# Patient Record
Sex: FEMALE | Race: WHITE | Employment: OTHER | URBAN - METROPOLITAN AREA
[De-identification: names, ages, dates, MRNs, and addresses within clinical notes are randomized per-mention and may not be internally consistent; named-entity substitution may affect disease eponyms.]

---

## 2017-01-10 ENCOUNTER — NURSING HOME VISIT (OUTPATIENT)
Dept: GERIATRICS | Facility: CLINIC | Age: 72
End: 2017-01-10
Payer: COMMERCIAL

## 2017-01-10 VITALS
OXYGEN SATURATION: 93 % | DIASTOLIC BLOOD PRESSURE: 70 MMHG | WEIGHT: 105 LBS | RESPIRATION RATE: 22 BRPM | SYSTOLIC BLOOD PRESSURE: 128 MMHG | HEART RATE: 102 BPM

## 2017-01-10 DIAGNOSIS — J44.1 CHRONIC OBSTRUCTIVE PULMONARY DISEASE WITH ACUTE EXACERBATION (H): Primary | ICD-10-CM

## 2017-01-10 DIAGNOSIS — T38.0X5A STEROID-INDUCED DIABETES MELLITUS (H): ICD-10-CM

## 2017-01-10 DIAGNOSIS — C18.7 MALIGNANT NEOPLASM OF SIGMOID COLON (H): ICD-10-CM

## 2017-01-10 DIAGNOSIS — Z71.89 ADVANCED DIRECTIVES, COUNSELING/DISCUSSION: ICD-10-CM

## 2017-01-10 DIAGNOSIS — I10 BENIGN ESSENTIAL HYPERTENSION: ICD-10-CM

## 2017-01-10 DIAGNOSIS — I50.9 CONGESTIVE HEART FAILURE, UNSPECIFIED CONGESTIVE HEART FAILURE CHRONICITY, UNSPECIFIED CONGESTIVE HEART FAILURE TYPE: ICD-10-CM

## 2017-01-10 DIAGNOSIS — Z71.89 ADVANCE DIRECTIVE DISCUSSED WITH PATIENT: Chronic | ICD-10-CM

## 2017-01-10 DIAGNOSIS — J18.9 PNEUMONIA DUE TO INFECTIOUS ORGANISM, UNSPECIFIED LATERALITY, UNSPECIFIED PART OF LUNG: ICD-10-CM

## 2017-01-10 DIAGNOSIS — E09.9 STEROID-INDUCED DIABETES MELLITUS (H): ICD-10-CM

## 2017-01-10 PROBLEM — R53.1 WEAKNESS: Status: ACTIVE | Noted: 2017-01-04

## 2017-01-10 PROBLEM — D72.829 LEUKOCYTOSIS: Status: ACTIVE | Noted: 2017-01-04

## 2017-01-10 PROBLEM — R06.02 SHORTNESS OF BREATH: Status: ACTIVE | Noted: 2017-01-04

## 2017-01-10 PROBLEM — R73.9 HYPERGLYCEMIA: Status: ACTIVE | Noted: 2017-01-04

## 2017-01-10 PROBLEM — E87.1 HYPONATREMIA: Status: ACTIVE | Noted: 2017-01-04

## 2017-01-10 PROBLEM — I21.4 ACUTE NON-ST ELEVATION MYOCARDIAL INFARCTION (NSTEMI) (H): Status: ACTIVE | Noted: 2017-01-04

## 2017-01-10 PROBLEM — E11.9 TYPE 2 DIABETES MELLITUS (H): Status: ACTIVE | Noted: 2017-01-05

## 2017-01-10 PROBLEM — R79.89 ELEVATED TROPONIN I LEVEL: Status: ACTIVE | Noted: 2017-01-04

## 2017-01-10 PROCEDURE — 99310 SBSQ NF CARE HIGH MDM 45: CPT | Performed by: NURSE PRACTITIONER

## 2017-01-10 PROCEDURE — 99207 ZZC CDG-CORRECTLY CODED, REVIEWED AND AGREE: CPT | Performed by: NURSE PRACTITIONER

## 2017-01-10 RX ORDER — ATORVASTATIN CALCIUM 20 MG/1
20 TABLET, FILM COATED ORAL DAILY
COMMUNITY
End: 2020-01-01

## 2017-01-10 RX ORDER — ALBUTEROL SULFATE 90 UG/1
2 AEROSOL, METERED RESPIRATORY (INHALATION) 4 TIMES DAILY PRN
COMMUNITY
End: 2017-03-07

## 2017-01-10 RX ORDER — MULTIVITAMIN WITH IRON
1 TABLET ORAL 2 TIMES DAILY
Status: ON HOLD | COMMUNITY
End: 2021-01-01

## 2017-01-10 RX ORDER — CITALOPRAM HYDROBROMIDE 40 MG/1
40 TABLET ORAL DAILY
COMMUNITY

## 2017-01-10 RX ORDER — IPRATROPIUM BROMIDE AND ALBUTEROL SULFATE 2.5; .5 MG/3ML; MG/3ML
1 SOLUTION RESPIRATORY (INHALATION) 4 TIMES DAILY
COMMUNITY
End: 2017-02-28

## 2017-01-10 NOTE — PROGRESS NOTES
[facility]  PRIMARY CARE PROVIDER AND CLINIC:  No primary care provider on file. No primary physician on file.  Chief Complaint   Patient presents with     Clinic Care Coordination - Initial     Hospital F/U       HPI:    Bella Saucedo is a 71 year old  (1945),admitted to the [facility]  from [facility] .  Hospital stay 1/4/17  through 1/9/17.  Admitted to this facility for  rehab, medical management and nursing care. Current issues are:      There are no diagnoses linked to this encounter.     CODE STATUS/ADVANCE DIRECTIVES DISCUSSION:   {CODE STATUS:472595}  Patient's living condition: {LIVES WITH (NURSING HOME):705073}    ALLERGIES:Nitroglycerin  PAST MEDICAL HISTORY:  has no past medical history on file.  PAST SURGICAL HISTORY:  has no past surgical history on file.  FAMILY HISTORY: family history is not on file.  SOCIAL HISTORY:      Post Discharge Medication Reconciliation Status: {ACO Med Rec (Provider):415028}.  Current Outpatient Prescriptions   Medication Sig Dispense Refill     ACETAMINOPHEN PO Take 80 mg by mouth daily       ASPIRIN PO Take 81 mg by mouth daily       Citalopram Hydrobromide (CELEXA PO) Take 20 mg by mouth daily       Atorvastatin Calcium (LIPITOR PO) Take 20 mg by mouth daily       [START ON 1/25/2017] DEXAMETHASONE PO Take 4 mg by mouth 2 times daily       DEXAMETHASONE PO Take 6 mg by mouth daily       [START ON 1/17/2017] DEXAMETHASONE PO Take 5 mg by mouth daily       fluticasone-salmeterol (ADVAIR) 250-50 MCG/DOSE diskus inhaler Inhale 1 puff into the lungs 2 times daily       magnesium 250 MG tablet Take 1 tablet by mouth 2 times daily       METOPROLOL TARTRATE PO Take 12.5 mg by mouth 2 times daily       ipratropium - albuterol 0.5 mg/2.5 mg/3 mL (DUONEB) 0.5-2.5 (3) MG/3ML neb solution Take 1 vial by nebulization 4 times daily       Insulin Aspart (NOVOLOG SC) Inject as per sliding scale:  if 0 - 149 = 0  Unit;  150 - 199 = 1 Unit;  200 - 249 = 2 Unit;  250 - 299 = 3 unit;  300 - 349 = 4 unit;  350 - 999 = 5 unit,  subcutaneously before meals and  at bedtime for hyperglycemia       albuterol (PROAIR HFA/PROVENTIL HFA/VENTOLIN HFA) 108 (90 BASE) MCG/ACT Inhaler Inhale 2 puffs into the lungs 4 times daily as needed for shortness of breath / dyspnea or wheezing       LevoFLOXacin (LEVAQUIN PO) Take 750 mg by mouth every other day         Medications reconciled to facility chart and changes were made to reflect current medications as identified as above med list. Below are the changes that were made:   Medications stopped since last EPIC medication reconciliation:   There are no discontinued medications.    Medications started since last [facility] medication reconciliation:  Orders Placed This Encounter   Medications     ACETAMINOPHEN PO     Sig: Take 80 mg by mouth daily     ASPIRIN PO     Sig: Take 81 mg by mouth daily     Citalopram Hydrobromide (CELEXA PO)     Sig: Take 20 mg by mouth daily     Atorvastatin Calcium (LIPITOR PO)     Sig: Take 20 mg by mouth daily     DEXAMETHASONE PO     Sig: Take 4 mg by mouth 2 times daily     DEXAMETHASONE PO     Sig: Take 6 mg by mouth daily     DEXAMETHASONE PO     Sig: Take 5 mg by mouth daily     fluticasone-salmeterol (ADVAIR) 250-50 MCG/DOSE diskus inhaler     Sig: Inhale 1 puff into the lungs 2 times daily     magnesium 250 MG tablet     Sig: Take 1 tablet by mouth 2 times daily     METOPROLOL TARTRATE PO     Sig: Take 12.5 mg by mouth 2 times daily     ipratropium - albuterol 0.5 mg/2.5 mg/3 mL (DUONEB) 0.5-2.5 (3) MG/3ML neb solution     Sig: Take 1 vial by nebulization 4 times daily     Insulin Aspart (NOVOLOG SC)     Sig: Inject as per sliding scale:  if 0 - 149 = 0 Unit;  150 - 199 = 1 Unit;  200 - 249 = 2 Unit;  250 - 299 = 3 unit;  300 - 349 = 4 unit;  350 - 999 = 5 unit,  subcutaneously before meals and  at bedtime for hyperglycemia     albuterol (PROAIR HFA/PROVENTIL  HFA/VENTOLIN HFA) 108 (90 BASE) MCG/ACT Inhaler     Sig: Inhale 2 puffs into the lungs 4 times daily as needed for shortness of breath / dyspnea or wheezing     LevoFLOXacin (LEVAQUIN PO)     Sig: Take 750 mg by mouth every other day     ***        ROS:  {wogmmt61:883603}    Exam:  /70 mmHg  Pulse 102  Resp 22  Wt 105 lb (47.628 kg)  SpO2 93%  {Nursing home physical exam :871788}    GENERAL APPEARANCE:  Alert, in *** distress  HEAD:  Normal, normocephalic, atraumatic  EYE EXAM: normal external eye, conjunctiva, lids, WILBER  NECK EXAM: supple, ***, no JVD  CHEST/RESP:  respiratory effort *** normal, lung sounds *** , *** respiratory distress  CV:  Rate ***, rhythm ***, ***murmur, *** peripheral edema ***  GI/ABDOMEN:  ***normal bowel sounds, soft, nontender, no palpable masses  M/S:   extremities *** normal, gait *** normal-***, normal muscle tone, and range of motion ***  SKIN EXAM: ***  NEUROLOGIC EXAM: Normal gross motor movement, tone and coordination. No tremor.  Cranial nerves 2-12 are normal tested and grossly at patient's baseline  PSYCH:  Alert and oriented to ***, affect ***, judgement ***    Lab/Diagnostic data:   ***  No results found for: WBC]  No results found for: HGB]  Last Basic Metabolic Panel:  No results found for this basename: na   No results found for this basename: potassium  No results found for this basename: ed  No results found for this basename: co2  No results found for this basename: BUN  No results found for this basename: CR  No results found for this basename: glc    ASSESSMENT/PLAN:  There are no diagnoses linked to this encounter.      Orders:  ***    Information reviewed:  Medications, vital signs, orders, nursing notes, problem list, hospital information. Facility MDS and care plan reviewed. ***  Total time spent with patient visit was 35 min including patient visit, review of past records and ***. Greater than 50% of total time spent with counseling and coordinating  care.    Electronically signed by:  Sarah Reynolds CNP   [facility]  [phone] cell

## 2017-01-10 NOTE — PROGRESS NOTES
"[place]  PRIMARY CARE PROVIDER AND CLINIC:  No primary care provider on file. No primary physician on file.  Chief Complaint   Patient presents with     Clinic Care Coordination - Initial     Hospital F/U       HPI:    Bella Saucedo is a 71 year old  (1945),admitted to the [facility]  from [facility] .  Hospital stay 1/4/17  through 1/9/17.  Admitted to this facility for  rehab, medical management and nursing care. Current issues are:       As copied from hospital DC Summary:   \"Bella Saucedo is a 71 y.o. female with a history of COPD on home oxygen 2L/min, colon cancer s/p hemicolectomy and ostomy (had chemotherapy in 2016 - treated with Xeloda for 6 months with completion date in 11/2016 and currently on Decadron 2 mg BID for poor appetite, started by Oncologist), HTN, prediabetes, subarachnoid hemorrhage/coil embolization of right MCA, and diastolic CHF who was transferred to [facility] from [facility] on 01/04/2017 for further evaluation of shortness of breath on minimal exertion and increased fatigue with no associated chest pain. Originally presented to [place] ED and was found to have leukocytosis (WBC 19), elevated troponin (initially 0.093), EKG without acute ST changes, chest XR negative for acute infiltrate, CT C/A/P negative for PE and pulmonary infiltrate. Second troponin elevated to 0.155. Was then transferred to [facility] for further evaluation and management.      On admission to [facility], ASA started and heparin gtt continued. Cardiology consulted; echo 1/5 showed decreased LV size (LVEF 65-70%), increased RV wall thickness, aortic valve sclerosis. CTA report with moderate stenosis and significant coronary plaque (Ca score 1416), however without severe obstruction. Cardiology recommended continued risk factor treatment and signed off 1/5.     CT chest angio 1/5 with new nodular opacities in RLL, likely infectious/inflammatory in nature along with " bilateral emphysematous changes. She was started on a 7 day levaquin regimen; a follow up CT is advised in 6 weeks. Patient discharged to SNF on 1/9 with duonebs, advair, aforementioned abx, statin, asa, and increased decadron.     COMPLICATIONS DURING HOSPITALIZATION   None    CONSULTATIONS REQUESTED DURING THIS ADMISSION     Cardiology: Malick Barrios MD    PERTINENT IMAGING RESULTS   CT Angio chest 1/5/17:  1. No evidence for pulmonary embolism. Bilateral emphysematous changes most pronounced at the lung apices.   2. Ill-defined nodular opacities in the subpleural right lower lobe, which are new compared to prior examination and are likely infectious/inflammatory in nature.     Echo complete w/o 1/05/17   1. Technically limited exam.   2. Decreased LV size, borderline wall thickness, hyperdynamic global systolic function with an estimated EF of 65 - 70%. There is evididence of intra-cavitary obstruction.   3. Mildly increased RV wall thickness.   4. Right ventricular cavity size is normal, global systolic RV function is normal.   5. The aortic valve is sclerotic, no stenosis and mild regurgitation.   6. The inferior vena cava is normal sized, respiratory size variation greater than 50%.   7. The mitral valve is not well visualized, mild to moderate mitral regurgitation.     CT coronary arteries 1/5/17   - Moderate stenosis only, no severe stenosis noted.   - Extensive coronary calcific plaque noted in small vessels. Calcium score 1416.5, placing her in the 98th percentile for women her age.  - Please refer to radiology report for pulmonary angiographic results.      CT C/A/P 1/03/17 - OSH  1. Interval decrease in size of a pulmonary nodule in the left lower lobe now measuring 0.4 cm compared to 0.9 cm on the previous study.   2. Marked emphysematous changes in the lungs.   3. Stable hemangioma in the dome of the liver.   4. Status post left hemicolectomy with a left lower quadrant colostomy.      XR Chest  "1/04/17 - OSH  Stable chest x-ray. Marked emphysematous changes in the lungs. No evidence of acute pulmonary disease.\"      Chronic obstructive pulmonary disease with acute exacerbation (H)  On chronic O2 at home, now using nebs, advair. She reports no new dyspnea and reports feeling well today.     Pneumonia due to infectious organism, unspecified laterality, unspecified part of lung  Completing course of levaquin, qod dosing suggestive of renal impairment. Will check creat.     Congestive heart failure, unspecified congestive heart failure chronicity, unspecified congestive heart failure type (H)  Chronic, no new dyspnea, feels well.     Type 2 diabetes mellitus (H)  On low dose sliding scale, apparently steroid induced as she is on a long course taper of decadron.    Benign essential hypertension  On metoprolol, no headaches, no dizziness.     Malignant neoplasm of sigmoid colon (H)  Completed chemo therapy in November. Has colostomy and generally changes the appliance 2x per week. Has been managing this herself.         CODE STATUS/ADVANCE DIRECTIVES DISCUSSION:   CPR/Full code   Patient's living condition: lives with spouse    ALLERGIES:Nitroglycerin  PAST MEDICAL HISTORY:  has no past medical history on file.  PAST SURGICAL HISTORY:  has no past surgical history on file.  FAMILY HISTORY: family history is not on file.  SOCIAL HISTORY:      Post Discharge Medication Reconciliation Status: discharge medications reconciled and changed, per note/orders (see AVS).  Current Outpatient Prescriptions   Medication Sig Dispense Refill     ACETAMINOPHEN PO Take 80 mg by mouth daily       ASPIRIN PO Take 81 mg by mouth daily       Citalopram Hydrobromide (CELEXA PO) Take 20 mg by mouth daily       Atorvastatin Calcium (LIPITOR PO) Take 20 mg by mouth daily       [START ON 1/25/2017] DEXAMETHASONE PO Take 4 mg by mouth 2 times daily       DEXAMETHASONE PO Take 6 mg by mouth daily       [START ON 1/17/2017] DEXAMETHASONE PO " Take 5 mg by mouth daily       fluticasone-salmeterol (ADVAIR) 250-50 MCG/DOSE diskus inhaler Inhale 1 puff into the lungs 2 times daily       magnesium 250 MG tablet Take 1 tablet by mouth 2 times daily       METOPROLOL TARTRATE PO Take 12.5 mg by mouth 2 times daily       ipratropium - albuterol 0.5 mg/2.5 mg/3 mL (DUONEB) 0.5-2.5 (3) MG/3ML neb solution Take 1 vial by nebulization 4 times daily       Insulin Aspart (NOVOLOG SC) Inject as per sliding scale:  if 0 - 149 = 0 Unit;  150 - 199 = 1 Unit;  200 - 249 = 2 Unit;  250 - 299 = 3 unit;  300 - 349 = 4 unit;  350 - 999 = 5 unit,  subcutaneously before meals and  at bedtime for hyperglycemia       albuterol (PROAIR HFA/PROVENTIL HFA/VENTOLIN HFA) 108 (90 BASE) MCG/ACT Inhaler Inhale 2 puffs into the lungs 4 times daily as needed for shortness of breath / dyspnea or wheezing       LevoFLOXacin (LEVAQUIN PO) Take 750 mg by mouth every other day         Medications reconciled to facility chart and changes were made to reflect current medications as identified as above med list. Below are the changes that were made:   Medications stopped since last EPIC medication reconciliation:   There are no discontinued medications.    Medications started since last [facility] medication reconciliation:  Orders Placed This Encounter   Medications     ACETAMINOPHEN PO     Sig: Take 80 mg by mouth daily     ASPIRIN PO     Sig: Take 81 mg by mouth daily     Citalopram Hydrobromide (CELEXA PO)     Sig: Take 20 mg by mouth daily     Atorvastatin Calcium (LIPITOR PO)     Sig: Take 20 mg by mouth daily     DEXAMETHASONE PO     Sig: Take 4 mg by mouth 2 times daily     DEXAMETHASONE PO     Sig: Take 6 mg by mouth daily     DEXAMETHASONE PO     Sig: Take 5 mg by mouth daily     fluticasone-salmeterol (ADVAIR) 250-50 MCG/DOSE diskus inhaler     Sig: Inhale 1 puff into the lungs 2 times daily     magnesium 250 MG tablet     Sig: Take 1 tablet by mouth 2 times daily     METOPROLOL TARTRATE PO      Sig: Take 12.5 mg by mouth 2 times daily     ipratropium - albuterol 0.5 mg/2.5 mg/3 mL (DUONEB) 0.5-2.5 (3) MG/3ML neb solution     Sig: Take 1 vial by nebulization 4 times daily     Insulin Aspart (NOVOLOG SC)     Sig: Inject as per sliding scale:  if 0 - 149 = 0 Unit;  150 - 199 = 1 Unit;  200 - 249 = 2 Unit;  250 - 299 = 3 unit;  300 - 349 = 4 unit;  350 - 999 = 5 unit,  subcutaneously before meals and  at bedtime for hyperglycemia     albuterol (PROAIR HFA/PROVENTIL HFA/VENTOLIN HFA) 108 (90 BASE) MCG/ACT Inhaler     Sig: Inhale 2 puffs into the lungs 4 times daily as needed for shortness of breath / dyspnea or wheezing     LevoFLOXacin (LEVAQUIN PO)     Sig: Take 750 mg by mouth every other day     ROS:  Bella Saucedo complains of no pain, no chest pain or pressure, no shortness of breath. Sleep is good, appetite good. Bella Saucedo does not complain of bowel changes, colostomy. S/he does not complain of bladder changes, continent.  All other systems reviewed and are negative unless otherwise noted in HPI.     Exam:  /70 mmHg  Pulse 102  Resp 22  Wt 105 lb (47.628 kg)  SpO2 93%  GENERAL APPEARANCE:  Alert, in no distress  HEAD:  Normal, normocephalic, atraumatic  EYE EXAM: normal external eye, conjunctiva, lids, WILBER  NECK EXAM: supple, no JVD  CHEST/RESP:  respiratory effort normal, lung sounds diminished but clear , no respiratory distress  CV:  Rate reg, rhythm reg, no murmur, no peripheral edema   M/S:   extremities normal, gait abnormal-nonambulatory at this time-being evaluated by PT today, normal muscle tone  NEUROLOGIC EXAM: Normal gross motor movement, tone and coordination. No tremor.  Cranial nerves 2-12 are normal tested and grossly at patient's baseline  PSYCH:  Alert and oriented to self and surroundings with forgetfulness , affect pleasant, judgement appropriate     Lab/Diagnostic data:   Recent Labs   01/04-09/17  SODIUM 137 134 L -- 129 L   POTASSIUM 4.6 -- -- 4.1   CHLORIDE 96 L  -- -- 92 L   [id number] 34 H -- -- 26   BUN 24 -- 24 23   CREATININE 0.58 -- 0.61 0.61   CALCIUM 9.4 -- -- 8.6   GLUCOSE 167 H -- -- 174 H     Recent Labs   01/07/17  0613 01/06/17  0515 01/05/17  0600 01/04/17  1010   WBC -- -- 12.6 H -- 19.1 H   RBC -- -- -- -- 5.00   HGB 14.9 16.0 16.6 H < > 16.5 H   HCT 43.5 46.6 47.9 < > 48.2   MCV 99 98 97 < > 96   MCH -- -- -- -- 33.0   MCHC -- -- -- -- 34.2    175 189 < > 217   MPV 9.4 9.7 9.3 < > 8.8   < > = values in this interval not displayed.     Recent Labs   01/07/17  0613 01/06/17  0515 01/05/17  2312 01/04/17  2125 01/04/17  1010   INR -- -- -- -- 1.0 0.9    175 -- < > -- 217   PTT -- 82 H 82 H < > 41 H --   < > = values in this interval not displayed.         ASSESSMENT/PLAN:  Chronic obstructive pulmonary disease with acute exacerbation (H)  Pneumonia due to infectious organism, unspecified laterality, unspecified part of lung  Congestive heart failure, unspecified congestive heart failure chronicity, unspecified congestive heart failure type (H)  On antibiotics and nebs, feeling better. Needs therapy for strengthening, goal to return home. The current medical regimen is effective;  continue present plan and medications.     Steroid induced diabetes mellitus (H)  Monitor blood sugars, sliding scale novolog for now.     Benign essential hypertension  On metoprolol. Monitor BP/P    Malignant neoplasm of sigmoid colon (H)  Stable, manages colostomy her self at home.     Advanced directives, counseling/discussion  POLST signed.   - Full Code         Orders:  1.  PT/OT to eval and treat for weakness, COPD exacerbation.  2.  Colostomy bag change Thurs and Sat and PRN if loose/leaking.  3.  Daily weights.  4.  Lab draw 1/12/17 to include:    Magnesium dx:  Hypomagnesemia,   BMP dx:  HTN,   CBC dx:  Pneumonia      Information reviewed:  Medications, vital signs, orders, nursing notes, problem list, hospital information. Facility MDS and care plan reviewed.    Total time spent with patient visit was 35 min including patient visit, review of past records. Greater than 50% of total time spent with counseling and coordinating care.    Electronically signed by:  Sarah Reynolds CNP   [facility]  [phone] cell

## 2017-01-11 PROBLEM — T38.0X5A STEROID-INDUCED DIABETES MELLITUS (H): Status: ACTIVE | Noted: 2017-01-05

## 2017-01-11 PROBLEM — I10 BENIGN ESSENTIAL HYPERTENSION: Status: ACTIVE | Noted: 2017-01-11

## 2017-01-11 PROBLEM — J18.9 PNEUMONIA DUE TO INFECTIOUS ORGANISM, UNSPECIFIED LATERALITY, UNSPECIFIED PART OF LUNG: Status: ACTIVE | Noted: 2017-01-11

## 2017-01-11 PROBLEM — Z71.89 ADVANCED DIRECTIVES, COUNSELING/DISCUSSION: Status: ACTIVE | Noted: 2017-01-11

## 2017-01-11 PROBLEM — R73.9 HYPERGLYCEMIA: Status: RESOLVED | Noted: 2017-01-04 | Resolved: 2017-01-11

## 2017-01-11 PROBLEM — D72.829 LEUKOCYTOSIS: Status: RESOLVED | Noted: 2017-01-04 | Resolved: 2017-01-11

## 2017-01-11 PROBLEM — E09.9 STEROID-INDUCED DIABETES MELLITUS (H): Status: ACTIVE | Noted: 2017-01-05

## 2017-01-12 ENCOUNTER — HOSPITAL LABORATORY (OUTPATIENT)
Facility: OTHER | Age: 72
End: 2017-01-12

## 2017-01-12 LAB
ANION GAP SERPL CALCULATED.3IONS-SCNC: 6 MMOL/L (ref 3–14)
BUN SERPL-MCNC: 20 MG/DL (ref 7–30)
CALCIUM SERPL-MCNC: 8.8 MG/DL (ref 8.5–10.1)
CHLORIDE SERPL-SCNC: 97 MMOL/L (ref 94–109)
CO2 SERPL-SCNC: 33 MMOL/L (ref 20–32)
CREAT SERPL-MCNC: 0.45 MG/DL (ref 0.52–1.04)
ERYTHROCYTE [DISTWIDTH] IN BLOOD BY AUTOMATED COUNT: 13.3 % (ref 10–15)
GFR SERPL CREATININE-BSD FRML MDRD: ABNORMAL ML/MIN/1.7M2
GLUCOSE SERPL-MCNC: 189 MG/DL (ref 70–99)
HCT VFR BLD AUTO: 45.4 % (ref 35–47)
HGB BLD-MCNC: 15.3 G/DL (ref 11.7–15.7)
MAGNESIUM SERPL-MCNC: 2.4 MG/DL (ref 1.6–2.3)
MCH RBC QN AUTO: 32.7 PG (ref 26.5–33)
MCHC RBC AUTO-ENTMCNC: 33.7 G/DL (ref 31.5–36.5)
MCV RBC AUTO: 97 FL (ref 78–100)
PLATELET # BLD AUTO: 224 10E9/L (ref 150–450)
POTASSIUM SERPL-SCNC: 4.4 MMOL/L (ref 3.4–5.3)
RBC # BLD AUTO: 4.68 10E12/L (ref 3.8–5.2)
SODIUM SERPL-SCNC: 136 MMOL/L (ref 133–144)
WBC # BLD AUTO: 9.6 10E9/L (ref 4–11)

## 2017-01-17 ENCOUNTER — NURSING HOME VISIT (OUTPATIENT)
Dept: GERIATRICS | Facility: CLINIC | Age: 72
End: 2017-01-17
Payer: COMMERCIAL

## 2017-01-17 VITALS
HEART RATE: 83 BPM | WEIGHT: 109.6 LBS | TEMPERATURE: 97.7 F | RESPIRATION RATE: 20 BRPM | DIASTOLIC BLOOD PRESSURE: 57 MMHG | OXYGEN SATURATION: 96 % | SYSTOLIC BLOOD PRESSURE: 112 MMHG

## 2017-01-17 DIAGNOSIS — E09.9 STEROID-INDUCED DIABETES MELLITUS (H): ICD-10-CM

## 2017-01-17 DIAGNOSIS — T38.0X5A STEROID-INDUCED DIABETES MELLITUS (H): ICD-10-CM

## 2017-01-17 DIAGNOSIS — I50.9 CONGESTIVE HEART FAILURE, UNSPECIFIED CONGESTIVE HEART FAILURE CHRONICITY, UNSPECIFIED CONGESTIVE HEART FAILURE TYPE: ICD-10-CM

## 2017-01-17 DIAGNOSIS — J44.1 CHRONIC OBSTRUCTIVE PULMONARY DISEASE WITH ACUTE EXACERBATION (H): Primary | ICD-10-CM

## 2017-01-17 PROCEDURE — 99309 SBSQ NF CARE MODERATE MDM 30: CPT | Performed by: NURSE PRACTITIONER

## 2017-01-17 PROCEDURE — 99207 ZZC CDG-CORRECTLY CODED, REVIEWED AND AGREE: CPT | Performed by: NURSE PRACTITIONER

## 2017-01-17 NOTE — PROGRESS NOTES
[facility]    Chief Complaint   Patient presents with     Nursing Home Acute       HPI:    Bella Saucedo is a 71 year old  (1945), who is being seen today for an episodic care visit at [facility] . Today's concern is:  Chronic obstructive pulmonary disease with acute exacerbation (H)  She reports she is feeling much better with minimal dyspnea, no cough. She reports she is feeling much stronger    Steroid-induced diabetes mellitus (H)  On dexamethasone at 4 mg BID for appetite enhancement. Blood sugars trending as noted below.   Blood Sugar Summary  01/17/2017 07:15 98 mg/dL  01/16/2017 20:54 196 mg/dL  01/16/2017 19:22 196 mg/dL  01/16/2017 16:47 196 mg/dL  01/16/2017 10:55 190 mg/dL    Congestive heart failure, unspecified congestive heart failure chronicity, unspecified congestive heart failure type (H)  No new swelling, no cough, no congestion. On no diuretics.        ALLERGIES: Nitroglycerin  Past Medical, Surgical, Family and Social History reviewed and updated in [facility].    Current Outpatient Prescriptions   Medication Sig Dispense Refill     lidocaine (XYLOCAINE) 2 % solution Take by mouth 4 times daily as needed for moderate pain swish and spit every 3-8 hours as needed; max 8 doses/24 hour period       ASPIRIN PO Take 81 mg by mouth daily       Citalopram Hydrobromide (CELEXA PO) Take 20 mg by mouth daily       Atorvastatin Calcium (LIPITOR PO) Take 20 mg by mouth daily       [START ON 1/25/2017] DEXAMETHASONE PO Take 4 mg by mouth 2 times daily       DEXAMETHASONE PO Take 5 mg by mouth daily       fluticasone-salmeterol (ADVAIR) 250-50 MCG/DOSE diskus inhaler Inhale 1 puff into the lungs 2 times daily       magnesium 250 MG tablet Take 1 tablet by mouth daily        METOPROLOL TARTRATE PO Take 12.5 mg by mouth 2 times daily       ipratropium - albuterol 0.5 mg/2.5 mg/3 mL (DUONEB) 0.5-2.5 (3) MG/3ML neb solution Take 1 vial by nebulization 4 times daily       Insulin  Aspart (NOVOLOG SC) Inject as per sliding scale:  if 0 - 149 = 0 Unit;  150 - 199 = 1 Unit;  200 - 249 = 2 Unit;  250 - 299 = 3 unit;  300 - 349 = 4 unit;  350 - 999 = 5 unit,  subcutaneously before meals and  at bedtime for hyperglycemia       albuterol (PROAIR HFA/PROVENTIL HFA/VENTOLIN HFA) 108 (90 BASE) MCG/ACT Inhaler Inhale 2 puffs into the lungs 4 times daily as needed for shortness of breath / dyspnea or wheezing       DEXAMETHASONE PO Take 6 mg by mouth daily         Medications reconciled to facility chart and changes were made to reflect current medications as identified as above med list. Below are the changes that were made:   Medications stopped since last EPIC medication reconciliation:   Medications Discontinued During This Encounter   Medication Reason     ACETAMINOPHEN PO        Medications started since last [facility] medication reconciliation:  Orders Placed This Encounter   Medications     lidocaine (XYLOCAINE) 2 % solution     Sig: Take by mouth 4 times daily as needed for moderate pain swish and spit every 3-8 hours as needed; max 8 doses/24 hour period       REVIEW OF SYSTEMS:  Bella Saucedo complains of sharp pain, located in her mouth-feels as if she has a canker sore, no chest pain or pressure, no shortness of breath. Sleep is good, appetite good. Bella Saucedo does not complain of bowel changes. S/he does not complain of bladder changes.  All other systems reviewed and are negative unless otherwise noted in HPI.     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   CHEST/RESP:  respiratory effort normal, lung sounds CTA , no respiratory distress  CV:  Rate reg, rhythm reg, no  murmur, no peripheral edema   M/S:   extremities normal, gait normal-with rolling walker about 200 ft, normal muscle tone, and range of motion normal   SKIN EXAM: CDI  NEUROLOGIC EXAM: Normal gross motor movement, tone and coordination. No tremor.  Cranial nerves 2-12 are normal tested and grossly at patient's baseline  PSYCH:  Alert and oriented to person-place-time, affect pleasant, judgement appropriate     RECENT LABS:    Hospital Laboratory on 01/12/2017   Component Date Value Ref Range Status     Sodium 01/12/2017 136  133 - 144 mmol/L Final     Potassium 01/12/2017 4.4  3.4 - 5.3 mmol/L Final     Chloride 01/12/2017 97  94 - 109 mmol/L Final     Carbon Dioxide 01/12/2017 33* 20 - 32 mmol/L Final     Anion Gap 01/12/2017 6  3 - 14 mmol/L Final     Glucose 01/12/2017 189* 70 - 99 mg/dL Final     Urea Nitrogen 01/12/2017 20  7 - 30 mg/dL Final     Creatinine 01/12/2017 0.45* 0.52 - 1.04 mg/dL Final     GFR Estimate 01/12/2017   >60 mL/min/1.7m2 Final                    Value:>90  Non  GFR Calc       GFR Estimate If Black 01/12/2017   >60 mL/min/1.7m2 Final                    Value:>90   GFR Calc       Calcium 01/12/2017 8.8  8.5 - 10.1 mg/dL Final     WBC 01/12/2017 9.6  4.0 - 11.0 10e9/L Final     RBC Count 01/12/2017 4.68  3.8 - 5.2 10e12/L Final     Hemoglobin 01/12/2017 15.3  11.7 - 15.7 g/dL Final     Hematocrit 01/12/2017 45.4  35.0 - 47.0 % Final     MCV 01/12/2017 97  78 - 100 fl Final     MCH 01/12/2017 32.7  26.5 - 33.0 pg Final     MCHC 01/12/2017 33.7  31.5 - 36.5 g/dL Final     RDW 01/12/2017 13.3  10.0 - 15.0 % Final     Platelet Count 01/12/2017 224  150 - 450 10e9/L Final     Magnesium 01/12/2017 2.4* 1.6 - 2.3 mg/dL Final         ASSESSMENT/PLAN:  Chronic obstructive pulmonary disease with acute exacerbation (H)  Chronic, compensated with recent exacerbation. no smoking right now,  QID nebulizer use. On advair, duoneb, prn albuterol inhaler.  The current medical  regimen is effective;  continue present plan and medications.      Steroid-induced diabetes mellitus (H)  Blood sugars still trending a bit high, using dexamethasone BID. Using 3-5 u insulin per day     Congestive heart failure, unspecified congestive heart failure chronicity, unspecified congestive heart failure type (H)  Compensated, minimal dyspnea, no edema. Monitor.        ORDERS:  1.  DC Tylenol 80mg-no need.  2.  Viscous Lidocain 2% QID and PRN-swish/paint on sore spot in mouth until resolved.  Dx:  Sore mouth.  3.  DC daily weights.  4.  Weekly weights per facility protocol.  5.  DC Magnesium 250mg BID.  6.  Magnesium 250mg PO daily.  Dx:  Low magnesium levels.  7.  Please start med teaching for discharge.    Goal to discharge to home next week       Total time spent with patient visit was 25 min including patient visit and review of past records   Greater than 50% of total time spent with counseling and coordinating care    Electronically signed by  Sarah Reynolds CNP   [facility]  [phone] cell

## 2017-01-24 ENCOUNTER — NURSING HOME VISIT (OUTPATIENT)
Dept: GERIATRICS | Facility: CLINIC | Age: 72
End: 2017-01-24
Payer: COMMERCIAL

## 2017-01-24 VITALS
SYSTOLIC BLOOD PRESSURE: 123 MMHG | HEART RATE: 78 BPM | WEIGHT: 110.6 LBS | DIASTOLIC BLOOD PRESSURE: 55 MMHG | TEMPERATURE: 98.5 F | RESPIRATION RATE: 20 BRPM | OXYGEN SATURATION: 98 %

## 2017-01-24 DIAGNOSIS — J44.1 CHRONIC OBSTRUCTIVE PULMONARY DISEASE WITH ACUTE EXACERBATION (H): ICD-10-CM

## 2017-01-24 DIAGNOSIS — T38.0X5A STEROID-INDUCED DIABETES MELLITUS (H): Primary | ICD-10-CM

## 2017-01-24 DIAGNOSIS — E09.9 STEROID-INDUCED DIABETES MELLITUS (H): Primary | ICD-10-CM

## 2017-01-24 PROCEDURE — 99309 SBSQ NF CARE MODERATE MDM 30: CPT | Performed by: NURSE PRACTITIONER

## 2017-01-24 PROCEDURE — 99207 ZZC CDG-CORRECTLY CODED, REVIEWED AND AGREE: CPT | Performed by: NURSE PRACTITIONER

## 2017-01-24 RX ORDER — FUROSEMIDE 40 MG
20 TABLET ORAL DAILY
COMMUNITY
End: 2021-01-01

## 2017-01-24 NOTE — PROGRESS NOTES
[facility]    Chief Complaint   Patient presents with     Nursing Home Acute       HPI:    Bella Saucedo is a 71 year old  (1945), who is being seen today for an episodic care visit at [facility] . Today's concern is:  Diabetes mellitus, type 2 (H)  Continues to have high blood sugar trending 179-379 fasting. On sliding scale novolog. Continues to be on dexamethasone, thought to be steroid induced hyperglycemia  Blood Sugar Summary  01/23/2017 19:17 520 mg/dL  01/23/2017 19:15 520 mg/dL  01/23/2017 17:34 314 mg/dL  01/23/2017 10:42 455 mg/dL  01/23/2017 10:35 455 mg/dL    Chronic obstructive pulmonary disease with acute exacerbation (H)  No new concerns. Remains O2 dep and on nebs qid. No cough, no congestion.        ALLERGIES: Nitroglycerin  Past Medical, Surgical, Family and Social History reviewed and updated in [facility].    Current Outpatient Prescriptions   Medication Sig Dispense Refill     GUAIFENESIN PO Take 2 tsp. by mouth every 6 hours as needed       [START ON 1/27/2017] DEXAMETHASONE PO Take 2 mg by mouth daily       Furosemide (LASIX PO) Take 20 mg by mouth daily       POTASSIUM CHLORIDE PO Take 10 mEq by mouth daily       lidocaine (XYLOCAINE) 2 % solution Take by mouth 4 times daily as needed for moderate pain swish and spit every 3-8 hours as needed; max 8 doses/24 hour period       ASPIRIN PO Take 81 mg by mouth daily       Citalopram Hydrobromide (CELEXA PO) Take 20 mg by mouth daily       Atorvastatin Calcium (LIPITOR PO) Take 20 mg by mouth daily       DEXAMETHASONE PO Take 4 mg by mouth daily        fluticasone-salmeterol (ADVAIR) 250-50 MCG/DOSE diskus inhaler Inhale 1 puff into the lungs 2 times daily       magnesium 250 MG tablet Take 1 tablet by mouth daily        METOPROLOL TARTRATE PO Take 12.5 mg by mouth 2 times daily       ipratropium - albuterol 0.5 mg/2.5 mg/3 mL (DUONEB) 0.5-2.5 (3) MG/3ML neb solution Take 1 vial by nebulization 4 times daily        Insulin Aspart (NOVOLOG SC) Inject as per sliding scale:  if 0 - 149 = 0 Unit;  150 - 199 = 1 Unit;  200 - 249 = 2 Unit;  250 - 299 = 3 unit;  300 - 349 = 4 unit;  350 - 999 = 5 unit,  subcutaneously before meals and  at bedtime for hyperglycemia       albuterol (PROAIR HFA/PROVENTIL HFA/VENTOLIN HFA) 108 (90 BASE) MCG/ACT Inhaler Inhale 2 puffs into the lungs 4 times daily as needed for shortness of breath / dyspnea or wheezing       [DISCONTINUED] DEXAMETHASONE PO Take 5 mg by mouth daily         Medications reconciled to facility chart and changes were made to reflect current medications as identified as above med list. Below are the changes that were made:   Medications stopped since last EPIC medication reconciliation:   Medications Discontinued During This Encounter   Medication Reason     DEXAMETHASONE PO        Medications started since last [facility] medication reconciliation:  Orders Placed This Encounter   Medications     GUAIFENESIN PO     Sig: Take 2 tsp. by mouth every 6 hours as needed     DEXAMETHASONE PO     Sig: Take 2 mg by mouth daily     Furosemide (LASIX PO)     Sig: Take 20 mg by mouth daily     POTASSIUM CHLORIDE PO     Sig: Take 10 mEq by mouth daily       REVIEW OF SYSTEMS:  10 point ROS of systems including Constitutional, Eyes, Respiratory, Cardiovascular, Gastroenterology, Genitourinary, Integumentary, Muscularskeletal, Psychiatric were all negative except for pertinent positives noted in my HPI.    PHYSICAL EXAM:  /55 mmHg  Pulse 78  Temp(Src) 98.5  F (36.9  C)  Resp 20  Wt 110 lb 9.6 oz (50.168 kg)  SpO2 98%  GENERAL APPEARANCE:  Alert, in no acute distress  HEAD:  Normal, normocephalic, atraumatic  EYE EXAM: normal external eye, conjunctiva, lids, WILBER  NECK EXAM: supple, no JVD  CHEST/RESP:  respiratory effort normal, lung sounds CTA , no respiratory distress  CV:  Rate reg, rhythm reg, no murmur, 2+ peripheral edema to bilateral feet  M/S:   extremities normal, gait  normal-with rolling walker , normal muscle tone, and range of motion normal   NEUROLOGIC EXAM: Normal gross motor movement, tone and coordination. No tremor.  Cranial nerves 2-12 are normal tested and grossly at patient's baseline  PSYCH:  Alert and oriented to person-place-time , affect pleasant, judgement appropriate     RECENT LABS:    Hospital Laboratory on 01/12/2017   Component Date Value Ref Range Status     Sodium 01/12/2017 136  133 - 144 mmol/L Final     Potassium 01/12/2017 4.4  3.4 - 5.3 mmol/L Final     Chloride 01/12/2017 97  94 - 109 mmol/L Final     Carbon Dioxide 01/12/2017 33* 20 - 32 mmol/L Final     Anion Gap 01/12/2017 6  3 - 14 mmol/L Final     Glucose 01/12/2017 189* 70 - 99 mg/dL Final     Urea Nitrogen 01/12/2017 20  7 - 30 mg/dL Final     Creatinine 01/12/2017 0.45* 0.52 - 1.04 mg/dL Final     GFR Estimate 01/12/2017   >60 mL/min/1.7m2 Final                    Value:>90  Non  GFR Calc       GFR Estimate If Black 01/12/2017   >60 mL/min/1.7m2 Final                    Value:>90   GFR Calc       Calcium 01/12/2017 8.8  8.5 - 10.1 mg/dL Final     WBC 01/12/2017 9.6  4.0 - 11.0 10e9/L Final     RBC Count 01/12/2017 4.68  3.8 - 5.2 10e12/L Final     Hemoglobin 01/12/2017 15.3  11.7 - 15.7 g/dL Final     Hematocrit 01/12/2017 45.4  35.0 - 47.0 % Final     MCV 01/12/2017 97  78 - 100 fl Final     MCH 01/12/2017 32.7  26.5 - 33.0 pg Final     MCHC 01/12/2017 33.7  31.5 - 36.5 g/dL Final     RDW 01/12/2017 13.3  10.0 - 15.0 % Final     Platelet Count 01/12/2017 224  150 - 450 10e9/L Final     Magnesium 01/12/2017 2.4* 1.6 - 2.3 mg/dL Final         ASSESSMENT/PLAN:  Chronic obstructive pulmonary disease with acute exacerbation (H)  Stable, no new concerns. The current medical regimen is effective;  continue present plan and medications.     Steroid-induced diabetes mellitus (H)  Continues to have elevated blood sugars, will do rapid decrease in dexamethasone to evaluate  baseline blood sugars. She will need ongoing teaching to check blood sugars and manage insulin before discharge to home.        ORDERS:  1.  DC current Dexamethasone orders.  2.  Start Dexamethasone 4mg PO daily x 3days, then 2mg PO daily x3 days then DC.  3.  Lasix 20mg PO daily-please give a dose today then every AM.  Dx:  Edema.  4.  Potassium 10meq PO daily.  Dx:  Supplement.  5.  Daily weights-notify NP of >3lbs in 1 day or 5lbs in 1 week change.  6.  Tensoshapes-toes to knees on in Am and off in PM.  Dx:  LE edema  7.  Lab draw 1/26/17:  BMP dx:  HTN, Mag dx:  Low mag levels, CBC dx:  Leukocytosis      Total time spent with patient visit was 35 min including patient visit and review of past records   Greater than 50% of total time spent with counseling and coordinating care    Electronically signed by  Sarah Reynolds CNP   [facility]  [phone] cell

## 2017-01-26 ENCOUNTER — DISCHARGE SUMMARY NURSING HOME (OUTPATIENT)
Dept: GERIATRICS | Facility: CLINIC | Age: 72
End: 2017-01-26
Payer: COMMERCIAL

## 2017-01-26 ENCOUNTER — HOSPITAL LABORATORY (OUTPATIENT)
Facility: OTHER | Age: 72
End: 2017-01-26

## 2017-01-26 DIAGNOSIS — T38.0X5A STEROID-INDUCED DIABETES MELLITUS (H): Primary | ICD-10-CM

## 2017-01-26 DIAGNOSIS — I50.9 CONGESTIVE HEART FAILURE, UNSPECIFIED CONGESTIVE HEART FAILURE CHRONICITY, UNSPECIFIED CONGESTIVE HEART FAILURE TYPE: ICD-10-CM

## 2017-01-26 DIAGNOSIS — E09.9 STEROID-INDUCED DIABETES MELLITUS (H): Primary | ICD-10-CM

## 2017-01-26 DIAGNOSIS — J18.9 PNEUMONIA DUE TO INFECTIOUS ORGANISM, UNSPECIFIED LATERALITY, UNSPECIFIED PART OF LUNG: ICD-10-CM

## 2017-01-26 DIAGNOSIS — J44.1 CHRONIC OBSTRUCTIVE PULMONARY DISEASE WITH ACUTE EXACERBATION (H): ICD-10-CM

## 2017-01-26 LAB
ANION GAP SERPL CALCULATED.3IONS-SCNC: 3 MMOL/L (ref 3–14)
BUN SERPL-MCNC: 26 MG/DL (ref 7–30)
CALCIUM SERPL-MCNC: 8.6 MG/DL (ref 8.5–10.1)
CHLORIDE SERPL-SCNC: 101 MMOL/L (ref 94–109)
CO2 SERPL-SCNC: 36 MMOL/L (ref 20–32)
CREAT SERPL-MCNC: 0.44 MG/DL (ref 0.52–1.04)
ERYTHROCYTE [DISTWIDTH] IN BLOOD BY AUTOMATED COUNT: 13.4 % (ref 10–15)
GFR SERPL CREATININE-BSD FRML MDRD: ABNORMAL ML/MIN/1.7M2
GLUCOSE SERPL-MCNC: 136 MG/DL (ref 70–99)
HCT VFR BLD AUTO: 43.1 % (ref 35–47)
HGB BLD-MCNC: 14.2 G/DL (ref 11.7–15.7)
MCH RBC QN AUTO: 32.5 PG (ref 26.5–33)
MCHC RBC AUTO-ENTMCNC: 32.9 G/DL (ref 31.5–36.5)
MCV RBC AUTO: 99 FL (ref 78–100)
PLATELET # BLD AUTO: 183 10E9/L (ref 150–450)
POTASSIUM SERPL-SCNC: 4.1 MMOL/L (ref 3.4–5.3)
RBC # BLD AUTO: 4.37 10E12/L (ref 3.8–5.2)
SODIUM SERPL-SCNC: 140 MMOL/L (ref 133–144)
WBC # BLD AUTO: 9.8 10E9/L (ref 4–11)

## 2017-01-26 PROCEDURE — 99316 NF DSCHRG MGMT 30 MIN+: CPT | Performed by: NURSE PRACTITIONER

## 2017-01-27 VITALS
HEART RATE: 90 BPM | OXYGEN SATURATION: 96 % | WEIGHT: 115.2 LBS | SYSTOLIC BLOOD PRESSURE: 112 MMHG | DIASTOLIC BLOOD PRESSURE: 52 MMHG

## 2017-01-27 NOTE — PROGRESS NOTES
Documentation of Face-to-Face and Certification for Home Health Services     Patient: Bella Saucedo   YOB: 1945  MR Number: [id number]  Today's Date: 1/27/2017    I certify that patient: Bella Saucedo is under my care and that I, or a nurse practitioner or physician's assistant working with me, had a face-to-face encounter that meets the physician face-to-face encounter requirements with this patient on: 1/26/2017.    This encounter with the patient was in whole, or in part, for the following medical condition, which is the primary reason for home health care:      Steroid-induced diabetes mellitus (H)  Chronic obstructive pulmonary disease with acute exacerbation (H)  Pneumonia due to infectious organism, unspecified laterality, unspecified part of lung  Congestive heart failure, unspecified congestive heart failure chronicity, unspecified congestive heart failure type (H) .    I certify that, based on my findings, the following services are medically necessary home health services: Nursing, Occupational Therapy and Physical Therapy.    My clinical findings support the need for the above services because: Nurse is needed: To assess blood sugars after changes in medications or other medical regimen. and To provide assessment and oversight required in the home to assure adherence to the medical plan due to: medication changes.., Occupational Therapy Services are needed to assess and treat cognitive ability and address ADL safety due to impairment in strength, mobility. and Physical Therapy Services are needed to assess and treat the following functional impairments: new rolling walker , weakness.    Further, I certify that my clinical findings support that this patient is homebound (i.e. absences from home require considerable and taxing effort and are for medical reasons or Druze services or infrequently or of short duration when for other reasons) because: Requires assistance of another  person or specialized equipment to access medical services because patient: Is unable to walk greater than 200 feet without rest..    Based on the above findings. I certify that this patient is confined to the home and needs intermittent skilled nursing care, physical therapy and/or speech therapy.  The patient is under my care, and I have initiated the establishment of the plan of care.  This patient will be followed by a physician who will periodically review the plan of care.  Physician/Provider to provide follow up care: Shannon Olivas    Responsible Medicare certified PECOS Physician: Sarah Reynolds CNP  Physician Signature: See electronic signature associated with these discharge orders.  Date: 1/26/2017

## 2017-01-27 NOTE — PROGRESS NOTES
[facility] DISCHARGE SUMMARY    PATIENT'S NAME: Bella Saucedo  YOB: 1945    PRIMARY CARE PROVIDER AND CLINIC RESPONSIBLE AFTER TRANSFER: Shannon Olivas [facility] [address] [facility]     CODE STATUS/ADVANCE DIRECTIVES DISCUSSION: CPR/Full code     ALLERGIES: Nitroglycerin    TRANSFERRING PROVIDERS:  Sarah Reynolds CNP, Uzma Avila MD   DATE OF SNF ADMISSION:  January / 09 / 2017  DATE OF SNF (anticipated) DISCHARGE: January / 28 / 2017  DISCHARGE DISPOSITION: Non-[facility] Provider  Nursing Facility: [facility]  stay 1/4/17 to 1/9/17.     Condition on Discharge:  Improving.    Function:  Ambulating with rolling walker independently  Cognitive Scores: CPT 5.1/6 and ACL 5.2/6    Equipment: walker and oxygen      [facility] Course:    Steroid-induced diabetes mellitus (H)  Trying taper of dexamethasone, as it appears that this was started by oncology to stimulate appetite. Her appetite is good, has gained about 10 pounds since admission. She continues to have VERY high blood sugars, is treating QID novolog insulin, and she has learned how to do this on her own. She will continue to manage her own blood sugars, keeping track of them, and will report this to her primary care provider and to home care RN for assistance. She is doing well with this. Supplies/glucometer ordered at [facility].   - insulin aspart (NOVOLOG PEN) 100 UNIT/ML injection; Inject 1-5 Units Subcutaneous 4 times daily  - insulin pen needle 31G X 6 MM; Use 4 times daily or as directed.  - blood glucose monitoring (NO BRAND SPECIFIED) meter device kit; Use to test blood sugar 4 times daily or as directed.  - blood glucose (NO BRAND SPECIFIED) lancets standard; Use to test blood sugar 4 times daily or as directed.  - blood glucose monitoring (NO BRAND SPECIFIED) test strip; Use to test blood sugars 4 times daily or as  directed    Chronic obstructive pulmonary disease with acute exacerbation (H)  Stable, chronic O2 dependence and on advair, duonebs regularly. compensated    Pneumonia due to infectious organism, unspecified laterality, unspecified part of lung  Resolved, completed antibiotics while hospitalized.     Congestive heart failure, unspecified congestive heart failure chronicity, unspecified congestive heart failure type (H)  New onset of LE edema this week, so resumed on PTA lasix 20 mg daily with some relief. No new dyspnea with this. The current medical regimen is effective;  continue present plan and medications.        PAST MEDICAL HISTORY:  has no past medical history on file.    DISCHARGE MEDICATIONS:  Current Outpatient Prescriptions   Medication Sig Dispense Refill     insulin aspart (NOVOLOG PEN) 100 UNIT/ML injection Inject 1-5 Units Subcutaneous 4 times daily 6 mL 1     insulin pen needle 31G X 6 MM Use 4 times daily or as directed. 100 each 1     blood glucose monitoring (NO BRAND SPECIFIED) meter device kit Use to test blood sugar 4 times daily or as directed. 1 kit 0     blood glucose (NO BRAND SPECIFIED) lancets standard Use to test blood sugar 4 times daily or as directed. 1 Box 3     blood glucose monitoring (NO BRAND SPECIFIED) test strip Use to test blood sugars 4 times daily or as directed 100 strip 3     GUAIFENESIN PO Take 2 tsp. by mouth every 6 hours as needed       DEXAMETHASONE PO Take 2 mg by mouth daily       Furosemide (LASIX PO) Take 20 mg by mouth daily       POTASSIUM CHLORIDE PO Take 10 mEq by mouth daily       lidocaine (XYLOCAINE) 2 % solution Take by mouth 4 times daily as needed for moderate pain swish and spit every 3-8 hours as needed; max 8 doses/24 hour period       ASPIRIN PO Take 81 mg by mouth daily       Citalopram Hydrobromide (CELEXA PO) Take 20 mg by mouth daily       Atorvastatin Calcium (LIPITOR PO) Take 20 mg by mouth daily       fluticasone-salmeterol (ADVAIR) 250-50  MCG/DOSE diskus inhaler Inhale 1 puff into the lungs 2 times daily       magnesium 250 MG tablet Take 1 tablet by mouth daily        METOPROLOL TARTRATE PO Take 12.5 mg by mouth 2 times daily       ipratropium - albuterol 0.5 mg/2.5 mg/3 mL (DUONEB) 0.5-2.5 (3) MG/3ML neb solution Take 1 vial by nebulization 4 times daily       Insulin Aspart (NOVOLOG SC) Inject as per sliding scale:  if 0 - 149 = 0 Unit;  150 - 199 = 1 Unit;  200 - 249 = 2 Unit;  250 - 299 = 3 unit;  300 - 349 = 4 unit;  350 - 999 = 5 unit,  subcutaneously before meals and  at bedtime for hyperglycemia       albuterol (PROAIR HFA/PROVENTIL HFA/VENTOLIN HFA) 108 (90 BASE) MCG/ACT Inhaler Inhale 2 puffs into the lungs 4 times daily as needed for shortness of breath / dyspnea or wheezing         Review of Systems:  No CP, SOB, Cough, dizziness, fevers, chills, HA, N/V, dysuria or Bowel Abnormalities. Appetite is good.  Pain none    /52 mmHg  Pulse 90  Wt 115 lb 3.2 oz (52.254 kg)  SpO2 96%    Physical Exam:  A & O x 3,   NAD. Lungs CTA, non labored. RRR, S1/S2 w/o murmur,gallop or rub.  No edema.  Abdomen soft, nontender.    DISCHARGE PLAN:  Occupational Therapy, Physical Therapy, Registered Nurse, Home Health Aide,  and From:  [facility] Follow-up with PCP in 7 days by calling [phone] to schedule an appointment.     Current [place] scheduled appointments:  None    Pending labs: none    [facility] labs   Results for orders placed or performed in visit on 01/26/17   Basic metabolic panel   Result Value Ref Range    Sodium 140 133 - 144 mmol/L    Potassium 4.1 3.4 - 5.3 mmol/L    Chloride 101 94 - 109 mmol/L    Carbon Dioxide 36 (H) 20 - 32 mmol/L    Anion Gap 3 3 - 14 mmol/L    Glucose 136 (H) 70 - 99 mg/dL    Urea Nitrogen 26 7 - 30 mg/dL    Creatinine 0.44 (L) 0.52 - 1.04 mg/dL    GFR Estimate >90  Non  GFR Calc   >60 mL/min/1.7m2    GFR Estimate If Black >90   GFR Calc   >60 mL/min/1.7m2     Calcium 8.6 8.5 - 10.1 mg/dL   CBC with platelets   Result Value Ref Range    WBC 9.8 4.0 - 11.0 10e9/L    RBC Count 4.37 3.8 - 5.2 10e12/L    Hemoglobin 14.2 11.7 - 15.7 g/dL    Hematocrit 43.1 35.0 - 47.0 %    MCV 99 78 - 100 fl    MCH 32.5 26.5 - 33.0 pg    MCHC 32.9 31.5 - 36.5 g/dL    RDW 13.4 10.0 - 15.0 %    Platelet Count 183 150 - 450 10e9/L      Discharge Treatments:none    TOTAL DISCHARGE TIME:   Greater than 30 minutes  Electronically signed by:  Sarah Reynolds CNP   [facility]  [phone] cell

## 2017-02-08 ENCOUNTER — NURSING HOME VISIT (OUTPATIENT)
Dept: GERIATRICS | Facility: CLINIC | Age: 72
End: 2017-02-08
Payer: COMMERCIAL

## 2017-02-08 VITALS
SYSTOLIC BLOOD PRESSURE: 92 MMHG | TEMPERATURE: 98.8 F | RESPIRATION RATE: 16 BRPM | DIASTOLIC BLOOD PRESSURE: 53 MMHG | HEART RATE: 71 BPM | WEIGHT: 108.8 LBS | OXYGEN SATURATION: 92 %

## 2017-02-08 DIAGNOSIS — I10 BENIGN ESSENTIAL HYPERTENSION: ICD-10-CM

## 2017-02-08 DIAGNOSIS — R73.03 BORDERLINE DIABETES MELLITUS: ICD-10-CM

## 2017-02-08 DIAGNOSIS — J18.9 PNEUMONIA DUE TO INFECTIOUS ORGANISM, UNSPECIFIED LATERALITY, UNSPECIFIED PART OF LUNG: Primary | ICD-10-CM

## 2017-02-08 DIAGNOSIS — J44.1 CHRONIC OBSTRUCTIVE PULMONARY DISEASE WITH ACUTE EXACERBATION (H): ICD-10-CM

## 2017-02-08 DIAGNOSIS — F43.22 ADJUSTMENT DISORDER WITH ANXIOUS MOOD: ICD-10-CM

## 2017-02-08 DIAGNOSIS — Z71.89 ADVANCE DIRECTIVE DISCUSSED WITH PATIENT: Chronic | ICD-10-CM

## 2017-02-08 PROCEDURE — 99207 ZZC CDG-CORRECTLY CODED, REVIEWED AND AGREE: CPT | Performed by: FAMILY MEDICINE

## 2017-02-08 PROCEDURE — 99306 1ST NF CARE HIGH MDM 50: CPT | Performed by: FAMILY MEDICINE

## 2017-02-08 NOTE — PROGRESS NOTES
[facility]  PRIMARY CARE PROVIDER AND CLINIC:  Shannon Olivas [facility] [address] [facility] [address]*  Chief Complaint   Patient presents with     Clinic Care Coordination - Initial     Hospital F/U       HPI:   Obtained from the patient, medical record and from the medial staffs.     Bella Saucedo is a 71 year old  (1945),admitted to the [facility]  from [facility].  Hospital stay 1/4/17  through 1/9/17.  Admitted to this facility for  rehab, medical management and nursing care.     Current issues are:      Pneumonia due to infectious organism, unspecified laterality, unspecified part of lung  -  Pt was recently in this facility transferred home in a stable condition, developed RTI sx, diagnosed with LLL and possibly RLL, dced on Levaquin.  - Denies fever, chills, Admits for chronic SOB, and cough with whitish sputum scant in amount.   -   Benign essential hypertension  - No CP, HA or fainting      Chronic obstructive pulmonary disease with acute exacerbation (H)  - Recent exacerbation with steroid taper. Reports chronic SOB and poor endurance. On Home O2 2 liter continuous. Now on 3 liter. Noted to have some anxious mood prompted to keep O2 high.     Adjustment disorder with anxious mood  - Reports worry a lot and fears not to be able to breath. Reports was told that O2 level should be above 90 all the time.   - Reports started on Celexa, and feels it helps calm her nerve down.     Advance directive discussed with patient  - Wants to be full code but not vegetated.       CODE STATUS/ADVANCE DIRECTIVES DISCUSSION:   CPR/Full code   Patient's living condition: lives with spouse    ALLERGIES:Nitroglycerin  PAST MEDICAL HISTORY:  has no past medical history on file.  PAST SURGICAL HISTORY:  has no past surgical history on file.  FAMILY HISTORY: family history is not on file.  SOCIAL HISTORY:      Post Discharge Medication  Reconciliation Status: discharge medications reconciled and changed, per note/orders (see AVS).  Current Outpatient Prescriptions   Medication Sig Dispense Refill     insulin aspart (NOVOLOG PEN) 100 UNIT/ML injection Inject 1-5 Units Subcutaneous 4 times daily 6 mL 1     insulin pen needle 31G X 6 MM Use 4 times daily or as directed. 100 each 1     blood glucose monitoring (NO BRAND SPECIFIED) meter device kit Use to test blood sugar 4 times daily or as directed. 1 kit 0     blood glucose (NO BRAND SPECIFIED) lancets standard Use to test blood sugar 4 times daily or as directed. 1 Box 3     blood glucose monitoring (NO BRAND SPECIFIED) test strip Use to test blood sugars 4 times daily or as directed 100 strip 3     GUAIFENESIN PO Take 2 tsp. by mouth every 6 hours as needed       DEXAMETHASONE PO Take 2 mg by mouth daily       Furosemide (LASIX PO) Take 20 mg by mouth daily       POTASSIUM CHLORIDE PO Take 10 mEq by mouth daily       lidocaine (XYLOCAINE) 2 % solution Take by mouth 4 times daily as needed for moderate pain swish and spit every 3-8 hours as needed; max 8 doses/24 hour period       ASPIRIN PO Take 81 mg by mouth daily       Citalopram Hydrobromide (CELEXA PO) Take 20 mg by mouth daily       Atorvastatin Calcium (LIPITOR PO) Take 20 mg by mouth daily       fluticasone-salmeterol (ADVAIR) 250-50 MCG/DOSE diskus inhaler Inhale 1 puff into the lungs 2 times daily       magnesium 250 MG tablet Take 1 tablet by mouth daily        METOPROLOL TARTRATE PO Take 12.5 mg by mouth 2 times daily       ipratropium - albuterol 0.5 mg/2.5 mg/3 mL (DUONEB) 0.5-2.5 (3) MG/3ML neb solution Take 1 vial by nebulization 4 times daily       Insulin Aspart (NOVOLOG SC) Inject as per sliding scale:  if 0 - 149 = 0 Unit;  150 - 199 = 1 Unit;  200 - 249 = 2 Unit;  250 - 299 = 3 unit;  300 - 349 = 4 unit;  350 - 999 = 5 unit,  subcutaneously before meals and  at bedtime for hyperglycemia       albuterol (PROAIR HFA/PROVENTIL  HFA/VENTOLIN HFA) 108 (90 BASE) MCG/ACT Inhaler Inhale 2 puffs into the lungs 4 times daily as needed for shortness of breath / dyspnea or wheezing         ROS:  10 point ROS of systems including Constitutional, Eyes, Respiratory, Cardiovascular, Gastroenterology, Genitourinary, Integumentary, Muscularskeletal, Psychiatric were all negative except for pertinent positives noted in my HPI.    Exam:  BP 92/53 mmHg  Pulse 71  Temp(Src) 98.8  F (37.1  C)  Resp 16  Wt 108 lb 12.8 oz (49.351 kg)  SpO2 92%  GENERAL APPEARANCE:  Alert, thin, anxious  ENT:  Mouth and posterior oropharynx normal, moist mucous membranes, NC in place  EYES:  EOM, conjunctivae, lids, pupils and irises normal  NECK:  No adenopathy,masses or thyromegaly  RESP:  diminished breath sounds at the bases, some coarse sound over LLL. , Otherwise good rima entry b/l, no wheezing appreciated.   CV:  Palpation and auscultation of heart done , regular rate and rhythm, no murmur, rub, or gallop, no edema  ABDOMEN:  normal bowel sounds, soft, nontender, no hepatosplenomegaly or other masses  M/S:   Gait and station abnormal Uses a walker. Hand  5/5 b/l  SKIN:  Inspection of skin and subcutaneous tissue baseline, Palpation of skin and subcutaneous tissue baseline, bruises over forearms.   NEURO:   Cranial nerves 2-12 are normal tested and grossly at patient's baseline, no NFD detected  PSYCH:  oriented X 3, normal insight, judgement and memory    Lab/Diagnostic data:     WBC   Date Value Ref Range Status   01/26/2017 9.8 4.0 - 11.0 10e9/L Final   ]  HEMOGLOBIN   Date Value Ref Range Status   01/26/2017 14.2 11.7 - 15.7 g/dL Final   01/12/2017 15.3 11.7 - 15.7 g/dL Final   ]  Last Basic Metabolic Panel:  NA      140   1/26/2017   POTASSIUM      4.1   1/26/2017  MARILU      8.6   1/26/2017  CO2       36   1/26/2017  BUN       26   1/26/2017  CR     0.44   1/26/2017  GLC      136   1/26/2017    ASSESSMENT/PLAN:  Pneumonia due to infectious organism,  unspecified laterality, unspecified part of lung  - Continue ABX.   - Repeat CXR in 4-6 weeks to follow on resolution.     Benign essential hypertension   BP Readings from Last 3 Encounters:   02/08/17 92/53   01/26/16 112/52   01/25/17 98/60   - On Lasix 20 mg qod, Lopressor 12.5 mg bid.  - Keep SBP> 130 mmHg and DBP > 65 mmHg (levels below these increase mortality as shown by standard studies and observations).       Chronic obstructive pulmonary disease with acute exacerbation (H)  - On Dexamethasone taper.   - On O2 continuous, keep SpO2% b/w 88-92%  - Continue home meds.     Adjustment disorder with anxious mood  - Controlled with Celexa    Advance directive discussed with patient  - Full code but to pull life support measures if becomes vegetated.   - Quality vs quantity of life discussed with patient. Life expectancy also discussed.      Diabetes:  - On SSI.   - Change diet to resident diet of choice  - check HbA1C  - Some Glucose high reading possibly 2/2 to steroid.       Orders:  - Change Lopressor frequency from bid to once daily  - Give Lasix on Mon/Wed/Fri and Sat. Given KCL along with Lasix.     Information reviewed:  Medications, vital signs, orders, nursing notes, problem list, hospital information. Total time spent with patient visit was 45 min including patient visit and review of past records.     Electronically signed by:  Uzma Avila MD

## 2017-02-09 ENCOUNTER — HOSPITAL LABORATORY (OUTPATIENT)
Facility: OTHER | Age: 72
End: 2017-02-09

## 2017-02-09 LAB
ANION GAP SERPL CALCULATED.3IONS-SCNC: 5 MMOL/L (ref 3–14)
BUN SERPL-MCNC: 28 MG/DL (ref 7–30)
CALCIUM SERPL-MCNC: 8.4 MG/DL (ref 8.5–10.1)
CHLORIDE SERPL-SCNC: 104 MMOL/L (ref 94–109)
CO2 SERPL-SCNC: 34 MMOL/L (ref 20–32)
CREAT SERPL-MCNC: 0.45 MG/DL (ref 0.52–1.04)
ERYTHROCYTE [DISTWIDTH] IN BLOOD BY AUTOMATED COUNT: 13 % (ref 10–15)
GFR SERPL CREATININE-BSD FRML MDRD: ABNORMAL ML/MIN/1.7M2
GLUCOSE SERPL-MCNC: 129 MG/DL (ref 70–99)
HCT VFR BLD AUTO: 38.7 % (ref 35–47)
HGB BLD-MCNC: 12.7 G/DL (ref 11.7–15.7)
MCH RBC QN AUTO: 32.3 PG (ref 26.5–33)
MCHC RBC AUTO-ENTMCNC: 32.8 G/DL (ref 31.5–36.5)
MCV RBC AUTO: 99 FL (ref 78–100)
PLATELET # BLD AUTO: 288 10E9/L (ref 150–450)
POTASSIUM SERPL-SCNC: 3.9 MMOL/L (ref 3.4–5.3)
RBC # BLD AUTO: 3.93 10E12/L (ref 3.8–5.2)
SODIUM SERPL-SCNC: 143 MMOL/L (ref 133–144)
WBC # BLD AUTO: 10.5 10E9/L (ref 4–11)

## 2017-02-14 ENCOUNTER — NURSING HOME VISIT (OUTPATIENT)
Dept: GERIATRICS | Facility: CLINIC | Age: 72
End: 2017-02-14
Payer: COMMERCIAL

## 2017-02-14 VITALS
OXYGEN SATURATION: 96 % | DIASTOLIC BLOOD PRESSURE: 56 MMHG | SYSTOLIC BLOOD PRESSURE: 94 MMHG | RESPIRATION RATE: 20 BRPM | TEMPERATURE: 97.1 F | HEART RATE: 83 BPM

## 2017-02-14 DIAGNOSIS — J18.9 PNEUMONIA DUE TO INFECTIOUS ORGANISM, UNSPECIFIED LATERALITY, UNSPECIFIED PART OF LUNG: Primary | ICD-10-CM

## 2017-02-14 DIAGNOSIS — E09.9 STEROID-INDUCED DIABETES MELLITUS (H): ICD-10-CM

## 2017-02-14 DIAGNOSIS — T38.0X5A STEROID-INDUCED DIABETES MELLITUS (H): ICD-10-CM

## 2017-02-14 PROCEDURE — 99309 SBSQ NF CARE MODERATE MDM 30: CPT | Performed by: NURSE PRACTITIONER

## 2017-02-14 NOTE — PROGRESS NOTES
[facility]    Chief Complaint   Patient presents with     Nursing Home Acute       HPI:    Bella Arias is a 71 year old  (1945), who is being seen today for an episodic care visit at [facility] . Today's concern is:  Pneumonia due to infectious organism, unspecified laterality, unspecified part of lung  Re-admitted to TCU after hospitalization for pneumonia, reports that she is feeling better with no cough, no congestion. Continues to be O2 dependent at baseline .     Steroid Induced Diabetes mellitus, type 2 (H)  Last steroid dose is tomorrow. Remains on Lantus and sliding scale insulin  Blood sugars as follows  AM 86 - 200  Noon 136 - 319  Supper 204 - 434       ALLERGIES: Nitroglycerin  Past Medical, Surgical, Family and Social History reviewed and updated in [facility].    Current Outpatient Prescriptions   Medication Sig Dispense Refill     insulin aspart (NOVOLOG PEN) 100 UNIT/ML injection Inject 1-5 Units Subcutaneous 4 times daily 6 mL 1     insulin pen needle 31G X 6 MM Use 4 times daily or as directed. 100 each 1     blood glucose monitoring (NO BRAND SPECIFIED) meter device kit Use to test blood sugar 4 times daily or as directed. 1 kit 0     blood glucose (NO BRAND SPECIFIED) lancets standard Use to test blood sugar 4 times daily or as directed. 1 Box 3     blood glucose monitoring (NO BRAND SPECIFIED) test strip Use to test blood sugars 4 times daily or as directed 100 strip 3     GUAIFENESIN PO Take 2 tsp. by mouth every 6 hours as needed       DEXAMETHASONE PO Take 2 mg by mouth daily       Furosemide (LASIX PO) Take 20 mg by mouth daily       POTASSIUM CHLORIDE PO Take 10 mEq by mouth daily       lidocaine (XYLOCAINE) 2 % solution Take by mouth 4 times daily as needed for moderate pain swish and spit every 3-8 hours as needed; max 8 doses/24 hour period       ASPIRIN PO Take 81 mg by mouth daily       Citalopram Hydrobromide (CELEXA PO) Take 20 mg by mouth daily        Atorvastatin Calcium (LIPITOR PO) Take 20 mg by mouth daily       fluticasone-salmeterol (ADVAIR) 250-50 MCG/DOSE diskus inhaler Inhale 1 puff into the lungs 2 times daily       magnesium 250 MG tablet Take 1 tablet by mouth daily        METOPROLOL TARTRATE PO Take 12.5 mg by mouth 2 times daily       ipratropium - albuterol 0.5 mg/2.5 mg/3 mL (DUONEB) 0.5-2.5 (3) MG/3ML neb solution Take 1 vial by nebulization 4 times daily       Insulin Aspart (NOVOLOG SC) Inject as per sliding scale:  if 0 - 149 = 0 Unit;  150 - 199 = 1 Unit;  200 - 249 = 2 Unit;  250 - 299 = 3 unit;  300 - 349 = 4 unit;  350 - 999 = 5 unit,  subcutaneously before meals and  at bedtime for hyperglycemia       albuterol (PROAIR HFA/PROVENTIL HFA/VENTOLIN HFA) 108 (90 BASE) MCG/ACT Inhaler Inhale 2 puffs into the lungs 4 times daily as needed for shortness of breath / dyspnea or wheezing         Medications reconciled to facility chart and changes were made to reflect current medications as identified as above med list. Below are the changes that were made:   Medications stopped since last EPIC medication reconciliation:   There are no discontinued medications.    Medications started since last [facility] medication reconciliation:  No orders of the defined types were placed in this encounter.           REVIEW OF SYSTEMS:  4 point ROS including Respiratory, CV, GI and , other than that noted in the HPI,  is negative    PHYSICAL EXAM:  BP 94/56  Pulse 83  Temp 97.1  F (36.2  C)  Resp 20  SpO2 96%  GENERAL APPEARANCE:  Alert, in no distress  HEAD:  Normal, normocephalic, atraumatic  EYE EXAM: normal external eye, conjunctiva, lids, WILBER  NECK EXAM: supple, no JVD  CHEST/RESP:  respiratory effort normal, lung sounds CTA but very diminished, no respiratory distress  CV:  Rate reg, rhythm reg, no murmur, no peripheral edema   M/S:   extremities normal, gait normal-with rolling walker 350 ft, normal muscle tone, and range of motion normal  NEUROLOGIC  EXAM: Normal gross motor movement, tone and coordination. No tremor.  Cranial nerves 2-12 are normal tested and grossly at patient's baseline  PSYCH:  Alert and oriented to person-place-time with memory loss, affect pleasant , judgement appropriate   SLUMS 15/30    RECENT LABS:    Results for orders placed or performed in visit on 02/09/17   Basic metabolic panel   Result Value Ref Range    Sodium 143 133 - 144 mmol/L    Potassium 3.9 3.4 - 5.3 mmol/L    Chloride 104 94 - 109 mmol/L    Carbon Dioxide 34 (H) 20 - 32 mmol/L    Anion Gap 5 3 - 14 mmol/L    Glucose 129 (H) 70 - 99 mg/dL    Urea Nitrogen 28 7 - 30 mg/dL    Creatinine 0.45 (L) 0.52 - 1.04 mg/dL    GFR Estimate >90  Non  GFR Calc   >60 mL/min/1.7m2    GFR Estimate If Black >90   GFR Calc   >60 mL/min/1.7m2    Calcium 8.4 (L) 8.5 - 10.1 mg/dL   CBC with platelets   Result Value Ref Range    WBC 10.5 4.0 - 11.0 10e9/L    RBC Count 3.93 3.8 - 5.2 10e12/L    Hemoglobin 12.7 11.7 - 15.7 g/dL    Hematocrit 38.7 35.0 - 47.0 %    MCV 99 78 - 100 fl    MCH 32.3 26.5 - 33.0 pg    MCHC 32.8 31.5 - 36.5 g/dL    RDW 13.0 10.0 - 15.0 %    Platelet Count 288 150 - 450 10e9/L         ASSESSMENT/PLAN:  Pneumonia due to infectious organism, unspecified laterality, unspecified part of lung  Resolving, feeling better. Will continue to monitor.     Diabetes mellitus, type 2 (H)  Blood sugars still erratic, but last dose of decadron is tomorrow. Goal to simplify insulin regimen to make it easier for her to manage her insulin at home.       ORDERS:  1. Dc lopressor  2. Nursing to start med and diabetic education per ST recommendations  3. Lab draw on 2/16/17 CBC and BMP Dx pneumonia and CHF    Total time spent with patient visit was 25 min including patient visit and review of past records   Greater than 50% of total time spent with counseling and coordinating care    Electronically signed by  Sarah Reynolds CNP   [facility]  [facility]  [phone] cell

## 2017-02-16 ENCOUNTER — HOSPITAL LABORATORY (OUTPATIENT)
Facility: OTHER | Age: 72
End: 2017-02-16

## 2017-02-16 LAB
ANION GAP SERPL CALCULATED.3IONS-SCNC: 5 MMOL/L (ref 3–14)
BUN SERPL-MCNC: 25 MG/DL (ref 7–30)
CALCIUM SERPL-MCNC: 8 MG/DL (ref 8.5–10.1)
CHLORIDE SERPL-SCNC: 105 MMOL/L (ref 94–109)
CO2 SERPL-SCNC: 34 MMOL/L (ref 20–32)
CREAT SERPL-MCNC: 0.47 MG/DL (ref 0.52–1.04)
ERYTHROCYTE [DISTWIDTH] IN BLOOD BY AUTOMATED COUNT: 13.6 % (ref 10–15)
GFR SERPL CREATININE-BSD FRML MDRD: ABNORMAL ML/MIN/1.7M2
GLUCOSE SERPL-MCNC: 145 MG/DL (ref 70–99)
HCT VFR BLD AUTO: 35.8 % (ref 35–47)
HGB BLD-MCNC: 11.7 G/DL (ref 11.7–15.7)
MCH RBC QN AUTO: 32.2 PG (ref 26.5–33)
MCHC RBC AUTO-ENTMCNC: 32.7 G/DL (ref 31.5–36.5)
MCV RBC AUTO: 99 FL (ref 78–100)
PLATELET # BLD AUTO: 320 10E9/L (ref 150–450)
POTASSIUM SERPL-SCNC: 4.2 MMOL/L (ref 3.4–5.3)
RBC # BLD AUTO: 3.63 10E12/L (ref 3.8–5.2)
SODIUM SERPL-SCNC: 144 MMOL/L (ref 133–144)
WBC # BLD AUTO: 10.3 10E9/L (ref 4–11)

## 2017-02-21 ENCOUNTER — NURSING HOME VISIT (OUTPATIENT)
Dept: GERIATRICS | Facility: CLINIC | Age: 72
End: 2017-02-21
Payer: COMMERCIAL

## 2017-02-21 VITALS
SYSTOLIC BLOOD PRESSURE: 103 MMHG | RESPIRATION RATE: 18 BRPM | DIASTOLIC BLOOD PRESSURE: 46 MMHG | WEIGHT: 105.6 LBS | OXYGEN SATURATION: 95 % | TEMPERATURE: 98.3 F | HEART RATE: 85 BPM

## 2017-02-21 DIAGNOSIS — J44.1 CHRONIC OBSTRUCTIVE PULMONARY DISEASE WITH ACUTE EXACERBATION (H): ICD-10-CM

## 2017-02-21 DIAGNOSIS — G31.84 MILD COGNITIVE IMPAIRMENT: ICD-10-CM

## 2017-02-21 DIAGNOSIS — I50.9 CONGESTIVE HEART FAILURE, UNSPECIFIED CONGESTIVE HEART FAILURE CHRONICITY, UNSPECIFIED CONGESTIVE HEART FAILURE TYPE: ICD-10-CM

## 2017-02-21 DIAGNOSIS — T38.0X5A STEROID-INDUCED DIABETES MELLITUS (H): Primary | ICD-10-CM

## 2017-02-21 DIAGNOSIS — E09.9 STEROID-INDUCED DIABETES MELLITUS (H): Primary | ICD-10-CM

## 2017-02-21 PROCEDURE — 99309 SBSQ NF CARE MODERATE MDM 30: CPT | Performed by: NURSE PRACTITIONER

## 2017-02-21 RX ORDER — ALBUTEROL SULFATE 0.83 MG/ML
1 SOLUTION RESPIRATORY (INHALATION) EVERY 4 HOURS PRN
Status: ON HOLD | COMMUNITY
End: 2020-01-01

## 2017-02-21 RX ORDER — INSULIN GLARGINE 100 [IU]/ML
3 INJECTION, SOLUTION SUBCUTANEOUS AT BEDTIME
COMMUNITY
End: 2021-01-01

## 2017-02-21 NOTE — PROGRESS NOTES
[facility]    Chief Complaint   Patient presents with     Nursing Home Acute       HPI:    Bella Saucedo is a 71 year old  (1945), who is being seen today for an episodic care visit at [facility] . Today's concern is:  Diabetes mellitus, type 2 (H)  Blood sugars normalizing, off prednisone. She is having a difficult time with sliding scale insulin and this is slowing her progress toward going home.   Blood Sugar Summary  02/21/2017 11:51 148 mg/dL  02/21/2017 07:50 120 mg/dL  02/20/2017 16:46 113 mg/dL  02/20/2017 10:34 281 mg/dL  02/20/2017 07:02 107 mg/dL    Chronic obstructive pulmonary disease with acute exacerbation (H)  Chronic O2 dependent, minimal cough, no increased dyspnea.     Congestive heart failure, unspecified congestive heart failure chronicity, unspecified congestive heart failure type (H)  No edema, no cough, no increased dyspnea.     Mild cognitive impairment  SLUMS 15/30, this is limiting her ability to learn new task of managing her own sliding scale insulin.        ALLERGIES: Nitroglycerin  Past Medical, Surgical, Family and Social History reviewed and updated in [facility].    Current Outpatient Prescriptions   Medication Sig Dispense Refill     insulin glargine (LANTUS) 100 UNIT/ML injection Inject 5 Units Subcutaneous At Bedtime       albuterol (2.5 MG/3ML) 0.083% neb solution Take 1 vial by nebulization 4 times daily       Insulin Aspart (NOVOLOG SC) 0-200=0units  201+=5units       insulin aspart (NOVOLOG PEN) 100 UNIT/ML injection Inject 1-5 Units Subcutaneous 4 times daily 6 mL 1     insulin pen needle 31G X 6 MM Use 4 times daily or as directed. 100 each 1     blood glucose monitoring (NO BRAND SPECIFIED) meter device kit Use to test blood sugar 4 times daily or as directed. 1 kit 0     blood glucose (NO BRAND SPECIFIED) lancets standard Use to test blood sugar 4 times daily or as directed. 1 Box 3     blood glucose monitoring (NO BRAND SPECIFIED) test  strip Use to test blood sugars 4 times daily or as directed 100 strip 3     Furosemide (LASIX PO) Take 20 mg by mouth daily       POTASSIUM CHLORIDE PO Take 10 mEq by mouth Every Mon, Wed, Fri, and Sat       lidocaine (XYLOCAINE) 2 % solution Take by mouth 4 times daily as needed for moderate pain swish and spit every 3-8 hours as needed; max 8 doses/24 hour period       ASPIRIN PO Take 81 mg by mouth daily       Citalopram Hydrobromide (CELEXA PO) Take 20 mg by mouth daily       Atorvastatin Calcium (LIPITOR PO) Take 20 mg by mouth daily       fluticasone-salmeterol (ADVAIR) 250-50 MCG/DOSE diskus inhaler Inhale 1 puff into the lungs 2 times daily       magnesium 250 MG tablet Take 1 tablet by mouth daily        ipratropium - albuterol 0.5 mg/2.5 mg/3 mL (DUONEB) 0.5-2.5 (3) MG/3ML neb solution Take 1 vial by nebulization 4 times daily       albuterol (PROAIR HFA/PROVENTIL HFA/VENTOLIN HFA) 108 (90 BASE) MCG/ACT Inhaler Inhale 2 puffs into the lungs 4 times daily as needed for shortness of breath / dyspnea or wheezing       DEXAMETHASONE PO Take 2 mg by mouth daily       [DISCONTINUED] Insulin Aspart (NOVOLOG SC) Inject as per sliding scale:  if 0 - 149 = 0 Unit;  150 - 199 = 1 Unit;  200 - 249 = 2 Unit;  250 - 299 = 3 unit;  300 - 349 = 4 unit;  350 - 999 = 5 unit,  subcutaneously before meals and  at bedtime for hyperglycemia         Medications reconciled to facility chart and changes were made to reflect current medications as identified as above med list. Below are the changes that were made:   Medications stopped since last EPIC medication reconciliation:   Medications Discontinued During This Encounter   Medication Reason     GUAIFENESIN PO      METOPROLOL TARTRATE PO      Insulin Aspart (NOVOLOG SC)        Medications started since last [facility] medication reconciliation:  Orders Placed This Encounter   Medications     insulin glargine (LANTUS) 100 UNIT/ML injection     Sig: Inject 5 Units Subcutaneous At  Bedtime     albuterol (2.5 MG/3ML) 0.083% neb solution     Sig: Take 1 vial by nebulization 4 times daily     Insulin Aspart (NOVOLOG SC)     Si-200=0units  201+=5units       REVIEW OF SYSTEMS:  4 point ROS including Respiratory, CV, GI and , other than that noted in the HPI,  is negative    PHYSICAL EXAM:  /46  Pulse 85  Temp 98.3  F (36.8  C)  Resp 18  Wt 105 lb 9.6 oz (47.9 kg)  SpO2 95%    GENERAL APPEARANCE:  Alert, in no distress  HEAD:  Normal, normocephalic, atraumatic  EYE EXAM: normal external eye, conjunctiva, lids, WILBER  NECK EXAM: supple, no JVD  CHEST/RESP:  respiratory effort normal, lung sounds diminished but clear , no respiratory distress  CV:  Rate reg, rhythm reg, no murmur, no peripheral edema   M/S:   extremities normal, gait normal-with rolling walker , normal muscle tone, and range of motion normal  NEUROLOGIC EXAM: Normal gross motor movement, tone and coordination. No tremor.  Cranial nerves 2-12 are normal tested and grossly at patient's baseline  PSYCH:  Alert and oriented to person-place-time with forgetfulness, affect pleasant , judgement appropriate     RECENT LABS:    Hospital Laboratory on 2017   Component Date Value Ref Range Status     Sodium 2017 144  133 - 144 mmol/L Final     Potassium 2017 4.2  3.4 - 5.3 mmol/L Final     Chloride 2017 105  94 - 109 mmol/L Final     Carbon Dioxide 2017 34* 20 - 32 mmol/L Final     Anion Gap 2017 5  3 - 14 mmol/L Final     Glucose 2017 145* 70 - 99 mg/dL Final     Urea Nitrogen 2017 25  7 - 30 mg/dL Final     Creatinine 2017 0.47* 0.52 - 1.04 mg/dL Final     GFR Estimate 2017   >60 mL/min/1.7m2 Final                    Value:>90  Non  GFR Calc       GFR Estimate If Black 2017   >60 mL/min/1.7m2 Final                    Value:>90   GFR Calc       Calcium 2017 8.0* 8.5 - 10.1 mg/dL Final     WBC 2017 10.3  4.0 - 11.0 10e9/L  Final     RBC Count 02/16/2017 3.63* 3.8 - 5.2 10e12/L Final     Hemoglobin 02/16/2017 11.7  11.7 - 15.7 g/dL Final     Hematocrit 02/16/2017 35.8  35.0 - 47.0 % Final     MCV 02/16/2017 99  78 - 100 fl Final     MCH 02/16/2017 32.2  26.5 - 33.0 pg Final     MCHC 02/16/2017 32.7  31.5 - 36.5 g/dL Final     RDW 02/16/2017 13.6  10.0 - 15.0 % Final     Platelet Count 02/16/2017 320  150 - 450 10e9/L Final         ASSESSMENT/PLAN:  Diabetes mellitus, type 2 (H)  Blood sugars are normalizing, and she is having difficulty with managing the sliding scale. Her therapy is almost done, will need to be planning for discharge soon. Will simplify insulin regimen to make it easier for her. Goal to limit variability with insulin admin and plan for once daily glargine.     Chronic obstructive pulmonary disease with acute exacerbation (H)  Stable, compensated. The current medical regimen is effective;  continue present plan and medications.     Congestive heart failure, unspecified congestive heart failure chronicity, unspecified congestive heart failure type (H)  Compensated, minimal dyspnea, no edema. The current medical regimen is effective;  continue present plan and medications.      Mild cognitive impairment  Continues to work with therapy for cognitive improvement.       ORDERS:  1.  DC current sliding scale insuilin orders.  2.  Continue to check blood sugars 3x/day at meals.  3.  Mealtime replacement Novolog Insulin as follows:   Blood sugars   0-200 = 0 insulin   201+ = 5units      Total time spent with patient visit was 25 min including patient visit and review of past records   Greater than 50% of total time spent with counseling and coordinating care    Electronically signed by  Sarah Reynolds CNP   [facility]  [phone] cell

## 2017-02-28 ENCOUNTER — NURSING HOME VISIT (OUTPATIENT)
Dept: GERIATRICS | Facility: CLINIC | Age: 72
End: 2017-02-28
Payer: COMMERCIAL

## 2017-02-28 VITALS
SYSTOLIC BLOOD PRESSURE: 129 MMHG | RESPIRATION RATE: 20 BRPM | HEART RATE: 96 BPM | DIASTOLIC BLOOD PRESSURE: 54 MMHG | TEMPERATURE: 98.9 F | OXYGEN SATURATION: 89 % | WEIGHT: 111 LBS

## 2017-02-28 DIAGNOSIS — J44.1 CHRONIC OBSTRUCTIVE PULMONARY DISEASE WITH ACUTE EXACERBATION (H): ICD-10-CM

## 2017-02-28 DIAGNOSIS — T38.0X5A STEROID-INDUCED DIABETES MELLITUS (H): Primary | ICD-10-CM

## 2017-02-28 DIAGNOSIS — E09.9 STEROID-INDUCED DIABETES MELLITUS (H): Primary | ICD-10-CM

## 2017-02-28 PROCEDURE — 99309 SBSQ NF CARE MODERATE MDM 30: CPT | Performed by: NURSE PRACTITIONER

## 2017-02-28 PROCEDURE — 99207 ZZC CDG-CORRECTLY CODED, REVIEWED AND AGREE: CPT | Performed by: NURSE PRACTITIONER

## 2017-02-28 RX ORDER — ACETAMINOPHEN 325 MG/1
650 TABLET ORAL EVERY 4 HOURS PRN
COMMUNITY

## 2017-02-28 NOTE — PROGRESS NOTES
[facility]    Chief Complaint   Patient presents with     Nursing Home Acute       HPI:    Bella Saucedo is a 71 year old  (1945), who is being seen today for an episodic care visit at [facility] . Today's concern is:  Steroid-induced diabetes mellitus (H)  Blood sugars have been moderating nicely. She is much more easily able to understand the use of extra novolog if blood sugar > 200. She apparently still has difficulty initiating and remembering the process, but must refer to the printed instructions. She is able then to complete task, per nursing report, with minimal support. She has required additional novolog only 2 times int he last 7 days.   Blood Sugar Summary  02/28/2017 08:38 94 mg/dL  02/27/2017 17:06 94 mg/dL  02/27/2017 11:19 99 mg/dL  02/27/2017 07:04 105 mg/dL  02/26/2017 17:55 333 mg/dL      Chronic obstructive pulmonary disease with acute exacerbation (H)  Stable without new dyspnea, no cough, no congestion noted.        ALLERGIES: Nitroglycerin  Past Medical, Surgical, Family and Social History reviewed and updated in [facility].    Current Outpatient Prescriptions   Medication Sig Dispense Refill     Nutritional Supplements (NUTRITIONAL SUPPLEMENT PO) Take 90 mLs by mouth 3 times daily       ACETAMINOPHEN PO Take 325 mg by mouth every 4 hours as needed for pain       insulin glargine (LANTUS) 100 UNIT/ML injection Inject 5 Units Subcutaneous At Bedtime       albuterol (2.5 MG/3ML) 0.083% neb solution Take 1 vial by nebulization 4 times daily       Insulin Aspart (NOVOLOG SC) 0-200=0units  201+=5units       insulin pen needle 31G X 6 MM Use 4 times daily or as directed. 100 each 1     blood glucose monitoring (NO BRAND SPECIFIED) meter device kit Use to test blood sugar 4 times daily or as directed. 1 kit 0     blood glucose (NO BRAND SPECIFIED) lancets standard Use to test blood sugar 4 times daily or as directed. 1 Box 3     blood glucose monitoring (NO BRAND  SPECIFIED) test strip Use to test blood sugars 4 times daily or as directed 100 strip 3     Furosemide (LASIX PO) Take 20 mg by mouth daily       POTASSIUM CHLORIDE PO Take 10 mEq by mouth Every Mon, Wed, Fri, and Sat       ASPIRIN PO Take 81 mg by mouth daily       Citalopram Hydrobromide (CELEXA PO) Take 20 mg by mouth daily       Atorvastatin Calcium (LIPITOR PO) Take 20 mg by mouth daily       fluticasone-salmeterol (ADVAIR) 250-50 MCG/DOSE diskus inhaler Inhale 1 puff into the lungs 2 times daily       magnesium 250 MG tablet Take 1 tablet by mouth daily        albuterol (PROAIR HFA/PROVENTIL HFA/VENTOLIN HFA) 108 (90 BASE) MCG/ACT Inhaler Inhale 2 puffs into the lungs 4 times daily as needed for shortness of breath / dyspnea or wheezing       [DISCONTINUED] insulin aspart (NOVOLOG PEN) 100 UNIT/ML injection Inject 1-5 Units Subcutaneous 4 times daily 6 mL 1     DEXAMETHASONE PO Take 2 mg by mouth daily         Medications reconciled to facility chart and changes were made to reflect current medications as identified as above med list. Below are the changes that were made:   Medications stopped since last EPIC medication reconciliation:   Medications Discontinued During This Encounter   Medication Reason     lidocaine (XYLOCAINE) 2 % solution      insulin aspart (NOVOLOG PEN) 100 UNIT/ML injection      ipratropium - albuterol 0.5 mg/2.5 mg/3 mL (DUONEB) 0.5-2.5 (3) MG/3ML neb solution        Medications started since last [facility] medication reconciliation:  Orders Placed This Encounter   Medications     Nutritional Supplements (NUTRITIONAL SUPPLEMENT PO)     Sig: Take 90 mLs by mouth 3 times daily     ACETAMINOPHEN PO     Sig: Take 325 mg by mouth every 4 hours as needed for pain     REVIEW OF SYSTEMS:  10 point ROS of systems including Constitutional, Eyes, Respiratory, Cardiovascular, Gastroenterology, Genitourinary, Integumentary, Muscularskeletal, Psychiatric were all negative except for pertinent positives  noted in my HPI.    PHYSICAL EXAM:  /54  Pulse 96  Temp 98.9  F (37.2  C)  Resp 20  Wt 111 lb (50.3 kg)  SpO2 (!) 89%  GENERAL APPEARANCE:  Alert, in no acute distress  HEAD:  Normal, normocephalic, atraumatic  EYE EXAM: normal external eye, conjunctiva, lids, WILBER  NECK EXAM: supple, no JVD  CHEST/RESP:  respiratory effort normal, lung sounds CTA , no respiratory distress  CV:  Rate reg, rhythm reg, no murmur, no peripheral edema   M/S:   extremities normal, gait normal-with rolling walker , normal muscle tone, and range of motion normal   NEUROLOGIC EXAM: Normal gross motor movement, tone and coordination. No tremor.  Cranial nerves 2-12 are normal tested and grossly at patient's baseline  PSYCH:  Alert and oriented to person-place-time with forgetfulness, affect pleasant , judgement appropriate     RECENT LABS:    Hospital Laboratory on 02/16/2017   Component Date Value Ref Range Status     Sodium 02/16/2017 144  133 - 144 mmol/L Final     Potassium 02/16/2017 4.2  3.4 - 5.3 mmol/L Final     Chloride 02/16/2017 105  94 - 109 mmol/L Final     Carbon Dioxide 02/16/2017 34* 20 - 32 mmol/L Final     Anion Gap 02/16/2017 5  3 - 14 mmol/L Final     Glucose 02/16/2017 145* 70 - 99 mg/dL Final     Urea Nitrogen 02/16/2017 25  7 - 30 mg/dL Final     Creatinine 02/16/2017 0.47* 0.52 - 1.04 mg/dL Final     GFR Estimate 02/16/2017   >60 mL/min/1.7m2 Final                    Value:>90  Non  GFR Calc       GFR Estimate If Black 02/16/2017   >60 mL/min/1.7m2 Final                    Value:>90   GFR Calc       Calcium 02/16/2017 8.0* 8.5 - 10.1 mg/dL Final     WBC 02/16/2017 10.3  4.0 - 11.0 10e9/L Final     RBC Count 02/16/2017 3.63* 3.8 - 5.2 10e12/L Final     Hemoglobin 02/16/2017 11.7  11.7 - 15.7 g/dL Final     Hematocrit 02/16/2017 35.8  35.0 - 47.0 % Final     MCV 02/16/2017 99  78 - 100 fl Final     MCH 02/16/2017 32.2  26.5 - 33.0 pg Final     MCHC 02/16/2017 32.7  31.5 - 36.5 g/dL  Final     RDW 02/16/2017 13.6  10.0 - 15.0 % Final     Platelet Count 02/16/2017 320  150 - 450 10e9/L Final         ASSESSMENT/PLAN:  Steroid-induced diabetes mellitus (H)  Blood sugars moderating well with being off steroids, Now on low dose Lantus and very occasional use of novolog if blood sugar >200.     Chronic obstructive pulmonary disease with acute exacerbation (H)  Chronic, compensated with out recent exacerbation. No  smoking,  chronic O2 use, routine nebulizer use. On advair, albuterol neb.  The current medical regimen is effective;  continue present plan and medications.        ORDERS:  No new orders . Still working to learn her diabetic needs before discharge to home.     Total time spent with patient visit was 25 min including patient visit and review of past records   Greater than 50% of total time spent with counseling and coordinating care    Electronically signed by  Sarah Reynolds CNP   [facility]  [phone] cell

## 2017-03-07 ENCOUNTER — NURSING HOME VISIT (OUTPATIENT)
Dept: GERIATRICS | Facility: CLINIC | Age: 72
End: 2017-03-07
Payer: COMMERCIAL

## 2017-03-07 VITALS
TEMPERATURE: 98.1 F | OXYGEN SATURATION: 89 % | DIASTOLIC BLOOD PRESSURE: 53 MMHG | RESPIRATION RATE: 20 BRPM | SYSTOLIC BLOOD PRESSURE: 96 MMHG | HEART RATE: 98 BPM

## 2017-03-07 DIAGNOSIS — T38.0X5A STEROID-INDUCED DIABETES MELLITUS (H): ICD-10-CM

## 2017-03-07 DIAGNOSIS — G31.84 MILD COGNITIVE IMPAIRMENT: ICD-10-CM

## 2017-03-07 DIAGNOSIS — J44.1 CHRONIC OBSTRUCTIVE PULMONARY DISEASE WITH ACUTE EXACERBATION (H): Primary | ICD-10-CM

## 2017-03-07 DIAGNOSIS — E09.9 STEROID-INDUCED DIABETES MELLITUS (H): ICD-10-CM

## 2017-03-07 PROCEDURE — 99309 SBSQ NF CARE MODERATE MDM 30: CPT | Performed by: NURSE PRACTITIONER

## 2017-03-07 PROCEDURE — 99207 ZZC CDG-CORRECTLY CODED, REVIEWED AND AGREE: CPT | Performed by: NURSE PRACTITIONER

## 2017-03-07 NOTE — PROGRESS NOTES
[facility]    Chief Complaint   Patient presents with     Nursing Home Acute       HPI:    Bella Saucedo is a 71 year old  (1945), who is being seen today for an episodic care visit at [facility] . Today's concern is:  Chronic obstructive pulmonary disease with acute exacerbation (H)  No new cough, no congestion. Remains O2 dependent at baseline with no new symptoms.     Diabetes mellitus, type 2 (H)  On lantus 5 u daily and rarely has had to use extra novolog (3 times in last 21 blood sugars)    3/4/17:  0800  93  1200  177  1700  106    3/5/17:  0800  122  1200  113  1700  117    3/6/17:  0800  128  1200  153  1700  108      Mild cognitive impairment  Ongoing, with last CPT 5.1/6. She is continuing to try to learn the steps of blood sugar checking and insulin admin but has poor ST memory and poor recall of steps.        ALLERGIES: Nitroglycerin  Past Medical, Surgical, Family and Social History reviewed and updated in [facility].    Current Outpatient Prescriptions   Medication Sig Dispense Refill     GUAIFENESIN PO Take 2 teaspoonful by mouth every 6 hours as needed       Nutritional Supplements (NUTRITIONAL SUPPLEMENT PO) Take 90 mLs by mouth 3 times daily       ACETAMINOPHEN PO Take 325 mg by mouth every 4 hours as needed for pain       insulin glargine (LANTUS) 100 UNIT/ML injection Inject 5 Units Subcutaneous At Bedtime       albuterol (2.5 MG/3ML) 0.083% neb solution Take 1 vial by nebulization 4 times daily       insulin pen needle 31G X 6 MM Use 4 times daily or as directed. 100 each 1     blood glucose monitoring (NO BRAND SPECIFIED) meter device kit Use to test blood sugar 4 times daily or as directed. 1 kit 0     blood glucose (NO BRAND SPECIFIED) lancets standard Use to test blood sugar 4 times daily or as directed. 1 Box 3     blood glucose monitoring (NO BRAND SPECIFIED) test strip Use to test blood sugars 4 times daily or as directed 100 strip 3     Furosemide  (LASIX PO) Take 20 mg by mouth daily       POTASSIUM CHLORIDE PO Take 10 mEq by mouth Every Mon, Wed, Fri, and Sat       ASPIRIN PO Take 81 mg by mouth daily       Citalopram Hydrobromide (CELEXA PO) Take 20 mg by mouth daily       Atorvastatin Calcium (LIPITOR PO) Take 20 mg by mouth daily       fluticasone-salmeterol (ADVAIR) 250-50 MCG/DOSE diskus inhaler Inhale 1 puff into the lungs 2 times daily       magnesium 250 MG tablet Take 1 tablet by mouth 2 times daily        DEXAMETHASONE PO Take 2 mg by mouth daily       [DISCONTINUED] albuterol (PROAIR HFA/PROVENTIL HFA/VENTOLIN HFA) 108 (90 BASE) MCG/ACT Inhaler Inhale 2 puffs into the lungs 4 times daily as needed for shortness of breath / dyspnea or wheezing         Medications reconciled to facility chart and changes were made to reflect current medications as identified as above med list. Below are the changes that were made:   Medications stopped since last EPIC medication reconciliation:   Medications Discontinued During This Encounter   Medication Reason     albuterol (PROAIR HFA/PROVENTIL HFA/VENTOLIN HFA) 108 (90 BASE) MCG/ACT Inhaler      Insulin Aspart (NOVOLOG SC)        Medications started since last [facility] medication reconciliation:  Orders Placed This Encounter   Medications     GUAIFENESIN PO     Sig: Take 2 teaspoonful by mouth every 6 hours as needed       REVIEW OF SYSTEMS:  4 point ROS including Respiratory, CV, GI and , other than that noted in the HPI,  is negative    PHYSICAL EXAM:  BP 96/53  Pulse 98  Temp 98.1  F (36.7  C)  Resp 20  SpO2 (!) 89%  GENERAL APPEARANCE:  Alert, in no distress  HEAD:  Normal, normocephalic, atraumatic  EYE EXAM: normal external eye, conjunctiva, lids, WILBER  NECK EXAM: supple, no JVD  CHEST/RESP:  respiratory effort normal, lung sounds CTA , no respiratory distress  CV:  Rate reg, rhythm reg, no murmur, no peripheral edema   M/S:   extremities normal, gait normal-with rolling walker , normal muscle tone, and  range of motion normal   NEUROLOGIC EXAM: Normal gross motor movement, tone and coordination. No tremor.  Cranial nerves 2-12 are normal tested and grossly at patient's baseline  PSYCH:  Alert and oriented to person-place-time with forgetfulness, affect pleasant , judgement appropriate     RECENT LABS:    Hospital Laboratory on 02/16/2017   Component Date Value Ref Range Status     Sodium 02/16/2017 144  133 - 144 mmol/L Final     Potassium 02/16/2017 4.2  3.4 - 5.3 mmol/L Final     Chloride 02/16/2017 105  94 - 109 mmol/L Final     Carbon Dioxide 02/16/2017 34* 20 - 32 mmol/L Final     Anion Gap 02/16/2017 5  3 - 14 mmol/L Final     Glucose 02/16/2017 145* 70 - 99 mg/dL Final     Urea Nitrogen 02/16/2017 25  7 - 30 mg/dL Final     Creatinine 02/16/2017 0.47* 0.52 - 1.04 mg/dL Final     GFR Estimate 02/16/2017   >60 mL/min/1.7m2 Final                    Value:>90  Non  GFR Calc       GFR Estimate If Black 02/16/2017   >60 mL/min/1.7m2 Final                    Value:>90   GFR Calc       Calcium 02/16/2017 8.0* 8.5 - 10.1 mg/dL Final     WBC 02/16/2017 10.3  4.0 - 11.0 10e9/L Final     RBC Count 02/16/2017 3.63* 3.8 - 5.2 10e12/L Final     Hemoglobin 02/16/2017 11.7  11.7 - 15.7 g/dL Final     Hematocrit 02/16/2017 35.8  35.0 - 47.0 % Final     MCV 02/16/2017 99  78 - 100 fl Final     MCH 02/16/2017 32.2  26.5 - 33.0 pg Final     MCHC 02/16/2017 32.7  31.5 - 36.5 g/dL Final     RDW 02/16/2017 13.6  10.0 - 15.0 % Final     Platelet Count 02/16/2017 320  150 - 450 10e9/L Final         ASSESSMENT/PLAN:  Chronic obstructive pulmonary disease with acute exacerbation (H)  Chronic, compensated with no recent exacerbation. no smoking,  chronic O2 use, prn nebulizer use. On advair.  The current medical regimen is effective;  continue present plan and medications.     Diabetes mellitus, type 2 (H)  Blood sugars in excellent range, on lantus daily with rare novolog use. The current medical regimen  is effective;  continue present plan of blood sugar checks, but will discontinue novolog sliding scale.     Mild cognitive impairment  Pleasant, alert, forgetful with poor short term memory and recall.       ORDERS:  1.  DC sliding scale Novolog  2.  Continue TID blood sugar checks.  Update NP if blood sugar >300.      Total time spent with patient visit was 25 min including patient visit and review of past records   Greater than 50% of total time spent with counseling and coordinating care    Electronically signed by  Sarah Reynolds CNP   [facility]  [phone] cell

## 2017-03-08 PROBLEM — G31.84 MILD COGNITIVE IMPAIRMENT: Status: ACTIVE | Noted: 2017-03-08

## 2017-03-14 ENCOUNTER — NURSING HOME VISIT (OUTPATIENT)
Dept: GERIATRICS | Facility: CLINIC | Age: 72
End: 2017-03-14
Payer: COMMERCIAL

## 2017-03-14 VITALS
HEART RATE: 98 BPM | RESPIRATION RATE: 20 BRPM | SYSTOLIC BLOOD PRESSURE: 115 MMHG | DIASTOLIC BLOOD PRESSURE: 61 MMHG | TEMPERATURE: 97.3 F | OXYGEN SATURATION: 95 %

## 2017-03-14 DIAGNOSIS — J44.1 CHRONIC OBSTRUCTIVE PULMONARY DISEASE WITH ACUTE EXACERBATION (H): ICD-10-CM

## 2017-03-14 DIAGNOSIS — T38.0X5A STEROID-INDUCED DIABETES MELLITUS (H): Primary | ICD-10-CM

## 2017-03-14 DIAGNOSIS — E09.9 STEROID-INDUCED DIABETES MELLITUS (H): Primary | ICD-10-CM

## 2017-03-14 PROCEDURE — 99309 SBSQ NF CARE MODERATE MDM 30: CPT | Performed by: NURSE PRACTITIONER

## 2017-03-14 NOTE — PROGRESS NOTES
[facility]    Chief Complaint   Patient presents with     Nursing Home Acute       HPI:    Bella Saucedo is a 71 year old  (1945), who is being seen today for an episodic care visit at [facility] . Today's concern is:  Steroid-induced diabetes mellitus (H)  No evidence of hypoglycemia or hyperglycemia. She is eating well, she is checking her blood sugar TID, still on Lantus 5 U daily. She has not required any sliding scale novolog and this was discontinued last week.   Blood sugar summary:  3/7/17 1800--107  3/8/17 0800--125  3/8/17 1200--144  3/10/17 0800--91    Chronic obstructive pulmonary disease with acute exacerbation (H)  No new symptoms. Ongoing and chronic dyspnea with chronic O2 dependent. She did have temp x1 day last week, but CXR was neg for infiltrate and she has felt well since then.        ALLERGIES: Nitroglycerin  Past Medical, Surgical, Family and Social History reviewed and updated in [facility].    Current Outpatient Prescriptions   Medication Sig Dispense Refill     GUAIFENESIN PO Take 2 teaspoonful by mouth every 6 hours as needed       Nutritional Supplements (NUTRITIONAL SUPPLEMENT PO) Take 90 mLs by mouth 3 times daily       ACETAMINOPHEN PO Take 325 mg by mouth every 4 hours as needed for pain       insulin glargine (LANTUS) 100 UNIT/ML injection Inject 5 Units Subcutaneous At Bedtime       albuterol (2.5 MG/3ML) 0.083% neb solution Take 1 vial by nebulization 4 times daily       insulin pen needle 31G X 6 MM Use 4 times daily or as directed. 100 each 1     blood glucose monitoring (NO BRAND SPECIFIED) meter device kit Use to test blood sugar 4 times daily or as directed. 1 kit 0     blood glucose (NO BRAND SPECIFIED) lancets standard Use to test blood sugar 4 times daily or as directed. 1 Box 3     blood glucose monitoring (NO BRAND SPECIFIED) test strip Use to test blood sugars 4 times daily or as directed 100 strip 3     Furosemide (LASIX PO) Take 20 mg  by mouth daily       POTASSIUM CHLORIDE PO Take 10 mEq by mouth Every Mon, Wed, Fri, and Sat       ASPIRIN PO Take 81 mg by mouth daily       Citalopram Hydrobromide (CELEXA PO) Take 20 mg by mouth daily       Atorvastatin Calcium (LIPITOR PO) Take 20 mg by mouth daily       fluticasone-salmeterol (ADVAIR) 250-50 MCG/DOSE diskus inhaler Inhale 1 puff into the lungs 2 times daily       magnesium 250 MG tablet Take 1 tablet by mouth 2 times daily          Medications reconciled to facility chart and changes were made to reflect current medications as identified as above med list. Below are the changes that were made:   Medications stopped since last EPIC medication reconciliation:   There are no discontinued medications.    Medications started since last [facility] medication reconciliation:  No orders of the defined types were placed in this encounter.    REVIEW OF SYSTEMS:  10 point ROS of systems including Constitutional, Eyes, Respiratory, Cardiovascular, Gastroenterology, Genitourinary, Integumentary, Muscularskeletal, Psychiatric were all negative except for pertinent positives noted in my HPI.    PHYSICAL EXAM:  /61  Pulse 98  Temp 97.3  F (36.3  C)  Resp 20  SpO2 95%    GENERAL APPEARANCE:  Alert, in no distress  HEAD:  Normal, normocephalic, atraumatic  EYE EXAM: normal external eye, conjunctiva, lids, WILBER  NECK EXAM: supple, no JVD  CHEST/RESP:  respiratory effort normal, lung sounds diminished at baseline , no acute respiratory distress  CV:  Rate reg, rhythm reg, no murmur, no peripheral edema   M/S:   extremities normal, gait normal-with rolling walker , normal muscle tone, and range of motion normal   SKIN EXAM: CDI  NEUROLOGIC EXAM: Normal gross motor movement, tone and coordination. No tremor.  Cranial nerves 2-12 are normal tested and grossly at patient's baseline  PSYCH:  Alert and oriented to person-place-time with mild forgetfulness, affect pleasant , judgement appropriate     RECENT LABS:     Hospital Laboratory on 02/16/2017   Component Date Value Ref Range Status     Sodium 02/16/2017 144  133 - 144 mmol/L Final     Potassium 02/16/2017 4.2  3.4 - 5.3 mmol/L Final     Chloride 02/16/2017 105  94 - 109 mmol/L Final     Carbon Dioxide 02/16/2017 34* 20 - 32 mmol/L Final     Anion Gap 02/16/2017 5  3 - 14 mmol/L Final     Glucose 02/16/2017 145* 70 - 99 mg/dL Final     Urea Nitrogen 02/16/2017 25  7 - 30 mg/dL Final     Creatinine 02/16/2017 0.47* 0.52 - 1.04 mg/dL Final     GFR Estimate 02/16/2017   >60 mL/min/1.7m2 Final                    Value:>90  Non  GFR Calc       GFR Estimate If Black 02/16/2017   >60 mL/min/1.7m2 Final                    Value:>90   GFR Calc       Calcium 02/16/2017 8.0* 8.5 - 10.1 mg/dL Final     WBC 02/16/2017 10.3  4.0 - 11.0 10e9/L Final     RBC Count 02/16/2017 3.63* 3.8 - 5.2 10e12/L Final     Hemoglobin 02/16/2017 11.7  11.7 - 15.7 g/dL Final     Hematocrit 02/16/2017 35.8  35.0 - 47.0 % Final     MCV 02/16/2017 99  78 - 100 fl Final     MCH 02/16/2017 32.2  26.5 - 33.0 pg Final     MCHC 02/16/2017 32.7  31.5 - 36.5 g/dL Final     RDW 02/16/2017 13.6  10.0 - 15.0 % Final     Platelet Count 02/16/2017 320  150 - 450 10e9/L Final         ASSESSMENT/PLAN:  Steroid-induced diabetes mellitus (H)  Stable blood sugars. Still learning process for admin of insulin    Chronic obstructive pulmonary disease with acute exacerbation (H)  Chronic, compensated with no recent exacerbation. no smoking,  chronic O2 use, chronic  nebulizer use. On meds as listed.  The current medical regimen is effective;  continue present plan and medications.        ORDERS:  1.  DC Mupirocin ointment to feet--all resolved.      Total time spent with patient visit was 25 min including patient visit and review of past records   Greater than 50% of total time spent with counseling and coordinating care    Electronically signed by  TIFFANY Coello [facility]  [facility]  [phone] cell

## 2017-03-21 ENCOUNTER — NURSING HOME VISIT (OUTPATIENT)
Dept: GERIATRICS | Facility: CLINIC | Age: 72
End: 2017-03-21
Payer: COMMERCIAL

## 2017-03-21 VITALS
SYSTOLIC BLOOD PRESSURE: 129 MMHG | OXYGEN SATURATION: 95 % | RESPIRATION RATE: 20 BRPM | HEART RATE: 88 BPM | WEIGHT: 114 LBS | TEMPERATURE: 98.7 F | DIASTOLIC BLOOD PRESSURE: 43 MMHG

## 2017-03-21 DIAGNOSIS — C18.9 MALIGNANT NEOPLASM OF COLON, UNSPECIFIED PART OF COLON (H): ICD-10-CM

## 2017-03-21 DIAGNOSIS — T38.0X5A STEROID-INDUCED DIABETES MELLITUS (H): ICD-10-CM

## 2017-03-21 DIAGNOSIS — I50.9 CONGESTIVE HEART FAILURE, UNSPECIFIED CONGESTIVE HEART FAILURE CHRONICITY, UNSPECIFIED CONGESTIVE HEART FAILURE TYPE: ICD-10-CM

## 2017-03-21 DIAGNOSIS — E09.9 STEROID-INDUCED DIABETES MELLITUS (H): ICD-10-CM

## 2017-03-21 DIAGNOSIS — J44.1 CHRONIC OBSTRUCTIVE PULMONARY DISEASE WITH ACUTE EXACERBATION (H): Primary | ICD-10-CM

## 2017-03-21 PROCEDURE — 99316 NF DSCHRG MGMT 30 MIN+: CPT | Performed by: NURSE PRACTITIONER

## 2017-03-21 PROCEDURE — 99207 ZZC CDG-CORRECTLY CODED, REVIEWED AND AGREE: CPT | Performed by: NURSE PRACTITIONER

## 2017-03-21 NOTE — PROGRESS NOTES
[place] [facility] DISCHARGE SUMMARY    PATIENT'S NAME: Bella Saucedo  YOB: 1945    PRIMARY CARE PROVIDER AND CLINIC RESPONSIBLE AFTER TRANSFER: Shannon Olivas [facility] [address] / [facility] 5*     CODE STATUS/ADVANCE DIRECTIVES DISCUSSION: CPR/Full code     ALLERGIES: Nitroglycerin    TRANSFERRING PROVIDERS:  Sarah Reynolds CNP, Uzma Avila MD   DATE OF SNF ADMISSION:  January / 09 / 2017  DATE OF SNF (anticipated) DISCHARGE: March / 23 / 2017  DISCHARGE DISPOSITION: Non-[facility] Provider  Nursing Facility: [facility]  stay 1/4/17 to 1/9/17.     DISCHARGE DIAGNOSIS:   1. Chronic obstructive pulmonary disease with acute exacerbation (H)    2. Steroid-induced diabetes mellitus (H)    3. Congestive heart failure, unspecified congestive heart failure chronicity, unspecified congestive heart failure type (H)    4. Malignant neoplasm of colon, unspecified part of colon (H)        Condition on Discharge:  Improving.    Function:  Ambulatory independently with 4 wheel rolling walker   Cognitive Scores: BIMS 14/15 and CPT 5.1/6    Equipment: walker      [facility] Course:    Chronic obstructive pulmonary disease with acute exacerbation (H)  Copd has been stable with chronic O2 use, chronic nebulizer use, advair    Steroid-induced diabetes mellitus (H)  Has transitioned well to once daily Lantus dosing, with blood sugars as listed below. She has learned how to do this herself. I reviewed her blood sugar machine, and blood sugar running 110-200 consistently. She is checking her own blood sugars about 3 times per day. She is currently on no prednisone.     Congestive heart failure, unspecified congestive heart failure chronicity, unspecified congestive heart failure type (H)  Stable on current lasix dosing with supplemental K+. No recent exacerbations. No edema, no new dyspnea.     Malignant neoplasm of colon  (H)  Stable, colostomy stable and working well. The current medical regimen is effective;  continue present plan and medications. She is eating well, has gained weight  Wt Readings from Last 4 Encounters:   03/21/17 114 lb (51.7 kg)   02/28/17 111 lb (50.3 kg)   02/21/17 105 lb 9.6 oz (47.9 kg)   02/08/17 108 lb 12.8 oz (49.4 kg)            PAST MEDICAL HISTORY:  has no past medical history on file.    DISCHARGE MEDICATIONS:  Current Outpatient Prescriptions   Medication Sig Dispense Refill     ACETAMINOPHEN PO Take 325 mg by mouth every 4 hours as needed for pain       insulin glargine (LANTUS) 100 UNIT/ML injection Inject 5 Units Subcutaneous At Bedtime       albuterol (2.5 MG/3ML) 0.083% neb solution Take 1 vial by nebulization 4 times daily       insulin pen needle 31G X 6 MM Use 4 times daily or as directed. 100 each 1     blood glucose monitoring (NO BRAND SPECIFIED) meter device kit Use to test blood sugar 4 times daily or as directed. 1 kit 0     blood glucose (NO BRAND SPECIFIED) lancets standard Use to test blood sugar 4 times daily or as directed. 1 Box 3     blood glucose monitoring (NO BRAND SPECIFIED) test strip Use to test blood sugars 4 times daily or as directed 100 strip 3     Furosemide (LASIX PO) Take 20 mg by mouth daily Every Mon, Wed, Frid, Sat       POTASSIUM CHLORIDE PO Take 10 mEq by mouth Every Mon, Wed, Fri, and Sat       ASPIRIN PO Take 81 mg by mouth daily       Citalopram Hydrobromide (CELEXA PO) Take 20 mg by mouth daily       Atorvastatin Calcium (LIPITOR PO) Take 20 mg by mouth daily       fluticasone-salmeterol (ADVAIR) 250-50 MCG/DOSE diskus inhaler Inhale 1 puff into the lungs 2 times daily       magnesium 250 MG tablet Take 1 tablet by mouth 2 times daily          MEDICATION CHANGES/RATIONALE:   Dexamethasone DC'd 2/15/17  Novolog sliding scale DC'd to promote independence in living.     Review of Systems:  No CP, SOB, Cough, dizziness, fevers, chills, HA, N/V, dysuria or Bowel  Abnormalities. Appetite is good.  Pain controlled    /43  Pulse 88  Temp 98.7  F (37.1  C)  Resp 20  Wt 114 lb (51.7 kg)  SpO2 95%    Physical Exam:  A & O x 4, NAD. Lungs CTA, non labored. RRR, S1/S2 w/o murmur,gallop or rub.  No edema.  Abdomen soft, nontender.    DISCHARGE PLAN:  To home with [facility] RN, PT and OT.    Current [facility] scheduled appointments:  No future appointments.     Pending labs: none    [facility] labs   Hospital Laboratory on 02/16/2017   Component Date Value Ref Range Status     Sodium 02/16/2017 144  133 - 144 mmol/L Final     Potassium 02/16/2017 4.2  3.4 - 5.3 mmol/L Final     Chloride 02/16/2017 105  94 - 109 mmol/L Final     Carbon Dioxide 02/16/2017 34* 20 - 32 mmol/L Final     Anion Gap 02/16/2017 5  3 - 14 mmol/L Final     Glucose 02/16/2017 145* 70 - 99 mg/dL Final     Urea Nitrogen 02/16/2017 25  7 - 30 mg/dL Final     Creatinine 02/16/2017 0.47* 0.52 - 1.04 mg/dL Final     GFR Estimate 02/16/2017   >60 mL/min/1.7m2 Final                    Value:>90  Non  GFR Calc       GFR Estimate If Black 02/16/2017   >60 mL/min/1.7m2 Final                    Value:>90   GFR Calc       Calcium 02/16/2017 8.0* 8.5 - 10.1 mg/dL Final     WBC 02/16/2017 10.3  4.0 - 11.0 10e9/L Final     RBC Count 02/16/2017 3.63* 3.8 - 5.2 10e12/L Final     Hemoglobin 02/16/2017 11.7  11.7 - 15.7 g/dL Final     Hematocrit 02/16/2017 35.8  35.0 - 47.0 % Final     MCV 02/16/2017 99  78 - 100 fl Final     MCH 02/16/2017 32.2  26.5 - 33.0 pg Final     MCHC 02/16/2017 32.7  31.5 - 36.5 g/dL Final     RDW 02/16/2017 13.6  10.0 - 15.0 % Final     Platelet Count 02/16/2017 320  150 - 450 10e9/L Final         Discharge Treatments:  none    TOTAL DISCHARGE TIME:   Greater than 30 minutes  Electronically signed by:  Sarah Reynolds CNP   [facility]  [phone] cell                 Documentation of Face-to-Face and Certification for Home Health Services     Patient:  Bella Saucedo   YOB: 1945  MR Number: [id number]  Today's Date: 3/21/2017    I certify that patient: Bella Saucedo is under my care and that I, or a nurse practitioner or physician's assistant working with me, had a face-to-face encounter that meets the physician face-to-face encounter requirements with this patient on: 3/21/2017.    This encounter with the patient was in whole, or in part, for the following medical condition, which is the primary reason for home health care:      Chronic obstructive pulmonary disease with acute exacerbation (H)  Steroid-induced diabetes mellitus (H)  Congestive heart failure, unspecified congestive heart failure chronicity, unspecified congestive heart failure type (H)  Malignant neoplasm of colon, unspecified part of colon (H) .    I certify that, based on my findings, the following services are medically necessary home health services: Nursing, Occupational Therapy and Physical Therapy.    My clinical findings support the need for the above services because: Nurse is needed: To assess diabetic management  after changes in medications or other medical regimen.., Occupational Therapy Services are needed to assess and treat cognitive ability and address ADL safety due to impairment in IADLS. and Physical Therapy Services are needed to assess and treat the following functional impairments: weakness.    Further, I certify that my clinical findings support that this patient is homebound (i.e. absences from home require considerable and taxing effort and are for medical reasons or [employer] services or infrequently or of short duration when for other reasons) because: Requires assistance of another person or specialized equipment to access medical services because patient: Is unable to walk greater than 350 ft feet without rest...    Based on the above findings. I certify that this patient is confined to the home and needs intermittent skilled nursing care, physical  therapy and/or speech therapy.  The patient is under my care, and I have initiated the establishment of the plan of care.  This patient will be followed by a physician who will periodically review the plan of care.  Physician/Provider to provide follow up care: Shannon Olivas    Responsible Medicare certified PECOS Physician: Shannon Olivas     This Face to Face documentation provided as a service to Primary Care Provider. All required signatures and follow up forms to be signed by Primary Care Provider.  Sarah Reynolds, TIFFANY   [facility]  [phone] cell

## 2020-01-01 ENCOUNTER — TELEPHONE (OUTPATIENT)
Dept: GERIATRICS | Facility: CLINIC | Age: 75
End: 2020-01-01

## 2020-01-01 ENCOUNTER — NURSING HOME VISIT (OUTPATIENT)
Dept: GERIATRICS | Facility: CLINIC | Age: 75
End: 2020-01-01
Payer: COMMERCIAL

## 2020-01-01 ENCOUNTER — HOSPITAL ENCOUNTER (EMERGENCY)
Facility: CLINIC | Age: 75
Discharge: HOME | End: 2020-12-31
Attending: EMERGENCY MEDICINE | Admitting: EMERGENCY MEDICINE
Payer: COMMERCIAL

## 2020-01-01 ENCOUNTER — APPOINTMENT (OUTPATIENT)
Dept: GENERAL RADIOLOGY | Facility: CLINIC | Age: 75
DRG: 208 | End: 2020-01-01
Attending: INTERNAL MEDICINE
Payer: COMMERCIAL

## 2020-01-01 ENCOUNTER — HOSPITAL LABORATORY (OUTPATIENT)
Facility: OTHER | Age: 75
End: 2020-01-01

## 2020-01-01 ENCOUNTER — APPOINTMENT (OUTPATIENT)
Dept: GENERAL RADIOLOGY | Facility: CLINIC | Age: 75
End: 2020-01-01
Attending: EMERGENCY MEDICINE
Payer: COMMERCIAL

## 2020-01-01 ENCOUNTER — VIRTUAL VISIT (OUTPATIENT)
Dept: GERIATRICS | Facility: CLINIC | Age: 75
End: 2020-01-01
Payer: COMMERCIAL

## 2020-01-01 ENCOUNTER — DOCUMENTATION ONLY (OUTPATIENT)
Dept: OTHER | Facility: CLINIC | Age: 75
End: 2020-01-01

## 2020-01-01 ENCOUNTER — APPOINTMENT (OUTPATIENT)
Dept: SPEECH THERAPY | Facility: CLINIC | Age: 75
DRG: 208 | End: 2020-01-01
Attending: INTERNAL MEDICINE
Payer: COMMERCIAL

## 2020-01-01 ENCOUNTER — APPOINTMENT (OUTPATIENT)
Dept: PHYSICAL THERAPY | Facility: CLINIC | Age: 75
DRG: 208 | End: 2020-01-01
Attending: INTERNAL MEDICINE
Payer: COMMERCIAL

## 2020-01-01 ENCOUNTER — APPOINTMENT (OUTPATIENT)
Dept: CT IMAGING | Facility: CLINIC | Age: 75
DRG: 208 | End: 2020-01-01
Attending: INTERNAL MEDICINE
Payer: COMMERCIAL

## 2020-01-01 ENCOUNTER — APPOINTMENT (OUTPATIENT)
Dept: CT IMAGING | Facility: CLINIC | Age: 75
End: 2020-01-01
Attending: EMERGENCY MEDICINE
Payer: COMMERCIAL

## 2020-01-01 ENCOUNTER — HOSPITAL ENCOUNTER (INPATIENT)
Facility: CLINIC | Age: 75
LOS: 13 days | Discharge: NURSING, REHAB OR LONG-TERM CARE | DRG: 208 | End: 2020-10-30
Attending: INTERNAL MEDICINE | Admitting: INTERNAL MEDICINE
Payer: COMMERCIAL

## 2020-01-01 ENCOUNTER — APPOINTMENT (OUTPATIENT)
Dept: SPEECH THERAPY | Facility: CLINIC | Age: 75
DRG: 208 | End: 2020-01-01
Attending: PHYSICIAN ASSISTANT
Payer: COMMERCIAL

## 2020-01-01 ENCOUNTER — DISCHARGE SUMMARY NURSING HOME (OUTPATIENT)
Dept: GERIATRICS | Facility: CLINIC | Age: 75
End: 2020-01-01
Payer: COMMERCIAL

## 2020-01-01 VITALS
DIASTOLIC BLOOD PRESSURE: 73 MMHG | OXYGEN SATURATION: 97 % | RESPIRATION RATE: 20 BRPM | SYSTOLIC BLOOD PRESSURE: 151 MMHG | BODY MASS INDEX: 21.46 KG/M2 | TEMPERATURE: 97.6 F | WEIGHT: 125 LBS | HEART RATE: 78 BPM

## 2020-01-01 VITALS
BODY MASS INDEX: 21.15 KG/M2 | TEMPERATURE: 98.7 F | SYSTOLIC BLOOD PRESSURE: 154 MMHG | DIASTOLIC BLOOD PRESSURE: 70 MMHG | WEIGHT: 123.9 LBS | HEIGHT: 64 IN | RESPIRATION RATE: 18 BRPM | OXYGEN SATURATION: 97 % | HEART RATE: 77 BPM

## 2020-01-01 VITALS
OXYGEN SATURATION: 97 % | HEART RATE: 76 BPM | DIASTOLIC BLOOD PRESSURE: 67 MMHG | RESPIRATION RATE: 15 BRPM | TEMPERATURE: 98.1 F | BODY MASS INDEX: 21.46 KG/M2 | SYSTOLIC BLOOD PRESSURE: 129 MMHG | WEIGHT: 125 LBS

## 2020-01-01 VITALS
WEIGHT: 127.8 LBS | HEART RATE: 87 BPM | TEMPERATURE: 97.3 F | BODY MASS INDEX: 21.94 KG/M2 | SYSTOLIC BLOOD PRESSURE: 176 MMHG | OXYGEN SATURATION: 91 % | DIASTOLIC BLOOD PRESSURE: 96 MMHG | RESPIRATION RATE: 20 BRPM

## 2020-01-01 VITALS
TEMPERATURE: 98.1 F | SYSTOLIC BLOOD PRESSURE: 139 MMHG | RESPIRATION RATE: 18 BRPM | HEART RATE: 78 BPM | OXYGEN SATURATION: 98 % | DIASTOLIC BLOOD PRESSURE: 69 MMHG | WEIGHT: 126.2 LBS | BODY MASS INDEX: 21.66 KG/M2

## 2020-01-01 VITALS
DIASTOLIC BLOOD PRESSURE: 60 MMHG | HEART RATE: 80 BPM | TEMPERATURE: 97.6 F | SYSTOLIC BLOOD PRESSURE: 126 MMHG | RESPIRATION RATE: 19 BRPM | OXYGEN SATURATION: 93 % | DIASTOLIC BLOOD PRESSURE: 67 MMHG | HEART RATE: 72 BPM | OXYGEN SATURATION: 92 % | HEIGHT: 64 IN | BODY MASS INDEX: 21.15 KG/M2 | RESPIRATION RATE: 16 BRPM | TEMPERATURE: 97.3 F | SYSTOLIC BLOOD PRESSURE: 119 MMHG | WEIGHT: 123.9 LBS | WEIGHT: 139 LBS

## 2020-01-01 VITALS
SYSTOLIC BLOOD PRESSURE: 141 MMHG | DIASTOLIC BLOOD PRESSURE: 60 MMHG | TEMPERATURE: 97.9 F | OXYGEN SATURATION: 97 % | HEART RATE: 84 BPM | WEIGHT: 123.9 LBS | RESPIRATION RATE: 19 BRPM | HEIGHT: 64 IN | BODY MASS INDEX: 21.15 KG/M2

## 2020-01-01 VITALS
SYSTOLIC BLOOD PRESSURE: 133 MMHG | TEMPERATURE: 97.5 F | OXYGEN SATURATION: 96 % | DIASTOLIC BLOOD PRESSURE: 54 MMHG | BODY MASS INDEX: 21.92 KG/M2 | WEIGHT: 127.7 LBS | RESPIRATION RATE: 30 BRPM | HEART RATE: 80 BPM

## 2020-01-01 VITALS
RESPIRATION RATE: 20 BRPM | SYSTOLIC BLOOD PRESSURE: 121 MMHG | DIASTOLIC BLOOD PRESSURE: 62 MMHG | OXYGEN SATURATION: 95 % | HEART RATE: 82 BPM | TEMPERATURE: 98.4 F | WEIGHT: 127.87 LBS

## 2020-01-01 VITALS
RESPIRATION RATE: 19 BRPM | TEMPERATURE: 97.6 F | WEIGHT: 123.9 LBS | OXYGEN SATURATION: 96 % | SYSTOLIC BLOOD PRESSURE: 119 MMHG | HEART RATE: 72 BPM | DIASTOLIC BLOOD PRESSURE: 60 MMHG

## 2020-01-01 DIAGNOSIS — I50.30 HEART FAILURE WITH PRESERVED EJECTION FRACTION, NYHA CLASS I (H): ICD-10-CM

## 2020-01-01 DIAGNOSIS — J96.11 CHRONIC RESPIRATORY FAILURE WITH HYPOXIA AND HYPERCAPNIA (H): Primary | ICD-10-CM

## 2020-01-01 DIAGNOSIS — J96.12 CHRONIC RESPIRATORY FAILURE WITH HYPOXIA AND HYPERCAPNIA (H): Primary | ICD-10-CM

## 2020-01-01 DIAGNOSIS — J44.9 COPD, SEVERE (H): ICD-10-CM

## 2020-01-01 DIAGNOSIS — E11.59 TYPE 2 DIABETES MELLITUS WITH OTHER CIRCULATORY COMPLICATION, WITH LONG-TERM CURRENT USE OF INSULIN (H): ICD-10-CM

## 2020-01-01 DIAGNOSIS — J96.12 CHRONIC RESPIRATORY FAILURE WITH HYPOXIA AND HYPERCAPNIA (H): ICD-10-CM

## 2020-01-01 DIAGNOSIS — Z79.4 TYPE 2 DIABETES MELLITUS WITH OTHER CIRCULATORY COMPLICATION, WITH LONG-TERM CURRENT USE OF INSULIN (H): ICD-10-CM

## 2020-01-01 DIAGNOSIS — I10 ESSENTIAL HYPERTENSION: ICD-10-CM

## 2020-01-01 DIAGNOSIS — R53.81 PHYSICAL DECONDITIONING: ICD-10-CM

## 2020-01-01 DIAGNOSIS — R13.12 OROPHARYNGEAL DYSPHAGIA: ICD-10-CM

## 2020-01-01 DIAGNOSIS — I25.10 CORONARY ARTERY DISEASE INVOLVING NATIVE CORONARY ARTERY OF NATIVE HEART WITHOUT ANGINA PECTORIS: ICD-10-CM

## 2020-01-01 DIAGNOSIS — F41.9 ANXIETY: ICD-10-CM

## 2020-01-01 DIAGNOSIS — J44.9 SEVERE CHRONIC OBSTRUCTIVE PULMONARY DISEASE (H): ICD-10-CM

## 2020-01-01 DIAGNOSIS — E46 MALNUTRITION, UNSPECIFIED TYPE (H): ICD-10-CM

## 2020-01-01 DIAGNOSIS — J44.1 CHRONIC OBSTRUCTIVE PULMONARY DISEASE WITH ACUTE EXACERBATION (H): Primary | ICD-10-CM

## 2020-01-01 DIAGNOSIS — J96.11 CHRONIC RESPIRATORY FAILURE WITH HYPOXIA AND HYPERCAPNIA (H): ICD-10-CM

## 2020-01-01 DIAGNOSIS — J44.1 CHRONIC OBSTRUCTIVE PULMONARY DISEASE WITH ACUTE EXACERBATION (H): ICD-10-CM

## 2020-01-01 DIAGNOSIS — R73.03 BORDERLINE DIABETES MELLITUS: ICD-10-CM

## 2020-01-01 DIAGNOSIS — C18.9 MALIGNANT NEOPLASM OF COLON, UNSPECIFIED PART OF COLON (H): ICD-10-CM

## 2020-01-01 DIAGNOSIS — J44.9 SEVERE CHRONIC OBSTRUCTIVE PULMONARY DISEASE (H): Primary | ICD-10-CM

## 2020-01-01 DIAGNOSIS — J96.21 ACUTE ON CHRONIC RESPIRATORY FAILURE WITH HYPOXIA AND HYPERCAPNIA (H): Primary | ICD-10-CM

## 2020-01-01 DIAGNOSIS — G93.40 ENCEPHALOPATHY: ICD-10-CM

## 2020-01-01 DIAGNOSIS — J96.22 ACUTE ON CHRONIC RESPIRATORY FAILURE WITH HYPOXIA AND HYPERCAPNIA (H): Primary | ICD-10-CM

## 2020-01-01 DIAGNOSIS — J18.9 PNEUMONIA DUE TO INFECTIOUS ORGANISM, UNSPECIFIED LATERALITY, UNSPECIFIED PART OF LUNG: ICD-10-CM

## 2020-01-01 DIAGNOSIS — J96.22 ACUTE ON CHRONIC RESPIRATORY FAILURE WITH HYPOXIA AND HYPERCAPNIA (H): ICD-10-CM

## 2020-01-01 DIAGNOSIS — I25.2 HX OF NON-ST ELEVATION MYOCARDIAL INFARCTION (NSTEMI): ICD-10-CM

## 2020-01-01 DIAGNOSIS — E55.9 VITAMIN D DEFICIENCY: ICD-10-CM

## 2020-01-01 DIAGNOSIS — J96.21 ACUTE ON CHRONIC RESPIRATORY FAILURE WITH HYPOXIA AND HYPERCAPNIA (H): ICD-10-CM

## 2020-01-01 DIAGNOSIS — L85.3 DRY SKIN: Primary | ICD-10-CM

## 2020-01-01 DIAGNOSIS — M62.81 GENERALIZED MUSCLE WEAKNESS: ICD-10-CM

## 2020-01-01 DIAGNOSIS — C18.7 MALIGNANT NEOPLASM OF SIGMOID COLON (H): ICD-10-CM

## 2020-01-01 DIAGNOSIS — K21.00 GASTROESOPHAGEAL REFLUX DISEASE WITH ESOPHAGITIS WITHOUT HEMORRHAGE: ICD-10-CM

## 2020-01-01 DIAGNOSIS — I87.8 VENOUS STASIS: ICD-10-CM

## 2020-01-01 DIAGNOSIS — K59.00 CONSTIPATION, UNSPECIFIED CONSTIPATION TYPE: ICD-10-CM

## 2020-01-01 LAB
ALBUMIN SERPL-MCNC: 3.1 G/DL (ref 3.4–5)
ALBUMIN SERPL-MCNC: 3.2 G/DL (ref 3.4–5)
ALBUMIN SERPL-MCNC: 3.4 G/DL (ref 3.4–5)
ALP SERPL-CCNC: 106 U/L (ref 40–150)
ALP SERPL-CCNC: 124 U/L (ref 40–150)
ALP SERPL-CCNC: 95 U/L (ref 40–150)
ALT SERPL W P-5'-P-CCNC: 22 U/L (ref 0–50)
ALT SERPL W P-5'-P-CCNC: 24 U/L (ref 0–50)
ALT SERPL W P-5'-P-CCNC: 44 U/L (ref 0–50)
ANION GAP SERPL CALCULATED.3IONS-SCNC: 1 MMOL/L (ref 3–14)
ANION GAP SERPL CALCULATED.3IONS-SCNC: 2 MMOL/L (ref 3–14)
ANION GAP SERPL CALCULATED.3IONS-SCNC: 4 MMOL/L (ref 3–14)
ANION GAP SERPL CALCULATED.3IONS-SCNC: 4 MMOL/L (ref 3–14)
ANION GAP SERPL CALCULATED.3IONS-SCNC: <1 MMOL/L (ref 3–14)
AST SERPL W P-5'-P-CCNC: 17 U/L (ref 0–45)
AST SERPL W P-5'-P-CCNC: 19 U/L (ref 0–45)
AST SERPL W P-5'-P-CCNC: 28 U/L (ref 0–45)
BACTERIA SPEC CULT: NO GROWTH
BASE EXCESS BLDA CALC-SCNC: 11.8 MMOL/L
BASE EXCESS BLDV CALC-SCNC: 14.2 MMOL/L
BASE EXCESS BLDV CALC-SCNC: 15.7 MMOL/L
BASE EXCESS BLDV CALC-SCNC: 16.4 MMOL/L
BASE EXCESS BLDV CALC-SCNC: 6.5 MMOL/L
BASOPHILS # BLD AUTO: 0 10E9/L (ref 0–0.2)
BASOPHILS NFR BLD AUTO: 0.2 %
BILIRUB DIRECT SERPL-MCNC: 0.3 MG/DL (ref 0–0.2)
BILIRUB SERPL-MCNC: 0.4 MG/DL (ref 0.2–1.3)
BILIRUB SERPL-MCNC: 0.4 MG/DL (ref 0.2–1.3)
BILIRUB SERPL-MCNC: 0.9 MG/DL (ref 0.2–1.3)
BUN SERPL-MCNC: 18 MG/DL (ref 7–30)
BUN SERPL-MCNC: 18 MG/DL (ref 7–30)
BUN SERPL-MCNC: 19 MG/DL (ref 7–30)
BUN SERPL-MCNC: 21 MG/DL (ref 7–30)
BUN SERPL-MCNC: 22 MG/DL (ref 7–30)
BUN SERPL-MCNC: 22 MG/DL (ref 7–30)
BUN SERPL-MCNC: 23 MG/DL (ref 7–30)
BUN SERPL-MCNC: 24 MG/DL (ref 7–30)
BUN SERPL-MCNC: 25 MG/DL (ref 7–30)
BUN SERPL-MCNC: 25 MG/DL (ref 7–30)
BUN SERPL-MCNC: 27 MG/DL (ref 7–30)
BUN SERPL-MCNC: 30 MG/DL (ref 7–30)
BUN SERPL-MCNC: 32 MG/DL (ref 7–30)
CALCIUM SERPL-MCNC: 8.2 MG/DL (ref 8.5–10.1)
CALCIUM SERPL-MCNC: 8.3 MG/DL (ref 8.5–10.1)
CALCIUM SERPL-MCNC: 8.3 MG/DL (ref 8.5–10.1)
CALCIUM SERPL-MCNC: 8.4 MG/DL (ref 8.5–10.1)
CALCIUM SERPL-MCNC: 8.5 MG/DL (ref 8.5–10.1)
CALCIUM SERPL-MCNC: 8.5 MG/DL (ref 8.5–10.1)
CALCIUM SERPL-MCNC: 8.6 MG/DL (ref 8.5–10.1)
CALCIUM SERPL-MCNC: 8.7 MG/DL (ref 8.5–10.1)
CALCIUM SERPL-MCNC: 8.9 MG/DL (ref 8.5–10.1)
CALCIUM SERPL-MCNC: 8.9 MG/DL (ref 8.5–10.1)
CALCIUM SERPL-MCNC: 9 MG/DL (ref 8.5–10.1)
CALCIUM SERPL-MCNC: 9.1 MG/DL (ref 8.5–10.1)
CALCIUM SERPL-MCNC: 9.1 MG/DL (ref 8.5–10.1)
CHLORIDE SERPL-SCNC: 101 MMOL/L (ref 94–109)
CHLORIDE SERPL-SCNC: 101 MMOL/L (ref 94–109)
CHLORIDE SERPL-SCNC: 102 MMOL/L (ref 94–109)
CHLORIDE SERPL-SCNC: 103 MMOL/L (ref 94–109)
CHLORIDE SERPL-SCNC: 105 MMOL/L (ref 94–109)
CHLORIDE SERPL-SCNC: 106 MMOL/L (ref 94–109)
CHLORIDE SERPL-SCNC: 97 MMOL/L (ref 94–109)
CHLORIDE SERPL-SCNC: 98 MMOL/L (ref 94–109)
CHLORIDE SERPL-SCNC: 99 MMOL/L (ref 94–109)
CO2 SERPL-SCNC: 35 MMOL/L (ref 20–32)
CO2 SERPL-SCNC: 36 MMOL/L (ref 20–32)
CO2 SERPL-SCNC: 36 MMOL/L (ref 20–32)
CO2 SERPL-SCNC: 37 MMOL/L (ref 20–32)
CO2 SERPL-SCNC: 37 MMOL/L (ref 20–32)
CO2 SERPL-SCNC: 38 MMOL/L (ref 20–32)
CO2 SERPL-SCNC: 39 MMOL/L (ref 20–32)
CO2 SERPL-SCNC: 39 MMOL/L (ref 20–32)
CO2 SERPL-SCNC: 41 MMOL/L (ref 20–32)
CO2 SERPL-SCNC: 42 MMOL/L (ref 20–32)
CREAT SERPL-MCNC: 0.42 MG/DL (ref 0.52–1.04)
CREAT SERPL-MCNC: 0.47 MG/DL (ref 0.52–1.04)
CREAT SERPL-MCNC: 0.48 MG/DL (ref 0.52–1.04)
CREAT SERPL-MCNC: 0.52 MG/DL (ref 0.52–1.04)
CREAT SERPL-MCNC: 0.52 MG/DL (ref 0.52–1.04)
CREAT SERPL-MCNC: 0.53 MG/DL (ref 0.52–1.04)
CREAT SERPL-MCNC: 0.55 MG/DL (ref 0.52–1.04)
CREAT SERPL-MCNC: 0.58 MG/DL (ref 0.52–1.04)
CREAT SERPL-MCNC: 0.58 MG/DL (ref 0.52–1.04)
CREAT SERPL-MCNC: 0.59 MG/DL (ref 0.52–1.04)
CREAT SERPL-MCNC: 0.64 MG/DL (ref 0.52–1.04)
CREAT SERPL-MCNC: 0.65 MG/DL (ref 0.52–1.04)
CREAT SERPL-MCNC: 0.66 MG/DL (ref 0.52–1.04)
DEPRECATED CALCIDIOL+CALCIFEROL SERPL-MC: 13 UG/L (ref 20–75)
DIFFERENTIAL METHOD BLD: ABNORMAL
EOSINOPHIL # BLD AUTO: 0 10E9/L (ref 0–0.7)
EOSINOPHIL NFR BLD AUTO: 0.1 %
ERYTHROCYTE [DISTWIDTH] IN BLOOD BY AUTOMATED COUNT: 13.3 % (ref 10–15)
ERYTHROCYTE [DISTWIDTH] IN BLOOD BY AUTOMATED COUNT: 13.5 % (ref 10–15)
ERYTHROCYTE [DISTWIDTH] IN BLOOD BY AUTOMATED COUNT: 13.7 % (ref 10–15)
ERYTHROCYTE [DISTWIDTH] IN BLOOD BY AUTOMATED COUNT: 13.9 % (ref 10–15)
ERYTHROCYTE [DISTWIDTH] IN BLOOD BY AUTOMATED COUNT: 13.9 % (ref 10–15)
ERYTHROCYTE [DISTWIDTH] IN BLOOD BY AUTOMATED COUNT: 14 % (ref 10–15)
ERYTHROCYTE [DISTWIDTH] IN BLOOD BY AUTOMATED COUNT: 14.2 % (ref 10–15)
ERYTHROCYTE [DISTWIDTH] IN BLOOD BY AUTOMATED COUNT: 14.3 % (ref 10–15)
ERYTHROCYTE [DISTWIDTH] IN BLOOD BY AUTOMATED COUNT: 14.3 % (ref 10–15)
ERYTHROCYTE [DISTWIDTH] IN BLOOD BY AUTOMATED COUNT: 14.4 % (ref 10–15)
ERYTHROCYTE [DISTWIDTH] IN BLOOD BY AUTOMATED COUNT: 14.6 % (ref 10–15)
FLUABV+SARS-COV-2+RSV PNL RESP NAA+PROBE: NEGATIVE
FLUABV+SARS-COV-2+RSV PNL RESP NAA+PROBE: NEGATIVE
FLUAV+FLUBV RNA SPEC QL NAA+PROBE: NEGATIVE
FLUAV+FLUBV RNA SPEC QL NAA+PROBE: NEGATIVE
FOLATE SERPL-MCNC: 14.5 NG/ML
GFR SERPL CREATININE-BSD FRML MDRD: 87 ML/MIN/{1.73_M2}
GFR SERPL CREATININE-BSD FRML MDRD: 90 ML/MIN/{1.73_M2}
GFR SERPL CREATININE-BSD FRML MDRD: >90 ML/MIN/{1.73_M2}
GLUCOSE BLDC GLUCOMTR-MCNC: 100 MG/DL (ref 70–99)
GLUCOSE BLDC GLUCOMTR-MCNC: 102 MG/DL (ref 70–99)
GLUCOSE BLDC GLUCOMTR-MCNC: 103 MG/DL (ref 70–99)
GLUCOSE BLDC GLUCOMTR-MCNC: 104 MG/DL (ref 70–99)
GLUCOSE BLDC GLUCOMTR-MCNC: 108 MG/DL (ref 70–99)
GLUCOSE BLDC GLUCOMTR-MCNC: 109 MG/DL (ref 70–99)
GLUCOSE BLDC GLUCOMTR-MCNC: 112 MG/DL (ref 70–99)
GLUCOSE BLDC GLUCOMTR-MCNC: 114 MG/DL (ref 70–99)
GLUCOSE BLDC GLUCOMTR-MCNC: 115 MG/DL (ref 70–99)
GLUCOSE BLDC GLUCOMTR-MCNC: 116 MG/DL (ref 70–99)
GLUCOSE BLDC GLUCOMTR-MCNC: 116 MG/DL (ref 70–99)
GLUCOSE BLDC GLUCOMTR-MCNC: 121 MG/DL (ref 70–99)
GLUCOSE BLDC GLUCOMTR-MCNC: 123 MG/DL (ref 70–99)
GLUCOSE BLDC GLUCOMTR-MCNC: 124 MG/DL (ref 70–99)
GLUCOSE BLDC GLUCOMTR-MCNC: 125 MG/DL (ref 70–99)
GLUCOSE BLDC GLUCOMTR-MCNC: 125 MG/DL (ref 70–99)
GLUCOSE BLDC GLUCOMTR-MCNC: 127 MG/DL (ref 70–99)
GLUCOSE BLDC GLUCOMTR-MCNC: 128 MG/DL (ref 70–99)
GLUCOSE BLDC GLUCOMTR-MCNC: 129 MG/DL (ref 70–99)
GLUCOSE BLDC GLUCOMTR-MCNC: 130 MG/DL (ref 70–99)
GLUCOSE BLDC GLUCOMTR-MCNC: 130 MG/DL (ref 70–99)
GLUCOSE BLDC GLUCOMTR-MCNC: 131 MG/DL (ref 70–99)
GLUCOSE BLDC GLUCOMTR-MCNC: 132 MG/DL (ref 70–99)
GLUCOSE BLDC GLUCOMTR-MCNC: 132 MG/DL (ref 70–99)
GLUCOSE BLDC GLUCOMTR-MCNC: 134 MG/DL (ref 70–99)
GLUCOSE BLDC GLUCOMTR-MCNC: 136 MG/DL (ref 70–99)
GLUCOSE BLDC GLUCOMTR-MCNC: 140 MG/DL (ref 70–99)
GLUCOSE BLDC GLUCOMTR-MCNC: 141 MG/DL (ref 70–99)
GLUCOSE BLDC GLUCOMTR-MCNC: 146 MG/DL (ref 70–99)
GLUCOSE BLDC GLUCOMTR-MCNC: 146 MG/DL (ref 70–99)
GLUCOSE BLDC GLUCOMTR-MCNC: 148 MG/DL (ref 70–99)
GLUCOSE BLDC GLUCOMTR-MCNC: 152 MG/DL (ref 70–99)
GLUCOSE BLDC GLUCOMTR-MCNC: 154 MG/DL (ref 70–99)
GLUCOSE BLDC GLUCOMTR-MCNC: 154 MG/DL (ref 70–99)
GLUCOSE BLDC GLUCOMTR-MCNC: 155 MG/DL (ref 70–99)
GLUCOSE BLDC GLUCOMTR-MCNC: 155 MG/DL (ref 70–99)
GLUCOSE BLDC GLUCOMTR-MCNC: 156 MG/DL (ref 70–99)
GLUCOSE BLDC GLUCOMTR-MCNC: 158 MG/DL (ref 70–99)
GLUCOSE BLDC GLUCOMTR-MCNC: 161 MG/DL (ref 70–99)
GLUCOSE BLDC GLUCOMTR-MCNC: 162 MG/DL (ref 70–99)
GLUCOSE BLDC GLUCOMTR-MCNC: 168 MG/DL (ref 70–99)
GLUCOSE BLDC GLUCOMTR-MCNC: 168 MG/DL (ref 70–99)
GLUCOSE BLDC GLUCOMTR-MCNC: 184 MG/DL (ref 70–99)
GLUCOSE BLDC GLUCOMTR-MCNC: 185 MG/DL (ref 70–99)
GLUCOSE BLDC GLUCOMTR-MCNC: 187 MG/DL (ref 70–99)
GLUCOSE BLDC GLUCOMTR-MCNC: 189 MG/DL (ref 70–99)
GLUCOSE BLDC GLUCOMTR-MCNC: 192 MG/DL (ref 70–99)
GLUCOSE BLDC GLUCOMTR-MCNC: 194 MG/DL (ref 70–99)
GLUCOSE BLDC GLUCOMTR-MCNC: 195 MG/DL (ref 70–99)
GLUCOSE BLDC GLUCOMTR-MCNC: 199 MG/DL (ref 70–99)
GLUCOSE BLDC GLUCOMTR-MCNC: 202 MG/DL (ref 70–99)
GLUCOSE BLDC GLUCOMTR-MCNC: 212 MG/DL (ref 70–99)
GLUCOSE BLDC GLUCOMTR-MCNC: 213 MG/DL (ref 70–99)
GLUCOSE BLDC GLUCOMTR-MCNC: 215 MG/DL (ref 70–99)
GLUCOSE BLDC GLUCOMTR-MCNC: 216 MG/DL (ref 70–99)
GLUCOSE BLDC GLUCOMTR-MCNC: 219 MG/DL (ref 70–99)
GLUCOSE BLDC GLUCOMTR-MCNC: 236 MG/DL (ref 70–99)
GLUCOSE BLDC GLUCOMTR-MCNC: 238 MG/DL (ref 70–99)
GLUCOSE BLDC GLUCOMTR-MCNC: 253 MG/DL (ref 70–99)
GLUCOSE BLDC GLUCOMTR-MCNC: 280 MG/DL (ref 70–99)
GLUCOSE BLDC GLUCOMTR-MCNC: 300 MG/DL (ref 70–99)
GLUCOSE BLDC GLUCOMTR-MCNC: 304 MG/DL (ref 70–99)
GLUCOSE BLDC GLUCOMTR-MCNC: 78 MG/DL (ref 70–99)
GLUCOSE BLDC GLUCOMTR-MCNC: 84 MG/DL (ref 70–99)
GLUCOSE BLDC GLUCOMTR-MCNC: 85 MG/DL (ref 70–99)
GLUCOSE BLDC GLUCOMTR-MCNC: 87 MG/DL (ref 70–99)
GLUCOSE BLDC GLUCOMTR-MCNC: 88 MG/DL (ref 70–99)
GLUCOSE BLDC GLUCOMTR-MCNC: 89 MG/DL (ref 70–99)
GLUCOSE BLDC GLUCOMTR-MCNC: 94 MG/DL (ref 70–99)
GLUCOSE BLDC GLUCOMTR-MCNC: 94 MG/DL (ref 70–99)
GLUCOSE SERPL-MCNC: 104 MG/DL (ref 70–99)
GLUCOSE SERPL-MCNC: 106 MG/DL (ref 70–99)
GLUCOSE SERPL-MCNC: 108 MG/DL (ref 70–99)
GLUCOSE SERPL-MCNC: 110 MG/DL (ref 70–99)
GLUCOSE SERPL-MCNC: 112 MG/DL (ref 70–99)
GLUCOSE SERPL-MCNC: 119 MG/DL (ref 70–99)
GLUCOSE SERPL-MCNC: 135 MG/DL (ref 70–99)
GLUCOSE SERPL-MCNC: 157 MG/DL (ref 70–99)
GLUCOSE SERPL-MCNC: 191 MG/DL (ref 70–99)
GLUCOSE SERPL-MCNC: 237 MG/DL (ref 70–99)
GLUCOSE SERPL-MCNC: 82 MG/DL (ref 70–99)
GLUCOSE SERPL-MCNC: 82 MG/DL (ref 70–99)
GLUCOSE SERPL-MCNC: 99 MG/DL (ref 70–99)
GRAM STN SPEC: NORMAL
GRAM STN SPEC: NORMAL
HBA1C MFR BLD: 6 % (ref 0–5.6)
HCO3 BLD-SCNC: 40 MMOL/L (ref 21–28)
HCO3 BLDV-SCNC: 35 MMOL/L (ref 21–28)
HCO3 BLDV-SCNC: 42 MMOL/L (ref 21–28)
HCO3 BLDV-SCNC: 43 MMOL/L (ref 21–28)
HCO3 BLDV-SCNC: 45 MMOL/L (ref 21–28)
HCT VFR BLD AUTO: 40.2 % (ref 35–47)
HCT VFR BLD AUTO: 40.2 % (ref 35–47)
HCT VFR BLD AUTO: 40.4 % (ref 35–47)
HCT VFR BLD AUTO: 41.1 % (ref 35–47)
HCT VFR BLD AUTO: 42.2 % (ref 35–47)
HCT VFR BLD AUTO: 42.4 % (ref 35–47)
HCT VFR BLD AUTO: 43.2 % (ref 35–47)
HCT VFR BLD AUTO: 43.5 % (ref 35–47)
HCT VFR BLD AUTO: 44.6 % (ref 35–47)
HCT VFR BLD AUTO: 46.5 % (ref 35–47)
HCT VFR BLD AUTO: 46.6 % (ref 35–47)
HGB BLD-MCNC: 12.5 G/DL (ref 11.7–15.7)
HGB BLD-MCNC: 12.5 G/DL (ref 11.7–15.7)
HGB BLD-MCNC: 12.7 G/DL (ref 11.7–15.7)
HGB BLD-MCNC: 12.9 G/DL (ref 11.7–15.7)
HGB BLD-MCNC: 13 G/DL (ref 11.7–15.7)
HGB BLD-MCNC: 13 G/DL (ref 11.7–15.7)
HGB BLD-MCNC: 13.2 G/DL (ref 11.7–15.7)
HGB BLD-MCNC: 13.2 G/DL (ref 11.7–15.7)
HGB BLD-MCNC: 13.9 G/DL (ref 11.7–15.7)
HGB BLD-MCNC: 14.4 G/DL (ref 11.7–15.7)
HGB BLD-MCNC: 14.4 G/DL (ref 11.7–15.7)
IMM GRANULOCYTES # BLD: 0.1 10E9/L (ref 0–0.4)
IMM GRANULOCYTES NFR BLD: 0.4 %
L PNEUMO1 AG UR QL IA: NORMAL
LABORATORY COMMENT REPORT: NORMAL
LABORATORY COMMENT REPORT: NORMAL
LACTATE BLD-SCNC: 1.9 MMOL/L (ref 0.7–2)
LYMPHOCYTES # BLD AUTO: 1.6 10E9/L (ref 0.8–5.3)
LYMPHOCYTES NFR BLD AUTO: 14.2 %
Lab: NORMAL
Lab: NORMAL
MAGNESIUM SERPL-MCNC: 2.2 MG/DL (ref 1.6–2.3)
MAGNESIUM SERPL-MCNC: 2.9 MG/DL (ref 1.6–2.3)
MCH RBC QN AUTO: 30.4 PG (ref 26.5–33)
MCH RBC QN AUTO: 30.5 PG (ref 26.5–33)
MCH RBC QN AUTO: 30.6 PG (ref 26.5–33)
MCH RBC QN AUTO: 30.6 PG (ref 26.5–33)
MCH RBC QN AUTO: 30.8 PG (ref 26.5–33)
MCH RBC QN AUTO: 30.8 PG (ref 26.5–33)
MCH RBC QN AUTO: 30.9 PG (ref 26.5–33)
MCH RBC QN AUTO: 31 PG (ref 26.5–33)
MCH RBC QN AUTO: 31 PG (ref 26.5–33)
MCH RBC QN AUTO: 31.1 PG (ref 26.5–33)
MCH RBC QN AUTO: 31.3 PG (ref 26.5–33)
MCHC RBC AUTO-ENTMCNC: 30.1 G/DL (ref 31.5–36.5)
MCHC RBC AUTO-ENTMCNC: 30.3 G/DL (ref 31.5–36.5)
MCHC RBC AUTO-ENTMCNC: 30.8 G/DL (ref 31.5–36.5)
MCHC RBC AUTO-ENTMCNC: 30.9 G/DL (ref 31.5–36.5)
MCHC RBC AUTO-ENTMCNC: 31 G/DL (ref 31.5–36.5)
MCHC RBC AUTO-ENTMCNC: 31.1 G/DL (ref 31.5–36.5)
MCHC RBC AUTO-ENTMCNC: 31.2 G/DL (ref 31.5–36.5)
MCHC RBC AUTO-ENTMCNC: 31.4 G/DL (ref 31.5–36.5)
MCHC RBC AUTO-ENTMCNC: 31.4 G/DL (ref 31.5–36.5)
MCV RBC AUTO: 100 FL (ref 78–100)
MCV RBC AUTO: 101 FL (ref 78–100)
MCV RBC AUTO: 101 FL (ref 78–100)
MCV RBC AUTO: 102 FL (ref 78–100)
MCV RBC AUTO: 97 FL (ref 78–100)
MCV RBC AUTO: 98 FL (ref 78–100)
MCV RBC AUTO: 99 FL (ref 78–100)
MCV RBC AUTO: 99 FL (ref 78–100)
MONOCYTES # BLD AUTO: 1.4 10E9/L (ref 0–1.3)
MONOCYTES NFR BLD AUTO: 12.3 %
NEUTROPHILS # BLD AUTO: 8.1 10E9/L (ref 1.6–8.3)
NEUTROPHILS NFR BLD AUTO: 72.8 %
NRBC # BLD AUTO: 0 10*3/UL
NRBC BLD AUTO-RTO: 0 /100
O2/TOTAL GAS SETTING VFR VENT: ABNORMAL %
OXYHGB MFR BLD: 94 % (ref 92–100)
OXYHGB MFR BLDV: 69 %
OXYHGB MFR BLDV: 77 %
OXYHGB MFR BLDV: 91 %
PCO2 BLD: 66 MM HG (ref 35–45)
PCO2 BLDV: 66 MM HG (ref 40–50)
PCO2 BLDV: 69 MM HG (ref 40–50)
PCO2 BLDV: 69 MM HG (ref 40–50)
PCO2 BLDV: 71 MM HG (ref 40–50)
PH BLD: 7.39 PH (ref 7.35–7.45)
PH BLDV: 7.32 PH (ref 7.32–7.43)
PH BLDV: 7.4 PH (ref 7.32–7.43)
PH BLDV: 7.41 PH (ref 7.32–7.43)
PH BLDV: 7.43 PH (ref 7.32–7.43)
PHOSPHATE SERPL-MCNC: 2.1 MG/DL (ref 2.5–4.5)
PHOSPHATE SERPL-MCNC: 3.1 MG/DL (ref 2.5–4.5)
PHOSPHATE SERPL-MCNC: 3.5 MG/DL (ref 2.5–4.5)
PHOSPHATE SERPL-MCNC: 3.6 MG/DL (ref 2.5–4.5)
PHOSPHATE SERPL-MCNC: 3.8 MG/DL (ref 2.5–4.5)
PHOSPHATE SERPL-MCNC: 4.1 MG/DL (ref 2.5–4.5)
PLATELET # BLD AUTO: 199 10E9/L (ref 150–450)
PLATELET # BLD AUTO: 202 10E9/L (ref 150–450)
PLATELET # BLD AUTO: 208 10E9/L (ref 150–450)
PLATELET # BLD AUTO: 210 10E9/L (ref 150–450)
PLATELET # BLD AUTO: 215 10E9/L (ref 150–450)
PLATELET # BLD AUTO: 215 10E9/L (ref 150–450)
PLATELET # BLD AUTO: 222 10E9/L (ref 150–450)
PLATELET # BLD AUTO: 223 10E9/L (ref 150–450)
PLATELET # BLD AUTO: 226 10E9/L (ref 150–450)
PLATELET # BLD AUTO: 239 10E9/L (ref 150–450)
PLATELET # BLD AUTO: 250 10E9/L (ref 150–450)
PLATELET # BLD AUTO: 267 10E9/L (ref 150–450)
PLATELET # BLD AUTO: 272 10E9/L (ref 150–450)
PO2 BLD: 78 MM HG (ref 80–105)
PO2 BLDV: 36 MM HG (ref 25–47)
PO2 BLDV: 44 MM HG (ref 25–47)
PO2 BLDV: 50 MM HG (ref 25–47)
PO2 BLDV: 66 MM HG (ref 25–47)
POTASSIUM SERPL-SCNC: 3.6 MMOL/L (ref 3.4–5.3)
POTASSIUM SERPL-SCNC: 3.7 MMOL/L (ref 3.4–5.3)
POTASSIUM SERPL-SCNC: 3.7 MMOL/L (ref 3.4–5.3)
POTASSIUM SERPL-SCNC: 3.8 MMOL/L (ref 3.4–5.3)
POTASSIUM SERPL-SCNC: 3.8 MMOL/L (ref 3.4–5.3)
POTASSIUM SERPL-SCNC: 3.9 MMOL/L (ref 3.4–5.3)
POTASSIUM SERPL-SCNC: 4 MMOL/L (ref 3.4–5.3)
POTASSIUM SERPL-SCNC: 4.1 MMOL/L (ref 3.4–5.3)
POTASSIUM SERPL-SCNC: 4.2 MMOL/L (ref 3.4–5.3)
POTASSIUM SERPL-SCNC: 4.3 MMOL/L (ref 3.4–5.3)
POTASSIUM SERPL-SCNC: 4.6 MMOL/L (ref 3.4–5.3)
POTASSIUM SERPL-SCNC: 4.7 MMOL/L (ref 3.4–5.3)
POTASSIUM SERPL-SCNC: 4.7 MMOL/L (ref 3.4–5.3)
PROT SERPL-MCNC: 6.5 G/DL (ref 6.8–8.8)
PROT SERPL-MCNC: 6.6 G/DL (ref 6.8–8.8)
PROT SERPL-MCNC: 6.8 G/DL (ref 6.8–8.8)
RBC # BLD AUTO: 3.99 10E12/L (ref 3.8–5.2)
RBC # BLD AUTO: 4.04 10E12/L (ref 3.8–5.2)
RBC # BLD AUTO: 4.1 10E12/L (ref 3.8–5.2)
RBC # BLD AUTO: 4.22 10E12/L (ref 3.8–5.2)
RBC # BLD AUTO: 4.22 10E12/L (ref 3.8–5.2)
RBC # BLD AUTO: 4.23 10E12/L (ref 3.8–5.2)
RBC # BLD AUTO: 4.31 10E12/L (ref 3.8–5.2)
RBC # BLD AUTO: 4.31 10E12/L (ref 3.8–5.2)
RBC # BLD AUTO: 4.48 10E12/L (ref 3.8–5.2)
RBC # BLD AUTO: 4.63 10E12/L (ref 3.8–5.2)
RBC # BLD AUTO: 4.73 10E12/L (ref 3.8–5.2)
RSV RNA SPEC NAA+PROBE: NEGATIVE
RSV RNA SPEC QL NAA+PROBE: NORMAL
SARS-COV-2 RNA SPEC QL NAA+PROBE: NEGATIVE
SARS-COV-2 RNA SPEC QL NAA+PROBE: NEGATIVE
SARS-COV-2 RNA SPEC QL NAA+PROBE: NORMAL
SARS-COV-2 RNA SPEC QL NAA+PROBE: NOT DETECTED
SODIUM SERPL-SCNC: 135 MMOL/L (ref 133–144)
SODIUM SERPL-SCNC: 139 MMOL/L (ref 133–144)
SODIUM SERPL-SCNC: 139 MMOL/L (ref 133–144)
SODIUM SERPL-SCNC: 140 MMOL/L (ref 133–144)
SODIUM SERPL-SCNC: 141 MMOL/L (ref 133–144)
SODIUM SERPL-SCNC: 142 MMOL/L (ref 133–144)
SODIUM SERPL-SCNC: 143 MMOL/L (ref 133–144)
SODIUM SERPL-SCNC: 145 MMOL/L (ref 133–144)
SPECIMEN SOURCE: NORMAL
TSH SERPL DL<=0.005 MIU/L-ACNC: 1.24 MU/L (ref 0.4–4)
VIT B12 SERPL-MCNC: 680 PG/ML (ref 193–986)
WBC # BLD AUTO: 10.9 10E9/L (ref 4–11)
WBC # BLD AUTO: 11.2 10E9/L (ref 4–11)
WBC # BLD AUTO: 11.3 10E9/L (ref 4–11)
WBC # BLD AUTO: 5.4 10E9/L (ref 4–11)
WBC # BLD AUTO: 6 10E9/L (ref 4–11)
WBC # BLD AUTO: 6.3 10E9/L (ref 4–11)
WBC # BLD AUTO: 7.1 10E9/L (ref 4–11)
WBC # BLD AUTO: 7.6 10E9/L (ref 4–11)
WBC # BLD AUTO: 7.6 10E9/L (ref 4–11)
WBC # BLD AUTO: 7.7 10E9/L (ref 4–11)
WBC # BLD AUTO: 9.2 10E9/L (ref 4–11)

## 2020-01-01 PROCEDURE — 250N000011 HC RX IP 250 OP 636: Performed by: PHYSICIAN ASSISTANT

## 2020-01-01 PROCEDURE — 258N000003 HC RX IP 258 OP 636: Performed by: PHYSICIAN ASSISTANT

## 2020-01-01 PROCEDURE — 272N000063 HC CIRCUIT HUMID FACE/TRACH MSK

## 2020-01-01 PROCEDURE — 99232 SBSQ HOSP IP/OBS MODERATE 35: CPT | Performed by: HOSPITALIST

## 2020-01-01 PROCEDURE — 92526 ORAL FUNCTION THERAPY: CPT | Mod: GN | Performed by: SPEECH-LANGUAGE PATHOLOGIST

## 2020-01-01 PROCEDURE — 80048 BASIC METABOLIC PNL TOTAL CA: CPT | Performed by: PHYSICIAN ASSISTANT

## 2020-01-01 PROCEDURE — 94640 AIRWAY INHALATION TREATMENT: CPT | Mod: 76

## 2020-01-01 PROCEDURE — 36415 COLL VENOUS BLD VENIPUNCTURE: CPT | Performed by: INTERNAL MEDICINE

## 2020-01-01 PROCEDURE — 99232 SBSQ HOSP IP/OBS MODERATE 35: CPT | Performed by: INTERNAL MEDICINE

## 2020-01-01 PROCEDURE — 120N000001 HC R&B MED SURG/OB

## 2020-01-01 PROCEDURE — 250N000013 HC RX MED GY IP 250 OP 250 PS 637: Performed by: HOSPITALIST

## 2020-01-01 PROCEDURE — 99291 CRITICAL CARE FIRST HOUR: CPT | Performed by: INTERNAL MEDICINE

## 2020-01-01 PROCEDURE — 250N000013 HC RX MED GY IP 250 OP 250 PS 637: Performed by: INTERNAL MEDICINE

## 2020-01-01 PROCEDURE — 250N000011 HC RX IP 250 OP 636: Performed by: INTERNAL MEDICINE

## 2020-01-01 PROCEDURE — 999N000157 HC STATISTIC RCP TIME EA 10 MIN

## 2020-01-01 PROCEDURE — 80048 BASIC METABOLIC PNL TOTAL CA: CPT | Performed by: INTERNAL MEDICINE

## 2020-01-01 PROCEDURE — 99310 SBSQ NF CARE HIGH MDM 45: CPT | Performed by: FAMILY MEDICINE

## 2020-01-01 PROCEDURE — 250N000009 HC RX 250: Performed by: INTERNAL MEDICINE

## 2020-01-01 PROCEDURE — 71045 X-RAY EXAM CHEST 1 VIEW: CPT

## 2020-01-01 PROCEDURE — 200N000001 HC R&B ICU

## 2020-01-01 PROCEDURE — 94640 AIRWAY INHALATION TREATMENT: CPT

## 2020-01-01 PROCEDURE — 250N000013 HC RX MED GY IP 250 OP 250 PS 637: Performed by: PHYSICIAN ASSISTANT

## 2020-01-01 PROCEDURE — 84100 ASSAY OF PHOSPHORUS: CPT | Performed by: INTERNAL MEDICINE

## 2020-01-01 PROCEDURE — U0003 INFECTIOUS AGENT DETECTION BY NUCLEIC ACID (DNA OR RNA); SEVERE ACUTE RESPIRATORY SYNDROME CORONAVIRUS 2 (SARS-COV-2) (CORONAVIRUS DISEASE [COVID-19]), AMPLIFIED PROBE TECHNIQUE, MAKING USE OF HIGH THROUGHPUT TECHNOLOGIES AS DESCRIBED BY CMS-2020-01-R: HCPCS | Performed by: INTERNAL MEDICINE

## 2020-01-01 PROCEDURE — 84100 ASSAY OF PHOSPHORUS: CPT | Performed by: PHYSICIAN ASSISTANT

## 2020-01-01 PROCEDURE — 36415 COLL VENOUS BLD VENIPUNCTURE: CPT | Performed by: PHYSICIAN ASSISTANT

## 2020-01-01 PROCEDURE — 999N001017 HC STATISTIC GLUCOSE BY METER IP

## 2020-01-01 PROCEDURE — 99309 SBSQ NF CARE MODERATE MDM 30: CPT | Mod: 95 | Performed by: FAMILY MEDICINE

## 2020-01-01 PROCEDURE — 97530 THERAPEUTIC ACTIVITIES: CPT | Mod: GP | Performed by: PHYSICAL THERAPIST

## 2020-01-01 PROCEDURE — 87070 CULTURE OTHR SPECIMN AEROBIC: CPT | Performed by: INTERNAL MEDICINE

## 2020-01-01 PROCEDURE — 97162 PT EVAL MOD COMPLEX 30 MIN: CPT | Mod: GP

## 2020-01-01 PROCEDURE — 85027 COMPLETE CBC AUTOMATED: CPT | Performed by: PHYSICIAN ASSISTANT

## 2020-01-01 PROCEDURE — 99233 SBSQ HOSP IP/OBS HIGH 50: CPT | Performed by: INTERNAL MEDICINE

## 2020-01-01 PROCEDURE — 83605 ASSAY OF LACTIC ACID: CPT | Performed by: INTERNAL MEDICINE

## 2020-01-01 PROCEDURE — 97116 GAIT TRAINING THERAPY: CPT | Mod: GP | Performed by: PHYSICAL THERAPIST

## 2020-01-01 PROCEDURE — 99285 EMERGENCY DEPT VISIT HI MDM: CPT | Mod: 25 | Performed by: EMERGENCY MEDICINE

## 2020-01-01 PROCEDURE — 99306 1ST NF CARE HIGH MDM 50: CPT | Mod: AI | Performed by: NURSE PRACTITIONER

## 2020-01-01 PROCEDURE — 85025 COMPLETE CBC W/AUTO DIFF WBC: CPT | Performed by: EMERGENCY MEDICINE

## 2020-01-01 PROCEDURE — 94002 VENT MGMT INPAT INIT DAY: CPT

## 2020-01-01 PROCEDURE — 250N000012 HC RX MED GY IP 250 OP 636 PS 637: Performed by: INTERNAL MEDICINE

## 2020-01-01 PROCEDURE — 250N000009 HC RX 250: Performed by: PHYSICIAN ASSISTANT

## 2020-01-01 PROCEDURE — 272N000064 HC CIRCUIT HUMIDITY W/CPAP BIPAP

## 2020-01-01 PROCEDURE — 87899 AGENT NOS ASSAY W/OPTIC: CPT | Performed by: INTERNAL MEDICINE

## 2020-01-01 PROCEDURE — 97110 THERAPEUTIC EXERCISES: CPT | Mod: GP

## 2020-01-01 PROCEDURE — 83036 HEMOGLOBIN GLYCOSYLATED A1C: CPT | Performed by: INTERNAL MEDICINE

## 2020-01-01 PROCEDURE — 82803 BLOOD GASES ANY COMBINATION: CPT | Performed by: EMERGENCY MEDICINE

## 2020-01-01 PROCEDURE — 97110 THERAPEUTIC EXERCISES: CPT | Mod: GP | Performed by: PHYSICAL THERAPIST

## 2020-01-01 PROCEDURE — C9803 HOPD COVID-19 SPEC COLLECT: HCPCS | Performed by: EMERGENCY MEDICINE

## 2020-01-01 PROCEDURE — 85027 COMPLETE CBC AUTOMATED: CPT | Performed by: INTERNAL MEDICINE

## 2020-01-01 PROCEDURE — 85049 AUTOMATED PLATELET COUNT: CPT | Performed by: INTERNAL MEDICINE

## 2020-01-01 PROCEDURE — 250N000011 HC RX IP 250 OP 636: Performed by: EMERGENCY MEDICINE

## 2020-01-01 PROCEDURE — 99310 SBSQ NF CARE HIGH MDM 45: CPT | Performed by: NURSE PRACTITIONER

## 2020-01-01 PROCEDURE — 5A1945Z RESPIRATORY VENTILATION, 24-96 CONSECUTIVE HOURS: ICD-10-PCS | Performed by: INTERNAL MEDICINE

## 2020-01-01 PROCEDURE — 99207 PR CONSULT E&M CHANGED TO INITIAL LEVEL: CPT | Performed by: PHYSICIAN ASSISTANT

## 2020-01-01 PROCEDURE — 999N000009 HC STATISTIC AIRWAY CARE

## 2020-01-01 PROCEDURE — 71275 CT ANGIOGRAPHY CHEST: CPT

## 2020-01-01 PROCEDURE — G0463 HOSPITAL OUTPT CLINIC VISIT: HCPCS

## 2020-01-01 PROCEDURE — 94003 VENT MGMT INPAT SUBQ DAY: CPT

## 2020-01-01 PROCEDURE — 97110 THERAPEUTIC EXERCISES: CPT | Mod: GP | Performed by: PHYSICAL THERAPY ASSISTANT

## 2020-01-01 PROCEDURE — 87636 SARSCOV2 & INF A&B AMP PRB: CPT | Performed by: EMERGENCY MEDICINE

## 2020-01-01 PROCEDURE — 250N000009 HC RX 250: Performed by: EMERGENCY MEDICINE

## 2020-01-01 PROCEDURE — 97530 THERAPEUTIC ACTIVITIES: CPT | Mod: GP | Performed by: PHYSICAL THERAPY ASSISTANT

## 2020-01-01 PROCEDURE — 80076 HEPATIC FUNCTION PANEL: CPT | Performed by: INTERNAL MEDICINE

## 2020-01-01 PROCEDURE — 92610 EVALUATE SWALLOWING FUNCTION: CPT | Mod: GN | Performed by: SPEECH-LANGUAGE PATHOLOGIST

## 2020-01-01 PROCEDURE — 99207 PR CDG-MDM COMPONENT: MEETS MODERATE - DOWN CODED: CPT | Performed by: FAMILY MEDICINE

## 2020-01-01 PROCEDURE — 99306 1ST NF CARE HIGH MDM 50: CPT | Performed by: FAMILY MEDICINE

## 2020-01-01 PROCEDURE — 85049 AUTOMATED PLATELET COUNT: CPT | Performed by: PHYSICIAN ASSISTANT

## 2020-01-01 PROCEDURE — 99223 1ST HOSP IP/OBS HIGH 75: CPT | Mod: AI | Performed by: INTERNAL MEDICINE

## 2020-01-01 PROCEDURE — 97530 THERAPEUTIC ACTIVITIES: CPT | Mod: GP

## 2020-01-01 PROCEDURE — 258N000003 HC RX IP 258 OP 636: Performed by: INTERNAL MEDICINE

## 2020-01-01 PROCEDURE — 82805 BLOOD GASES W/O2 SATURATION: CPT | Performed by: INTERNAL MEDICINE

## 2020-01-01 PROCEDURE — 83735 ASSAY OF MAGNESIUM: CPT | Performed by: INTERNAL MEDICINE

## 2020-01-01 PROCEDURE — 93005 ELECTROCARDIOGRAM TRACING: CPT | Performed by: EMERGENCY MEDICINE

## 2020-01-01 PROCEDURE — 99309 SBSQ NF CARE MODERATE MDM 30: CPT | Performed by: FAMILY MEDICINE

## 2020-01-01 PROCEDURE — 80053 COMPREHEN METABOLIC PANEL: CPT | Performed by: EMERGENCY MEDICINE

## 2020-01-01 PROCEDURE — 93010 ELECTROCARDIOGRAM REPORT: CPT | Performed by: EMERGENCY MEDICINE

## 2020-01-01 PROCEDURE — 92611 MOTION FLUOROSCOPY/SWALLOW: CPT | Mod: GN | Performed by: SPEECH-LANGUAGE PATHOLOGIST

## 2020-01-01 PROCEDURE — 99239 HOSP IP/OBS DSCHRG MGMT >30: CPT | Performed by: HOSPITALIST

## 2020-01-01 PROCEDURE — 99222 1ST HOSP IP/OBS MODERATE 55: CPT | Performed by: PHYSICIAN ASSISTANT

## 2020-01-01 PROCEDURE — 74230 X-RAY XM SWLNG FUNCJ C+: CPT

## 2020-01-01 PROCEDURE — 87205 SMEAR GRAM STAIN: CPT | Performed by: INTERNAL MEDICINE

## 2020-01-01 PROCEDURE — 94660 CPAP INITIATION&MGMT: CPT

## 2020-01-01 PROCEDURE — 87631 RESP VIRUS 3-5 TARGETS: CPT | Performed by: INTERNAL MEDICINE

## 2020-01-01 PROCEDURE — 74018 RADEX ABDOMEN 1 VIEW: CPT

## 2020-01-01 PROCEDURE — 99309 SBSQ NF CARE MODERATE MDM 30: CPT | Performed by: NURSE PRACTITIONER

## 2020-01-01 PROCEDURE — 99291 CRITICAL CARE FIRST HOUR: CPT | Mod: 25 | Performed by: EMERGENCY MEDICINE

## 2020-01-01 PROCEDURE — 36600 WITHDRAWAL OF ARTERIAL BLOOD: CPT

## 2020-01-01 PROCEDURE — 99316 NF DSCHRG MGMT 30 MIN+: CPT | Performed by: NURSE PRACTITIONER

## 2020-01-01 PROCEDURE — 87040 BLOOD CULTURE FOR BACTERIA: CPT | Performed by: INTERNAL MEDICINE

## 2020-01-01 RX ORDER — IPRATROPIUM BROMIDE AND ALBUTEROL SULFATE 2.5; .5 MG/3ML; MG/3ML
3 SOLUTION RESPIRATORY (INHALATION)
Status: DISCONTINUED | OUTPATIENT
Start: 2020-01-01 | End: 2020-01-01 | Stop reason: HOSPADM

## 2020-01-01 RX ORDER — NALOXONE HYDROCHLORIDE 0.4 MG/ML
.1-.4 INJECTION, SOLUTION INTRAMUSCULAR; INTRAVENOUS; SUBCUTANEOUS
Status: DISCONTINUED | OUTPATIENT
Start: 2020-01-01 | End: 2020-01-01

## 2020-01-01 RX ORDER — DIAZEPAM 2 MG
2 TABLET ORAL EVERY 6 HOURS PRN
Qty: 8 TABLET | Refills: 0 | Status: SHIPPED | OUTPATIENT
Start: 2020-01-01 | End: 2020-01-01

## 2020-01-01 RX ORDER — QUETIAPINE FUMARATE 50 MG/1
50 TABLET, EXTENDED RELEASE ORAL AT BEDTIME
Status: CANCELLED | OUTPATIENT
Start: 2020-01-01

## 2020-01-01 RX ORDER — ASPIRIN 81 MG/1
81 TABLET ORAL DAILY
COMMUNITY
End: 2021-01-01

## 2020-01-01 RX ORDER — ROSUVASTATIN CALCIUM 10 MG/1
10 TABLET, COATED ORAL DAILY
COMMUNITY
End: 2020-01-01

## 2020-01-01 RX ORDER — PROPOFOL 10 MG/ML
5-75 INJECTION, EMULSION INTRAVENOUS CONTINUOUS
Status: DISCONTINUED | OUTPATIENT
Start: 2020-01-01 | End: 2020-01-01

## 2020-01-01 RX ORDER — CEFTRIAXONE 2 G/1
2 INJECTION, POWDER, FOR SOLUTION INTRAMUSCULAR; INTRAVENOUS EVERY 24 HOURS
Status: DISCONTINUED | OUTPATIENT
Start: 2020-01-01 | End: 2020-01-01

## 2020-01-01 RX ORDER — BARIUM SULFATE 400 MG/ML
SUSPENSION ORAL ONCE
Status: COMPLETED | OUTPATIENT
Start: 2020-01-01 | End: 2020-01-01

## 2020-01-01 RX ORDER — GUAIFENESIN 600 MG/1
1200 TABLET, EXTENDED RELEASE ORAL 2 TIMES DAILY PRN
COMMUNITY
End: 2021-01-01

## 2020-01-01 RX ORDER — LORAZEPAM 0.5 MG/1
0.25 TABLET ORAL EVERY 8 HOURS PRN
Qty: 40 TABLET | Refills: 1 | Status: SHIPPED | OUTPATIENT
Start: 2020-01-01 | End: 2021-01-01

## 2020-01-01 RX ORDER — FUROSEMIDE 40 MG
40 TABLET ORAL
Status: DISCONTINUED | OUTPATIENT
Start: 2020-01-01 | End: 2020-01-01 | Stop reason: HOSPADM

## 2020-01-01 RX ORDER — CEFTRIAXONE 1 G/1
1 INJECTION, POWDER, FOR SOLUTION INTRAMUSCULAR; INTRAVENOUS EVERY 24 HOURS
Status: DISCONTINUED | OUTPATIENT
Start: 2020-01-01 | End: 2020-01-01

## 2020-01-01 RX ORDER — PREDNISONE 10 MG/1
30 TABLET ORAL DAILY
DISCHARGE
Start: 2020-01-01 | End: 2020-01-01

## 2020-01-01 RX ORDER — AMOXICILLIN 250 MG
1 CAPSULE ORAL AT BEDTIME
Status: DISCONTINUED | OUTPATIENT
Start: 2020-01-01 | End: 2020-01-01

## 2020-01-01 RX ORDER — GUAIFENESIN 600 MG/1
600 TABLET, EXTENDED RELEASE ORAL 2 TIMES DAILY
DISCHARGE
Start: 2020-01-01 | End: 2020-01-01

## 2020-01-01 RX ORDER — IOPAMIDOL 755 MG/ML
65 INJECTION, SOLUTION INTRAVASCULAR ONCE
Status: COMPLETED | OUTPATIENT
Start: 2020-01-01 | End: 2020-01-01

## 2020-01-01 RX ORDER — IPRATROPIUM BROMIDE AND ALBUTEROL SULFATE 2.5; .5 MG/3ML; MG/3ML
1 SOLUTION RESPIRATORY (INHALATION) 3 TIMES DAILY PRN
COMMUNITY

## 2020-01-01 RX ORDER — GUAIFENESIN 600 MG/1
600 TABLET, EXTENDED RELEASE ORAL 2 TIMES DAILY
Status: DISCONTINUED | OUTPATIENT
Start: 2020-01-01 | End: 2020-01-01 | Stop reason: HOSPADM

## 2020-01-01 RX ORDER — NALOXONE HYDROCHLORIDE 0.4 MG/ML
.1-.4 INJECTION, SOLUTION INTRAMUSCULAR; INTRAVENOUS; SUBCUTANEOUS
Status: DISCONTINUED | OUTPATIENT
Start: 2020-01-01 | End: 2020-01-01 | Stop reason: HOSPADM

## 2020-01-01 RX ORDER — ALBUTEROL SULFATE 0.83 MG/ML
2.5 SOLUTION RESPIRATORY (INHALATION) EVERY 4 HOURS PRN
Status: CANCELLED | OUTPATIENT
Start: 2020-01-01

## 2020-01-01 RX ORDER — HEPARIN SODIUM 5000 [USP'U]/.5ML
5000 INJECTION, SOLUTION INTRAVENOUS; SUBCUTANEOUS EVERY 12 HOURS
Status: DISCONTINUED | OUTPATIENT
Start: 2020-01-01 | End: 2020-01-01 | Stop reason: HOSPADM

## 2020-01-01 RX ORDER — ACETAMINOPHEN 325 MG/1
325 TABLET ORAL EVERY 4 HOURS PRN
Status: DISCONTINUED | OUTPATIENT
Start: 2020-01-01 | End: 2020-01-01 | Stop reason: HOSPADM

## 2020-01-01 RX ORDER — CARBOXYMETHYLCELLULOSE SODIUM 5 MG/ML
1 SOLUTION/ DROPS OPHTHALMIC
Status: DISCONTINUED | OUTPATIENT
Start: 2020-01-01 | End: 2020-01-01 | Stop reason: HOSPADM

## 2020-01-01 RX ORDER — HYDROMORPHONE HYDROCHLORIDE 1 MG/ML
.3-.5 INJECTION, SOLUTION INTRAMUSCULAR; INTRAVENOUS; SUBCUTANEOUS
Status: DISCONTINUED | OUTPATIENT
Start: 2020-01-01 | End: 2020-01-01

## 2020-01-01 RX ORDER — UREA 40 %
CREAM (GRAM) TOPICAL DAILY PRN
Status: DISCONTINUED | OUTPATIENT
Start: 2020-01-01 | End: 2020-01-01 | Stop reason: HOSPADM

## 2020-01-01 RX ORDER — ALBUTEROL SULFATE 0.83 MG/ML
2.5 SOLUTION RESPIRATORY (INHALATION) EVERY 4 HOURS
Status: DISCONTINUED | OUTPATIENT
Start: 2020-01-01 | End: 2020-01-01

## 2020-01-01 RX ORDER — PREDNISONE 20 MG/1
40 TABLET ORAL DAILY
Status: DISCONTINUED | OUTPATIENT
Start: 2020-01-01 | End: 2020-01-01

## 2020-01-01 RX ORDER — ATORVASTATIN CALCIUM 20 MG/1
20 TABLET, FILM COATED ORAL DAILY
Status: DISCONTINUED | OUTPATIENT
Start: 2020-01-01 | End: 2020-01-01 | Stop reason: HOSPADM

## 2020-01-01 RX ORDER — AZITHROMYCIN 500 MG/1
500 INJECTION, POWDER, LYOPHILIZED, FOR SOLUTION INTRAVENOUS EVERY 24 HOURS
Status: DISCONTINUED | OUTPATIENT
Start: 2020-01-01 | End: 2020-01-01

## 2020-01-01 RX ORDER — CITALOPRAM HYDROBROMIDE 20 MG/1
40 TABLET ORAL DAILY
Status: DISCONTINUED | OUTPATIENT
Start: 2020-01-01 | End: 2020-01-01 | Stop reason: HOSPADM

## 2020-01-01 RX ORDER — DEXTROSE MONOHYDRATE 25 G/50ML
25-50 INJECTION, SOLUTION INTRAVENOUS
Status: DISCONTINUED | OUTPATIENT
Start: 2020-01-01 | End: 2020-01-01 | Stop reason: HOSPADM

## 2020-01-01 RX ORDER — IPRATROPIUM BROMIDE AND ALBUTEROL SULFATE 2.5; .5 MG/3ML; MG/3ML
3 SOLUTION RESPIRATORY (INHALATION) EVERY 4 HOURS PRN
DISCHARGE
Start: 2020-01-01 | End: 2020-01-01

## 2020-01-01 RX ORDER — AMOXICILLIN 250 MG
2 CAPSULE ORAL 2 TIMES DAILY
COMMUNITY
Start: 2020-01-01

## 2020-01-01 RX ORDER — NICOTINE POLACRILEX 4 MG
15-30 LOZENGE BUCCAL
Status: DISCONTINUED | OUTPATIENT
Start: 2020-01-01 | End: 2020-01-01 | Stop reason: HOSPADM

## 2020-01-01 RX ORDER — PREDNISONE 10 MG/1
10 TABLET ORAL DAILY
DISCHARGE
Start: 2020-01-01 | End: 2020-01-01

## 2020-01-01 RX ORDER — POTASSIUM CHLORIDE 750 MG/1
10 TABLET, EXTENDED RELEASE ORAL
Status: DISCONTINUED | OUTPATIENT
Start: 2020-01-01 | End: 2020-01-01 | Stop reason: HOSPADM

## 2020-01-01 RX ORDER — UREA 40 %
CREAM (GRAM) TOPICAL DAILY PRN
Status: ON HOLD | DISCHARGE
Start: 2020-01-01 | End: 2021-01-01

## 2020-01-01 RX ORDER — HYDROMORPHONE HYDROCHLORIDE 1 MG/ML
0.2 INJECTION, SOLUTION INTRAMUSCULAR; INTRAVENOUS; SUBCUTANEOUS
Status: DISCONTINUED | OUTPATIENT
Start: 2020-01-01 | End: 2020-01-01 | Stop reason: HOSPADM

## 2020-01-01 RX ORDER — PREDNISONE 10 MG/1
20 TABLET ORAL DAILY
DISCHARGE
Start: 2020-01-01 | End: 2020-01-01

## 2020-01-01 RX ORDER — ALBUTEROL SULFATE 0.83 MG/ML
2.5 SOLUTION RESPIRATORY (INHALATION)
Status: DISCONTINUED | OUTPATIENT
Start: 2020-01-01 | End: 2020-01-01 | Stop reason: HOSPADM

## 2020-01-01 RX ORDER — LORAZEPAM 0.5 MG/1
0.25 TABLET ORAL EVERY 6 HOURS PRN
Qty: 8 TABLET | Refills: 0 | Status: SHIPPED | OUTPATIENT
Start: 2020-01-01 | End: 2020-01-01

## 2020-01-01 RX ORDER — PANTOPRAZOLE SODIUM 40 MG/1
40 TABLET, DELAYED RELEASE ORAL DAILY
Status: DISCONTINUED | OUTPATIENT
Start: 2020-01-01 | End: 2020-01-01 | Stop reason: HOSPADM

## 2020-01-01 RX ORDER — ASPIRIN 81 MG/1
81 TABLET ORAL DAILY
Status: DISCONTINUED | OUTPATIENT
Start: 2020-01-01 | End: 2020-01-01 | Stop reason: HOSPADM

## 2020-01-01 RX ORDER — AMOXICILLIN 250 MG
1 CAPSULE ORAL AT BEDTIME
DISCHARGE
Start: 2020-01-01 | End: 2020-01-01

## 2020-01-01 RX ORDER — METHYLPREDNISOLONE SODIUM SUCCINATE 125 MG/2ML
125 INJECTION, POWDER, LYOPHILIZED, FOR SOLUTION INTRAMUSCULAR; INTRAVENOUS EVERY 24 HOURS
Status: DISCONTINUED | OUTPATIENT
Start: 2020-01-01 | End: 2020-01-01

## 2020-01-01 RX ORDER — AMOXICILLIN 250 MG
1-2 CAPSULE ORAL 2 TIMES DAILY
Status: DISCONTINUED | OUTPATIENT
Start: 2020-01-01 | End: 2020-01-01 | Stop reason: HOSPADM

## 2020-01-01 RX ORDER — POLYETHYLENE GLYCOL 3350 17 G/17G
17 POWDER, FOR SOLUTION ORAL 2 TIMES DAILY PRN
Status: DISCONTINUED | OUTPATIENT
Start: 2020-01-01 | End: 2020-01-01 | Stop reason: HOSPADM

## 2020-01-01 RX ORDER — QUETIAPINE FUMARATE 50 MG/1
50 TABLET, FILM COATED ORAL AT BEDTIME
Status: DISCONTINUED | OUTPATIENT
Start: 2020-01-01 | End: 2020-01-01 | Stop reason: HOSPADM

## 2020-01-01 RX ORDER — QUETIAPINE FUMARATE 50 MG/1
50 TABLET, FILM COATED ORAL AT BEDTIME
DISCHARGE
Start: 2020-01-01 | End: 2020-01-01

## 2020-01-01 RX ORDER — IPRATROPIUM BROMIDE AND ALBUTEROL SULFATE 2.5; .5 MG/3ML; MG/3ML
3 SOLUTION RESPIRATORY (INHALATION) EVERY 4 HOURS
Status: DISCONTINUED | OUTPATIENT
Start: 2020-01-01 | End: 2020-01-01 | Stop reason: CLARIF

## 2020-01-01 RX ORDER — DIAZEPAM 2 MG
2 TABLET ORAL EVERY 6 HOURS PRN
Status: DISCONTINUED | OUTPATIENT
Start: 2020-01-01 | End: 2020-01-01

## 2020-01-01 RX ORDER — AMOXICILLIN 250 MG
1-2 CAPSULE ORAL 2 TIMES DAILY
DISCHARGE
Start: 2020-01-01 | End: 2020-01-01 | Stop reason: DRUGHIGH

## 2020-01-01 RX ORDER — FENTANYL CITRATE 50 UG/ML
25-50 INJECTION, SOLUTION INTRAMUSCULAR; INTRAVENOUS
Status: DISCONTINUED | OUTPATIENT
Start: 2020-01-01 | End: 2020-01-01

## 2020-01-01 RX ORDER — GUAIFENESIN 600 MG/1
600 TABLET, EXTENDED RELEASE ORAL 2 TIMES DAILY PRN
Start: 2020-01-01 | End: 2020-01-01

## 2020-01-01 RX ORDER — CEFTRIAXONE 2 G/1
2 INJECTION, POWDER, FOR SOLUTION INTRAMUSCULAR; INTRAVENOUS EVERY 24 HOURS
Status: COMPLETED | OUTPATIENT
Start: 2020-01-01 | End: 2020-01-01

## 2020-01-01 RX ORDER — PANTOPRAZOLE SODIUM 40 MG/1
20 TABLET, DELAYED RELEASE ORAL DAILY
COMMUNITY
End: 2021-01-01

## 2020-01-01 RX ORDER — POTASSIUM CHLORIDE 750 MG/1
10 TABLET, EXTENDED RELEASE ORAL DAILY
COMMUNITY
End: 2021-01-01

## 2020-01-01 RX ORDER — POLYETHYLENE GLYCOL 3350 17 G/17G
POWDER, FOR SOLUTION ORAL
Status: ON HOLD | COMMUNITY
Start: 2020-01-01 | End: 2021-01-01

## 2020-01-01 RX ORDER — METHYLPREDNISOLONE SODIUM SUCCINATE 125 MG/2ML
60 INJECTION, POWDER, LYOPHILIZED, FOR SOLUTION INTRAMUSCULAR; INTRAVENOUS EVERY 12 HOURS
Status: DISCONTINUED | OUTPATIENT
Start: 2020-01-01 | End: 2020-01-01

## 2020-01-01 RX ORDER — POLYETHYLENE GLYCOL 3350 17 G/17G
1 POWDER, FOR SOLUTION ORAL DAILY
DISCHARGE
Start: 2020-01-01 | End: 2020-01-01

## 2020-01-01 RX ORDER — BARIUM SULFATE 400 MG/ML
SUSPENSION ORAL ONCE
Status: DISCONTINUED | OUTPATIENT
Start: 2020-01-01 | End: 2020-01-01 | Stop reason: CLARIF

## 2020-01-01 RX ORDER — CHLORHEXIDINE GLUCONATE ORAL RINSE 1.2 MG/ML
15 SOLUTION DENTAL EVERY 12 HOURS
Status: DISCONTINUED | OUTPATIENT
Start: 2020-01-01 | End: 2020-01-01

## 2020-01-01 RX ORDER — IOPAMIDOL 755 MG/ML
58 INJECTION, SOLUTION INTRAVASCULAR ONCE
Status: COMPLETED | OUTPATIENT
Start: 2020-01-01 | End: 2020-01-01

## 2020-01-01 RX ORDER — FAMOTIDINE 20 MG
4000 TABLET ORAL DAILY
COMMUNITY

## 2020-01-01 RX ORDER — LORAZEPAM 0.5 MG/1
0.25 TABLET ORAL EVERY 6 HOURS PRN
Status: DISCONTINUED | OUTPATIENT
Start: 2020-01-01 | End: 2020-01-01 | Stop reason: HOSPADM

## 2020-01-01 RX ORDER — FAMOTIDINE 40 MG/5ML
20 POWDER, FOR SUSPENSION ORAL 2 TIMES DAILY
Status: DISCONTINUED | OUTPATIENT
Start: 2020-01-01 | End: 2020-01-01

## 2020-01-01 RX ORDER — SODIUM CHLORIDE 9 MG/ML
INJECTION, SOLUTION INTRAVENOUS CONTINUOUS
Status: DISCONTINUED | OUTPATIENT
Start: 2020-01-01 | End: 2020-01-01

## 2020-01-01 RX ADMIN — PANTOPRAZOLE SODIUM 40 MG: 40 TABLET, DELAYED RELEASE ORAL at 08:58

## 2020-01-01 RX ADMIN — IPRATROPIUM BROMIDE AND ALBUTEROL SULFATE 3 ML: .5; 3 SOLUTION RESPIRATORY (INHALATION) at 09:59

## 2020-01-01 RX ADMIN — PROPOFOL 40 MCG/KG/MIN: 10 INJECTION, EMULSION INTRAVENOUS at 00:16

## 2020-01-01 RX ADMIN — AZITHROMYCIN MONOHYDRATE 250 MG: 500 INJECTION, POWDER, LYOPHILIZED, FOR SOLUTION INTRAVENOUS at 07:52

## 2020-01-01 RX ADMIN — AZITHROMYCIN MONOHYDRATE 250 MG: 500 INJECTION, POWDER, LYOPHILIZED, FOR SOLUTION INTRAVENOUS at 09:58

## 2020-01-01 RX ADMIN — POLYETHYLENE GLYCOL 3350 17 G: 17 POWDER, FOR SOLUTION ORAL at 21:25

## 2020-01-01 RX ADMIN — PANTOPRAZOLE SODIUM 40 MG: 40 TABLET, DELAYED RELEASE ORAL at 08:33

## 2020-01-01 RX ADMIN — PROPOFOL 30 MCG/KG/MIN: 10 INJECTION, EMULSION INTRAVENOUS at 06:05

## 2020-01-01 RX ADMIN — HEPARIN SODIUM 5000 UNITS: 5000 INJECTION, SOLUTION INTRAVENOUS; SUBCUTANEOUS at 05:53

## 2020-01-01 RX ADMIN — ASPIRIN 81 MG: 81 TABLET ORAL at 09:03

## 2020-01-01 RX ADMIN — METHYLPREDNISOLONE SODIUM SUCCINATE 125 MG: 125 INJECTION, POWDER, FOR SOLUTION INTRAMUSCULAR; INTRAVENOUS at 06:42

## 2020-01-01 RX ADMIN — IPRATROPIUM BROMIDE AND ALBUTEROL SULFATE 3 ML: .5; 3 SOLUTION RESPIRATORY (INHALATION) at 11:49

## 2020-01-01 RX ADMIN — IPRATROPIUM BROMIDE AND ALBUTEROL SULFATE 3 ML: .5; 3 SOLUTION RESPIRATORY (INHALATION) at 02:50

## 2020-01-01 RX ADMIN — INSULIN ASPART 2 UNITS: 100 INJECTION, SOLUTION INTRAVENOUS; SUBCUTANEOUS at 13:25

## 2020-01-01 RX ADMIN — IPRATROPIUM BROMIDE AND ALBUTEROL SULFATE 3 ML: .5; 3 SOLUTION RESPIRATORY (INHALATION) at 20:57

## 2020-01-01 RX ADMIN — IPRATROPIUM BROMIDE AND ALBUTEROL SULFATE 3 ML: .5; 3 SOLUTION RESPIRATORY (INHALATION) at 19:26

## 2020-01-01 RX ADMIN — ATORVASTATIN CALCIUM 20 MG: 20 TABLET, FILM COATED ORAL at 12:48

## 2020-01-01 RX ADMIN — GUAIFENESIN 600 MG: 600 TABLET, EXTENDED RELEASE ORAL at 09:05

## 2020-01-01 RX ADMIN — POTASSIUM CHLORIDE 10 MEQ: 750 TABLET, EXTENDED RELEASE ORAL at 09:35

## 2020-01-01 RX ADMIN — CITALOPRAM HYDROBROMIDE 40 MG: 40 TABLET ORAL at 09:03

## 2020-01-01 RX ADMIN — IPRATROPIUM BROMIDE AND ALBUTEROL SULFATE 3 ML: .5; 3 SOLUTION RESPIRATORY (INHALATION) at 11:25

## 2020-01-01 RX ADMIN — IPRATROPIUM BROMIDE AND ALBUTEROL SULFATE 3 ML: .5; 3 SOLUTION RESPIRATORY (INHALATION) at 19:40

## 2020-01-01 RX ADMIN — IPRATROPIUM BROMIDE AND ALBUTEROL SULFATE 3 ML: .5; 3 SOLUTION RESPIRATORY (INHALATION) at 19:57

## 2020-01-01 RX ADMIN — QUETIAPINE 50 MG: 25 TABLET ORAL at 21:18

## 2020-01-01 RX ADMIN — IPRATROPIUM BROMIDE AND ALBUTEROL SULFATE 3 ML: .5; 3 SOLUTION RESPIRATORY (INHALATION) at 07:20

## 2020-01-01 RX ADMIN — GUAIFENESIN 600 MG: 600 TABLET, EXTENDED RELEASE ORAL at 10:06

## 2020-01-01 RX ADMIN — ASPIRIN 81 MG: 81 TABLET ORAL at 08:07

## 2020-01-01 RX ADMIN — PROPOFOL 40 MCG/KG/MIN: 10 INJECTION, EMULSION INTRAVENOUS at 18:15

## 2020-01-01 RX ADMIN — FUROSEMIDE 40 MG: 40 TABLET ORAL at 18:11

## 2020-01-01 RX ADMIN — HEPARIN SODIUM 5000 UNITS: 5000 INJECTION, SOLUTION INTRAVENOUS; SUBCUTANEOUS at 08:00

## 2020-01-01 RX ADMIN — ATORVASTATIN CALCIUM 20 MG: 20 TABLET, FILM COATED ORAL at 09:05

## 2020-01-01 RX ADMIN — IPRATROPIUM BROMIDE AND ALBUTEROL SULFATE 3 ML: .5; 3 SOLUTION RESPIRATORY (INHALATION) at 03:36

## 2020-01-01 RX ADMIN — IPRATROPIUM BROMIDE AND ALBUTEROL SULFATE 3 ML: .5; 3 SOLUTION RESPIRATORY (INHALATION) at 22:43

## 2020-01-01 RX ADMIN — PANTOPRAZOLE SODIUM 40 MG: 40 TABLET, DELAYED RELEASE ORAL at 09:05

## 2020-01-01 RX ADMIN — DOCUSATE SODIUM 50 MG AND SENNOSIDES 8.6 MG 1 TABLET: 8.6; 5 TABLET, FILM COATED ORAL at 21:48

## 2020-01-01 RX ADMIN — CEFTRIAXONE SODIUM 2 G: 2 INJECTION, POWDER, FOR SOLUTION INTRAMUSCULAR; INTRAVENOUS at 05:47

## 2020-01-01 RX ADMIN — ACETAMINOPHEN 325 MG: 325 TABLET, FILM COATED ORAL at 21:57

## 2020-01-01 RX ADMIN — PROPOFOL 35 MCG/KG/MIN: 10 INJECTION, EMULSION INTRAVENOUS at 11:00

## 2020-01-01 RX ADMIN — IPRATROPIUM BROMIDE AND ALBUTEROL SULFATE 3 ML: .5; 3 SOLUTION RESPIRATORY (INHALATION) at 10:38

## 2020-01-01 RX ADMIN — CITALOPRAM HYDROBROMIDE 40 MG: 40 TABLET ORAL at 08:33

## 2020-01-01 RX ADMIN — GUAIFENESIN 600 MG: 600 TABLET, EXTENDED RELEASE ORAL at 21:21

## 2020-01-01 RX ADMIN — FAMOTIDINE 20 MG: 40 POWDER, FOR SUSPENSION ORAL at 21:48

## 2020-01-01 RX ADMIN — PREDNISONE 40 MG: 20 TABLET ORAL at 08:01

## 2020-01-01 RX ADMIN — METHYLPREDNISOLONE SODIUM SUCCINATE 62.5 MG: 125 INJECTION, POWDER, FOR SOLUTION INTRAMUSCULAR; INTRAVENOUS at 05:54

## 2020-01-01 RX ADMIN — DOCUSATE SODIUM 50 MG AND SENNOSIDES 8.6 MG 1 TABLET: 8.6; 5 TABLET, FILM COATED ORAL at 20:49

## 2020-01-01 RX ADMIN — IPRATROPIUM BROMIDE AND ALBUTEROL SULFATE 3 ML: .5; 3 SOLUTION RESPIRATORY (INHALATION) at 10:31

## 2020-01-01 RX ADMIN — FUROSEMIDE 40 MG: 40 TABLET ORAL at 08:09

## 2020-01-01 RX ADMIN — IPRATROPIUM BROMIDE AND ALBUTEROL SULFATE 3 ML: .5; 3 SOLUTION RESPIRATORY (INHALATION) at 23:31

## 2020-01-01 RX ADMIN — PANTOPRAZOLE SODIUM 40 MG: 40 TABLET, DELAYED RELEASE ORAL at 08:53

## 2020-01-01 RX ADMIN — FUROSEMIDE 40 MG: 40 TABLET ORAL at 09:35

## 2020-01-01 RX ADMIN — CHLORHEXIDINE GLUCONATE 0.12% ORAL RINSE 15 ML: 1.2 LIQUID ORAL at 19:54

## 2020-01-01 RX ADMIN — SODIUM CHLORIDE, POTASSIUM CHLORIDE, SODIUM LACTATE AND CALCIUM CHLORIDE 500 ML: 600; 310; 30; 20 INJECTION, SOLUTION INTRAVENOUS at 12:28

## 2020-01-01 RX ADMIN — BARIUM SULFATE 10 ML: 400 SUSPENSION ORAL at 13:56

## 2020-01-01 RX ADMIN — IPRATROPIUM BROMIDE AND ALBUTEROL SULFATE 3 ML: .5; 3 SOLUTION RESPIRATORY (INHALATION) at 03:40

## 2020-01-01 RX ADMIN — IPRATROPIUM BROMIDE AND ALBUTEROL SULFATE 3 ML: .5; 3 SOLUTION RESPIRATORY (INHALATION) at 11:13

## 2020-01-01 RX ADMIN — ATORVASTATIN CALCIUM 20 MG: 20 TABLET, FILM COATED ORAL at 08:59

## 2020-01-01 RX ADMIN — DOCUSATE SODIUM 50 MG AND SENNOSIDES 8.6 MG 1 TABLET: 8.6; 5 TABLET, FILM COATED ORAL at 22:08

## 2020-01-01 RX ADMIN — IPRATROPIUM BROMIDE AND ALBUTEROL SULFATE 3 ML: .5; 3 SOLUTION RESPIRATORY (INHALATION) at 06:54

## 2020-01-01 RX ADMIN — ATORVASTATIN CALCIUM 20 MG: 20 TABLET, FILM COATED ORAL at 08:33

## 2020-01-01 RX ADMIN — HEPARIN SODIUM 5000 UNITS: 5000 INJECTION, SOLUTION INTRAVENOUS; SUBCUTANEOUS at 05:47

## 2020-01-01 RX ADMIN — HEPARIN SODIUM 5000 UNITS: 5000 INJECTION, SOLUTION INTRAVENOUS; SUBCUTANEOUS at 08:53

## 2020-01-01 RX ADMIN — IPRATROPIUM BROMIDE AND ALBUTEROL SULFATE 3 ML: .5; 3 SOLUTION RESPIRATORY (INHALATION) at 15:29

## 2020-01-01 RX ADMIN — IPRATROPIUM BROMIDE AND ALBUTEROL SULFATE 3 ML: .5; 3 SOLUTION RESPIRATORY (INHALATION) at 14:17

## 2020-01-01 RX ADMIN — FENTANYL CITRATE 50 MCG: 50 INJECTION, SOLUTION INTRAMUSCULAR; INTRAVENOUS at 17:02

## 2020-01-01 RX ADMIN — IPRATROPIUM BROMIDE AND ALBUTEROL SULFATE 3 ML: .5; 3 SOLUTION RESPIRATORY (INHALATION) at 02:41

## 2020-01-01 RX ADMIN — UMECLIDINIUM 1 PUFF: 62.5 AEROSOL, POWDER ORAL at 14:23

## 2020-01-01 RX ADMIN — FUROSEMIDE 40 MG: 40 TABLET ORAL at 09:06

## 2020-01-01 RX ADMIN — INSULIN ASPART 1 UNITS: 100 INJECTION, SOLUTION INTRAVENOUS; SUBCUTANEOUS at 08:29

## 2020-01-01 RX ADMIN — ATORVASTATIN CALCIUM 20 MG: 20 TABLET, FILM COATED ORAL at 09:04

## 2020-01-01 RX ADMIN — CITALOPRAM HYDROBROMIDE 40 MG: 40 TABLET ORAL at 08:53

## 2020-01-01 RX ADMIN — HEPARIN SODIUM 5000 UNITS: 5000 INJECTION, SOLUTION INTRAVENOUS; SUBCUTANEOUS at 21:21

## 2020-01-01 RX ADMIN — SODIUM CHLORIDE: 9 INJECTION, SOLUTION INTRAVENOUS at 05:53

## 2020-01-01 RX ADMIN — QUETIAPINE 50 MG: 25 TABLET ORAL at 22:10

## 2020-01-01 RX ADMIN — IPRATROPIUM BROMIDE AND ALBUTEROL SULFATE 3 ML: .5; 3 SOLUTION RESPIRATORY (INHALATION) at 16:01

## 2020-01-01 RX ADMIN — IPRATROPIUM BROMIDE AND ALBUTEROL SULFATE 3 ML: .5; 3 SOLUTION RESPIRATORY (INHALATION) at 03:01

## 2020-01-01 RX ADMIN — HYDROMORPHONE HYDROCHLORIDE 0.2 MG: 1 INJECTION, SOLUTION INTRAMUSCULAR; INTRAVENOUS; SUBCUTANEOUS at 16:23

## 2020-01-01 RX ADMIN — HEPARIN SODIUM 5000 UNITS: 5000 INJECTION, SOLUTION INTRAVENOUS; SUBCUTANEOUS at 17:38

## 2020-01-01 RX ADMIN — QUETIAPINE 50 MG: 25 TABLET ORAL at 21:21

## 2020-01-01 RX ADMIN — AZITHROMYCIN MONOHYDRATE 500 MG: 500 INJECTION, POWDER, LYOPHILIZED, FOR SOLUTION INTRAVENOUS at 07:40

## 2020-01-01 RX ADMIN — IPRATROPIUM BROMIDE AND ALBUTEROL SULFATE 3 ML: .5; 3 SOLUTION RESPIRATORY (INHALATION) at 07:57

## 2020-01-01 RX ADMIN — IPRATROPIUM BROMIDE AND ALBUTEROL SULFATE 3 ML: .5; 3 SOLUTION RESPIRATORY (INHALATION) at 19:38

## 2020-01-01 RX ADMIN — CEFTRIAXONE SODIUM 1 G: 1 INJECTION, POWDER, FOR SOLUTION INTRAMUSCULAR; INTRAVENOUS at 06:37

## 2020-01-01 RX ADMIN — PANTOPRAZOLE SODIUM 40 MG: 40 TABLET, DELAYED RELEASE ORAL at 09:03

## 2020-01-01 RX ADMIN — METHYLPREDNISOLONE SODIUM SUCCINATE 62.5 MG: 125 INJECTION, POWDER, FOR SOLUTION INTRAMUSCULAR; INTRAVENOUS at 05:47

## 2020-01-01 RX ADMIN — HEPARIN SODIUM 5000 UNITS: 5000 INJECTION, SOLUTION INTRAVENOUS; SUBCUTANEOUS at 17:58

## 2020-01-01 RX ADMIN — IPRATROPIUM BROMIDE AND ALBUTEROL SULFATE 3 ML: .5; 3 SOLUTION RESPIRATORY (INHALATION) at 23:08

## 2020-01-01 RX ADMIN — GUAIFENESIN 600 MG: 600 TABLET, EXTENDED RELEASE ORAL at 21:17

## 2020-01-01 RX ADMIN — INSULIN ASPART 1 UNITS: 100 INJECTION, SOLUTION INTRAVENOUS; SUBCUTANEOUS at 22:32

## 2020-01-01 RX ADMIN — CEFTRIAXONE SODIUM 1 G: 1 INJECTION, POWDER, FOR SOLUTION INTRAMUSCULAR; INTRAVENOUS at 05:33

## 2020-01-01 RX ADMIN — SODIUM CHLORIDE: 9 INJECTION, SOLUTION INTRAVENOUS at 08:04

## 2020-01-01 RX ADMIN — GUAIFENESIN 600 MG: 600 TABLET, EXTENDED RELEASE ORAL at 21:25

## 2020-01-01 RX ADMIN — DOCUSATE SODIUM 50 MG AND SENNOSIDES 8.6 MG 1 TABLET: 8.6; 5 TABLET, FILM COATED ORAL at 21:51

## 2020-01-01 RX ADMIN — AZITHROMYCIN MONOHYDRATE 250 MG: 500 INJECTION, POWDER, LYOPHILIZED, FOR SOLUTION INTRAVENOUS at 09:17

## 2020-01-01 RX ADMIN — IPRATROPIUM BROMIDE AND ALBUTEROL SULFATE 3 ML: .5; 3 SOLUTION RESPIRATORY (INHALATION) at 07:18

## 2020-01-01 RX ADMIN — INSULIN GLARGINE 2 UNITS: 100 INJECTION, SOLUTION SUBCUTANEOUS at 21:17

## 2020-01-01 RX ADMIN — GUAIFENESIN 600 MG: 600 TABLET, EXTENDED RELEASE ORAL at 08:00

## 2020-01-01 RX ADMIN — IPRATROPIUM BROMIDE AND ALBUTEROL SULFATE 3 ML: .5; 3 SOLUTION RESPIRATORY (INHALATION) at 15:00

## 2020-01-01 RX ADMIN — IPRATROPIUM BROMIDE AND ALBUTEROL SULFATE 3 ML: .5; 3 SOLUTION RESPIRATORY (INHALATION) at 11:01

## 2020-01-01 RX ADMIN — METHYLPREDNISOLONE SODIUM SUCCINATE 62.5 MG: 125 INJECTION, POWDER, FOR SOLUTION INTRAMUSCULAR; INTRAVENOUS at 17:56

## 2020-01-01 RX ADMIN — IPRATROPIUM BROMIDE AND ALBUTEROL SULFATE 3 ML: .5; 3 SOLUTION RESPIRATORY (INHALATION) at 11:59

## 2020-01-01 RX ADMIN — FLUTICASONE FUROATE AND VILANTEROL TRIFENATATE 1 PUFF: 100; 25 POWDER RESPIRATORY (INHALATION) at 13:31

## 2020-01-01 RX ADMIN — IPRATROPIUM BROMIDE AND ALBUTEROL SULFATE 3 ML: .5; 3 SOLUTION RESPIRATORY (INHALATION) at 16:17

## 2020-01-01 RX ADMIN — QUETIAPINE 50 MG: 25 TABLET ORAL at 21:51

## 2020-01-01 RX ADMIN — POTASSIUM CHLORIDE 10 MEQ: 750 TABLET, EXTENDED RELEASE ORAL at 08:33

## 2020-01-01 RX ADMIN — FLUTICASONE FUROATE AND VILANTEROL TRIFENATATE 1 PUFF: 100; 25 POWDER RESPIRATORY (INHALATION) at 08:53

## 2020-01-01 RX ADMIN — METHYLPREDNISOLONE SODIUM SUCCINATE 62.5 MG: 125 INJECTION, POWDER, FOR SOLUTION INTRAMUSCULAR; INTRAVENOUS at 06:09

## 2020-01-01 RX ADMIN — HEPARIN SODIUM 5000 UNITS: 5000 INJECTION, SOLUTION INTRAVENOUS; SUBCUTANEOUS at 19:47

## 2020-01-01 RX ADMIN — INSULIN ASPART 1 UNITS: 100 INJECTION, SOLUTION INTRAVENOUS; SUBCUTANEOUS at 17:58

## 2020-01-01 RX ADMIN — PREDNISONE 30 MG: 20 TABLET ORAL at 08:53

## 2020-01-01 RX ADMIN — IPRATROPIUM BROMIDE AND ALBUTEROL SULFATE 3 ML: .5; 3 SOLUTION RESPIRATORY (INHALATION) at 23:26

## 2020-01-01 RX ADMIN — PREDNISONE 30 MG: 20 TABLET ORAL at 07:57

## 2020-01-01 RX ADMIN — ASPIRIN 81 MG: 81 TABLET ORAL at 08:53

## 2020-01-01 RX ADMIN — FLUTICASONE FUROATE AND VILANTEROL TRIFENATATE 1 PUFF: 100; 25 POWDER RESPIRATORY (INHALATION) at 08:56

## 2020-01-01 RX ADMIN — IPRATROPIUM BROMIDE AND ALBUTEROL SULFATE 3 ML: .5; 3 SOLUTION RESPIRATORY (INHALATION) at 18:30

## 2020-01-01 RX ADMIN — IPRATROPIUM BROMIDE AND ALBUTEROL SULFATE 3 ML: .5; 3 SOLUTION RESPIRATORY (INHALATION) at 15:38

## 2020-01-01 RX ADMIN — FLUTICASONE FUROATE AND VILANTEROL TRIFENATATE 1 PUFF: 100; 25 POWDER RESPIRATORY (INHALATION) at 09:06

## 2020-01-01 RX ADMIN — QUETIAPINE 50 MG: 25 TABLET ORAL at 22:08

## 2020-01-01 RX ADMIN — HYDROMORPHONE HYDROCHLORIDE 0.2 MG: 1 INJECTION, SOLUTION INTRAMUSCULAR; INTRAVENOUS; SUBCUTANEOUS at 18:18

## 2020-01-01 RX ADMIN — IPRATROPIUM BROMIDE AND ALBUTEROL SULFATE 3 ML: .5; 3 SOLUTION RESPIRATORY (INHALATION) at 06:13

## 2020-01-01 RX ADMIN — METHYLPREDNISOLONE SODIUM SUCCINATE 62.5 MG: 125 INJECTION, POWDER, FOR SOLUTION INTRAMUSCULAR; INTRAVENOUS at 17:58

## 2020-01-01 RX ADMIN — POTASSIUM CHLORIDE 10 MEQ: 750 TABLET, EXTENDED RELEASE ORAL at 18:11

## 2020-01-01 RX ADMIN — IPRATROPIUM BROMIDE AND ALBUTEROL SULFATE 3 ML: .5; 3 SOLUTION RESPIRATORY (INHALATION) at 07:08

## 2020-01-01 RX ADMIN — IPRATROPIUM BROMIDE AND ALBUTEROL SULFATE 3 ML: .5; 3 SOLUTION RESPIRATORY (INHALATION) at 19:24

## 2020-01-01 RX ADMIN — PROPOFOL 40 MCG/KG/MIN: 10 INJECTION, EMULSION INTRAVENOUS at 18:24

## 2020-01-01 RX ADMIN — HEPARIN SODIUM 5000 UNITS: 5000 INJECTION, SOLUTION INTRAVENOUS; SUBCUTANEOUS at 18:06

## 2020-01-01 RX ADMIN — POTASSIUM CHLORIDE 10 MEQ: 750 TABLET, EXTENDED RELEASE ORAL at 08:58

## 2020-01-01 RX ADMIN — PREDNISONE 40 MG: 20 TABLET ORAL at 09:05

## 2020-01-01 RX ADMIN — UMECLIDINIUM 1 PUFF: 62.5 AEROSOL, POWDER ORAL at 08:54

## 2020-01-01 RX ADMIN — CITALOPRAM HYDROBROMIDE 40 MG: 40 TABLET ORAL at 12:49

## 2020-01-01 RX ADMIN — FUROSEMIDE 40 MG: 40 TABLET ORAL at 08:03

## 2020-01-01 RX ADMIN — HEPARIN SODIUM 5000 UNITS: 5000 INJECTION, SOLUTION INTRAVENOUS; SUBCUTANEOUS at 06:29

## 2020-01-01 RX ADMIN — IPRATROPIUM BROMIDE AND ALBUTEROL SULFATE 3 ML: .5; 3 SOLUTION RESPIRATORY (INHALATION) at 08:12

## 2020-01-01 RX ADMIN — IPRATROPIUM BROMIDE AND ALBUTEROL SULFATE 3 ML: .5; 3 SOLUTION RESPIRATORY (INHALATION) at 08:23

## 2020-01-01 RX ADMIN — PROPOFOL 40 MCG/KG/MIN: 10 INJECTION, EMULSION INTRAVENOUS at 05:33

## 2020-01-01 RX ADMIN — PANTOPRAZOLE SODIUM 40 MG: 40 TABLET, DELAYED RELEASE ORAL at 08:00

## 2020-01-01 RX ADMIN — DOCUSATE SODIUM 50 MG AND SENNOSIDES 8.6 MG 1 TABLET: 8.6; 5 TABLET, FILM COATED ORAL at 21:18

## 2020-01-01 RX ADMIN — POTASSIUM CHLORIDE 10 MEQ: 750 TABLET, EXTENDED RELEASE ORAL at 08:03

## 2020-01-01 RX ADMIN — IPRATROPIUM BROMIDE AND ALBUTEROL SULFATE 3 ML: .5; 3 SOLUTION RESPIRATORY (INHALATION) at 19:20

## 2020-01-01 RX ADMIN — FENTANYL CITRATE 50 MCG: 50 INJECTION, SOLUTION INTRAMUSCULAR; INTRAVENOUS at 09:08

## 2020-01-01 RX ADMIN — CEFTRIAXONE SODIUM 2 G: 2 INJECTION, POWDER, FOR SOLUTION INTRAMUSCULAR; INTRAVENOUS at 05:53

## 2020-01-01 RX ADMIN — IPRATROPIUM BROMIDE AND ALBUTEROL SULFATE 3 ML: .5; 3 SOLUTION RESPIRATORY (INHALATION) at 02:57

## 2020-01-01 RX ADMIN — QUETIAPINE 50 MG: 25 TABLET ORAL at 21:50

## 2020-01-01 RX ADMIN — IPRATROPIUM BROMIDE AND ALBUTEROL SULFATE 3 ML: .5; 3 SOLUTION RESPIRATORY (INHALATION) at 15:09

## 2020-01-01 RX ADMIN — INSULIN GLARGINE 2 UNITS: 100 INJECTION, SOLUTION SUBCUTANEOUS at 21:51

## 2020-01-01 RX ADMIN — IPRATROPIUM BROMIDE AND ALBUTEROL SULFATE 3 ML: .5; 3 SOLUTION RESPIRATORY (INHALATION) at 10:16

## 2020-01-01 RX ADMIN — IOPAMIDOL 65 ML: 755 INJECTION, SOLUTION INTRAVENOUS at 03:36

## 2020-01-01 RX ADMIN — IPRATROPIUM BROMIDE AND ALBUTEROL SULFATE 3 ML: .5; 3 SOLUTION RESPIRATORY (INHALATION) at 11:39

## 2020-01-01 RX ADMIN — PROPOFOL 40 MCG/KG/MIN: 10 INJECTION, EMULSION INTRAVENOUS at 00:10

## 2020-01-01 RX ADMIN — GUAIFENESIN 600 MG: 600 TABLET, EXTENDED RELEASE ORAL at 08:58

## 2020-01-01 RX ADMIN — CHLORHEXIDINE GLUCONATE 0.12% ORAL RINSE 15 ML: 1.2 LIQUID ORAL at 08:02

## 2020-01-01 RX ADMIN — PANTOPRAZOLE SODIUM 40 MG: 40 TABLET, DELAYED RELEASE ORAL at 12:49

## 2020-01-01 RX ADMIN — ASPIRIN 81 MG: 81 TABLET ORAL at 08:59

## 2020-01-01 RX ADMIN — IPRATROPIUM BROMIDE AND ALBUTEROL SULFATE 3 ML: .5; 3 SOLUTION RESPIRATORY (INHALATION) at 18:28

## 2020-01-01 RX ADMIN — IPRATROPIUM BROMIDE AND ALBUTEROL SULFATE 3 ML: .5; 3 SOLUTION RESPIRATORY (INHALATION) at 19:39

## 2020-01-01 RX ADMIN — HEPARIN SODIUM 5000 UNITS: 5000 INJECTION, SOLUTION INTRAVENOUS; SUBCUTANEOUS at 18:13

## 2020-01-01 RX ADMIN — AZITHROMYCIN MONOHYDRATE 500 MG: 500 INJECTION, POWDER, LYOPHILIZED, FOR SOLUTION INTRAVENOUS at 07:59

## 2020-01-01 RX ADMIN — INSULIN GLARGINE 4 UNITS: 100 INJECTION, SOLUTION SUBCUTANEOUS at 00:11

## 2020-01-01 RX ADMIN — DOCUSATE SODIUM 50 MG AND SENNOSIDES 8.6 MG 1 TABLET: 8.6; 5 TABLET, FILM COATED ORAL at 21:17

## 2020-01-01 RX ADMIN — IPRATROPIUM BROMIDE AND ALBUTEROL SULFATE 3 ML: .5; 3 SOLUTION RESPIRATORY (INHALATION) at 20:33

## 2020-01-01 RX ADMIN — FUROSEMIDE 40 MG: 40 TABLET ORAL at 08:58

## 2020-01-01 RX ADMIN — DOCUSATE SODIUM 50 MG AND SENNOSIDES 8.6 MG 1 TABLET: 8.6; 5 TABLET, FILM COATED ORAL at 08:53

## 2020-01-01 RX ADMIN — IPRATROPIUM BROMIDE AND ALBUTEROL SULFATE 3 ML: .5; 3 SOLUTION RESPIRATORY (INHALATION) at 23:14

## 2020-01-01 RX ADMIN — IPRATROPIUM BROMIDE AND ALBUTEROL SULFATE 3 ML: .5; 3 SOLUTION RESPIRATORY (INHALATION) at 16:14

## 2020-01-01 RX ADMIN — HEPARIN SODIUM 5000 UNITS: 5000 INJECTION, SOLUTION INTRAVENOUS; SUBCUTANEOUS at 06:09

## 2020-01-01 RX ADMIN — METHYLPREDNISOLONE SODIUM SUCCINATE 62.5 MG: 125 INJECTION, POWDER, FOR SOLUTION INTRAMUSCULAR; INTRAVENOUS at 18:18

## 2020-01-01 RX ADMIN — PREDNISONE 40 MG: 20 TABLET ORAL at 08:58

## 2020-01-01 RX ADMIN — SODIUM CHLORIDE: 9 INJECTION, SOLUTION INTRAVENOUS at 14:35

## 2020-01-01 RX ADMIN — HEPARIN SODIUM 5000 UNITS: 5000 INJECTION, SOLUTION INTRAVENOUS; SUBCUTANEOUS at 06:40

## 2020-01-01 RX ADMIN — ASPIRIN 81 MG: 81 TABLET ORAL at 07:56

## 2020-01-01 RX ADMIN — IPRATROPIUM BROMIDE AND ALBUTEROL SULFATE 3 ML: .5; 3 SOLUTION RESPIRATORY (INHALATION) at 11:30

## 2020-01-01 RX ADMIN — INSULIN ASPART 1 UNITS: 100 INJECTION, SOLUTION INTRAVENOUS; SUBCUTANEOUS at 11:58

## 2020-01-01 RX ADMIN — HEPARIN SODIUM 5000 UNITS: 5000 INJECTION, SOLUTION INTRAVENOUS; SUBCUTANEOUS at 17:56

## 2020-01-01 RX ADMIN — LORAZEPAM 0.25 MG: 0.5 TABLET ORAL at 10:26

## 2020-01-01 RX ADMIN — CITALOPRAM HYDROBROMIDE 40 MG: 40 TABLET ORAL at 09:05

## 2020-01-01 RX ADMIN — DOCUSATE SODIUM 50 MG AND SENNOSIDES 8.6 MG 1 TABLET: 8.6; 5 TABLET, FILM COATED ORAL at 21:21

## 2020-01-01 RX ADMIN — FENTANYL CITRATE 50 MCG: 50 INJECTION, SOLUTION INTRAMUSCULAR; INTRAVENOUS at 14:26

## 2020-01-01 RX ADMIN — ASPIRIN 81 MG: 81 TABLET ORAL at 08:33

## 2020-01-01 RX ADMIN — HEPARIN SODIUM 5000 UNITS: 5000 INJECTION, SOLUTION INTRAVENOUS; SUBCUTANEOUS at 05:58

## 2020-01-01 RX ADMIN — CHLORHEXIDINE GLUCONATE 0.12% ORAL RINSE 15 ML: 1.2 LIQUID ORAL at 08:01

## 2020-01-01 RX ADMIN — PREDNISONE 40 MG: 20 TABLET ORAL at 08:07

## 2020-01-01 RX ADMIN — CITALOPRAM HYDROBROMIDE 40 MG: 40 TABLET ORAL at 07:57

## 2020-01-01 RX ADMIN — GUAIFENESIN 600 MG: 600 TABLET, EXTENDED RELEASE ORAL at 07:57

## 2020-01-01 RX ADMIN — IPRATROPIUM BROMIDE AND ALBUTEROL SULFATE 3 ML: .5; 3 SOLUTION RESPIRATORY (INHALATION) at 22:54

## 2020-01-01 RX ADMIN — FAMOTIDINE 20 MG: 40 POWDER, FOR SUSPENSION ORAL at 08:06

## 2020-01-01 RX ADMIN — IPRATROPIUM BROMIDE AND ALBUTEROL SULFATE 3 ML: .5; 3 SOLUTION RESPIRATORY (INHALATION) at 07:17

## 2020-01-01 RX ADMIN — CEFTRIAXONE SODIUM 1 G: 1 INJECTION, POWDER, FOR SOLUTION INTRAMUSCULAR; INTRAVENOUS at 06:42

## 2020-01-01 RX ADMIN — ASPIRIN 81 MG: 81 TABLET ORAL at 12:49

## 2020-01-01 RX ADMIN — GUAIFENESIN 600 MG: 600 TABLET, EXTENDED RELEASE ORAL at 08:53

## 2020-01-01 RX ADMIN — QUETIAPINE 50 MG: 25 TABLET ORAL at 21:25

## 2020-01-01 RX ADMIN — IPRATROPIUM BROMIDE AND ALBUTEROL SULFATE 3 ML: .5; 3 SOLUTION RESPIRATORY (INHALATION) at 10:52

## 2020-01-01 RX ADMIN — HEPARIN SODIUM 5000 UNITS: 5000 INJECTION, SOLUTION INTRAVENOUS; SUBCUTANEOUS at 17:01

## 2020-01-01 RX ADMIN — FLUTICASONE FUROATE AND VILANTEROL TRIFENATATE 1 PUFF: 100; 25 POWDER RESPIRATORY (INHALATION) at 08:02

## 2020-01-01 RX ADMIN — FUROSEMIDE 40 MG: 40 TABLET ORAL at 12:49

## 2020-01-01 RX ADMIN — FLUTICASONE FUROATE AND VILANTEROL TRIFENATATE 1 PUFF: 100; 25 POWDER RESPIRATORY (INHALATION) at 08:05

## 2020-01-01 RX ADMIN — METHYLPREDNISOLONE SODIUM SUCCINATE 125 MG: 125 INJECTION, POWDER, FOR SOLUTION INTRAMUSCULAR; INTRAVENOUS at 05:33

## 2020-01-01 RX ADMIN — HEPARIN SODIUM 5000 UNITS: 5000 INJECTION, SOLUTION INTRAVENOUS; SUBCUTANEOUS at 06:36

## 2020-01-01 RX ADMIN — HEPARIN SODIUM 5000 UNITS: 5000 INJECTION, SOLUTION INTRAVENOUS; SUBCUTANEOUS at 18:26

## 2020-01-01 RX ADMIN — IPRATROPIUM BROMIDE AND ALBUTEROL SULFATE 3 ML: .5; 3 SOLUTION RESPIRATORY (INHALATION) at 04:01

## 2020-01-01 RX ADMIN — CHLORHEXIDINE GLUCONATE 0.12% ORAL RINSE 15 ML: 1.2 LIQUID ORAL at 19:48

## 2020-01-01 RX ADMIN — IPRATROPIUM BROMIDE AND ALBUTEROL SULFATE 3 ML: .5; 3 SOLUTION RESPIRATORY (INHALATION) at 23:24

## 2020-01-01 RX ADMIN — HEPARIN SODIUM 5000 UNITS: 5000 INJECTION, SOLUTION INTRAVENOUS; SUBCUTANEOUS at 20:49

## 2020-01-01 RX ADMIN — ATORVASTATIN CALCIUM 20 MG: 20 TABLET, FILM COATED ORAL at 08:53

## 2020-01-01 RX ADMIN — HEPARIN SODIUM 5000 UNITS: 5000 INJECTION, SOLUTION INTRAVENOUS; SUBCUTANEOUS at 05:33

## 2020-01-01 RX ADMIN — GUAIFENESIN 600 MG: 600 TABLET, EXTENDED RELEASE ORAL at 20:49

## 2020-01-01 RX ADMIN — CITALOPRAM HYDROBROMIDE 40 MG: 40 TABLET ORAL at 08:07

## 2020-01-01 RX ADMIN — QUETIAPINE 50 MG: 25 TABLET ORAL at 21:17

## 2020-01-01 RX ADMIN — CITALOPRAM HYDROBROMIDE 40 MG: 40 TABLET ORAL at 08:00

## 2020-01-01 RX ADMIN — CEFTRIAXONE SODIUM 1 G: 1 INJECTION, POWDER, FOR SOLUTION INTRAMUSCULAR; INTRAVENOUS at 05:58

## 2020-01-01 RX ADMIN — INSULIN GLARGINE 2 UNITS: 100 INJECTION, SOLUTION SUBCUTANEOUS at 21:59

## 2020-01-01 RX ADMIN — IPRATROPIUM BROMIDE AND ALBUTEROL SULFATE 3 ML: .5; 3 SOLUTION RESPIRATORY (INHALATION) at 15:42

## 2020-01-01 RX ADMIN — ASPIRIN 81 MG: 81 TABLET ORAL at 08:00

## 2020-01-01 RX ADMIN — QUETIAPINE 50 MG: 25 TABLET ORAL at 21:43

## 2020-01-01 RX ADMIN — AZITHROMYCIN MONOHYDRATE 500 MG: 500 INJECTION, POWDER, LYOPHILIZED, FOR SOLUTION INTRAVENOUS at 08:01

## 2020-01-01 RX ADMIN — UMECLIDINIUM 1 PUFF: 62.5 AEROSOL, POWDER ORAL at 08:05

## 2020-01-01 RX ADMIN — IPRATROPIUM BROMIDE AND ALBUTEROL SULFATE 3 ML: .5; 3 SOLUTION RESPIRATORY (INHALATION) at 19:34

## 2020-01-01 RX ADMIN — ASPIRIN 81 MG: 81 TABLET ORAL at 09:05

## 2020-01-01 RX ADMIN — FAMOTIDINE 20 MG: 40 POWDER, FOR SUSPENSION ORAL at 09:08

## 2020-01-01 RX ADMIN — DOCUSATE SODIUM 50 MG AND SENNOSIDES 8.6 MG 1 TABLET: 8.6; 5 TABLET, FILM COATED ORAL at 21:56

## 2020-01-01 RX ADMIN — UMECLIDINIUM 1 PUFF: 62.5 AEROSOL, POWDER ORAL at 08:02

## 2020-01-01 RX ADMIN — INSULIN ASPART 1 UNITS: 100 INJECTION, SOLUTION INTRAVENOUS; SUBCUTANEOUS at 13:06

## 2020-01-01 RX ADMIN — PANTOPRAZOLE SODIUM 40 MG: 40 TABLET, DELAYED RELEASE ORAL at 07:58

## 2020-01-01 RX ADMIN — METHYLPREDNISOLONE SODIUM SUCCINATE 125 MG: 125 INJECTION, POWDER, FOR SOLUTION INTRAMUSCULAR; INTRAVENOUS at 05:58

## 2020-01-01 RX ADMIN — PREDNISONE 30 MG: 20 TABLET ORAL at 09:05

## 2020-01-01 RX ADMIN — QUETIAPINE 50 MG: 25 TABLET ORAL at 21:57

## 2020-01-01 RX ADMIN — IPRATROPIUM BROMIDE AND ALBUTEROL SULFATE 3 ML: .5; 3 SOLUTION RESPIRATORY (INHALATION) at 19:10

## 2020-01-01 RX ADMIN — IOPAMIDOL 58 ML: 755 INJECTION, SOLUTION INTRAVENOUS at 10:42

## 2020-01-01 RX ADMIN — HEPARIN SODIUM 5000 UNITS: 5000 INJECTION, SOLUTION INTRAVENOUS; SUBCUTANEOUS at 18:25

## 2020-01-01 RX ADMIN — CHLORHEXIDINE GLUCONATE 0.12% ORAL RINSE 15 ML: 1.2 LIQUID ORAL at 08:11

## 2020-01-01 RX ADMIN — IPRATROPIUM BROMIDE AND ALBUTEROL SULFATE 3 ML: .5; 3 SOLUTION RESPIRATORY (INHALATION) at 23:02

## 2020-01-01 RX ADMIN — HEPARIN SODIUM 5000 UNITS: 5000 INJECTION, SOLUTION INTRAVENOUS; SUBCUTANEOUS at 06:32

## 2020-01-01 RX ADMIN — INSULIN GLARGINE 2 UNITS: 100 INJECTION, SOLUTION SUBCUTANEOUS at 21:21

## 2020-01-01 RX ADMIN — DOCUSATE SODIUM 50 MG AND SENNOSIDES 8.6 MG 2 TABLET: 8.6; 5 TABLET, FILM COATED ORAL at 21:25

## 2020-01-01 RX ADMIN — SODIUM CHLORIDE 64 ML: 9 INJECTION, SOLUTION INTRAVENOUS at 03:37

## 2020-01-01 RX ADMIN — HEPARIN SODIUM 5000 UNITS: 5000 INJECTION, SOLUTION INTRAVENOUS; SUBCUTANEOUS at 09:06

## 2020-01-01 RX ADMIN — IPRATROPIUM BROMIDE AND ALBUTEROL SULFATE 3 ML: .5; 3 SOLUTION RESPIRATORY (INHALATION) at 02:39

## 2020-01-01 RX ADMIN — SODIUM CHLORIDE 84 ML: 9 INJECTION, SOLUTION INTRAVENOUS at 10:42

## 2020-01-01 RX ADMIN — PANTOPRAZOLE SODIUM 40 MG: 40 TABLET, DELAYED RELEASE ORAL at 08:07

## 2020-01-01 RX ADMIN — HEPARIN SODIUM 5000 UNITS: 5000 INJECTION, SOLUTION INTRAVENOUS; SUBCUTANEOUS at 08:01

## 2020-01-01 RX ADMIN — SODIUM PHOSPHATE, MONOBASIC, MONOHYDRATE 15 MMOL: 276; 142 INJECTION, SOLUTION INTRAVENOUS at 12:28

## 2020-01-01 RX ADMIN — INSULIN ASPART 1 UNITS: 100 INJECTION, SOLUTION INTRAVENOUS; SUBCUTANEOUS at 01:00

## 2020-01-01 RX ADMIN — ATORVASTATIN CALCIUM 20 MG: 20 TABLET, FILM COATED ORAL at 08:07

## 2020-01-01 RX ADMIN — DOCUSATE SODIUM 50 MG AND SENNOSIDES 8.6 MG 2 TABLET: 8.6; 5 TABLET, FILM COATED ORAL at 09:06

## 2020-01-01 RX ADMIN — POTASSIUM CHLORIDE 10 MEQ: 750 TABLET, EXTENDED RELEASE ORAL at 08:09

## 2020-01-01 RX ADMIN — IPRATROPIUM BROMIDE AND ALBUTEROL SULFATE 3 ML: .5; 3 SOLUTION RESPIRATORY (INHALATION) at 15:40

## 2020-01-01 RX ADMIN — INSULIN GLARGINE 2 UNITS: 100 INJECTION, SOLUTION SUBCUTANEOUS at 21:25

## 2020-01-01 RX ADMIN — POTASSIUM CHLORIDE 10 MEQ: 750 TABLET, EXTENDED RELEASE ORAL at 09:05

## 2020-01-01 RX ADMIN — IPRATROPIUM BROMIDE AND ALBUTEROL SULFATE 3 ML: .5; 3 SOLUTION RESPIRATORY (INHALATION) at 11:42

## 2020-01-01 RX ADMIN — CITALOPRAM HYDROBROMIDE 40 MG: 40 TABLET ORAL at 08:59

## 2020-01-01 RX ADMIN — ATORVASTATIN CALCIUM 20 MG: 20 TABLET, FILM COATED ORAL at 08:00

## 2020-01-01 RX ADMIN — IPRATROPIUM BROMIDE AND ALBUTEROL SULFATE 3 ML: .5; 3 SOLUTION RESPIRATORY (INHALATION) at 07:28

## 2020-01-01 RX ADMIN — IPRATROPIUM BROMIDE AND ALBUTEROL SULFATE 3 ML: .5; 3 SOLUTION RESPIRATORY (INHALATION) at 23:17

## 2020-01-01 RX ADMIN — IPRATROPIUM BROMIDE AND ALBUTEROL SULFATE 3 ML: .5; 3 SOLUTION RESPIRATORY (INHALATION) at 15:37

## 2020-01-01 RX ADMIN — IPRATROPIUM BROMIDE AND ALBUTEROL SULFATE 3 ML: .5; 3 SOLUTION RESPIRATORY (INHALATION) at 11:04

## 2020-01-01 RX ADMIN — HEPARIN SODIUM 5000 UNITS: 5000 INJECTION, SOLUTION INTRAVENOUS; SUBCUTANEOUS at 07:56

## 2020-01-01 RX ADMIN — HEPARIN SODIUM 5000 UNITS: 5000 INJECTION, SOLUTION INTRAVENOUS; SUBCUTANEOUS at 06:42

## 2020-01-01 RX ADMIN — HEPARIN SODIUM 5000 UNITS: 5000 INJECTION, SOLUTION INTRAVENOUS; SUBCUTANEOUS at 18:18

## 2020-01-01 RX ADMIN — GUAIFENESIN 600 MG: 600 TABLET, EXTENDED RELEASE ORAL at 21:51

## 2020-01-01 RX ADMIN — QUETIAPINE 50 MG: 25 TABLET ORAL at 21:56

## 2020-01-01 RX ADMIN — DOCUSATE SODIUM 50 MG AND SENNOSIDES 8.6 MG 1 TABLET: 8.6; 5 TABLET, FILM COATED ORAL at 21:43

## 2020-01-01 RX ADMIN — IPRATROPIUM BROMIDE AND ALBUTEROL SULFATE 3 ML: .5; 3 SOLUTION RESPIRATORY (INHALATION) at 07:03

## 2020-01-01 RX ADMIN — FAMOTIDINE 20 MG: 40 POWDER, FOR SUSPENSION ORAL at 20:23

## 2020-01-01 RX ADMIN — FUROSEMIDE 40 MG: 40 TABLET ORAL at 08:34

## 2020-01-01 RX ADMIN — METHYLPREDNISOLONE SODIUM SUCCINATE 62.5 MG: 125 INJECTION, POWDER, FOR SOLUTION INTRAMUSCULAR; INTRAVENOUS at 06:36

## 2020-01-01 RX ADMIN — IPRATROPIUM BROMIDE AND ALBUTEROL SULFATE 3 ML: .5; 3 SOLUTION RESPIRATORY (INHALATION) at 15:22

## 2020-01-01 RX ADMIN — CEFTRIAXONE SODIUM 2 G: 2 INJECTION, POWDER, FOR SOLUTION INTRAMUSCULAR; INTRAVENOUS at 06:08

## 2020-01-01 RX ADMIN — IPRATROPIUM BROMIDE AND ALBUTEROL SULFATE 3 ML: .5; 3 SOLUTION RESPIRATORY (INHALATION) at 08:43

## 2020-01-01 RX ADMIN — PROPOFOL 40 MCG/KG/MIN: 10 INJECTION, EMULSION INTRAVENOUS at 12:21

## 2020-01-01 RX ADMIN — ATORVASTATIN CALCIUM 20 MG: 20 TABLET, FILM COATED ORAL at 07:57

## 2020-01-01 RX ADMIN — INSULIN ASPART 1 UNITS: 100 INJECTION, SOLUTION INTRAVENOUS; SUBCUTANEOUS at 17:03

## 2020-01-01 ASSESSMENT — ACTIVITIES OF DAILY LIVING (ADL)
ADLS_ACUITY_SCORE: 26
EATING/SWALLOWING: SWALLOWING LIQUIDS;SWALLOWING SOLID FOOD
ADLS_ACUITY_SCORE: 22
ADLS_ACUITY_SCORE: 26
ADLS_ACUITY_SCORE: 17
ADLS_ACUITY_SCORE: 24
ADLS_ACUITY_SCORE: 17
ADLS_ACUITY_SCORE: 28
ADLS_ACUITY_SCORE: 24
ADLS_ACUITY_SCORE: 23
ADLS_ACUITY_SCORE: 16
ADLS_ACUITY_SCORE: 21
TOILETING_ISSUES: NO
ADLS_ACUITY_SCORE: 24
ADLS_ACUITY_SCORE: 23
ADLS_ACUITY_SCORE: 21
EQUIPMENT_CURRENTLY_USED_AT_HOME: GRAB BAR, TOILET
ADLS_ACUITY_SCORE: 21
ADLS_ACUITY_SCORE: 26
WEAR_GLASSES_OR_BLIND: YES
ADLS_ACUITY_SCORE: 22
ADLS_ACUITY_SCORE: 25
ADLS_ACUITY_SCORE: 26
ADLS_ACUITY_SCORE: 22
ADLS_ACUITY_SCORE: 21
ADLS_ACUITY_SCORE: 21
ADLS_ACUITY_SCORE: 19
CONCENTRATING,_REMEMBERING_OR_MAKING_DECISIONS_DIFFICULTY: NO
DRESSING/BATHING_DIFFICULTY: NO
ADLS_ACUITY_SCORE: 26
ADLS_ACUITY_SCORE: 24
ADLS_ACUITY_SCORE: 21
ADLS_ACUITY_SCORE: 22
ADLS_ACUITY_SCORE: 18
ADLS_ACUITY_SCORE: 16
ADLS_ACUITY_SCORE: 20
ADLS_ACUITY_SCORE: 20
ADLS_ACUITY_SCORE: 26
ADLS_ACUITY_SCORE: 19
ADLS_ACUITY_SCORE: 22
ADLS_ACUITY_SCORE: 20
ADLS_ACUITY_SCORE: 17
ADLS_ACUITY_SCORE: 20
ADLS_ACUITY_SCORE: 26
ADLS_ACUITY_SCORE: 20
WALKING_OR_CLIMBING_STAIRS_DIFFICULTY: NO
ADLS_ACUITY_SCORE: 20
ADLS_ACUITY_SCORE: 20
ADLS_ACUITY_SCORE: 17
ADLS_ACUITY_SCORE: 26
ADLS_ACUITY_SCORE: 20
ADLS_ACUITY_SCORE: 22
ADLS_ACUITY_SCORE: 20
ADLS_ACUITY_SCORE: 24
ADLS_ACUITY_SCORE: 24
ADLS_ACUITY_SCORE: 20
ADLS_ACUITY_SCORE: 26
ADLS_ACUITY_SCORE: 22
ADLS_ACUITY_SCORE: 26
ADLS_ACUITY_SCORE: 22
DIFFICULTY_EATING/SWALLOWING: YES
ADLS_ACUITY_SCORE: 21
ADLS_ACUITY_SCORE: 20
ADLS_ACUITY_SCORE: 21
ADLS_ACUITY_SCORE: 22
ADLS_ACUITY_SCORE: 21
ADLS_ACUITY_SCORE: 24
ADLS_ACUITY_SCORE: 16
ADLS_ACUITY_SCORE: 26
ADLS_ACUITY_SCORE: 20
ADLS_ACUITY_SCORE: 17
ADLS_ACUITY_SCORE: 21
ADLS_ACUITY_SCORE: 28
ADLS_ACUITY_SCORE: 16
ADLS_ACUITY_SCORE: 20
ADLS_ACUITY_SCORE: 26
ADLS_ACUITY_SCORE: 20
ADLS_ACUITY_SCORE: 22
ADLS_ACUITY_SCORE: 22
ADLS_ACUITY_SCORE: 21
ADLS_ACUITY_SCORE: 19
ADLS_ACUITY_SCORE: 26
ADLS_ACUITY_SCORE: 26
ADLS_ACUITY_SCORE: 23
ADLS_ACUITY_SCORE: 24
ADLS_ACUITY_SCORE: 16
FALL_HISTORY_WITHIN_LAST_SIX_MONTHS: NO
ADLS_ACUITY_SCORE: 26
DOING_ERRANDS_INDEPENDENTLY_DIFFICULTY: NO
DIFFICULTY_COMMUNICATING: NO

## 2020-01-01 ASSESSMENT — ENCOUNTER SYMPTOMS
ABDOMINAL PAIN: 0
BACK PAIN: 0
WEAKNESS: 0
HEADACHES: 0
COUGH: 0
VOMITING: 0
CHEST TIGHTNESS: 0
APPETITE CHANGE: 0
LIGHT-HEADEDNESS: 0
SHORTNESS OF BREATH: 0
FATIGUE: 0
NAUSEA: 0
FEVER: 0
CHILLS: 0
CONFUSION: 0

## 2020-01-01 ASSESSMENT — MIFFLIN-ST. JEOR
SCORE: 1047.01

## 2020-10-17 PROBLEM — J96.90 RESPIRATORY FAILURE (H): Status: ACTIVE | Noted: 2020-01-01

## 2020-10-17 NOTE — PROGRESS NOTES
Pt's ETT was 23 cm at the lip, advanced to 2 cm and now pt's ETT is 25 cm at the lip.    Hero Martin, RT.

## 2020-10-17 NOTE — PROGRESS NOTES
[facility] ICU RESPIRATORY NOTE        Date of Admission: 10/17/2020    Date of Intubation (most recent): 10/17/20    Reason for Mechanical Ventilation: res failure     Number of Days on Mechanical Ventilation: 1    Met Criteria for Spontaneous Breathing Trial: No    Reason for No Spontaneous Breathing Trial: per MD     Significant Events Today: none overnight.     ABG Results: No lab results found in last 7 days.    Current Vent Settings: Ventilation Mode: CMV/AC  (Continuous Mandatory Ventilation/ Assist Control)  FiO2 (%): 50 %  Rate Set (breaths/minute): 17 breaths/min  Tidal Volume Set (mL): 450 mL  PEEP (cm H2O): 5 cmH2O  Oxygen Concentration (%): 50 %  Resp: 17        Plan: will remain on full ventilatory support, assess for SBT per MD order.      Cliff Bautista, RT

## 2020-10-17 NOTE — PHARMACY-ADMISSION MEDICATION HISTORY
Pharmacy Medication History  Admission medication history interview status for the 10/17/2020  admission is complete. See EPIC admission navigator for prior to admission medications       Medication history sources: Patient's family/friend (Samia, [phone])  Location of interview: Phone  Medication history source reliability: Good  Adherence assessment: Good    Significant changes made to the medication list:  Added:  - pantoprazole: added per patient's daughter, Samia.    Removed:  - Advair inhaler: Samia reports that patient is no longer using Advair because it dries her throat and causes dry cough.    Changed:  - Citalopram: changed from 20 mg to 40 mg  - Furosemide: changed from 20 mg to 40 mg and added every Tues as well  - Potassium chloride: added every Tues  - Lantus: changed from 5 units to 1 unit  - albuterol neb solution: added 1/2 vial PRN    Additional medication history information:   - Patient's daughter, Samia, seems to be a good historian and takes care of the patient's medications.     Medication reconciliation completed by provider prior to medication history? No    Time spent in this activity: 15 minutes      Prior to Admission medications    Medication Sig Last Dose Taking? Auth Provider   acetaminophen (TYLENOL) 325 MG tablet Take 325 mg by mouth every 4 hours as needed for pain   at PRN Yes Reported, Patient   albuterol (2.5 MG/3ML) 0.083% neb solution Take 1 vial by nebulization every 4 hours as needed And 1/2 vial as needed 10/15/2020 Yes Reported, Patient   aspirin 81 MG EC tablet Take 81 mg by mouth daily 10/16/2020 at AM Yes Unknown, Entered By History   atorvastatin (LIPITOR) 20 MG tablet Take 20 mg by mouth daily  10/15/2020 at PM Yes Reported, Patient   citalopram (CELEXA) 40 MG tablet Take 40 mg by mouth daily  10/16/2020 at AM Yes Reported, Patient   furosemide (LASIX) 40 MG tablet Take 40 mg by mouth daily Every Mon, Tues, Wed, Fri, Sat 10/16/2020 at AM Yes Reported, Patient    insulin glargine (LANTUS) 100 UNIT/ML injection Inject 1 Units Subcutaneous At Bedtime  10/16/2020 at PM Yes Reported, Patient   magnesium 250 MG tablet Take 1 tablet by mouth 2 times daily  10/16/2020 at AM Yes Reported, Patient   pantoprazole (PROTONIX) 40 MG EC tablet Take 40 mg by mouth daily 10/16/2020 at AM Yes Unknown, Entered By History   potassium chloride ER (KLOR-CON M) 10 MEQ CR tablet Take 10 mEq by mouth daily Every Mon, Tues, Wed, Fri, Sat 10/16/2020 at AM Yes Unknown, Entered By History   blood glucose (NO BRAND SPECIFIED) lancets standard Use to test blood sugar 4 times daily or as directed.   Sarah Reynolds APRN CNP   blood glucose monitoring (NO BRAND SPECIFIED) meter device kit Use to test blood sugar 4 times daily or as directed.   Sarah Reynolds APRN CNP   blood glucose monitoring (NO BRAND SPECIFIED) test strip Use to test blood sugars 4 times daily or as directed   Sarah Reynolds APRN CNP   insulin pen needle 31G X 6 MM Use 4 times daily or as directed.   Sarah Reynolds APRN CNP

## 2020-10-17 NOTE — PLAN OF CARE
Propofol at 40 mgm, withdraws to pain all 4 extremeties, pupils 2 reactive.  Dtr updated, VSS.  Low urine output, LR bolus with mild results, phos replaced.

## 2020-10-17 NOTE — PROGRESS NOTES
ICU Cross Coverage Note    Patient seen and examined.   Vent settings appropriate. [id number] autopeep on PEEP 7.   Plan SAT if able today.   Cont steroids, abx and bronchodilators.     Bryan Guzman MD   of Medicine  Interventional Pulmonary  Department of Pulmonary, Allergy, Critical Care and Sleep Medicine   [facility]  Pager: [phone]   Office: [phone]  Email: [email or web address]    Randa LAZARO, OCN  Clinical Nurse Specialist  Department of Thoracic Surgery  Office: [phone]  Email: [email or web address]    Riya Kruse  Interventional Pulmonology Surgery Scheduler  Office: [phone]  Email: [email or web address]

## 2020-10-17 NOTE — H&P
Bella Saucedo MRN# [id number]   Age: 74 year old YOB: 1945     Date of Admission:  10/17/2020      Primary care provider: Shannon Olivas         Assessment and Plan:   Bella Saucedo is a 74 year old female with medical history significant for COPD on chronic home O2 (2L), DMT2, colon cancer s/p L hemicolectomy with colostomy, HTN admitted on 10/17/2020 from [place] ED. Initial findings concerning for COPD exacerbation but she is undergoing treatment for CAP and rule out for COVID-19.     Neuro:   #Sedation - using propofol gtt and fentanyl boluses prn. Minimize as able with goal RASS -1 - 0. Daily sedation vacation.     Cardiac:   #HfPEF (TTE 2017 with LVEF 65 - 70%, increased RV wall thickness)  #HTN   #CAD - CT coronary arteriogram (2017) moderate stenosis with extensive plaque noted   No acute issues at this time. Close monitoring and conservative fluid administration.   - telemetry  - hold PTA antihypertensives and restart as able     Pulmonary:   #Hypoxic/hypercapnic respiratory failure - ABG at presentation to ED with severe non-compensated respiratory acidosis (pCO2 > 90). Only minimal improvement with Bpap so she was intubated. Concern for COPD exacerbation with wheezing on exam. She received Mg, solumedrol, nebs at OSH and will continue. Empiric antibiotics started (see ID below).   - scheduled duonebs Q4H  - some autopeep on vent (11 so PEEP increased to 7)  - continue solumedrol     Renal:   No acute issues at this time. Monitor I/Os, avoid nephrotoxins. Daily BMP.     GI:   No acute issues at this time. Colostomy in place and will monitor output. Senna-docusate every day ordered.     ID:   #PUI COVID - given respiratory failure though no clear new infiltrates on CXR. She was started on ceftriaxone/azithromycin. Blood and sputum cultures pending. Influenza/RSV PCR pending.   - continue empiric antibiotics and f/u cultures, PCR results     Heme:   No acute issues at this  time.   - daily CBC    Endocrine:   #DMT2 - unclear if she was taking insulin and will need to clarify medications. HbA1c pending.   BGs per ICU protocol with goal 120's - 180's. If wnl then reduce frequency of checks.     Skin:   #Erythema/chronic changes of b/l LE extremities - no report of fever or complaint of pain but unable to further clarify given intubated/sedated. Erythematous areas marked. Blood cultures ordered. Continue empiric therapy as above.     ICU:   Lines; ETT, 2x PIC, OG, grant   GI ppx: famotidine  DVT ppx: heparin   Code: full        Critical Care Time: 40 min.  I spent this time (excluding procedures) personally providing and directing critical care services at the bedside and on the critical care unit.       Date of Service (when I saw the patient): October 17, 2020    Carine Jay MD  Pulmonary, Allergy, Critical Care, and Sleep Medicine   Pager: [phone]            Chief Complaint:   SOB         History of Present Illness:     History obtained from ED physician in [place].    Patient presented with SOB x 1 day. On arrival patient was wheezing and she was initiated on Bpap with some improvement. However, she decompensated despite solumedrol, Mg, and nebs and ultimately was intubated for hypoxic/hypercapnic respiratory failure. EKG, troponin, electrolytes, and WBC were reportedly unremarkable. Blood cultures were obtained and she was started on ceftriaxone/azithromycin. Her daughter was updated by the [place] ED physician and she underwent EMS transport to [facility].     I attempted to contact her spouse and daughter via phone numbers in demographics but was unsuccessful with both.         Review of Systems:   A full ROS was unable to be performed as patient was intubated and sedated.          Past Medical and Surgical History:   SAH - 2010 s/p coiling of R PCOM rupture  HTN  Adenocarcinoma of colon  COPD  DMT2  HFpEF  Former smoker (23 py history, quit 2018)    Oophorectomy R, b/l  salpingectomy - 1986  Appendectomy - 1986  Hemicolectomy 2016 d/t Stage IIB colon cancer         Family History:   Brother - diabetes  Mother - cancer  Sister - breast cancer  Son x 2 - diabetes type I          Social History:     Social History     Socioeconomic History     Marital status:      Spouse name: Not on file     Number of children: Not on file     Years of education: Not on file     Highest education level: Not on file   Occupational History     Not on file   Social Needs     Financial resource strain: Not on file     Food insecurity     Worry: Not on file     Inability: Not on file     Transportation needs     Medical: Not on file     Non-medical: Not on file   Tobacco Use     Smoking status: Not on file   Substance and Sexual Activity     Alcohol use: Not on file     Drug use: Not on file     Sexual activity: Not on file   Lifestyle     Physical activity     Days per week: Not on file     Minutes per session: Not on file     Stress: Not on file   Relationships     Social connections     Talks on phone: Not on file     Gets together: Not on file     Attends Zoroastrianism service: Not on file     Active member of club or organization: Not on file     Attends meetings of clubs or organizations: Not on file     Relationship status: Not on file     Intimate partner violence     Fear of current or ex partner: Not on file     Emotionally abused: Not on file     Physically abused: Not on file     Forced sexual activity: Not on file   Other Topics Concern     Not on file   Social History Narrative     Not on file            Allergies and Medications:     Allergies   Allergen Reactions     Nitroglycerin Anxiety and Other (See Comments)     Mild headache, severe anxiety     Medications Prior to Admission   Medication Sig Dispense Refill Last Dose     ACETAMINOPHEN PO Take 325 mg by mouth every 4 hours as needed for pain        albuterol (2.5 MG/3ML) 0.083% neb solution Take 1 vial by nebulization 4 times daily         ASPIRIN PO Take 81 mg by mouth daily        Atorvastatin Calcium (LIPITOR PO) Take 20 mg by mouth daily        blood glucose (NO BRAND SPECIFIED) lancets standard Use to test blood sugar 4 times daily or as directed. 1 Box 3      blood glucose monitoring (NO BRAND SPECIFIED) meter device kit Use to test blood sugar 4 times daily or as directed. 1 kit 0      blood glucose monitoring (NO BRAND SPECIFIED) test strip Use to test blood sugars 4 times daily or as directed 100 strip 3      Citalopram Hydrobromide (CELEXA PO) Take 20 mg by mouth daily        fluticasone-salmeterol (ADVAIR) 250-50 MCG/DOSE diskus inhaler Inhale 1 puff into the lungs 2 times daily        Furosemide (LASIX PO) Take 20 mg by mouth daily Every Mon, Wed, Frid, Sat        insulin glargine (LANTUS) 100 UNIT/ML injection Inject 5 Units Subcutaneous At Bedtime        insulin pen needle 31G X 6 MM Use 4 times daily or as directed. 100 each 1      magnesium 250 MG tablet Take 1 tablet by mouth 2 times daily         POTASSIUM CHLORIDE PO Take 10 mEq by mouth Every Mon, Wed, Fri, and Sat            Physical Exam:   Temp:  [97.4  F (36.3  C)] 97.4  F (36.3  C)  Pulse:  [68] 68  Resp:  [13] 13  BP: (145)/(67) 145/67  SpO2:  [100 %] 100 %    Intake/Output Summary (Last 24 hours) at 10/17/2020 0514  Last data filed at 10/17/2020 0500  Gross per 24 hour   Intake --   Output 200 ml   Net -200 ml       Constitutional:   Intubated, sedated, in NAD     Eyes:   Nonicteric, LAURA     ENT:    oral mucosa moist without lesions     Neck:   Supple, trachea midline      Lungs:   Good air flow.  No crackles. No rhonchi.  No wheezes.     Cardiovascular:   Normal S1 and S2.  RRR.  No murmur, gallop or rub.  Radial, DP pulses normal and symmetric     Abdomen:   NABS, soft, nontender, nondistended.  No HSM.  Colostomy present. Midline scar.      Musculoskeletal:   No edema.      Neurologic:   Unable to assess given sedation.      Skin:   Warm, dry. Extremely red, dry  chronic skin changes in b/l LEs. L leg erythematous, and warm. This area is outlined.           Data:   All laboratory and imaging data personally reviewed.    CMPNo lab results found in last 7 days.  CBCNo lab results found in last 7 days.  INRNo lab results found in last 7 days.  Arterial Blood GasNo lab results found in last 7 days.  Urine Studies  No lab results found.  CMV viral loads  No lab results found.  No results found for: CMQNT, CMVQ  EBV viral loads   No lab results found.  No results found for: EBVDN, EBRES, EBVDN, EBVSP, EBVPC, EBVPCR  Respiratory Virus Testing    No results found for: RS, FLUAG   Sputum Cultures in the last 3 months:  No results found for: SDES No results found for: CULT

## 2020-10-17 NOTE — PROGRESS NOTES
[facility] ICU RESPIRATORY NOTE        Date of Admission: 10/17/2020    Date of Intubation (most recent): 10/17/2020    Reason for Mechanical Ventilation: Respiratory Failure     Number of Days on Mechanical Ventilation: 1    Met Criteria for Spontaneous Breathing Trial:NO  Reason for No Spontaneous Breathing Trial: Per MD    Significant Events Today: ETT advanced to 2 cm.     ABG Results:   Recent Labs   Lab 10/17/20  0841 10/17/20  0609   PH 7.39  --    PCO2 66*  --    PO2 78*  --    HCO3 40*  --    O2PER  --  40 percent        Current Vent Settings: Ventilation Mode: CMV/AC  (Continuous Mandatory Ventilation/ Assist Control)  FiO2 (%): 40 %  Rate Set (breaths/minute): 18 breaths/min  Tidal Volume Set (mL): 450 mL  PEEP (cm H2O): 7 cmH2O  Oxygen Concentration (%): 50 %  Resp: 18      Plan: Continue on full ventilator support.     Hero Martin, RT

## 2020-10-18 NOTE — PROGRESS NOTES
3p-1130p    Neuro: Sedated. Not following.   Pulm: LS diminished. On full vent support. Minimal ETT secretion  CV: SR w/ WNL BPs  : Cornejo patent with low UOP. Intensivisit notified  GI: Faint BS. Colostomy bag CDI w/ formed small stool. OG clamped. NPO.  Extremities: Very rigid extremities with cares. Purposeful movements on BUE.   Skin: Capo on jaz-area and BLE. Very dry skin with white/yellow thick patches on BLE. Generalized bruises  Lines: PIVx2  Family: Daughter and  updated via phone  Plan: SV

## 2020-10-18 NOTE — PROGRESS NOTES
Date of Admission: 10/17/2020     Date of Intubation (most recent): 10/17/2020     Reason for Mechanical Ventilation: Respiratory Failure      Number of Days on Mechanical Ventilation: 2     Met Criteria for Spontaneous Breathing Trial:NO  Reason for No Spontaneous Breathing Trial: Per MD     Significant Events Today: none     ABG Results:        Recent Labs   Lab 10/17/20  0841 10/17/20  0609   PH 7.39  --    PCO2 66*  --    PO2 78*  --    HCO3 40*  --    O2PER  --  40 percent        Current Vent Settings: Ventilation Mode: CMV/AC  (Continuous Mandatory Ventilation/ Assist Control)  FiO2 (%): 40 %  Rate Set (breaths/minute): 18 breaths/min  Tidal Volume Set (mL): 450 mL  PEEP (cm H2O): 7 cmH2O  Oxygen Concentration (%): 50 %  Resp: 18        Plan: Continue on full ventilator support.   RT lashae

## 2020-10-18 NOTE — PLAN OF CARE
Pt vented/sedated. Withdraws to pain, grimace of face with oral cares. No command following. VSS. OG, colostomy in place. Borderline UOP. Continue to monitor.

## 2020-10-18 NOTE — PROGRESS NOTES
[facility] Intensive Care Progress Note    Date of Service (when I saw the patient): 10/18/2020     Summary of hospitalization: Bella is a 74 year old with a past medical history of COPD on chronic home O2 (2L), type 2 diabetes mellitus, colon cancer s/p L hemicolectomy with colostomy, HTN, and CHFpEF who presented to an OSH on 10/17 for shortness of breath with concern for COPD exacerbation. She was intubated for hypoxic, hypercapnic respiratory failure and transferred to the ICU. She has been receiving steroids, antibiotics, and duonebs. She is mechanically ventilated and has been stable since admission.      Assessment & Plan   Bella Saucedo is a 74 year old female who was admitted on 10/17/2020 for acute on chronic COPD exacerbation.     Neurology:  1. Sedated: on propofol and fentanyl prn. Current RASS -1. Reports of increased agitation when weaned sedation overnight.   -Goal RASS -1-0  -Daily sedation vacation with goal to extubate  - Seroquel 50 mg HS    Cardiovascular:  1. HfPEF:  on admission, no peripheral edema so low suspicion for acute exacerbation. TTE 2017 with LVEF 65-70% with increased RV wall thickness.   2. Hypertension: blood pressures satisfactory since admission. Holding home BP meds.   3. CAD: no current concerns  - Telemetry   - Restart home BP meds as able    Pulmonary:        1. Acute resp failure 2/2 AECOPD. Easy to oxygenate/ventilate.   - Continue azithromycin (day 2/3) and ceftriaxone (day 2/5)  - Continue solumedrol (day 2/5)  - Duonebs q4h  - Daily pressure support trial with goal of extubation     Ventilation Mode: CMV/AC  (Continuous Mandatory Ventilation/ Assist Control)  FiO2 (%): 40 %  Rate Set (breaths/minute): 18 breaths/min  Tidal Volume Set (mL): 450 mL  PEEP (cm H2O): 7 cmH2O  Oxygen Concentration (%): 50 %  Resp: 18      Renal  1. No acute issues. Creatinine normal and urine output appropriate.    - monitor urine  output and creatinine     ID:         1. Possible CAP: unlikely as no leukocytosis, no consolidations on CXR, BCx2 negative, sputum culture pending but no organisms seen on gram stain.  - Continue azithromycin (day 2/3) and ceftriaxone (day 2/5)    GI:   1. Colostomy  - Daily senna     Nutrition:   1.  NPO     Endocrine:  1. Type 2 diabetes mellitus: HgBA1c elevated to 6.0. Requiring small amounts of insulin. Is on steroids.   - SSI    Heme/Onc:  1. No acute issues    DVT Prophylaxis: Heparin SQ  GI Prophylaxis: H2 Blocker    Restraints: Restraints for medical healing needed: YES      Critical Care Time: 30 min.  I spent this time (excluding procedures) personally providing and directing critical care services at the bedside and on the critical care unit.      Bryan Guzman MD   of Medicine  Interventional Pulmonary  Department of Pulmonary, Allergy, Critical Care and Sleep Medicine   [facility]  Pager: [phone]         Main Plans for Today   Continue respiratory support with goal of extubation     Interval History   No acute issues overnight     Physical Exam   Temp: 97.4  F (36.3  C) Temp src: Axillary Temp  Min: 97.2  F (36.2  C)  Max: 98.4  F (36.9  C) BP: 122/66 Pulse: 100   Resp: 18 SpO2: 95 % O2 Device: Mechanical Ventilator    Vitals:    10/17/20 0500 10/18/20 0600   Weight: 61.4 kg (135 lb 4.8 oz) 61.5 kg (135 lb 9.3 oz)     I/O last 3 completed shifts:  In: 1565.65 [I.V.:915.65; NG/GT:150; IV Piggyback:500]  Out: 970 [Urine:920; Emesis/NG output:50]    General: diaphoretic   Neurologic: sedated  Cardiovascular: regular rate and rhythm, no peripheral edema  Respiratory: lungs mostly clear with occasional expiratory wheeze  GI: colostomy in place  Skin/Extremities: no extremity edema. Dry and erythematous lower extremity skin that is well within lines drawn on admission   Lines: No erythema or discharge at entry site for peripheral IVs  Current lines are  required for patient management    Medications     propofol (DIPRIVAN) infusion 40 mcg/kg/min (10/18/20 0854)     sodium chloride 10 mL/hr at 10/18/20 0700       azithromycin  500 mg Intravenous Q24H     cefTRIAXone  1 g Intravenous Q24H     chlorhexidine  15 mL Mouth/Throat Q12H     famotidine  20 mg Oral or G tube BID     heparin ANTICOAGULANT  5,000 Units Subcutaneous Q12H     insulin aspart  1-4 Units Subcutaneous Q4H     ipratropium - albuterol 0.5 mg/2.5 mg/3 mL  3 mL Nebulization Q4H     methylPREDNISolone  125 mg Intravenous Q24H     senna-docusate  1 tablet Oral At Bedtime       Data   Recent Labs   Lab 10/18/20  0543 10/17/20  0609   WBC 7.6 5.4   HGB 12.7 13.0   MCV 99 102*    202    135   POTASSIUM 4.6 4.7   CHLORIDE 99 97   CO2 38* 38*   BUN 22 22   CR 0.66 0.65   ANIONGAP 2* <1*   MARILU 8.3* 8.2*   * 157*   ALBUMIN  --  3.2*   PROTTOTAL  --  6.6*   BILITOTAL  --  0.9   ALKPHOS  --  124   ALT  --  44   AST  --  28     No results found for this or any previous visit (from the past 24 hour(s)).

## 2020-10-18 NOTE — PLAN OF CARE
Able to wean for 1/2 h this pm, pt. Still restless trying to sit up and breath with propofol at 5 mcg. Restedated, fentanyl x1 , moderate urine output, ostomy paged to look at colostomy.  Skin lotioned.  Sinus tach.

## 2020-10-19 NOTE — PLAN OF CARE
[id number]  Neuro: lethargic, confused. HENNING.  CV: SR/ST. BP WDL. 1-2+ edema. Pulses palpable  Pulm: extubated at 1200 to bipap 10/5 30%. LS clear/diminished  GI/: [place], good urine output. Colostomy; no BM today  Skin: flaky  Access: PIV x2  Leia Bundy RN 10/19/20 6:59 PM

## 2020-10-19 NOTE — PROGRESS NOTES
Pt extubated to BIPAP 10/5 30% per MD order.  Alarm limits set at 10 and gel pad and mepilex applied.  LS are diminished t/o and no stridor present.  Will continue to follow  Daniel Felix, RT  10/19/2020

## 2020-10-19 NOTE — PROGRESS NOTES
Critical Care Progress Note      10/18/2020    Name: Bella Saucedo MRN#: [id number]   Age: 74 year old YOB: 1945     Hsptl Day# 2  ICU DAY #2    MV DAY #2             Problem List:   Active Problems:    Respiratory failure (H)  Acute hypoxemic/hypercapnic respiratory failure requiring mechanical ventilation  Copd exacerbation  encephalopathy         Summary/Hospital Course:     74F h/o copd on 2L home o2, dm, htn, colon ca s/p hemicolectomy now presented with respiratory failure in setting of copd exacerbation requiring intubation and mechanical ventilation.       Assessment and plan :     Bella Saucedo IS a 74 year old female admitted on 10/17/2020 for acute hypoxemic/hypercapnic respiratory failure, vent dependent.   I have personally reviewed the daily labs, imaging studies, cultures and discussed the case with referring physician and consulting physicians.     My assessment and plan by system for this patient is as follows:    Neurology/Psychiatry:   1. Encephalopathy/delirium:  In setting of critical illness.  Continue seroqeul.  2. Analgesia: add prn dilaudid  3. Sedation:  propofol    Cardiovascular:   1.H/o hypertension:  Resume anti-HTN meds once closer to extubation.    Pulmonary/Ventilator Management:   1. Acute hypoxemic/hypercapnic respiratory failure: in setting of copd exacerbation.  Continue mechanical ventilation with long expiratory phase.    2.  Copd exacerbation:  Continue steroids (change to interval dosing; bid).  Continue dounebs.  Sputum cx as well as covid, flu and rsv testing all negative.  R/o legionella via urine antigen.    GI and Nutrition :   1. Tube feeds if not extubated today  2. H2 blocker for pud prophy    Renal/Fluids/Electrolytes:   1. Creatinine ok 0.58  2. Elevated serum bicarb:  36.  Check vbg to assess acid/base status, but suspect this is due to chronic respiratory acidosis.    Infectious Disease:   1. Continue empiric ctx and azithro empirically for cap  for now. discont if sputum cx remains negative.    Endocrine:   1. H/o DM2:  Continue ss aspart    Hematology/Oncology:   1. Wbc 7.6 ok  2. hgb 12.5 ok  3. pltlts 222 ok    ICU Prophylaxis:   1. DVT: Hep Subq/ mechanical  2. VAP: HOB 30 degrees, chlorhexidine rinse  3. Stress Ulcer: H2 blocker   4. Restraints: Nonviolent soft two point restraints required and necessary for patient safety and continued cares and good effect as patient continues to pull at necessary lines, tubes despite education and distraction. Will readdress daily.   5. Wound care - per unit routine   6. Feeding - tf if not extubated today  7. Family Update: pending for today  8. Disposition - icu           Interim History:     No events overnight.  Continue spontaneous breathing trials as able.         Key Medications:       azithromycin  500 mg Intravenous Q24H     cefTRIAXone  1 g Intravenous Q24H     chlorhexidine  15 mL Mouth/Throat Q12H     famotidine  20 mg Oral or G tube BID     heparin ANTICOAGULANT  5,000 Units Subcutaneous Q12H     insulin aspart  1-4 Units Subcutaneous Q4H     ipratropium - albuterol 0.5 mg/2.5 mg/3 mL  3 mL Nebulization Q4H     methylPREDNISolone  125 mg Intravenous Q24H     QUEtiapine  50 mg Oral At Bedtime     senna-docusate  1 tablet Oral At Bedtime       propofol (DIPRIVAN) infusion 40 mcg/kg/min (10/18/20 1928)     sodium chloride 10 mL/hr at 10/18/20 1928               Physical Examination:   Temp:  [97.4  F (36.3  C)-98.5  F (36.9  C)] 98.5  F (36.9  C)  Pulse:  [] 101  Resp:  [17-27] 18  BP: (100-144)/(57-80) 105/59  FiO2 (%):  [40 %] 40 %  SpO2:  [93 %-100 %] 97 %    Intake/Output Summary (Last 24 hours) at 10/18/2020 2238  Last data filed at 10/18/2020 2200  Gross per 24 hour   Intake 852.78 ml   Output 1250 ml   Net -397.22 ml     Wt Readings from Last 4 Encounters:   10/18/20 61.5 kg (135 lb 9.3 oz)   03/21/17 51.7 kg (114 lb)   02/28/17 50.3 kg (111 lb)   02/21/17 47.9 kg (105 lb 9.6 oz)     BP -  Mean:  [] 74  Ventilation Mode: CMV/AC  (Continuous Mandatory Ventilation/ Assist Control)  FiO2 (%): 40 %  Rate Set (breaths/minute): 18 breaths/min  Tidal Volume Set (mL): 450 mL  PEEP (cm H2O): 7 cmH2O  Pressure Support (cm H2O): 10 cmH2O  Oxygen Concentration (%): 40 %  Resp: 18    Recent Labs   Lab 10/17/20  0841 10/17/20  0609   PH 7.39  --    PCO2 66*  --    PO2 78*  --    HCO3 40*  --    O2PER  --  40 percent        GEN: no acute distress, sedated.   HEENT: head ncat, sclera anicteric, OP patent, trachea midline   PULM: unlabored synchronous with vent, clear anteriorly, prolonged expiratory phase.    CV/COR: RRR S1S2 no gallop,  No rub, no murmur  ABD: soft nontender, hypoactive bowel sounds, no mass  EXT:  No Edema;  Warm x4  NEURO: sedated but wakens, somewhat delirious.  perrl.  SKIN: no obvious rash  LINES: clean, dry intact         Data:   All data and imaging reviewed     ROUTINE ICU LABS (Last four results)  CMP  Recent Labs   Lab 10/18/20  0543 10/17/20  0609    135   POTASSIUM 4.6 4.7   CHLORIDE 99 97   CO2 38* 38*   ANIONGAP 2* <1*   * 157*   BUN 22 22   CR 0.66 0.65   GFRESTIMATED 87 87   GFRESTBLACK >90 >90   MARILU 8.3* 8.2*   MAG  --  2.9*   PHOS 3.6 2.1*   PROTTOTAL  --  6.6*   ALBUMIN  --  3.2*   BILITOTAL  --  0.9   ALKPHOS  --  124   AST  --  28   ALT  --  44     CBC  Recent Labs   Lab 10/18/20  0543 10/17/20  0609   WBC 7.6 5.4   RBC 4.10 4.22   HGB 12.7 13.0   HCT 40.4 43.2   MCV 99 102*   MCH 31.0 30.8   MCHC 31.4* 30.1*   RDW 13.9 13.3    202     INRNo lab results found in last 7 days.  Arterial Blood Gas  Recent Labs   Lab 10/17/20  0841 10/17/20  0609   PH 7.39  --    PCO2 66*  --    PO2 78*  --    HCO3 40*  --    O2PER  --  40 percent        All cultures:  Recent Labs   Lab 10/17/20  0840 10/17/20  0608   CULT Culture negative monitoring continues No growth after 1 day  No growth after 1 day     No results found for this or any previous visit (from the past 24  hour(s)).    Labs personally reviewed/interpreted:  See a/p    Billing: This patient is critically ill: Yes. Total critical care time today 45 min.

## 2020-10-19 NOTE — PROGRESS NOTES
"[facility]  WO Nurse Inpatient Ostomy Assessment     Assessment of Established colostomy      WO Assessment & Physical Exam:        Current status: pt just extubated in ICU. Pt has her own supplies @ Bedside.          Stoma size:  1 1/4\"     Stoma appearance: pale red/pink, moist, protrudes, lumen @ apex    Mucocutaneous junction:  healed    Peristomal complication(s): intact, no erythema, skin intact    Abdominal assessment: rounded and soft    Output: small amt formed fecal output    Pain: unable to determine      Current pouching system: [place] [employer]          -Barrier: flat 1 1/4\" precut with tape   #[id number]         -Closed pouch                                   #[id number]        Pouch last changed:  10-19-20    Reason for pouch change today: stoma assessment        WO Plan:         Pouching product plan: change barrier weekly and closed pouch prn when 1/3 to 1/2 full of stool        Pt has her own supplies @ bedside      Objective Data:       Patient history according to medical record:        Bella Saucedo is a 74 year old female with medical history significant for COPD on chronic home O2 (2L), DMT2, colon cancer s/p L hemicolectomy with colostomy, HTN admitted on 10/17/2020 from [facility] ED. Initial findings concerning for COPD exacerbation but she is undergoing treatment for CAP and rule out for COVID-19.          Current Diet/Nutrition:   None       Output:  I/O last 3 completed shifts:  In: 721.79 [I.V.:581.79; NG/GT:140]  Out: 1335 [Urine:1335]      Labs:   Recent Labs   Lab 10/19/20  0511 10/17/20  2151 10/17/20  2151 10/17/20  0609   ALBUMIN  --   --   --  3.2*   HGB 12.5   < >  --  13.0   WBC 7.6   < >  --  5.4   A1C  --   --  6.0*  --     < > = values in this interval not displayed.         Ayden Risk Assessment:   Sensory Perception: 3-->slightly limited  Moisture: 4-->rarely moist  Activity: 1-->bedfast  Mobility: 2-->very limited  Nutrition: 1-->very poor  Friction and " Shear: 1-->problem  Ayden Score: 12      WOC Interventions:       Patient's chart evaluated    Focus of today's visit: colostomy assessment    Pouch changed with pt own supplies re-applied    Supplies: pt own supplies @ bedside    Delisa Fagan RN, CWOCN

## 2020-10-20 NOTE — PROGRESS NOTES
Pt to transfer to [facility] today. Report called to KASEY Jaime. Pts daughter, Samia, updated of pts transfer. Leia Bundy RN 10/20/20 5:02 PM

## 2020-10-20 NOTE — CONSULTS
[facility]  Consult Note - Hospitalist Service     Date of Admission:  10/17/2020  Consult Requested by: Intensivist  Reason for Consult: Assume care, transfer out of ICU    Assessment & Plan   Bella Saucedo is a 74 year old female with PMH significant for COPD on 2 L chronic home O2, insulin-dependent type 2 diabetes, colon cancer status post left hemicolectomy and colostomy, hypertension, heart failure with preserved EF who was directly admitted to the ICU from [facility] on 10/17/2020 with acute exacerbation of COPD and concerns for community-acquired pneumonia requiring intubation. COVID-19 ruled out. Extubated on 10/19/2020, hospitalist service consulted 10/20/2020 to transfer out of ICU and assume care.    Acute hypoxemic hypercapnic respiratory failure requiring intubation 10/17-10/19  Possible community-acquired pneumonia   Acute exacerbation of severe COPD in the setting of chronic 2 L home O2  Ruled out COVID-19 - Negative 10/17  Presented with hypoxic hypercapnic respiratory failure. ABG on admit c/w severe non-compensated respiratory acidosis with pCO2 >90 and failed attempt at BiPAP therefore she was intubated 10/17-10/19. No infiltrates on CXR. Started as COVID-19 PUI but testing negative and considered low risk therefore not retested. Influenza/RSV Negative. Sputum cultures Neg. Legionella urine Neg. Started on empiric antibiotics for possible CAP. No leukocytosis. Continues to be hypercapnic, likely chronic in setting of COPD. Appears to have a relatively recent smoking hx.  - [facility]  - Wean O2 as above, currently on 4L NC  - Continue ceftriaxone and azithromycin (received x3 days, continuing for antiinflammatory properties) to complete course, on day 4/5 of Rocephin, increase ceftriaxone to 2gm/d dosing   - Telemetry  - Monitor closely on continuous pulse ox  - Scheduled and PRN duo nebs  - Continue Solu-Medrol 62.5mg Q 12 hrs, taper as able  - Blood cultures NGTD  -  Labs in AM    Encephalopathy, Delirium of critical illness, improving  Improving, pt AAO x2-3.   - Started on Seroquel by ICU  - PT/OT tomorrow  - Delirium precautions  - Likely worsened by steroids  - Reorient frequently    Coronary artery disease, Hx NSTEMI  Hypertension  Heart failure with preserved ejection fraction   on admit. No peripheral edema.   TTE 2017: LVEF 65-70% with increased RV wall thickness.   - Resume PTA furosemide/KCl given hx CHF (PTA furosemide 40mg 5x/wk), has been hold since admission given critical illness  - Telemetry  - ASA 81mg/d, atorvastatin 20mg/d  - 2gm Na+ diet  - Holding PTA antihypertensives though unclear which meds these were, VSS, I do not seen any antihypertensives on med rec or in are everywhere but patient things she may have been on one. No acute need for BP meds currently.    Chronic venous stasis  Very dry unkempt LE. Erythematous and lichenification but does not appear acutely cellulitic. No fevers/chills.  - Lotion to keep moist  - General wound care  - No acute concerns for cellulitis    Insulin-dependent type 2 diabetes mellitus  HbA1c 6%.   PTA Regimen: Lantus 1 unit Q HS though ? Accuracy. Pt states PTA 5 units.  - Hypoglycemia protocol, preprandial and HS fingerstick checks  - Sliding scale insulin Low  - No long term insulin necessary currently  - Still receiving IV steroids    Recent Labs   Lab 10/20/20  1001 10/20/20  0501 10/20/20  0353 10/19/20  2331 10/19/20  2007 10/19/20  1822 10/19/20  1305 10/19/20  0511 10/19/20  0511 10/18/20  0543 10/18/20  0543 10/17/20  0609 10/17/20  0609   GLC  --  108*  --   --   --   --   --   --  104*  --  119*  --  157*   *  --  104* 132* 123* 128* 154*   < >  --    < >  --    < >  --     < > = values in this interval not displayed.       Hx stabe IIB colon cancer 2016 s/p left hemicolectomy and colostomy  - WOCN on board for ostomy cares    Other Noted History per chart review  Anxiety: Continue PTA citalopram  40mg/d, quetiapine 50mg Q HS  GERD: Continue PPI  Lung nodule  Rt MCA aneurysm s/p coil embolization 2011  PCOM aneurysm rupture s/p coil embolization 2010  Subarachnoid hemorrhage    Diet: Regular Diet Adult    DVT Prophylaxis: Heparin SQ  Cornejo Catheter: in place, indication: Strict 1-2 Hour I&O  Code Status: Full Code      The patient's care was discussed with the Attending Physician, Dr. Kaminski, Bedside Nurse and Patient.    Geno Arnold ([place]), PA-C  Hospitalist [facility]  [facility]    ______________________________________________________________________    Chief Complaint   Shortness of breath    History is obtained from the patient, electronic health record and Intensivist.    History of Present Illness   Bella Saucedo is a 74 year old female with PMH significant for COPD on 2 L chronic home O2, insulin-dependent type 2 diabetes, colon cancer status post left hemicolectomy and colostomy, hypertension, heart failure with preserved EF who was directly admitted to the ICU from [facility] on 10/17/2020 with acute exacerbation of COPD and concerns for community-acquired pneumonia requiring intubation. COVID-19 ruled out x1. Influenza, RSV, legionella Neg. BCx NGTD. Improved with intubation, duonebs, and started on abx for CAP therapy including azithromycin and Rocephin. Extubated on 10/19/2020, hospitalist service consulted 10/20/2020 to transfer out of ICU and assume care.    Review of Systems   The 10 point Review of Systems is negative other than noted in the HPI or here.     Past Medical History    I have reviewed this patient's medical history and updated it with pertinent information if needed.   No past medical history on file.    Past Surgical History   I have reviewed this patient's surgical history and updated it with pertinent information if needed.  No past surgical history on file.    Social History   I have reviewed this patient's social history and updated it with  pertinent information if needed.  Social History     Tobacco Use     Smoking status: Not on file   Substance Use Topics     Alcohol use: Not on file     Drug use: Not on file       Family History   I have reviewed this patient's family history and updated it with pertinent information if needed.   No family history on file.    Medications   I have reviewed this patient's current medications  Medications Prior to Admission   Medication Sig Dispense Refill Last Dose     acetaminophen (TYLENOL) 325 MG tablet Take 325 mg by mouth every 4 hours as needed for pain     at PRN     aspirin 81 MG EC tablet Take 81 mg by mouth daily   10/16/2020 at AM     atorvastatin (LIPITOR) 20 MG tablet Take 20 mg by mouth daily    10/15/2020 at PM     citalopram (CELEXA) 40 MG tablet Take 40 mg by mouth daily    10/16/2020 at AM     furosemide (LASIX) 40 MG tablet Take 40 mg by mouth daily Every Mon, Tues, Wed, Fri, Sat   10/16/2020 at AM     insulin glargine (LANTUS) 100 UNIT/ML injection Inject 1 Units Subcutaneous At Bedtime    10/16/2020 at PM     magnesium 250 MG tablet Take 1 tablet by mouth 2 times daily    10/16/2020 at AM     pantoprazole (PROTONIX) 40 MG EC tablet Take 40 mg by mouth daily   10/16/2020 at AM     potassium chloride ER (KLOR-CON M) 10 MEQ CR tablet Take 10 mEq by mouth daily Every Mon, Tues, Wed, Fri, Sat   10/16/2020 at AM     blood glucose (NO BRAND SPECIFIED) lancets standard Use to test blood sugar 4 times daily or as directed. 1 Box 3      blood glucose monitoring (NO BRAND SPECIFIED) meter device kit Use to test blood sugar 4 times daily or as directed. 1 kit 0      blood glucose monitoring (NO BRAND SPECIFIED) test strip Use to test blood sugars 4 times daily or as directed 100 strip 3      insulin pen needle 31G X 6 MM Use 4 times daily or as directed. 100 each 1        Allergies   Allergies   Allergen Reactions     Nitroglycerin Anxiety and Other (See Comments)     Mild headache, severe anxiety        Physical Exam   Vital Signs: Temp: 98  F (36.7  C) Temp src: Oral BP: 133/62 Pulse: 105   Resp: 16 SpO2: 98 % O2 Device: Nasal cannula Oxygen Delivery: 4 LPM  Weight: 134 lbs .63 oz    General: Awake, alert, older woman who appears stated age. Looks comfortable sitting up in bed. No acute distress.  HEENT: Normocephalic, atraumatic. Extraocular movements intact.   Respiratory: Clear to auscultation bilaterally, no rales, wheezing, or rhonchi. 4L NC. No increased work of breathing or tachypnea.   Cardiovascular: Regular rate and rhythm, +S1 and S2, no murmur auscultated. No peripheral edema.   Gastrointestinal: Soft, non-tender, non-distended. Bowel sounds present.  Skin: Warm, dry. Dorsalis pedis pulses palpable bilaterally. Very dry unkempt LE. Erythematous and lichenification but does not appear acutely cellulitic  Musculoskeletal: No joint swelling, erythema or tenderness. Moves all extremities equally.  Neurologic: AAO x3. Cranial nerves 2-12 grossly intact, normal strength and sensation.  Psychiatric: Appropriate mood and affect. No obvious anxiety or depression.    Data   Results for orders placed or performed during the hospital encounter of 10/17/20 (from the past 24 hour(s))   Glucose by meter   Result Value Ref Range    Glucose 128 (H) 70 - 99 mg/dL   Glucose by meter   Result Value Ref Range    Glucose 123 (H) 70 - 99 mg/dL   Glucose by meter   Result Value Ref Range    Glucose 132 (H) 70 - 99 mg/dL   Glucose by meter   Result Value Ref Range    Glucose 104 (H) 70 - 99 mg/dL   Basic metabolic panel   Result Value Ref Range    Sodium 143 133 - 144 mmol/L    Potassium 4.3 3.4 - 5.3 mmol/L    Chloride 105 94 - 109 mmol/L    Carbon Dioxide 37 (H) 20 - 32 mmol/L    Anion Gap 1 (L) 3 - 14 mmol/L    Glucose 108 (H) 70 - 99 mg/dL    Urea Nitrogen 24 7 - 30 mg/dL    Creatinine 0.52 0.52 - 1.04 mg/dL    GFR Estimate >90 >60 mL/min/[1.73_m2]    GFR Estimate If Black >90 >60 mL/min/[1.73_m2]    Calcium 8.3 (L)  8.5 - 10.1 mg/dL   CBC (AM Draw)   Result Value Ref Range    WBC 7.7 4.0 - 11.0 10e9/L    RBC Count 3.99 3.8 - 5.2 10e12/L    Hemoglobin 12.5 11.7 - 15.7 g/dL    Hematocrit 40.2 35.0 - 47.0 %     (H) 78 - 100 fl    MCH 31.3 26.5 - 33.0 pg    MCHC 31.1 (L) 31.5 - 36.5 g/dL    RDW 14.6 10.0 - 15.0 %    Platelet Count 210 150 - 450 10e9/L   Glucose by meter   Result Value Ref Range    Glucose 100 (H) 70 - 99 mg/dL

## 2020-10-20 NOTE — PROGRESS NOTES
Critical Care Progress Note      10/20/2020    Name: Bella Saucedo MRN#: [id number]   Age: 74 year old YOB: 1945     [facility]  [facility]    MV DAY #3             Problem List:   Active Problems:    Respiratory failure (H)  Acute hypoxemic/hypercapnic respiratory failure requiring mechanical ventilation  Copd exacerbation  encephalopathy         Summary/Hospital Course:     74F h/o copd on 2L home o2, dm, htn, colon ca s/p hemicolectomy now presented with respiratory failure in setting of copd exacerbation requiring intubation and mechanical ventilation. Extubated on 10/19 to bipap without difficulty.  Has now weaned to 4L nc.      Assessment and plan :     Bella Saucedo IS a 74 year old female admitted on 10/17/2020 for acute hypoxemic/hypercapnic respiratory failure, vent dependent.   I have personally reviewed the daily labs, imaging studies, cultures and discussed the case with referring physician and consulting physicians.     My assessment and plan by system for this patient is as follows:    Neurology/Psychiatry:   1. Delirium:  In setting of acute illness.  Continue seroqeul for now.    Cardiovascular:   1.H/o hypertension:  Holding antihypertensives for now.  Bps controlled with this.    Pulmonary/Ventilator Management:   1. Acute hypoxemic respiratory failure:  Improved.  Extubated yesterday to bipap.  Respiratory status improving.    2.  Copd exacerbation:  Continue steroids (methylpred 60 bid) x5 days--will need prednisone taper following this.  Continue dounebs.  Sputum cx as well as covid, flu and rsv testing all negative.  Urine legionella negative.      GI and Nutrition :   1. Advanced to regular diet today.  2.  Resume home protonix    Renal/Fluids/Electrolytes:   1. Creatinine ok 0.52  2. Elevated serum bicarb:  37 -- cosistent with known chronic co2 retention    Infectious Disease:   1. Cultures and legionella negative.  Ctx emipirically for one more day (5d course).   Continue azithromycin x7d.    Endocrine:   1. H/o DM2:  Continue ss aspart    Hematology/Oncology:   1. Wbc 7.7 ok  2. hgb 12.5 ok  3. pltlts 210 ok    ICU Prophylaxis:   1. DVT: Hep Subq/ mechanical  2. Feeding - regular diet today  3. Family Update:  and daugther by phone  4. Disposition - may leave icu today.           Interim History:     No events overnight.  Continues to improve today--now breathing comfortably on 4L nc.  Denies pain this morning.  Says her breathing is improving.  Denies dizzyness, denies lightheadedness.         Key Medications:       azithromycin  250 mg Intravenous Q24H     cefTRIAXone  1 g Intravenous Q24H     famotidine  20 mg Oral or G tube BID     heparin ANTICOAGULANT  5,000 Units Subcutaneous Q12H     insulin aspart  1-4 Units Subcutaneous Q4H     ipratropium - albuterol 0.5 mg/2.5 mg/3 mL  3 mL Nebulization Q4H     methylPREDNISolone  62.5 mg Intravenous Q12H     QUEtiapine  50 mg Oral At Bedtime     senna-docusate  1 tablet Oral At Bedtime       propofol (DIPRIVAN) infusion Stopped (10/19/20 1200)     sodium chloride Stopped (10/19/20 1100)               Physical Examination:   Temp:  [97.6  F (36.4  C)-98.8  F (37.1  C)] 98  F (36.7  C)  Pulse:  [] 105  Resp:  [8-21] 16  BP: (115-148)/(52-81) 133/62  FiO2 (%):  [30 %-50 %] 50 %  SpO2:  [88 %-100 %] 98 %      Intake/Output Summary (Last 24 hours) at 10/20/2020 1259  Last data filed at 10/20/2020 0600  Gross per 24 hour   Intake --   Output 950 ml   Net -950 ml         Wt Readings from Last 4 Encounters:   10/20/20 60.8 kg (134 lb 0.6 oz)   03/21/17 51.7 kg (114 lb)   02/28/17 50.3 kg (111 lb)   02/21/17 47.9 kg (105 lb 9.6 oz)     BP - Mean:  [] 86  Ventilation Mode: CMV/AC  (Continuous Mandatory Ventilation/ Assist Control)  FiO2 (%): 50 %  Rate Set (breaths/minute): 14 breaths/min  Tidal Volume Set (mL): 450 mL  PEEP (cm H2O): 7 cmH2O  Oxygen Concentration (%): 40 %  Resp: 16    Recent Labs   Lab 10/17/20  0812  10/17/20  0609   PH 7.39  --    PCO2 66*  --    PO2 78*  --    HCO3 40*  --    O2PER  --  40 percent        GEN: no acute distress, pleasant   HEENT: head ncat, sclera anicteric, OP patent, trachea midline   PULM: unlabored clear anteriorly, mild wheeze  CV/COR: RRR S1S2 no gallop,  No rub, no murmur  ABD: soft nontender, hypoactive bowel sounds, no mass  EXT:  No Edema;  Warm x4  NEURO: awake, alert, conversant.  Normal str/sens x4.  Neck:  supple  SKIN: no obvious rash  LINES: clean, dry intact         Data:   All data and imaging reviewed     ROUTINE ICU LABS (Last four results)  CMP  Recent Labs   Lab 10/20/20  0501 10/19/20  0511 10/18/20  0543 10/17/20  0609    139 139 135   POTASSIUM 4.3 4.1 4.6 4.7   CHLORIDE 105 102 99 97   CO2 37* 36* 38* 38*   ANIONGAP 1* 1* 2* <1*   * 104* 119* 157*   BUN 24 23 22 22   CR 0.52 0.58 0.66 0.65   GFRESTIMATED >90 >90 87 87   GFRESTBLACK >90 >90 >90 >90   MARLIU 8.3* 8.6 8.3* 8.2*   MAG  --   --   --  2.9*   PHOS  --  3.8 3.6 2.1*   PROTTOTAL  --   --   --  6.6*   ALBUMIN  --   --   --  3.2*   BILITOTAL  --   --   --  0.9   ALKPHOS  --   --   --  124   AST  --   --   --  28   ALT  --   --   --  44     CBC  Recent Labs   Lab 10/20/20  0501 10/19/20  0511 10/18/20  0543 10/17/20  0609   WBC 7.7 7.6 7.6 5.4   RBC 3.99 4.04 4.10 4.22   HGB 12.5 12.5 12.7 13.0   HCT 40.2 40.2 40.4 43.2   * 100 99 102*   MCH 31.3 30.9 31.0 30.8   MCHC 31.1* 31.1* 31.4* 30.1*   RDW 14.6 14.4 13.9 13.3    222 226 202     INRNo lab results found in last 7 days.  Arterial Blood Gas  Recent Labs   Lab 10/17/20  0841 10/17/20  0609   PH 7.39  --    PCO2 66*  --    PO2 78*  --    HCO3 40*  --    O2PER  --  40 percent        All cultures:  Recent Labs   Lab 10/17/20  0840 10/17/20  0608   CULT No growth No growth after 3 days  No growth after 3 days     No results found for this or any previous visit (from the past 24 hour(s)).    Labs personally reviewed/interpreted:  See  a/p

## 2020-10-20 NOTE — PLAN OF CARE
The patient is alert. She is pleasantly confused to time and situation.   Patient weaned off BiPap to 4L nasal cannula. Lungs diminished.   Sinus rhythm/sinus tachycardia.   Kept the patient NPO overnight. Did oral cares with mouth swabs frequently.   Cornejo in place. Cornejo cares done with bath. Adequate output.   Sudhir Olivarez RN 7:26 AM 10/20/20

## 2020-10-21 NOTE — PROGRESS NOTES
10/21/20 1249   General Information   Onset of Illness/Injury or Date of Surgery 10/17/20   Referring Physician Geno Arnold PA-C   Patient/Family Therapy Goal Statement (SLP) Patient is hungry.   Pertinent History of Current Problem Bella Saucedo is a 74 year old female with PMH significant for COPD on 2 L chronic home O2, insulin-dependent type 2 diabetes, colon cancer status post left hemicolectomy and colostomy, hypertension, heart failure with preserved EF who was directly admitted to the ICU from [facility] on 10/17/2020 with acute exacerbation of COPD and concerns for community-acquired pneumonia requiring intubation. COVID-19 ruled out. Extubated on 10/19/2020, hospitalist service consulted 10/20/2020 to transfer out of ICU and assume care.   General Observations Pleasant and SOB when speaking.    Past History of Dysphagia No documented history.    Oral Motor   Oral Musculature generally intact   Structural Abnormalities none present   Dentition (Oral Motor)   Dentition (Oral Motor) edentulous   Facial Symmetry (Oral Motor)   Facial Symmetry (Oral Motor) WNL   Lip Function (Oral Motor)   Lip Range of Motion (Oral Motor) WNL   Tongue Function (Oral Motor)   Tongue ROM (Oral Motor) elevation is impaired;lateralization is impaired;retraction is impaired;protrusion is impaired   Depression, Tongue ROM Impairment (Oral Motor) minimal impairment   Elevation, Tongue ROM Impairment (Oral Motor) minimal impairment   Protrusion, Tongue ROM Impairment (Oral Motor) minimal impairment   Retraction, Tongue ROM Impairment (Oral Motor) minimal impairment   Lateralization, Tongue ROM Impairment (Oral Motor) minimal impairment   Cough/Swallow/Gag Reflex (Oral Motor)   Soft Palate/Velum (Oral Motor) WNL   Volitional Throat Clear/Cough (Oral Motor) reduced strength   Volitional Swallow (Oral Motor) mildly delayed   General Swallowing Observations   Current Diet/Method of Nutritional Intake (General Swallowing  Observations, NIS) NPO   Respiratory Support (General Swallowing Observations) oxygen mask   Swallowing Evaluation Clinical swallow evaluation   Clinical Swallow Evaluation   Feeding Assistance set up only required   Clinical Swallow Eval: Thin Liquid Texture Trial   Mode of Presentation, Thin Liquids cup;spoon;self-fed;fed by clinician   Volume of Liquid or Food Presented Water   Oral Phase of Swallow Premature pharyngeal entry   Pharyngeal Phase of Swallow impaired;coughing/choking;reduction in laryngeal movement;repeated swallows   Diagnostic Statement Coughing after having a pureed texure.    Clinical Swallow Evaluation: Nectar-Thick Liquid Texture Trial   Mode of Presentation, Nectar cup;spoon;self-fed;fed by clinician   Volume of Nectar Presented 4 swallows of juice   Oral Phase, [place] WFL   Pharyngeal Phase, Nectar impaired;reduction in laryngeal movement;repeated swallows   Diagnostic Statement No overt Sx of aspiration.   Clinical Swallow Evaluation: Puree Solid Texture Trial   Mode of Presentation, Puree spoon;self-fed   Volume of Puree Presented 2 oz of pudding   Oral Phase, Puree Premature pharyngeal entry   Pharyngeal Phase, Puree impaired;reduction in laryngeal movement;repeated swallows   Diagnostic Statement Suspect increased pharyngeal residue.   Swallowing Recommendations   Diet Consistency Recommendations nectar-thick liquids;full liquid diet   Supervision Level for Intake distant supervision needed   Mode of Delivery Recommendations bolus size, small;food moistened;no straws   Swallowing Maneuver Recommendations alternate food and liquid intake;double dry swallow   Monitoring/Assistance Required (Eating/Swallowing) monitor for cough or change in vocal quality with intake;stop eating activities when fatigue is present   Recommended Feeding/Eating Techniques (Swallow Eval) maintain upright sitting position for eating;maintain upright posture during/after eating for 30 minutes;set-up and prepare  tray   Medication Administration Recommendations, Swallowing (SLP) Crush and or given whole in apple sauce.   Instrumental Assessment Recommendations VFSS (videofluroscopic swallowing study)  (If respiratory status does not improve or increased difficul)   Comment, Swallowing Recommendations Patient presents with mild to moderate oral and pharyngeal dysphagia at bedside secondary to post intubation and COPD. She demonstrated premature entry of thin liquids via the cup with delayed coughing. Can not rule out penetration/silent aspiration. Improved timing and tolerance of nectar thick liquids. Increased difficulty with coordinating breathing and swallowing. Suspect increased pharyngeal retention with pudding due to multiple swallows. Monitor patient closely with PO intake and if coughing hold diet.    General Therapy Interventions   Planned Therapy Interventions Dysphagia Treatment   Dysphagia treatment Modified diet education;Instruction of safe swallow strategies   SLP Therapy Assessment/Plan   Criteria for Skilled Therapeutic Interventions Met (SLP Eval) yes   SLP Diagnosis Mild to moderate dysphagia   Rehab Potential (SLP Eval) good, to achieve stated therapy goals   Therapy Frequency (SLP Eval) 5 times/wk   Predicted Duration of Therapy Intervention (SLP Eval) 1 week   Therapy Plan Review/Discharge Plan (SLP)   Therapy Plan Review (SLP) evaluation/treatment results reviewed;care plan/treatment goals reviewed;risks/benefits reviewed;participants voiced agreement with care plan;participants included;patient   Demonstrates Need for Referral to Another Service (SLP) physical therapist   Recommendation for Referral Discussed with MD (SLP) To help determine safe discharge.    SLP Discharge Planning    SLP Discharge Recommendation (DC Rec) Transitional Care Facility   SLP Rationale for DC Rec Patient will need continued ST needs for dysphagia management.   SLP Brief overview of current status  Recommend: 1. Full liquids  nectar thick 2. Upright, no straws, small bites/sips, double swallow and alternate liquids/solids at a slow pace to allow time to breathe.     Total Evaluation Time   Total Evaluation Time (Minutes) 18

## 2020-10-21 NOTE — PLAN OF CARE
Summary:     DATE & TIME: 10/21/2020 7543-4379  Cognitive Concerns/ Orientation : A&Ox2; disoriented to situation and place. Forgetful.    BEHAVIOR & AGGRESSION TOOL COLOR: Green  CIWA SCORE: NA  ABNL VS/O2: Sp02 maintained 90-93% on 4L transitioned to NC today, tolerating well was able to maintain good sats on 3L via oxymask overnight. Pt needing addittional supplemental oxygen with turning and repositioning (4-5L). Other VSS on RA. Remains afebrile.   MOBILITY: Lift, strong assist of 2 to BSC. Pt's respiratory status unsteady, encouraged rest periods. Desats with any exertion   PAIN MANAGMENT: Declines any pain.   DIET: Advanced to full liquid with nectar after swallow evaluation today, tolerating well  BOWEL/BLADDER: Cornejo pulled yesterday, urinating. Purewick initiated d/t weakness and respiratory status, periwick changed, incontinent/leaking   ABNL LAB/BG: q4 BG checks; 90 93 and 100 d/t NPO status this AM transitioned to Full liquid today.   DRAIN/DEVICES: discontinued 1 PIV, 1PIV NS TKO; purewick. LLQ colostomy.   TELEMETRY RHYTHM: Sinus dysrythmia  SKIN: Scattered bruising, mepliex to coccyx for prevention, excoriated, patches of dry skin.   TESTS/PROCEDURES: Swallow evaluation completed today, Full liquids.   D/C DAY/GOALS/PLACE: Pending improvement, multiple days.   OTHER IMPORTANT INFO: ICU transfer. BiPAP at bedside, q4 nebs treatments. Coarse lung sounds with expiratory wheezes. WOC following. Nursing will continue to monitor. Takes medication crushed with pudding, refused K due to size and can not crush

## 2020-10-21 NOTE — PLAN OF CARE
Summary:     DATE & TIME: 10/20/2020 8944-3580   Cognitive Concerns/ Orientation : A&Ox2; disoriented to situation and place. Forgetful.    BEHAVIOR & AGGRESSION TOOL COLOR: Green  CIWA SCORE: NA  ABNL VS/O2: Sp02 maintained 90-93% on 4L via oxymask, was able to maintain good sats on 3L via oxymask overnight. Pt needing addittional supplemental oxygen with activity (10-12L). Other VSS on RA. Remains afebrile.   MOBILITY: Lift, strong assist of 2 to BSC. Pt's respiratory status unsteady, encouraged rest periods.  PAIN MANAGMENT: Declines any pain.   DIET: NPO, swallow eval needed as pt failed bedside swallow test.   BOWEL/BLADDER: Cornejo pulled today, urinating. Purewick initiated d/t weakness and respiratory status, good output.   ABNL LAB/BG: q4 BG checks; 112, 124 and 109 d/t NPO status.   DRAIN/DEVICES: 2 PIV SL; purewick. LLQ colostomy.   TELEMETRY RHYTHM: SD with PVC  SKIN: Scattered bruising, mepliex to coccyx for prevention, excoriated, patches of dry skin. Warm, red and josette.   TESTS/PROCEDURES: Swallow evaluation today.   D/C DAY/GOALS/PLACE: Pending improvement, multiple days.   OTHER IMPORTANT INFO: ICU transfer. BiPAP at bedside, q4 nebs treatments. Coarse lung sounds with expiratory wheezes. WOC following. Nursing will continue to monitor.

## 2020-10-21 NOTE — CONSULTS
Care Management Initial Consult    General Information  Assessment completed with:: Patient, Children, Bella and Samia  Type of CM/SW Visit: Initial Assessment  Primary Care Provider verified and updated as needed?: Yes  Readmission Within the Last 30 Days: no previous admission in last 30 days        Advance Care Planning: Advance Care Planning Reviewed: no concerns identified   Communication Assessment  Patient's communication style: spoken language (English or Bilingual)       Cognitive  Cognitive/Neuro/Behavioral: WDL  Level of Consciousness: alert  Arousal Level: opens eyes spontaneously  Orientation: disoriented to, time  Mood/Behavior: calm, cooperative, behavior appropriate to situation  Best Language: 0 - No aphasia  Speech: hoarse    Living Environment:   People in home: spouse     Current living Arrangements: house     stairs at home    Family/Social Support:  Care provided by: spouse/significant other  Provides care for:    Marital Status:   Who is your support system?: , Children  Spouse's Name: Samir  Description of Support System: Supportive  Description of Support System: Supportive  Adequate family and caregiver support        Description of Support System: Supportive  Support Assessment: Adequate family and caregiver support    Current Resources:   Skilled Home Care Services: (NA)  Community Resources:    Equipment currently used at home:    Supplies currently used at home:      Employment:  Employment Status: retired        Financial/Environmental Concerns: No concerns identified          Lifestyle & Psychosocial Needs:   Patient stating she used to smoke up until day of admission and would like to quit. Patient requesting a Nicotine patch at discharge. Daughter voiced concerns regarding patient's spouse smoking in the house.Requesting brochure on Smoking Cessation     Socioeconomic History     Marital status:      Spouse name: Not on file     Number of children: Not on  file     Years of education: Not on file     Highest education level: Not on file          Functional Status:  Prior to admission patient needed assistance: Meal preparation, Laundry/Housekeeping, Transportation   Assesssment of Functional Status: Not at baseline with ADL Functioning    Mental Health Status:  WDL  unkempt      Values/Beliefs:  Spiritual, Cultural Beliefs, Latter-day Practices, Values that affect care: no               Additional Information:  Spoke at length with patients daughter and patient regarding plan of care and discharge plan. Daughter states patient has been getting weaker for the last few weeks . Patient has not seen a physician for over 1 year. Patient has refused to go to the clinic when she was getting weaker the last few weeks. Spouse assisted with meals and mobility.  Daughter helped with medications the last couple days prior to admission. Stated patient had poor appetite the last few weeks due to poor dentition .   Presently patient states she feels better. Noted KELLY. Patient is on 2 lit chronic o2 at home ,requiring 3-5 lit presently. Patient would benefit from  Therapy evaluation to determine disposition. Patient aware that she is not at baseline with activity.     Marcela Spivey RN  Inpatient Care Coordinator  [facility] [place]  #[phone]

## 2020-10-21 NOTE — PLAN OF CARE
DATE & TIME: 10/20/20 3443-3546   Cognitive Concerns/ Orientation : AOx2, forgetful, cooperative and calm   BEHAVIOR & AGGRESSION TOOL COLOR:green  CIWA SCORE:   ABNL VS/O2: at rest 4L face mask, w/activity 10L  MOBILITY: strong transfer with 2 to BSC, very weak  PAIN MANAGMENT: denies   DIET: NPO, failed bedside swallow SPL pending  BOWEL/BLADDER: colostomy, voiding on BSC  ABNL LAB/BG:   DRAIN/DEVICES: PIVX2  TELEMETRY RHYTHM:   SKIN: LEs red/josette with dry patches all over   TESTS/PROCEDURES:   D/C DAY/GOALS/PLACE: pending O2 needs  OTHER IMPORTANT INFO:   MD/RN ROUNDING SIGNED OFF D/E SHIFT:   COMMIT TO SIT DONE AND SIGNED OFF

## 2020-10-21 NOTE — PROGRESS NOTES
[facility]    Medicine Progress Note - Hospitalist Service        Date of Admission:  10/17/2020  4:31 AM    Assessment & Plan:   Bella Saucedo is a 74 year old female with PMH significant for COPD on 2 L chronic home O2, insulin-dependent type 2 diabetes, colon cancer status post left hemicolectomy and colostomy, hypertension, heart failure with preserved EF who was directly admitted to the ICU from [facility] on 10/17/2020 with acute exacerbation of COPD and concerns for community-acquired pneumonia requiring intubation. COVID-19 ruled out. Extubated on 10/19/2020, hospitalist service consulted 10/20/2020 to transfer out of ICU and assume care.     Acute hypoxemic and hypercapnic respiratory failure requiring intubation 10/17-10/19  Possible community-acquired pneumonia   Acute exacerbation of severe COPD in the setting of chronic 2 L home O2  Ruled out COVID-19 - Negative 10/17  Presented with hypoxic hypercapnic respiratory failure. ABG on admit c/w severe non-compensated respiratory acidosis with pCO2 >90 and failed attempt at BiPAP therefore she was intubated 10/17-10/19. No infiltrates on CXR. Started as COVID-19 PUI but testing negative and considered low risk therefore not retested. Influenza/RSV Negative. Sputum cultures Neg. Legionella urine Neg. Started on empiric antibiotics for possible CAP. No leukocytosis. Continues to be hypercapnic, likely chronic in setting of COPD. Appears to have a relatively recent smoking hx.  -Continue ceftriaxone and azithromycin(continuing for antiinflammatory properties). Will stop both ceftriaxone and azithromycin after 7 days  -Telemetry  -Monitor closely on continuous pulse ox  -Scheduled and PRN duo nebs  -Continue Solu-Medrol 62.5mg Q 12 hrs, taper as able  -Respiratory status unchanged compared to yesterday, continues to be on 4 L oxygen, wean as able     Acute encephalopathy, Delirium of critical illness, improving  Improving, pt AAO x2-3.   -Started  on Seroquel by ICU  -Delirium precautions  -Reorient frequently   -Evaluation by therapies today     Coronary artery disease, Hx NSTEMI  Hypertension  Heart failure with preserved ejection fraction   on admit. No peripheral edema.   TTE 2017: LVEF 65-70% with increased RV wall thickness.   -Continue PTA furosemide/KCl given hx CHF (PTA furosemide 40mg 5x/wk), has been hold since admission given critical illness  -Telemetry  -ASA 81mg/d, atorvastatin 20mg/d  -2gm Na+ diet  -Does not appear to be on any antihypertensives at home     Chronic venous stasis  Very dry unkempt LE. Erythematous and lichenification but does not appear acutely cellulitic. No fevers/chills.  -Lotion to keep moist  -General wound care  -No acute concerns for cellulitis     Insulin-dependent type 2 diabetes mellitus  HbA1c 6%.   PTA Regimen: Lantus 1 unit Q HS though ? Accuracy. Pt states PTA 5 units.  -Hypoglycemia protocol, preprandial and HS fingerstick checks  -Sliding scale insulin Low  -No long term insulin necessary currently  -Still receiving IV steroids    Hx stabe IIB colon cancer 2016 s/p left hemicolectomy and colostomy  -WOCN on board for ostomy cares     Other Noted History per chart review  Anxiety: Continue PTA citalopram 40mg/d, quetiapine 50mg Q HS  GERD: Continue PPI  Lung nodule  Rt MCA aneurysm s/p coil embolization 2011  PCOM aneurysm rupture s/p coil embolization 2010  Subarachnoid hemorrhage       Diet: Regular Diet Adult     DVT Prophylaxis: Pneumatic Compression Devices   Cornejo Catheter: not present  Code Status: Full Code     Disposition Plan    Expected discharge: 2 - 3 days, recommended to TBD  Entered: Baltazar Kaminski MD 10/21/2020, 11:14 AM        The patient's care was discussed with the Bedside Nurse and Patient.    Baltazar Kaminski MD  Hospitalist Service  [facility]    ______________________________________________________________________    Interval History   Dyspnea about the same.   Oxygen requirement stable at 4 L however per nursing report desaturates very easily with activity.  Endorses mild intermittent cough.  Afebrile.    Data reviewed today: I reviewed all medications, new labs and imaging results over the last 24 hours. I personally reviewed no images or EKG's today.    Physical Exam   Vital signs:  Temp: 98.8  F (37.1  C) Temp src: Axillary BP: 125/58 Pulse: 78   Resp: 18 SpO2: 90 % O2 Device: Oxymask Oxygen Delivery: 4 LPM   Weight: 62.4 kg (137 lb 9.6 oz)  There is no height or weight on file to calculate BMI.      Wt Readings from Last 2 Encounters:   10/21/20 62.4 kg (137 lb 9.6 oz)   03/21/17 51.7 kg (114 lb)       Gen: AAOX2-3, NAD  HEENT: Supple neck, moist oral mucosa, no pallor  Resp: Decreased air entry bilaterally with scattered wheeze, normal effort of breathing  CVS: RRR, no murmur  Abd/GI: Soft, non-tender. BS- normoactive.   Skin: Warm, dry no rashes  MSK: Chronic dermal thickening b/l LE  Neuro- CN- intact. No focal deficits. Globally weak      Data   Recent Labs   Lab 10/21/20  0851 10/20/20  0501 10/19/20  0511 10/17/20  0609 10/17/20  0609   WBC 6.0 7.7 7.6   < > 5.4   HGB 13.0 12.5 12.5   < > 13.0    101* 100   < > 102*    210 222   < > 202    143 139   < > 135   POTASSIUM 3.9 4.3 4.1   < > 4.7   CHLORIDE 101 105 102   < > 97   CO2 37* 37* 36*   < > 38*   BUN 25 24 23   < > 22   CR 0.53 0.52 0.58   < > 0.65   ANIONGAP 4 1* 1*   < > <1*   MARILU 8.5 8.3* 8.6   < > 8.2*   GLC 99 108* 104*   < > 157*   ALBUMIN  --   --   --   --  3.2*   PROTTOTAL  --   --   --   --  6.6*   BILITOTAL  --   --   --   --  0.9   ALKPHOS  --   --   --   --  124   ALT  --   --   --   --  44   AST  --   --   --   --  28    < > = values in this interval not displayed.       No results found for this or any previous visit (from the past 24 hour(s)).  Medications     sodium chloride 10 mL/hr at 10/21/20 0553       aspirin  81 mg Oral Daily     atorvastatin  20 mg Oral Daily      azithromycin  250 mg Intravenous Q24H     citalopram  40 mg Oral Daily     furosemide  40 mg Oral Once per day on Mon Tue Wed Fri Sat     heparin ANTICOAGULANT  5,000 Units Subcutaneous Q12H     insulin aspart  1-4 Units Subcutaneous Q4H     ipratropium - albuterol 0.5 mg/2.5 mg/3 mL  3 mL Nebulization Q4H     methylPREDNISolone  62.5 mg Intravenous Q12H     pantoprazole  40 mg Oral Daily     potassium chloride ER  10 mEq Oral Once per day on Mon Tue Wed Fri Sat     QUEtiapine  50 mg Oral At Bedtime     senna-docusate  1 tablet Oral At Bedtime

## 2020-10-22 NOTE — PROGRESS NOTES
10/22/20 1123   Quick Adds   Type of Visit Initial PT Evaluation   Living Environment   People in home spouse   Current Living Arrangements house   Home Accessibility stairs within home   Number of Stairs, Main Entrance none   Number of Stairs, Within Home, Primary other (see comments)  (22)   Stair Railings, Within Home, Primary railing on right side (ascending)   Transportation Anticipated family or friend will provide   Living Environment Comments pt reports stays upstairs all the time in a room that has a bathroom, 22 stairs to her room with bilateral railings,  assists with meals, pt walks in her room without an AD.    Self-Care   Usual Activity Tolerance fair   Current Activity Tolerance poor   Regular Exercise No   Equipment Currently Used at Home walker, rolling   Activity/Exercise/Self-Care Comment pt reports only used a walker after she went up the stairs in her home, but typically does not use an AD    Disability/Function   Hearing Difficulty or Deaf no   Wear Glasses or Blind   (reports should wear glasses but does not wear)   Walking or Climbing Stairs Difficulty yes   Doing Errands Independently Difficulty (such as shopping) yes   Errands Management  manages   Change in Functional Status Since Onset of Current Illness/Injury yes   General Information   Onset of Illness/Injury or Date of Surgery 10/17/20   Referring Physician Baltazar Kaminski MD   Patient/Family Therapy Goals Statement (PT) to get stronger and be able to walk again   Pertinent History of Current Problem (include personal factors and/or comorbidities that impact the POC) 74 year old female with PMH significant for COPD on 2 L chronic home O2, insulin-dependent type 2 diabetes, colon cancer status post left hemicolectomy and colostomy, hypertension, heart failure with preserved EF who was directly admitted to the ICU from [facility] on 10/17/2020 with acute exacerbation of COPD and concerns for community-acquired pneumonia  requiring intubation. COVID-19 ruled out. Extubated on 10/19/2020   General Observations pt resting comfortably, 4.5 L O2, S{O2=96%, colostomy bag   Cognition   Orientation Status (Cognition) oriented x 4   Pain Assessment   Patient Currently in Pain No   Integumentary/Edema   Integumentary/Edema Comments edema noted bilateral arms   Posture    Posture Comments flexed forward trunk and rounded shoulders   Range of Motion (ROM)   ROM Comment limited end range bilateral shoulder flex/abd to near 160deg-appears chronic, otherwise ROM appears grossly WFL   Strength   Strength Comments grossly 4-/5 to 4/5 bilateral UE/LEs, not focal weakness noted, weakness noted with functional movements and transfers   Bed Mobility   Comment (Bed Mobility) SBA with supine to sit with max elevated HOB and railing   Transfers   Transfer Safety Comments sit to stand with min A of 2 and FWW   Gait/Stairs (Locomotion)   [place] Level (Gait) minimum assist (75% patient effort);2 person assist   Assistive Device (Gait) walker, front-wheeled   Distance in Feet (Required for LE Total Joints) 2   Pattern (Gait) 2-point   Deviations/Abnormal Patterns (Gait) joyce decreased;stride length decreased;weight shifting decreased   Comment (Gait/Stairs) flexed forward trunk, only able to tolerate a few steps to the chair due to weakness, SOB and poor activity tolerance.    Balance   Balance Comments IND with sitting balance, requires bilateral UE support and min A with standing balance   Sensory Examination   Sensory Perception Comments denies numbness and tingling   Clinical Impression   Criteria for Skilled Therapeutic Intervention yes, treatment indicated   PT Diagnosis (PT) impaired gait   Influenced by the following impairments impaired activity tolerance, SOB, impaired strength, impaired balance   Functional limitations due to impairments impaired IND with functional mobility    Clinical Presentation Evolving/Changing   Clinical Presentation  "Rationale based on clinical judgement and resp status   Clinical Decision Making (Complexity) moderate complexity   Therapy Frequency (PT) Daily   Predicted Duration of Therapy Intervention (days/wks) 1 week   Planned Therapy Interventions (PT) balance training;bed mobility training;gait training;home exercise program;patient/family education;postural re-education;stair training;strengthening;stretching;transfer training   Anticipated Equipment Needs at Discharge (PT)   (tbd)   Risk & Benefits of therapy have been explained evaluation/treatment results reviewed;care plan/treatment goals reviewed;risks/benefits reviewed;participants voiced agreement with care plan;participants included;patient   PT Discharge Planning    PT Discharge Recommendation (DC Rec) Transitional Care Facility   PT Rationale for DC Rec pt significantly below her functional baseline, unable to even tolerate ambulation at this time due to SOB and fatigue, pt has 22 stairs up to her bedroom/bathroom at home.    PT Brief overview of current status  SPO2 at rest >90% on 4.5 L NC, desat to 86% with bed mobility and transfers, able to recover with prolonged rest breaks and PLB   [facility] AM-PAC TM \"6 Clicks\"   2016, Trustees of [facility], under license to [employer].  All rights reserved.   6 Clicks Short Forms Basic Mobility Inpatient Short Form   [facility] AM-PAC  \"6 Clicks\" V.2 Basic Mobility Inpatient Short Form   1. Turning from your back to your side while in a flat bed without using bedrails? 3 - A Little   2. Moving from lying on your back to sitting on the side of a flat bed without using bedrails? 3 - A Little   3. Moving to and from a bed to a chair (including a wheelchair)? 2 - A Lot   4. Standing up from a chair using your arms (e.g., wheelchair, or bedside chair)? 3 - A Little   5. To walk in hospital room? 2 - A Lot   6. Climbing 3-5 steps with a railing? 1 - Total   Basic Mobility Raw Score (Score out of " 24.Lower scores equate to lower levels of function) 14   Total Evaluation Time   Total Evaluation Time (Minutes) 10

## 2020-10-22 NOTE — PLAN OF CARE
Summary:     DATE & TIME: 10/21/2020. 1491-0348  Cognitive Concerns/ Orientation : A& disoriented to time. Forgetful.    BEHAVIOR & AGGRESSION TOOL COLOR: Green  CIWA SCORE: NA  ABNL VS/O2: Sp02 maintained 90-93% on 5L transitioned to NC. supplemental oxygen with turning and repositioning (4-5L). Other VSS . Infrequent unproductive cough. Lung sound diminished.Remains afebrile.   MOBILITY: Lift, strong assist of 2 to BSC. Turned and repositioned q 2 hrs. Desats with any exertion   PAIN MANAGMENT: Denies  DIET: Advanced to full liquid nectar thick.  BOWEL/BLADDER: incontinent of urine. Purewick in place. No BM.   ABNL LAB/BG: , 130  DRAIN/DEVICES:  1PIV NS TKO; purewick. LLQ colostomy.   TELEMETRY RHYTHM: NSR  SKIN: Scattered bruising, mepliex to coccyx for prevention CDI, patches of dry skin.   TESTS/PROCEDURES: None   D/C DAY/GOALS/PLACE: Pending.  OTHER IMPORTANT INFO:  q4 nebs treatment. WOC following. Care management /social work consult.

## 2020-10-22 NOTE — PROGRESS NOTES
[facility]    Medicine Progress Note - Hospitalist Service        Date of Admission:  10/17/2020  4:31 AM    Assessment & Plan:   Bella Saucedo is a 74 year old female with PMH significant for COPD on 2 L chronic home O2, insulin-dependent type 2 diabetes, colon cancer status post left hemicolectomy and colostomy, hypertension, heart failure with preserved EF who was directly admitted to the ICU from [facility] on 10/17/2020 with acute exacerbation of COPD and concerns for community-acquired pneumonia requiring intubation. COVID-19 ruled out. Extubated on 10/19/2020, hospitalist service consulted 10/20/2020 to transfer out of ICU and assume care.     Acute hypoxemic and hypercapnic respiratory failure requiring intubation 10/17-10/19  Possible community-acquired pneumonia   Acute exacerbation of severe COPD in the setting of chronic 2 L home O2  Ruled out COVID-19 - Negative 10/17  Presented with hypoxic hypercapnic respiratory failure. ABG on admit c/w severe non-compensated respiratory acidosis with pCO2 >90 and failed attempt at BiPAP therefore she was intubated 10/17-10/19. No infiltrates on CXR. Started as COVID-19 PUI but testing negative and considered low risk therefore not retested. Influenza/RSV Negative. Sputum cultures Neg. Legionella urine Neg. Started on empiric antibiotics for possible CAP. No leukocytosis. Continues to be hypercapnic, likely chronic in setting of COPD. Appears to have a relatively recent smoking hx.  -Continue ceftriaxone and azithromycin(continuing for antiinflammatory properties). Will stop both ceftriaxone and azithromycin after 10/23(will complete 7 days total)  -Scheduled and PRN duo nebs  -No significant changes in respiratory status in the last 48 hours, CT chest done today without PE  -Continue Solu-Medrol 62.5mg Q 12 hrs, pending clinical course switched to oral prednisone starting 10/23  -Anticipate slow improvement.    Acute encephalopathy, Delirium of  critical illness, improving  Improving, pt AAO x2-3.   -Started on Seroquel by ICU  -Delirium precautions  -Reorient frequently   -PT, OT following     Coronary artery disease, Hx NSTEMI  Hypertension  Heart failure with preserved ejection fraction   on admit. No peripheral edema.   TTE 2017: LVEF 65-70% with increased RV wall thickness.   -Continue PTA furosemide/KCl given hx CHF (PTA furosemide 40mg 5x/wk), has been hold since admission given critical illness  -Telemetry  -ASA 81mg/d, atorvastatin 20mg/d  -2gm Na+ diet  -Does not appear to be on any antihypertensives at home     Chronic venous stasis  Very dry unkempt LE. Erythematous and lichenification but does not appear acutely cellulitic. No fevers/chills.  -General wound care  -No acute concerns for cellulitis      Insulin-dependent type 2 diabetes mellitus  HbA1c 6%.   -Hypoglycemia protocol, preprandial and HS fingerstick checks  -Sliding scale insulin for now    Hx stabe IIB colon cancer 2016 s/p left hemicolectomy and colostomy  -WOCN following for ostomy cares     Other Noted History per chart review  Anxiety: Continue PTA citalopram 40mg/d, quetiapine 50mg Q HS  GERD: Continue PPI  Lung nodule  Rt MCA aneurysm s/p coil embolization 2011  PCOM aneurysm rupture s/p coil embolization 2010  Subarachnoid hemorrhage       Diet: Full Liquid Diet Nectar Thickened Liquids (pre-thickened or use instant food thickener)     DVT Prophylaxis: Pneumatic Compression Devices   Cornejo Catheter: not present  Code Status: Full Code     Disposition Plan    Expected discharge: 2 - 3 days, recommended to TBD pending PT evaluation  Entered: Baltazar Kaminski MD 10/22/2020, 10:18 AM        The patient's care was discussed with the Bedside Nurse and Patient.    Baltazar Kaminski MD  Hospitalist Service  [facility]    ______________________________________________________________________    Interval History   Patient feels about the same.  Oxygen  requirement has been up and down but largely stable around 4-5 L.  Endorses mild intermittent cough.    Data reviewed today: I reviewed all medications, new labs and imaging results over the last 24 hours. I personally reviewed no images or EKG's today.    Physical Exam   Vital signs:  Temp: 97.4  F (36.3  C) Temp src: Axillary BP: 119/60 Pulse: 72   Resp: 18 SpO2: 90 % O2 Device: Nasal cannula Oxygen Delivery: 4 LPM   Weight: 62.4 kg (137 lb 9.6 oz)  There is no height or weight on file to calculate BMI.      Wt Readings from Last 2 Encounters:   10/21/20 62.4 kg (137 lb 9.6 oz)   03/21/17 51.7 kg (114 lb)       Gen: AAOX2-3, NAD  HEENT: no pallor  Resp: Decreased air entry bilaterally with scattered wheeze, normal effort of breathing  CVS: RRR, no murmur  Abd/GI: Soft, non-tender.  Colostomy in place.  BS- normoactive.   Skin: Warm, dry no rashes  MSK: Chronic dermal thickening b/l LE  Neuro- CN- intact. No focal deficits. Globally weak      Data   Recent Labs   Lab 10/22/20  0924 10/21/20  0851 10/20/20  0501 10/17/20  0609 10/17/20  0609   WBC 6.3 6.0 7.7   < > 5.4   HGB 14.4 13.0 12.5   < > 13.0    100 101*   < > 102*    199 210   < > 202    142 143   < > 135   POTASSIUM 3.8 3.9 4.3   < > 4.7   CHLORIDE 101 101 105   < > 97   CO2 36* 37* 37*   < > 38*   BUN 21 25 24   < > 22   CR 0.47* 0.53 0.52   < > 0.65   ANIONGAP 4 4 1*   < > <1*   MARILU 8.7 8.5 8.3*   < > 8.2*   * 99 108*   < > 157*   ALBUMIN  --   --   --   --  3.2*   PROTTOTAL  --   --   --   --  6.6*   BILITOTAL  --   --   --   --  0.9   ALKPHOS  --   --   --   --  124   ALT  --   --   --   --  44   AST  --   --   --   --  28    < > = values in this interval not displayed.       No results found for this or any previous visit (from the past 24 hour(s)).  Medications     sodium chloride 10 mL/hr at 10/21/20 0553       aspirin  81 mg Oral Daily     atorvastatin  20 mg Oral Daily     azithromycin  250 mg Intravenous Q24H      cefTRIAXone  2 g Intravenous Q24H     citalopram  40 mg Oral Daily     furosemide  40 mg Oral Once per day on Mon Tue Wed Fri Sat     heparin ANTICOAGULANT  5,000 Units Subcutaneous Q12H     insulin aspart  1-4 Units Subcutaneous Q4H     ipratropium - albuterol 0.5 mg/2.5 mg/3 mL  3 mL Nebulization Q4H     methylPREDNISolone  62.5 mg Intravenous Q12H     pantoprazole  40 mg Oral Daily     potassium chloride ER  10 mEq Oral Once per day on Mon Tue Wed Fri Sat     QUEtiapine  50 mg Oral At Bedtime     senna-docusate  1 tablet Oral At Bedtime

## 2020-10-22 NOTE — PLAN OF CARE
DATE & TIME: 10/21/2020. PM shift  Cognitive Concerns/ Orientation : A& disoriented to time. Forgetful.    BEHAVIOR & AGGRESSION TOOL COLOR: Green  CIWA SCORE: NA  ABNL VS/O2: Sp02 maintained 90-93% on 4L transitioned to NC. supplemental oxygen with turning and repositioning (4-5L). Other VSS on RA. Infrequent unproductive cough. Lung sound diminished.Remains afebrile.   MOBILITY: Lift, strong assist of 2 to BSC. Turned and repositioned q 2 hrs. Desats with any exertion   PAIN MANAGMENT: Denies  DIET: Advanced to full liquid nectar thick.  BOWEL/BLADDER: incontinent of urine. Puriwick in place. No bm. Gave scheduled senna.  ABNL LAB/BG: HS   DRAIN/DEVICES:  1PIV NS TKO; purewick. LLQ colostomy.   TELEMETRY RHYTHM: NSR  SKIN: Scattered bruising, mepliex to coccyx for prevention CDI, patches of dry skin.   TESTS/PROCEDURES: None   D/C DAY/GOALS/PLACE: Pending.  OTHER IMPORTANT INFO:  BiPAP at bedside, q4 nebs treatment. WOC following. Care management /social work consult.

## 2020-10-22 NOTE — PLAN OF CARE
Summary:     DATE & TIME: 10/22/2020. 8995-3507  Cognitive Concerns/ Orientation : A& disoriented to time. Forgetful.    BEHAVIOR & AGGRESSION TOOL COLOR: Green  CIWA SCORE: NA  ABNL VS/O2: Sp02 maintained 90-93% on 4L NC. supplemental oxygen with turning and repositioning (4-5L). Other VSS . Infrequent unproductive cough. Lung sound diminished. Remains afebrile.   MOBILITY: Lift, strong assist of 2 to BSC. Turned and repositioned q 2 hrs. Desats with any exertion   PAIN MANAGMENT: Denies  DIET: Advanced to dysphagia level 1, pureed, nectar thick.  BOWEL/BLADDER: incontinent of urine. Purewick in place. No BM.   ABNL LAB/BG: , 130  DRAIN/DEVICES:  1PIV NS TKO; purewick. LLQ colostomy.   TELEMETRY RHYTHM: NSR  SKIN: Scattered bruising, mepliex to coccyx for prevention CDI, patches of dry skin.   TESTS/PROCEDURES: Chest x-ray today neg for PE, atelectasis, emphysema, trace pleural effusion  D/C DAY/GOALS/PLACE: Pending oxygen weaning and symptom improvement. PT recommends discharge to TCU.  OTHER IMPORTANT INFO:  q4 nebs treatment. WOC following. Care management /social work consult. PT consult today, up to chair desated to 86 on 4.5L per therapy note.

## 2020-10-23 NOTE — PLAN OF CARE
DATE & TIME: 10/23/2020 1026-8850    Cognitive Concerns/ Orientation : A&O x3-4, forgetful at times   BEHAVIOR & AGGRESSION TOOL COLOR: Green  ABNL VS/O2: Sp02 maintained 95-97% on 4L NC. Other VSS . Infrequent unproductive cough. Lung sounds diminished. Remains afebrile.  MOBILITY:  Lift, strong assist of 2 to BSC. Turned and repositioned q 2 hrs. Desats with exertion   PAIN MANAGMENT: Denies  DIET: Dysphagia level 1, pureed, nectar thick. Good appetite.   BOWEL/BLADDER:  Incontinent of urine. Purewick in place. Clear, parul urine. No BM.  ABNL LAB/BG: , 213  DRAIN/DEVICES: 1PIV NS TKO; purewick. LLQ colostomy.  TELEMETRY RHYTHM: NSR  SKIN: Scattered bruising, mepliex to coccyx for prevention CDI, patches of dry skin.   D/C DAY/GOALS/PLACE: Pending oxygen weaning and symptom improvement. PT recommends discharge to TCU.  OTHER IMPORTANT INFO: q4 nebs treatment. WOC/PT/SLP following.

## 2020-10-23 NOTE — PROGRESS NOTES
[facility]    Medicine Progress Note - Hospitalist Service        Date of Admission:  10/17/2020  4:31 AM    Assessment & Plan:   Bella Saucedo is a 74 year old female with PMH significant for COPD on 2 L chronic home O2, insulin-dependent type 2 diabetes, colon cancer status post left hemicolectomy and colostomy, hypertension, heart failure with preserved EF who was directly admitted to the ICU from [facility] on 10/17/2020 with acute exacerbation of COPD and concerns for community-acquired pneumonia requiring intubation. COVID-19 ruled out. Extubated on 10/19/2020, hospitalist service consulted 10/20/2020 to transfer out of ICU and assume care.     Acute hypoxemic and hypercapnic respiratory failure requiring intubation 10/17-10/19  Possible community-acquired pneumonia   Acute exacerbation of severe COPD in the setting of chronic 2 L home O2  Ruled out COVID-19 - Negative 10/17  Presented with hypoxic hypercapnic respiratory failure. ABG on admit c/w severe non-compensated respiratory acidosis with pCO2 >90 and failed attempt at BiPAP therefore she was intubated 10/17-10/19. No infiltrates on CXR. Started as COVID-19 PUI but testing negative and considered low risk therefore not retested. Influenza/RSV Negative. Sputum cultures Neg. Legionella urine Neg. Started on empiric antibiotics for possible CAP. No leukocytosis. Continues to be hypercapnic, likely chronic in setting of COPD. Appears to have a relatively recent smoking hx.  -We will complete ceftriaxone and azithromycin today(1 week total)  -Scheduled and PRN duo nebs  -Slow improvement, CT chest done yesterday without PE  -Transition to oral prednisone starting 10/24  -Still requiring 4 L oxygen, and desaturates more with activity, anticipate she might need another 2-3 days in the hospital  -Incentive spirometry, mobilize as able    Acute encephalopathy, Delirium of critical illness, improving  Improving, pt AAO x2-3.   -Started on Seroquel  [facility]  by ICU  -Delirium precautions  -Reorient frequently   -PT, OT following, recommend TCU     Coronary artery disease, Hx NSTEMI  Hypertension  Heart failure with preserved ejection fraction   on admit. No peripheral edema.   TTE 2017: LVEF 65-70% with increased RV wall thickness.   -Continue PTA furosemide/KCl given hx CHF (PTA furosemide 40mg 5x/wk), has been hold since admission given critical illness  -Telemetry  -ASA 81mg/d, atorvastatin 20mg/d  -2gm Na+ diet  -Does not appear to be on any antihypertensives at home     Chronic venous stasis  Very dry unkempt LE. Erythematous and lichenification but does not appear acutely cellulitic. No fevers/chills.  -General wound care  -No acute concerns for cellulitis      Insulin-dependent type 2 diabetes mellitus  HbA1c 6%.   -Hypoglycemia protocol, preprandial and HS fingerstick checks  -Sliding scale insulin for now    Hx stabe IIB colon cancer 2016 s/p left hemicolectomy and colostomy  -WOCN following for ostomy cares     Other Noted History per chart review  Anxiety: Continue PTA citalopram 40mg/d, quetiapine 50mg Q HS  GERD: Continue PPI  Lung nodule  Rt MCA aneurysm s/p coil embolization 2011  PCOM aneurysm rupture s/p coil embolization 2010  Subarachnoid hemorrhage       Diet: Snacks/Supplements Adult: Magic Cup; Between Meals  Combination Diet Dysphagia Diet Level 2: Mechan Altered; Nectar Thickened Liquids (pre-thickened or use instant food thickener) (No straws)     DVT Prophylaxis: Pneumatic Compression Devices   Cornejo Catheter: not present  Code Status: Full Code     Disposition Plan    Expected discharge: 2 - 3 days, recommended to TCU, anticipate she will be in the hospital over the weekend.  Entered: Baltazar Kaminski MD 10/23/2020, 12:24 PM        The patient's care was discussed with the Bedside Nurse and Patient.    Baltazar Kaminski MD  Hospitalist Service  [facility]  Hospital    ______________________________________________________________________    Interval History   Patient feels may be marginally better.  He still requiring oxygen at 4 L, desaturates with activity.  Mild cough.    Data reviewed today: I reviewed all medications, new labs and imaging results over the last 24 hours. I personally reviewed no images or EKG's today.    Physical Exam   Vital signs:  Temp: 98.4  F (36.9  C) Temp src: Oral BP: 126/62 Pulse: 69   Resp: 16 SpO2: 97 % O2 Device: Nasal cannula Oxygen Delivery: 4 LPM   Weight: 62.4 kg (137 lb 9.6 oz)  There is no height or weight on file to calculate BMI.      Wt Readings from Last 2 Encounters:   10/21/20 62.4 kg (137 lb 9.6 oz)   03/21/17 51.7 kg (114 lb)       Gen: AAOX2-3, NAD  HEENT: no pallor  Resp: Decreased air entry bilaterally with scattered wheeze, normal effort of breathing  CVS: RRR, no murmur  Abd/GI: Soft, non-tender.  Colostomy in place.  BS- normoactive.   Skin: Warm, dry no rashes  MSK: Chronic dermal thickening b/l LE  Neuro- CN- intact.  Globally weak      Data   Recent Labs   Lab 10/23/20  0907 10/22/20  0924 10/21/20  0851 10/17/20  0609 10/17/20  0609   WBC 7.1 6.3 6.0   < > 5.4   HGB 13.9 14.4 13.0   < > 13.0    100 100   < > 102*    215 199   < > 202    141 142   < > 135   POTASSIUM 4.0 3.8 3.9   < > 4.7   CHLORIDE 102 101 101   < > 97   CO2 39* 36* 37*   < > 38*   BUN 18 21 25   < > 22   CR 0.42* 0.47* 0.53   < > 0.65   ANIONGAP 1* 4 4   < > <1*   MARILU 8.9 8.7 8.5   < > 8.2*   * 237* 99   < > 157*   ALBUMIN  --   --   --   --  3.2*   PROTTOTAL  --   --   --   --  6.6*   BILITOTAL  --   --   --   --  0.9   ALKPHOS  --   --   --   --  124   ALT  --   --   --   --  44   AST  --   --   --   --  28    < > = values in this interval not displayed.       No results found for this or any previous visit (from the past 24 hour(s)).  Medications     sodium chloride 10 mL/hr at 10/21/20 0553       aspirin  81 mg  Oral Daily     atorvastatin  20 mg Oral Daily     azithromycin  250 mg Intravenous Q24H     citalopram  40 mg Oral Daily     furosemide  40 mg Oral Once per day on Mon Tue Wed Fri Sat     heparin ANTICOAGULANT  5,000 Units Subcutaneous Q12H     insulin aspart  1-3 Units Subcutaneous TID AC     insulin aspart  1-3 Units Subcutaneous At Bedtime     ipratropium - albuterol 0.5 mg/2.5 mg/3 mL  3 mL Nebulization Q4H     methylPREDNISolone  62.5 mg Intravenous Q12H     pantoprazole  40 mg Oral Daily     potassium chloride ER  10 mEq Oral Once per day on Mon Tue Wed Fri Sat     QUEtiapine  50 mg Oral At Bedtime     senna-docusate  1 tablet Oral At Bedtime

## 2020-10-23 NOTE — PLAN OF CARE
DATE & TIME: 10/22/2020 0159-1030  Cognitive Concerns/ Orientation : A&Ox3-4,  forgetful at times  BEHAVIOR & AGGRESSION TOOL COLOR: Green  CIWA SCORE: NA  ABNL VS/O2: Sp02 maintained 90-93% on 4L NC. supplemental oxygen with turning and repositioning (4-5L). Other VSS . Infrequent unproductive cough. Lung sound diminished. Remains afebrile.   MOBILITY: Lift, strong assist of 2 to BSC. T/R q2 hrs. KELLY  PAIN MANAGMENT: Denies  DIET: Advanced to dysphagia level 1, pureed, nectar thick. Good appetite.   BOWEL/BLADDER: incontinent of urine. Purewick in place. No BM.   ABNL LAB/BG:   DRAIN/DEVICES:  1PIV NS TKO; purewick. LLQ colostomy.   TELEMETRY RHYTHM: NSR  SKIN: Scattered bruising, mepliex to coccyx for prevention CDI, patches of dry skin.   TESTS/PROCEDURES: Chest x-ray yesterday neg for PE, atelectasis, emphysema, trace pleural effusion  D/C DAY/GOALS/PLACE: Pending oxygen weaning and symptom improvement. PT recommends discharge to TCU.  OTHER IMPORTANT INFO:  q4 nebs treatment. WOC/PT/SLP following.

## 2020-10-23 NOTE — PLAN OF CARE
DATE & TIME: 10/22/2020 1261-0712  Cognitive Concerns/ Orientation : A&Ox3-4, can be forgetful   BEHAVIOR & AGGRESSION TOOL COLOR: Green  CIWA SCORE: NA  ABNL VS/O2: Sp02 maintained 90-93% on 4L NC. supplemental oxygen with turning and repositioning (4-5L). Other VSS . Infrequent unproductive cough. Lung sound diminished. Remains afebrile.   MOBILITY: Lift, strong assist of 2 to BSC. T/R q2 hrs. KELLY  PAIN MANAGMENT: Denies  DIET: Advanced to dysphagia level 1, pureed, nectar thick. Good appetite.   BOWEL/BLADDER: incontinent of urine. Purewick in place. No BM.   ABNL LAB/BG: , 155  DRAIN/DEVICES:  1PIV NS TKO; purewick. LLQ colostomy.   TELEMETRY RHYTHM: NSR  SKIN: Scattered bruising, mepliex to coccyx for prevention CDI, patches of dry skin.   TESTS/PROCEDURES: Chest x-ray today neg for PE, atelectasis, emphysema, trace pleural effusion  D/C DAY/GOALS/PLACE: Pending oxygen weaning and symptom improvement. PT recommends discharge to TCU.  OTHER IMPORTANT INFO:  q4 nebs treatment. WOC/PT/SLP following.

## 2020-10-23 NOTE — PLAN OF CARE
DATE & TIME: 10/23/2020 3019-4841  Cognitive Concerns/ Orientation : A&Ox3-4,  forgetful at times  BEHAVIOR & AGGRESSION TOOL COLOR: Green  CIWA SCORE: NA  ABNL VS/O2: vss, on 4l o2  sat 90- 92% Infrequent nonproductive cough. Lung sound diminished.  MOBILITY: Lift, strong assist of 2 to BSC. T/R q2 hrs. KELLY  PAIN MANAGMENT: Denies  DIET: Advanced to dysphagia level 2, nectar thick. Good appetite.   BOWEL/BLADDER: incontinent of urine. Purewick in place. No BM.   ABNL LAB/BG: ,213,212  DRAIN/DEVICES:  1PIV NS TKO; purewick. LLQ colostomy.   TELEMETRY RHYTHM: NSR  SKIN: Scattered bruising, mepliex to coccyx for prevention CDI, patches of dry skin.   TESTS/PROCEDURES: none  D/C DAY/GOALS/PLACE: Pending oxygen weaning and symptom improvement. PT recommends discharge to TCU.  OTHER IMPORTANT INFO:  q4 nebs treatment. WOC/PT/SLP following.encouraged to use IS.

## 2020-10-23 NOTE — PROGRESS NOTES
"CLINICAL NUTRITION SERVICES  -  ASSESSMENT NOTE      Recommendations Ordered by Registered Dietitian (RD): Magic Cup BID between meals    Malnutrition: % Weight Loss:  None noted  % Intake:  </= 50% for >/= 5 days (severe malnutrition)  Subcutaneous Fat Loss:  Upper arm region moderate depletion  Muscle Loss:  Temporal region mild depletion, Clavicle bone region mild depletion, Patellar region mild depletion and Posterior calf region mild depletion  Fluid Retention:  None noted    Malnutrition Diagnosis: Non-Severe malnutrition  In Context of:  Acute illness or injury        REASON FOR ASSESSMENT  Bella Saucedo is a 74 year old female seen by Registered Dietitian for LOS      NUTRITION HISTORY  - Information obtained from patient.  She states that she eats well at baseline but tries to keep with softer foods due to poor dentition.  She has taken Ensure in the past but has not as of late --> \"I'm not sure why I quit\".  Weight has been stable.       CURRENT NUTRITION ORDERS  Diet Order:     Dysphagia Level 1 with Nectar Liquids      Patient was NPO from 10/17-10/20 due to vented/Bipap status  She was then on full liquids only and then advanced to solids yesterday so limited intake x 4-5 days earlier this admit     Current Intake/Tolerance:  Patient reports that she was able to eat 100% of all three meals yesterday:  Breakfast - Cream of Wheat, apple juice, milk, Magic Cup, and chocolate pudding  Lunch - Mashed potatoes, Magic Cup x 2, peaches, apple juice, and coffee  Dinner - Pureed beef, squash, Magic Cup, and apple juice     Patient reports that she LOVES the Magic Cup and would like to receive these more often (between meals)      NUTRITION FOCUSED PHYSICAL ASSESSMENT FOR DIAGNOSING MALNUTRITION)  Yes              Observed:    Muscle wasting (refer to documentation in Malnutrition section) and Subcutaneous fat loss (refer to documentation in Malnutrition section)    Obtained from Chart/Interdisciplinary " "Team:  None     ANTHROPOMETRICS  Height: 5'4\"  Weight: 62.4 kg (138#)(10/21)  BMI:  23.7 kg/m^2  Weight Status:  Normal BMI  IBW: 54.5 kg   % IBW: 114%  Weight History:   Wt Readings from Last 10 Encounters:   10/21/20 62.4 kg (137 lb 9.6 oz)   03/21/17 51.7 kg (114 lb)   02/28/17 50.3 kg (111 lb)   02/21/17 47.9 kg (105 lb 9.6 oz)   02/08/17 49.4 kg (108 lb 12.8 oz)   01/26/16 52.3 kg (115 lb 3.2 oz)   01/25/17 50.2 kg (110 lb 9.6 oz)   01/24/17 50.2 kg (110 lb 9.6 oz)   01/17/17 49.7 kg (109 lb 9.6 oz)   01/10/17 47.6 kg (105 lb)         LABS  Labs reviewed    MEDICATIONS  Medications reviewed      ASSESSED NUTRITION NEEDS PER APPROVED PRACTICE GUIDELINES:    Dosing Weight 62.4 kg   Estimated Energy Needs: 9555-0492 kcals (25-30 Kcal/Kg)  Justification: maintenance  Estimated Protein Needs: 75-95 grams protein (1.2-1.5 g pro/Kg)  Justification: hypercatabolism with acute illness  Estimated Fluid Needs: 8852-0436 mL (1 mL/Kcal)  Justification: maintenance    MALNUTRITION:  % Weight Loss:  None noted  % Intake:  </= 50% for >/= 5 days (severe malnutrition)  Subcutaneous Fat Loss:  Upper arm region moderate depletion  Muscle Loss:  Temporal region mild depletion, Clavicle bone region mild depletion, Patellar region mild depletion and Posterior calf region mild depletion  Fluid Retention:  None noted    Malnutrition Diagnosis: Non-Severe malnutrition  In Context of:  Acute illness or injury    NUTRITION DIAGNOSIS:  Malnutrition related to limited ability to take po earlier in stay, some evidence of fat and muscle wating as evidenced by patient coding for non-severe malnutrition       NUTRITION INTERVENTIONS  Recommendations / Nutrition Prescription  Continue DD1, Nectar Thick liquids as tolerated  Magic Cup BID between meals     Implementation  Nutrition education: Not appropriate at this time due to patient condition  Medical Food Supplement:  Ordered as above     Nutrition Goals  Patient will consume > 75% meals and " supplements     MONITORING AND EVALUATION:  Progress towards goals will be monitored and evaluated per protocol and Practice Guidelines    Cynthia Mackey RD, LD, CNSC   Clinical Dietitian - [facility]

## 2020-10-24 NOTE — PLAN OF CARE
DATE & TIME: 10/24/20 0700- 1930  Cognitive Concerns/ Orientation : A&Ox3-4,  forgetful at times  BEHAVIOR & AGGRESSION TOOL COLOR: Green  CIWA SCORE: NA  ABNL VS/O2: VSS 90- 93% 4 lpm NC   MOBILITY:  assist of 1-2, walker and gb, T/R q2 hrs.  PAIN MANAGMENT: Denies  DIET: Advanced to dysphagia level 2, nectar thick.  BOWEL/BLADDER: incontinent of urine. Purewick in place.  Colostomy  LLQ  ABNL LAB/BG: , [phone]   DRAINS/ Devices; IV right AC, purewick. LLQ colostomy.   TELEMETRY RHYTHM: NSR  SKIN: Scattered bruising, mepliex to coccyx for prevention CDI, patches of dry skin.   TESTS/PROCEDURES: none  D/C DAY/GOALS/PLACE: Pending oxygen weaning and symptom improvement. PT recommends discharge to TCU.  OTHER IMPORTANT INFO:  q4 nebs treatment. WOC/PT/SLP following. Encourage to use IS. Increased SOB with activity, up in chair  for breakfast, refused to get up for lunch and dinner, ambulated with PT.

## 2020-10-24 NOTE — PROGRESS NOTES
"[facility]    Medicine Progress Note - Hospitalist Service        Date of Admission:  10/17/2020  4:31 AM    Assessment & Plan:   Bella Saucedo is a 74 year old female with PMH significant for COPD on 2 L chronic home O2, insulin-dependent type 2 diabetes, colon cancer status post left hemicolectomy and colostomy, hypertension, heart failure with preserved EF who was directly admitted to the ICU from [facility] on 10/17/2020 with acute exacerbation of COPD and concerns for community-acquired pneumonia requiring intubation. COVID-19 ruled out. Extubated on 10/19/2020, hospitalist service consulted 10/20/2020 to transfer out of ICU and assume care.     Acute hypoxemic and hypercapnic respiratory failure requiring intubation 10/17-10/19  Possible community-acquired pneumonia   Acute exacerbation of severe COPD in the setting of chronic 2 L home O2  Ruled out COVID-19 - Negative 10/17  Presented with hypoxic hypercapnic respiratory failure. ABG on admit c/w severe non-compensated respiratory acidosis with pCO2 >90 and failed attempt at BiPAP therefore she was intubated 10/17-10/19. No infiltrates on CXR. Started as COVID-19 PUI but testing negative and considered low risk therefore not retested. Influenza/RSV Negative. Sputum cultures Neg. Legionella urine Neg. Started on empiric antibiotics for possible CAP. No leukocytosis. Continues to be hypercapnic, likely chronic in setting of COPD. Appears to have a relatively recent smoking hx.  - completeed ceftriaxone and azithromycin today(1 week total)  - Scheduled and PRN duo nebs  - it seems that in the past she was on Advair but as per pharm note- she stopped taking it because \"it dries her throat and causes dry cough: may benefit from a ICS/LABA combination after discharge;  we'll likely start her on Breo Ellipta  -Slow improvement, CT chest done on 10/22- no PE; moderate left lower lobe compressive atelectasis and/or infiltrate; small left " effusion  -Transition to oral prednisone starting 10/24  - Incentive spirometry, mobilize as able  - Still requiring 4 L oxygen, and desaturates more with activity; try to wean down O2 needs  - PT rec TCU    Acute encephalopathy, Delirium of critical illness, improving  Improving, pt AAO x2-3.   - Started on Seroquel by ICU  - Delirium precautions  - Reorient frequently   - PT, OT following, recommend TCU- the patient and daughter agree with this plan     Coronary artery disease, Hx NSTEMI  Hypertension  Heart failure with preserved ejection fraction   on admit. No peripheral edema.   TTE 2017: LVEF 65-70% with increased RV wall thickness.   - PTA furosemide/KCl (PTA furosemide 40mg 5x/wk)- held on admission given critical illness, resumed on 10/20  - Telemetry  - resumed PTA ASA 81mg/d, atorvastatin 20mg/d  - 2gm Na+ diet  - Does not appear to be on any antihypertensives at home     Chronic venous stasis  Very dry unkempt LE. Erythematous and lichenification but does not appear acutely cellulitic. No fevers/chills.  - General wound care  - No acute concerns for cellulitis      Insulin-dependent type 2 diabetes mellitus  HbA1c 6%.   - PTA on Lantus 1 units at bedtime?  - --304  - steroids likely contributing to elevated BS  - will start Lantus 4 units at bedtime tonight  - ISS  - Hypoglycemia protocol, preprandial and HS fingerstick checks    Hx stabe IIB colon cancer 2016 s/p left hemicolectomy and colostomy  -WOCN following for ostomy cares     Other Noted History per chart review  Anxiety: Continue PTA citalopram 40mg/d, quetiapine 50mg Q HS  GERD: Continue PPI  Lung nodule  Rt MCA aneurysm s/p coil embolization 2011  PCOM aneurysm rupture s/p coil embolization 2010  Subarachnoid hemorrhage       Diet: Snacks/Supplements Adult: Magic Cup; Between Meals  Combination Diet Dysphagia Diet Level 2: Mechan Altered; Nectar Thickened Liquids (pre-thickened or use instant food thickener) (No straws)     DVT  Prophylaxis: Pneumatic Compression Devices   Cornejo Catheter: not present  Code Status: Full Code     Disposition Plan    Expected discharge: couple of days, recommended to TCU    Entered: Mary Kate Romero MD 10/24/2020, 3:33 PM        The patient's care was discussed with the Bedside Nurse, Patient and Patient's Family- daughter .    Angelica Romero MD  Hospitalist Service  [facility]    ______________________________________________________________________    Interval History   Feeling slightly better, SOB a little better, mild cough, no chest pain, still on O2 4 liters; no N/V, no abd pain, no chest pain.    Data reviewed today: I reviewed all medications, new labs and imaging results over the last 24 hours. I personally reviewed no images or EKG's today.    Physical Exam   Vital signs:  Temp: 97.8  F (36.6  C) Temp src: Oral BP: 110/54 Pulse: 74   Resp: 18 SpO2: 92 % O2 Device: Nasal cannula Oxygen Delivery: 4 LPM   Weight: 61.9 kg (136 lb 7.4 oz)  There is no height or weight on file to calculate BMI.      Wt Readings from Last 2 Encounters:   10/24/20 61.9 kg (136 lb 7.4 oz)   03/21/17 51.7 kg (114 lb)       Gen: AAOX2-3, NAD  HEENT: no pallor  Resp: Decreased air entry bilaterally with scattered wheeze, normal effort of breathing  CVS: RRR, no murmur  Abd/GI: Soft, non-tender.  Colostomy in place.  BS- normoactive.   Skin: Warm, dry no rashes  MSK: Chronic dermal thickening b/l LE  Neuro- CN- intact.  Globally weak      Data   Recent Labs   Lab 10/24/20  0840 10/23/20  0907 10/22/20  0924   WBC 9.2 7.1 6.3   HGB 13.2 13.9 14.4   * 100 100    215 215   * 142 141   POTASSIUM 3.7 4.0 3.8   CHLORIDE 102 102 101   CO2 41* 39* 36*   BUN 25 18 21   CR 0.48* 0.42* 0.47*   ANIONGAP 2* 1* 4   MARILU 8.4* 8.9 8.7   * 135* 237*       No results found for this or any previous visit (from the past 24 hour(s)).  Medications     sodium chloride 10 mL/hr at 10/21/20 0529        aspirin  81 mg Oral Daily     atorvastatin  20 mg Oral Daily     citalopram  40 mg Oral Daily     furosemide  40 mg Oral Once per day on Mon Tue Wed Fri Sat     heparin ANTICOAGULANT  5,000 Units Subcutaneous Q12H     insulin aspart  1-3 Units Subcutaneous TID AC     insulin aspart  1-3 Units Subcutaneous At Bedtime     ipratropium - albuterol 0.5 mg/2.5 mg/3 mL  3 mL Nebulization Q4H     pantoprazole  40 mg Oral Daily     potassium chloride ER  10 mEq Oral Once per day on Mon Tue Wed Fri Sat     predniSONE  40 mg Oral Daily     QUEtiapine  50 mg Oral At Bedtime     senna-docusate  1 tablet Oral At Bedtime

## 2020-10-24 NOTE — PLAN OF CARE
Cognitive Concerns/ Orientation : A&Ox3-4,  forgetful at times  BEHAVIOR & AGGRESSION TOOL COLOR: Green  CIWA SCORE: NA  ABNL VS/O2: VSS 93% 4 lpm NC   MOBILITY: Lift, strong assist of 2 to BSC. T/R q2 hrs.  PAIN MANAGMENT: Denies  DIET: Advanced to dysphagia level 2, nectar thick.  BOWEL/BLADDER: incontinent of urine. Purewick in place. No BM.   ABNL LAB/BG:  and 108  DRAIN/DEVICES: Peripheral IV SL right AC, purewick. LLQ colostomy.   TELEMETRY RHYTHM: NSR  SKIN: Scattered bruising, mepliex to coccyx for prevention CDI, patches of dry skin.   TESTS/PROCEDURES: none  D/C DAY/GOALS/PLACE: Pending oxygen weaning and symptom improvement. PT recommends discharge to TCU.  OTHER IMPORTANT INFO:  q4 nebs treatment. WOC/PT/SLP following. Encourage to use IS. Increased SOB with activity.

## 2020-10-25 NOTE — PLAN OF CARE
Cognitive Concerns/ Orientation : A&Ox3-4,  forgetful at times  BEHAVIOR & AGGRESSION TOOL COLOR: Green  CIWA SCORE: NA  ABNL VS/O2: VSS 90- 95% 4 lpm NC   MOBILITY:  assist of 1-2, walker and gb, T/R q2 hrs.  PAIN MANAGMENT: Denies  DIET: Advanced to dysphagia level 2, nectar thick.  BOWEL/BLADDER: incontinent of urine. Purewick in place.  colostomy  ABNL LAB/BG:  and 132   DRAINS/ Devices; IV right AC, purewick. LLQ colostomy.   TELEMETRY RHYTHM: NSR with PAC  SKIN: Scattered bruising, mepliex to coccyx for prevention CDI, patches of dry skin.   TESTS/PROCEDURES: none  D/C DAY/GOALS/PLACE: Pending oxygen weaning and symptom improvement. PT recommends discharge to TCU.  OTHER IMPORTANT INFO:  q4 nebs treatment. WOC/PT/SLP following. Encourage to use IS. Increased SOB with activity, up in chair  for meals. .

## 2020-10-25 NOTE — PLAN OF CARE
DATE & TIME: 10/25/2020 9667-0946  Cognitive Concerns/ Orientation : A&Ox3-4,  forgetful at times  BEHAVIOR & AGGRESSION TOOL COLOR: Green  CIWA SCORE: NA  ABNL VS/O2: VSS 90- 95% 4 lpm NC   MOBILITY:  assist of 1-2, walker and gb,   PAIN MANAGMENT: Denies  DIET: Advanced to dysphagia level 2, nectar thick.  BOWEL/BLADDER: incontinent of urine. Purewick in place.  colostomy  ABNL LAB/BG: BS 94 161 and 215, Pco2  71 , Bicarb 45   DRAINS/ Devices; IV right AC, purewick. LLQ colostomy.   TELEMETRY RHYTHM: NSR with PAC  SKIN: Scattered bruising, mepliex to coccyx for prevention , patches of dry skin, LE josette.   TESTS/PROCEDURES: none  D/C DAY/GOALS/PLACE: Pending oxygen weaning and symptom improvement. PT recommends discharge to TCU.  OTHER IMPORTANT INFO:  q4 nebs treatment. WOC/PT/SLP following. Encourage to use IS. C/o of SOB and sats dropped to low 80s this am, feels better  after neb treatment , orders to Bipap qid and repeat BGs at 1600. Pulmonology consult pending.

## 2020-10-25 NOTE — PROGRESS NOTES
Patient was placed on BiPAP 10/5 40% per MD order due to elevated CO2. Order is for BiPAP treatments QID. SpO2 93%. Gel pad and mepilex in place. Skin intact. Alarm volume set at 10.  Plan for ABG 1hr after placement on BiPAP to reassess CO2 level.  Will cont to monitor.  10/25/2020  Geno Lindo, RT

## 2020-10-25 NOTE — PROGRESS NOTES
"[facility]    Medicine Progress Note - Hospitalist Service        Date of Admission:  10/17/2020  4:31 AM    Assessment & Plan:   Bella Saucedo is a 74 year old female with PMH significant for COPD on 2 L chronic home O2, insulin-dependent type 2 diabetes, colon cancer status post left hemicolectomy and colostomy, hypertension, heart failure with preserved EF who was directly admitted to the ICU from [facility] on 10/17/2020 with acute exacerbation of COPD and concerns for community-acquired pneumonia requiring intubation. COVID-19 ruled out. Extubated on 10/19/2020, hospitalist service consulted 10/20/2020 to transfer out of ICU and assume care.     Acute hypoxemic and hypercapnic respiratory failure requiring intubation 10/17-10/19  Possible community-acquired pneumonia   Acute exacerbation of severe COPD in the setting of chronic 2 L home O2  Ruled out COVID-19 - Negative 10/17  Presented with hypoxic hypercapnic respiratory failure. ABG on admit c/w severe non-compensated respiratory acidosis with pCO2 >90 and failed attempt at BiPAP therefore she was intubated 10/17-10/19. No infiltrates on CXR. Started as COVID-19 PUI but testing negative and considered low risk therefore not retested. Influenza/RSV Negative. Sputum cultures Neg. Legionella urine Neg. Started on empiric antibiotics for possible CAP. No leukocytosis. Continues to be hypercapnic, likely chronic in setting of COPD. Appears to have a relatively recent smoking hx.  - completeed ceftriaxone and azithromycin today(1 week total)  - Scheduled and PRN duo nebs  - it seems that in the past she was on Advair but as per pharm note- she stopped taking it because \"it dries her throat and causes dry cough: may benefit from a ICS/LABA combination after discharge; will start her on Breo Ellipta  - Slow improvement, CT chest done on 10/22- no PE; moderate left lower lobe compressive atelectasis and/or infiltrate; small left effusion  - Transition " to oral prednisone starting 10/24, plan to taper q3 days  - start Mucinex 600 mg po BID  - noted bicarb trending up 38--37--39--41  - VBG now, if pCO2 trending up, may need BiPAP; consider Pulmonology consult  - Incentive spirometry, mobilize as able  - Still requiring 4 L oxygen, and desaturates more with activity; try to wean down O2 needs if able  - PT rec TCU    ADDENDUM: VBG with pCO2 71; will order BiPAP 4 times daily, Pulmonology consult.    Acute encephalopathy, Delirium of critical illness, improving  Improving, pt AAO x2-3.   - Started on Seroquel by ICU  - Delirium precautions  - Reorient frequently   - PT, OT following, recommend TCU- the patient and daughter agree with this plan     Coronary artery disease, Hx NSTEMI  Hypertension  Heart failure with preserved ejection fraction   on admit. No peripheral edema.   TTE 2017: LVEF 65-70% with increased RV wall thickness.   - PTA furosemide/KCl (PTA furosemide 40mg 5x/wk)- held on admission given critical illness, resumed on 10/20  - Telemetry  - resumed PTA ASA 81mg/d, atorvastatin 20mg/d  - 2gm Na+ diet  - Does not appear to be on any antihypertensives at home     Chronic venous stasis  - b/l LE erythematous and lichenification but does not appear acutely cellulitic. No fevers/chills.  - General wound care  - No acute concerns for cellulitis      Insulin-dependent type 2 diabetes mellitus  HbA1c 6%.   - PTA on Lantus 1 units at bedtime?  - --304 yesterday  - steroids likely contributing to elevated BS  - started Lantus 4 units at bedtime, BS in am 94; will decrease Lantus to 2 units qhs  - ISS  - Hypoglycemia protocol, preprandial and HS fingerstick checks    Hx stabe IIB colon cancer 2016 s/p left hemicolectomy and colostomy  -WOCN following for ostomy cares     Other Noted History per chart review  Anxiety: Continue PTA citalopram 40mg/d, quetiapine 50mg Q HS  GERD: Continue PPI  Lung nodule  Rt MCA aneurysm s/p coil embolization 2011  PCOM  aneurysm rupture s/p coil embolization 2010  Subarachnoid hemorrhage       Diet: Snacks/Supplements Adult: Magic Cup; Between Meals  Combination Diet Dysphagia Diet Level 2: Mechan Altered; Nectar Thickened Liquids (pre-thickened or use instant food thickener) (No straws)     DVT Prophylaxis: Pneumatic Compression Devices   Cornejo Catheter: not present  Code Status: Full Code     Disposition Plan    Expected discharge: 1-2 days, recommended to TCU    Entered: Mary Kate Romero MD 10/25/2020, 12:23 PM        The patient's care was discussed with the Bedside Nurse and Patient     Angelica Romero MD  Hospitalist Service  [facility]    ______________________________________________________________________    Interval History  No new events, feels tired, sat up in the chair for few hours; SOB stable, no chest pain, some mildly productive cough    Data reviewed today: I reviewed all medications, new labs and imaging results over the last 24 hours. I personally reviewed no images or EKG's today.    Physical Exam   Vital signs:  Temp: 98.1  F (36.7  C) Temp src: Oral BP: 116/51 Pulse: 78   Resp: 16 SpO2: 95 % O2 Device: None (Room air) Oxygen Delivery: 4 LPM   Weight: 61.4 kg (135 lb 5.8 oz)  There is no height or weight on file to calculate BMI.      Wt Readings from Last 2 Encounters:   10/25/20 61.4 kg (135 lb 5.8 oz)   03/21/17 51.7 kg (114 lb)       Gen: AAOX2-3, NAD  HEENT: no pallor  Resp: Decreased air entry bilaterally with scattered wheeze, normal effort of breathing  CVS: RRR, no murmur  Abd/GI: Soft, non-tender.  Colostomy in place.  BS- normoactive.   Skin: Warm, dry no rashes  MSK: Chronic dermal thickening b/l LE  Neuro- CN- intact.  Globally weak      Data   Recent Labs   Lab 10/24/20  0840 10/23/20  0907 10/22/20  0924   WBC 9.2 7.1 6.3   HGB 13.2 13.9 14.4   * 100 100    215 215   * 142 141   POTASSIUM 3.7 4.0 3.8   CHLORIDE 102 102 101   CO2 41* 39* 36*   BUN 25 18 21    CR 0.48* 0.42* 0.47*   ANIONGAP 2* 1* 4   MARILU 8.4* 8.9 8.7   * 135* 237*       No results found for this or any previous visit (from the past 24 hour(s)).  Medications     sodium chloride 10 mL/hr at 10/21/20 0553       aspirin  81 mg Oral Daily     atorvastatin  20 mg Oral Daily     citalopram  40 mg Oral Daily     furosemide  40 mg Oral Once per day on Mon Tue Wed Fri Sat     guaiFENesin  600 mg Oral BID     heparin ANTICOAGULANT  5,000 Units Subcutaneous Q12H     insulin aspart  1-3 Units Subcutaneous TID AC     insulin aspart  1-3 Units Subcutaneous At Bedtime     insulin glargine  4 Units Subcutaneous At Bedtime     ipratropium - albuterol 0.5 mg/2.5 mg/3 mL  3 mL Nebulization Q4H     pantoprazole  40 mg Oral Daily     potassium chloride ER  10 mEq Oral Once per day on Mon Tue Wed Fri Sat     predniSONE  40 mg Oral Daily     QUEtiapine  50 mg Oral At Bedtime     senna-docusate  1 tablet Oral At Bedtime

## 2020-10-26 NOTE — CONSULTS
Pulmonary Medicine Consultation      Date of Admission: 10/17/2020  Primary Attending:  Baltazar Kaminski MD  Consulting Physician: Lina Rios MD    History:    Bella Saucedo is a 74 year old female with PMH significant for COPD on 2 L chronic home O2, insulin-dependent type 2 diabetes, colon cancer status post left hemicolectomy and colostomy, hypertension, heart failure with preserved EF who was directly admitted to the ICU from [facility] on 10/17/2020 with acute exacerbation of COPD and concerns for community-acquired pneumonia requiring intubation. COVID-19 ruled out. Extubated on 10/19/2020, hospitalist service consulted 10/20/2020 to transfer out of ICU and assume care. On 4L NC, uses nebulizers at home, does not follow with a pulmonologist as an outpatient.       Review of Systems - A 10-system ROS is negative except for items mentioned above and in HPI.       Prior medical history:  No past medical history on file.    No past surgical history on file.    Patient Active Problem List   Diagnosis     Acute respiratory failure (H)     Advance directive discussed with patient     Bandemia     Intracranial subarachnoid hemorrhage (H)     Cerebral aneurysm     Congestive heart failure (H)     Malignant neoplasm of colon (H)     Chronic obstructive pulmonary disease with acute exacerbation (H)     Severe chronic obstructive pulmonary disease (H)     Dizziness     Elevated troponin I level     Hypertension     Hypomagnesemia     Hyponatremia     Hypoxia     Lung mass     Malignant neoplasm of sigmoid colon (H)     Nicotine dependence     Cerebral arterial aneurysm     Acute non-ST elevation myocardial infarction (NSTEMI) (H)     Intermittent tremor     Perforation of sigmoid colon (H)     Borderline diabetes mellitus     Systemic infection (H)     Tremor     Shortness of breath     Hemorrhage into subarachnoid space of neuraxis (H)     Tobacco use     Steroid-induced diabetes mellitus (H)     Ventricular premature  beats     Weakness     Abnormal weight loss     Pneumonia due to infectious organism, unspecified laterality, unspecified part of lung     Benign essential hypertension     Mild cognitive impairment     Respiratory failure (H)       Social History     Social History     Socioeconomic History     Marital status:      Spouse name: Not on file     Number of children: Not on file     Years of education: Not on file     Highest education level: Not on file   Occupational History     Not on file   Social Needs     Financial resource strain: Not on file     Food insecurity     Worry: Not on file     Inability: Not on file     Transportation needs     Medical: Not on file     Non-medical: Not on file   Tobacco Use     Smoking status: Former Smoker     Packs/day: 1.00     Types: Cigarettes     Quit date: 10/17/2020     Years since quittin.0   Substance and Sexual Activity     Alcohol use: Not on file     Drug use: Not on file     Sexual activity: Not on file   Lifestyle     Physical activity     Days per week: Not on file     Minutes per session: Not on file     Stress: Not on file   Relationships     Social connections     Talks on phone: Not on file     Gets together: Not on file     Attends [employer] service: Not on file     Active member of club or organization: Not on file     Attends meetings of clubs or organizations: Not on file     Relationship status: Not on file     Intimate partner violence     Fear of current or ex partner: Not on file     Emotionally abused: Not on file     Physically abused: Not on file     Forced sexual activity: Not on file   Other Topics Concern     Not on file   Social History Narrative     Not on file         Family History  No family history on file.      Medications  No current outpatient medications on file.     Current Facility-Administered Medications Ordered in Epic   Medication Dose Route Frequency Last Rate Last Dose     acetaminophen (TYLENOL) tablet 325 mg  325 mg  Oral Q4H PRN         albuterol (PROVENTIL) neb solution 2.5 mg  2.5 mg Nebulization Q2H PRN         aspirin EC tablet 81 mg  81 mg Oral Daily   81 mg at 10/26/20 0859     atorvastatin (LIPITOR) tablet 20 mg  20 mg Oral Daily   20 mg at 10/26/20 0859     carboxymethylcellulose PF (REFRESH PLUS) 0.5 % ophthalmic solution 1 drop  1 drop Both Eyes Q1H PRN         citalopram (celeXA) tablet 40 mg  40 mg Oral Daily   40 mg at 10/26/20 0859     glucose gel 15-30 g  15-30 g Oral Q15 Min PRN        Or     dextrose 50 % injection 25-50 mL  25-50 mL Intravenous Q15 Min PRN        Or     glucagon injection 1 mg  1 mg Subcutaneous Q15 Min PRN         fluticasone-vilanterol (BREO ELLIPTA) 100-25 MCG/INH inhaler 1 puff  1 puff Inhalation Daily   1 puff at 10/26/20 0856     furosemide (LASIX) tablet 40 mg  40 mg Oral Once per day on Mon Tue Wed Fri Sat   40 mg at 10/26/20 0858     guaiFENesin (MUCINEX) 12 hr tablet 600 mg  600 mg Oral BID   600 mg at 10/26/20 0858     heparin ANTICOAGULANT injection 5,000 Units  5,000 Units Subcutaneous Q12H   5,000 Units at 10/26/20 0629     HYDROmorphone (PF) (DILAUDID) injection 0.2 mg  0.2 mg Intravenous Q2H PRN   0.2 mg at 10/19/20 1818     insulin aspart (NovoLOG) injection (RAPID ACTING)  1-3 Units Subcutaneous TID AC   1 Units at 10/26/20 1218     insulin aspart (NovoLOG) injection (RAPID ACTING)  1-3 Units Subcutaneous At Bedtime         insulin glargine (LANTUS PEN) injection 2 Units  2 Units Subcutaneous At Bedtime   2 Units at 10/25/20 2117     ipratropium - albuterol 0.5 mg/2.5 mg/3 mL (DUONEB) neb solution 3 mL  3 mL Nebulization Q4H   3 mL at 10/26/20 1130     naloxone (NARCAN) injection 0.1-0.4 mg  0.1-0.4 mg Intravenous Q2 Min PRN         No lozenges or gum should be given while patient on BIPAP/AVAPS/AVAPS AE   Does not apply Continuous PRN         pantoprazole (PROTONIX) EC tablet 40 mg  40 mg Oral Daily   40 mg at 10/26/20 0858     Patient may continue current oral medications    Does not apply Continuous PRN         potassium chloride ER (KLOR-CON M) CR tablet 10 mEq  10 mEq Oral Once per day on Mon Tue Wed Fri Sat   10 mEq at 10/26/20 0858     predniSONE (DELTASONE) tablet 40 mg  40 mg Oral Daily   40 mg at 10/26/20 0858     QUEtiapine (SEROquel) tablet 50 mg  50 mg Oral At Bedtime   50 mg at 10/25/20 2117     senna-docusate (SENOKOT-S/PERICOLACE) 8.6-50 MG per tablet 1 tablet  1 tablet Oral At Bedtime   1 tablet at 10/25/20 2117     sodium chloride 0.9% infusion   Intravenous Continuous 10 mL/hr at 10/21/20 0553       sodium phosphate 15 mmol in D5W intermittent infusion  15 mmol Intravenous Daily PRN   15 mmol at 10/17/20 1228     sodium phosphate 20 mmol in D5W intermittent infusion  20 mmol Intravenous Q6H PRN         sodium phosphate 25 mmol in D5W intermittent infusion  25 mmol Intravenous Q8H PRN         Urea 40 % CREA   Topical Daily PRN         No current Epic-ordered outpatient medications on file.       Allergies   Allergen Reactions     Nitroglycerin Anxiety and Other (See Comments)     Mild headache, severe anxiety         Physical Examination:   Vitals:    10/26/20 1130 10/26/20 1248 10/26/20 1307 10/26/20 1311   BP:  129/54     BP Location:  Left arm     Pulse:  115     Resp:     Temp:       TempSrc:       SpO2: 96% 92% 94% 96%   Weight:         There is no height or weight on file to calculate BMI.  Temp (24hrs), Av.1  F (36.7  C), Min:97.7  F (36.5  C), Max:98.3  F (36.8  C)        Constitutional:  Appears comfortable.  HENT:  mucous membranes moist.  Eyes: PERRLA, no icterus, no pallor.   Neck: No lymphadenopathy or thyromegaly, trachea midline, no carotid bruits.  Cardiovascular: Regular rate and rythym, no murmurs, rubs or gallops, no peripheral edema.  Respiratory/Chest: diminished throughout  Gastrointestinal: Abdomen was soft, non-tender, non-distended, no masses felt, no hepatosplenomegaly.  Musculoskeletal: No clubbing or cyanosis, full range of motion  in all extremities.  Neurological: No focal motor or sensory deficits. DTR's are 2+ and symmetric.  Skin: No skin rash, hives, petechiae, or breakdown.      CMP  Recent Labs   Lab 10/26/20  0855 10/24/20  0840 10/23/20  0907 10/22/20  0924 10/21/20  0851    145* 142 141 142   POTASSIUM 3.8 3.7 4.0 3.8 3.9   CHLORIDE 102 102 102 101 101   CO2 39* 41* 39* 36* 37*   ANIONGAP 1* 2* 1* 4 4   GLC 82 112* 135* 237* 99   BUN 18 25 18 21 25   CR 0.52 0.48* 0.42* 0.47* 0.53   GFRESTIMATED >90 >90 >90 >90 >90   GFRESTBLACK >90 >90 >90 >90 >90   MARILU 8.5 8.4* 8.9 8.7 8.5   PHOS 3.1  --  3.5  --  3.5     CBC  Recent Labs   Lab 10/26/20  0855 10/24/20  0840 10/23/20  0907 10/22/20  0924 10/21/20  0851   WBC  --  9.2 7.1 6.3 6.0   RBC  --  4.31 4.48 4.63 4.22   HGB  --  13.2 13.9 14.4 13.0   HCT  --  43.5 44.6 46.5 42.2   MCV  --  101* 100 100 100   MCH  --  30.6 31.0 31.1 30.8   MCHC  --  30.3* 31.2* 31.0* 30.8*   RDW  --  14.0 13.9 14.2 14.3    208 215 215 199     INRNo lab results found in last 7 days.  Arterial Blood Gas  Recent Labs   Lab 10/25/20  1601 10/25/20  1258   O2PER VPT  3.5 L     No results for input(s): CULT in the last 168 hours.    Diagnostic Studies:  Chest Radiology:     CT chest  IMPRESSION:  1.  No pulmonary embolism demonstrated.  2.  Moderate left lower lobe compressive atelectasis and/or  infiltrate. Small left effusion.    Assessment/Plan:     Acute on chronic hypoxic respiratory failure  AECOPD    Recommendations  -recovery will be prolong due to the severity of her COPD being on chronic oxygen. No PFTs on record outside of PFT from 2014 which showed severe obstruction. Will likely need TCU/rehab upon discharge  -continue breo, add incruse for triple therapy  -continue duonebs QID  -continue prednisone, taper 10mg every 4 days until completed  -should follow up with outpatient pulmonary to manage and evaluate for any changes with her COPD. She can follow up with [facility], [phone].  -wean  O2 to maintain sats>90% only  -PT/OT, out of bed, ambulate as able    Thank you for the consult, no further additional recommendations. Please call with any questions. Available if needed.         Lina Rios M.D.  Pulmonary, Critical Care and Sleep Medicine  [facility]   Pager: [phone]  Office:[phone]

## 2020-10-26 NOTE — PROGRESS NOTES
1900 neb tx given. Bipap earlier today for hypercapnia . Pt COPD, retainer at baseline. VBG fully compensated, now slightly alkalotic. Will keep Bipap on standby at bedside if needed. Pt not lethargic, resting comfortably. Will continue to follow.     Bj Garcia, RT

## 2020-10-26 NOTE — PLAN OF CARE
Cognitive Concerns/ Orientation : A&Ox3-4,  forgetful at times  BEHAVIOR & AGGRESSION TOOL COLOR: Green  CIWA SCORE: NA  ABNL VS/O2: VSS 93% 4 lpm NC   MOBILITY:  assist of 1-2, walker and gb,   PAIN MANAGMENT: Denies  DIET: Advanced to dysphagia level 2, nectar thick.  BOWEL/BLADDER: incontinent of urine. Purewick in place.  colostomy  ABNL LAB/BG:  and 130 Pco2  71, Bicarb 45   DRAINS/ Devices; IV right AC, purewick. LLQ colostomy.   TELEMETRY RHYTHM: NSR with PAC  SKIN: Scattered bruising, mepliex to coccyx for prevention CDI, patches of dry skin.   TESTS/PROCEDURES: none  D/C DAY/GOALS/PLACE: Pending oxygen weaning and symptom improvement. PT recommends discharge to TCU.  OTHER IMPORTANT INFO:  q4 nebs treatment. WOC/PT/SLP following. Encourage to use IS.

## 2020-10-26 NOTE — PLAN OF CARE
DATE & TIME: 10/26/2020 0888-2109  Cognitive Concerns/ Orientation : A&Ox4,  forgetful at times  BEHAVIOR & AGGRESSION TOOL COLOR: Green  ABNL VS/O2: VSS 94% 3-5L nc; C/o SOB, improved with mindful breathing, LS coarse    MOBILITY:  Up with assist of 1, walker and gb; encouraged to be up for meals, refused  PAIN MANAGMENT: Denies  DIET: Dysphagia diet, level 2, nectar thick; fair appetite  BOWEL/BLADDER: incontinent of urine. Purewick in place.  colostomy  ABNL LAB/BG: BG 78, 141, 192  DRAINS/DEVICES: PIV SL; LLQ colostomy.   TELEMETRY RHYTHM: NSR   SKIN: Scattered bruising, mepliex to coccyx for prevention- CDI, patches of dry skin.   TESTS/PROCEDURES: none  D/C DAY/GOALS/PLACE: Pending oxygen weaning and symptom improvement. PT recommends discharge to TCU.  OTHER IMPORTANT INFO:  WOC/PT/SLP following; Pulm consult today. Bipap available PRN for sleeping/napping

## 2020-10-26 NOTE — PLAN OF CARE
SLP: Attempted to see patient for swallow treatment. She was up in the chair with lunch in front of her with very little consumed and breathing was effortful. Updated the nurse's antoni (she was at lunch) and will check on patient.

## 2020-10-26 NOTE — PROGRESS NOTES
"[facility]    Medicine Progress Note - Hospitalist Service        Date of Admission:  10/17/2020  4:31 AM    Assessment & Plan:   Bella Saucedo is a 74 year old female with PMH significant for COPD on 2 L chronic home O2, insulin-dependent type 2 diabetes, colon cancer status post left hemicolectomy and colostomy, hypertension, heart failure with preserved EF who was directly admitted to the ICU from [facility] on 10/17/2020 with acute exacerbation of COPD and concerns for community-acquired pneumonia requiring intubation. COVID-19 ruled out. Extubated on 10/19/2020, hospitalist service consulted 10/20/2020 to transfer out of ICU and assume care.     Acute hypoxemic and hypercapnic respiratory failure requiring intubation 10/17-10/19  Possible community-acquired pneumonia   Acute exacerbation of severe COPD in the setting of chronic 2 L home O2  Ruled out COVID-19 - Negative 10/17  Presented with hypoxic hypercapnic respiratory failure. ABG on admit c/w severe non-compensated respiratory acidosis with pCO2 >90 and failed attempt at BiPAP therefore she was intubated 10/17-10/19. No infiltrates on CXR. Started as COVID-19 PUI but testing negative and considered low risk therefore not retested. Influenza/RSV Negative. Sputum cultures Neg. Legionella urine Neg. Started on empiric antibiotics for possible CAP. No leukocytosis. Continues to be hypercapnic, likely chronic in setting of COPD. Appears to have a relatively recent smoking hx.  - completed ceftriaxone and azithromycin today(1 week total)  - Scheduled and PRN duo nebs  - it seems that in the past she was on Advair but as per pharm note- she stopped taking it because \"it dries her throat and causes dry cough; she would benefit from a ICS/LABA combination after discharge; started her on Breo Ellipta  - Slow improvement, CT chest done on 10/22- no PE; moderate left lower lobe compressive atelectasis and/or infiltrate; small left effusion  - " Transition to oral prednisone starting 10/24, plan to taper q3 days  - started Mucinex 600 mg po BID  - noted bicarb trending up 38--37--39--41  - VBG on 10/25- with pCO2 71; ordered BiPAP 4 times daily; used BiPAP for few hors last evening, repeat VBG with improvement pCO2 66  -  Pulmonology consult.  - Incentive spirometry, mobilize as able  - Still requiring 3-4 L oxygen, and desaturates more with activity; try to wean down O2 needs if able  - PT rec TCU    Acute encephalopathy, Delirium of critical illness, improving  Improving, pt AAO x2-3.   - Started on Seroquel 50 mg po at bedtime by ICU  - Delirium precautions  - Reorient frequently   - PT, OT following, recommend TCU- the patient and daughter agree with this plan     Coronary artery disease, Hx NSTEMI  Hypertension  Heart failure with preserved ejection fraction   on admit. No peripheral edema.   TTE 2017: LVEF 65-70% with increased RV wall thickness.   - PTA furosemide/KCl (PTA furosemide 40mg 5x/wk)- held on admission given critical illness, resumed on 10/20  - Telemetry  - resumed PTA ASA 81mg/d, atorvastatin 20mg/d  - 2gm Na+ diet  - Does not appear to be on any antihypertensives at home     Chronic venous stasis  - b/l LE erythematous and lichenification but does not appear acutely cellulitic. No fevers/chills.  - General wound care  - No acute concerns for cellulitis      Insulin-dependent type 2 diabetes mellitus  HbA1c 6%.   - PTA on Lantus 1 units at bedtime?  - --304 yesterday  - steroids likely contributing to elevated BS  - started Lantus 4 units at bedtime, BS in am 94; decreased Lantus to 2 units qhs  - ISS  - Hypoglycemia protocol, preprandial and HS fingerstick checks    Hx stabe IIB colon cancer 2016 s/p left hemicolectomy and colostomy  -WOCN following for ostomy cares     Other Noted History per chart review  Anxiety: Continue PTA citalopram 40mg/d, quetiapine 50mg Q HS  GERD: Continue PPI  Lung nodule  Rt MCA aneurysm s/p  coil embolization 2011  PCOM aneurysm rupture s/p coil embolization 2010  Subarachnoid hemorrhage       Diet: Snacks/Supplements Adult: Magic Cup; Between Meals  Combination Diet Dysphagia Diet Level 2: Mechan Altered; Nectar Thickened Liquids (pre-thickened or use instant food thickener) (No straws)     DVT Prophylaxis: Pneumatic Compression Devices   Cornejo Catheter: not present  Code Status: Full Code     Disposition Plan    Expected discharge: 1-2 days, recommended to TCU    Entered: Mary Kate Romero MD 10/26/2020, 10:23 AM        The patient's care was discussed with the Bedside Nurse and Patient     Angelica Romero MD  Hospitalist Service  [facility]    ______________________________________________________________________    Interval History  Doing fine, maybe a little bit more SOB today, no chest pain, some mildly productive cough; no N/V, no abd pain, no diarrhea    Data reviewed today: I reviewed all medications, new labs and imaging results over the last 24 hours. I personally reviewed no images or EKG's today.    Physical Exam   Vital signs:  Temp: 98.3  F (36.8  C) Temp src: Oral BP: 113/48 Pulse: 75   Resp: 16 SpO2: 98 % O2 Device: Nasal cannula Oxygen Delivery: 4 LPM   Weight: 60.7 kg (133 lb 13.1 oz)  There is no height or weight on file to calculate BMI.      Wt Readings from Last 2 Encounters:   10/26/20 60.7 kg (133 lb 13.1 oz)   03/21/17 51.7 kg (114 lb)       Gen: AAOX2-3, NAD  HEENT: no pallor  Resp: Decreased air entry bilaterally with scattered wheeze, normal effort of breathing  CVS: RRR, no murmur  Abd/GI: Soft, non-tender.  Colostomy in place.  BS- normoactive.   Skin: Warm, dry no rashes  MSK: Chronic dermal thickening b/l LE  Neuro- CN- intact.  Globally weak      Data   Recent Labs   Lab 10/26/20  0855 10/24/20  0840 10/23/20  0907 10/22/20  0924   WBC  --  9.2 7.1 6.3   HGB  --  13.2 13.9 14.4   MCV  --  101* 100 100    208 215 215    145* 142 141    POTASSIUM 3.8 3.7 4.0 3.8   CHLORIDE 102 102 102 101   CO2 39* 41* 39* 36*   BUN 18 25 18 21   CR 0.52 0.48* 0.42* 0.47*   ANIONGAP 1* 2* 1* 4   MARILU 8.5 8.4* 8.9 8.7   GLC 82 112* 135* 237*       No results found for this or any previous visit (from the past 24 hour(s)).  Medications     - MEDICATION INSTRUCTIONS -       - MEDICATION INSTRUCTIONS -       sodium chloride 10 mL/hr at 10/21/20 0553       aspirin  81 mg Oral Daily     atorvastatin  20 mg Oral Daily     citalopram  40 mg Oral Daily     fluticasone-vilanterol  1 puff Inhalation Daily     furosemide  40 mg Oral Once per day on Mon Tue Wed Fri Sat     guaiFENesin  600 mg Oral BID     heparin ANTICOAGULANT  5,000 Units Subcutaneous Q12H     insulin aspart  1-3 Units Subcutaneous TID AC     insulin aspart  1-3 Units Subcutaneous At Bedtime     insulin glargine  2 Units Subcutaneous At Bedtime     ipratropium - albuterol 0.5 mg/2.5 mg/3 mL  3 mL Nebulization Q4H     pantoprazole  40 mg Oral Daily     potassium chloride ER  10 mEq Oral Once per day on Mon Tue Wed Fri Sat     predniSONE  40 mg Oral Daily     QUEtiapine  50 mg Oral At Bedtime     senna-docusate  1 tablet Oral At Bedtime

## 2020-10-27 NOTE — PLAN OF CARE
SLP: Patient seen for swallow treatment at breakfast. She demonstrated sufficient mastication of a soft solid with good oral clearing of a muffin. She was able to tolerate nectar thick liquids without overt Sx of aspiration. Trials of water given with premature entry and did not have overt Sx of aspiration, but did have changes in her oxygen and HR. Can not rule out silent aspiration. Given respiratory status recommend completing a video swallow study prior to advancing to [facility]. Will complete at 13:30 today.

## 2020-10-27 NOTE — PLAN OF CARE
DATE & TIME: 10/26/2020 4562-6631  Cognitive Concerns/ Orientation : A&Ox4,  forgetful at times  BEHAVIOR & AGGRESSION TOOL COLOR: Green  ABNL VS/O2: VSS 93% 4 lpm NC; C/o SOB, improved with mindful breathing, LS- BLL coarse   MOBILITY:  Up with assist of 1, walker and gb; turned and repositioned per pt request, otherwise turns herself  PAIN MANAGMENT: Denies  DIET: Dysphagia diet, level 2, nectar thick; fair appetite  BOWEL/BLADDER: incontinent of urine. Purewick in place.  Colostomy (changed today, low output this shift)  ABNL LAB/BG:   DRAINS/DEVICES: PIV SL; LLQ colostomy.   TELEMETRY RHYTHM: NSR   SKIN: Scattered bruising, mepliex to coccyx for prevention- CDI, patches of dry skin.   TESTS/PROCEDURES: none  D/C DAY/GOALS/PLACE: Pending oxygen weaning and symptom improvement. PT recommends discharge to TCU.  OTHER IMPORTANT INFO:  WOC/PT/SLP following; Pulm consult today. Bipap available PRN for sleeping/napping

## 2020-10-27 NOTE — PLAN OF CARE
DATE & TIME: 10/26-10/27/20 Night shift  Cognitive Concerns/ Orientation : A&Ox4,  forgetful at times  BEHAVIOR & AGGRESSION TOOL COLOR: Green  ABNL VS/O2: VSS 93% on 4L NC- tried to wean down to baseline O2 of 2L but unsuccessful- became SOB.  LS- coarse. Patient's VBG shows increased CO2.  MOBILITY:  Up with assist of 1, walker and gb; turned and repositioned per pt request, otherwise turns herself  PAIN MANAGMENT: Denies  DIET: Dysphagia diet, level 2, nectar thick; fair appetite  BOWEL/BLADDER: incontinent of urine. Purewick in place.  Colostomy (changed today, low output this shift)- abdomen rounded  ABNL LAB/BG:   DRAINS/DEVICES: PIV SL; LLQ colostomy.   TELEMETRY RHYTHM: NSR   SKIN: Scattered bruising, mepliex to coccyx for prevention- CDI, patches of dry skin.   TESTS/PROCEDURES: none  D/C DAY/GOALS/PLACE: Pending oxygen weaning and symptom improvement. PT recommends discharge to TCU.  OTHER IMPORTANT INFO:  WOC/PT/SLP following; Pulm consulted yesterday- rec outpatient appointment. Bipap available PRN for sleeping/napping- but patient refusing

## 2020-10-27 NOTE — PROGRESS NOTES
10/27/20 1405   General Information   Onset of Illness/Injury or Date of Surgery 10/17/20   Referring Physician Dr. Romero   Patient/Family Therapy Goal Statement (SLP) She did not state.   Pertinent History of Current Problem Bella Saucedo is a 74 year old female with PMH significant for COPD on 2 L chronic home O2, insulin-dependent type 2 diabetes, colon cancer status post left hemicolectomy and colostomy, hypertension, heart failure with preserved EF who was directly admitted to the ICU from [facility] on 10/17/2020 with acute exacerbation of COPD and concerns for community-acquired pneumonia requiring intubation. COVID-19 ruled out. Extubated on 10/19/2020, hospitalist service consulted 10/20/2020 to transfer out of ICU and assume care.   General Observations Pleasant and SOB.   VFSS Evaluation   Radiologist Dr. Donovan   Views Taken left lateral   Physical Location of Procedure [facility]   VFSS Textures Trialed Thin Liquids;Nectar-Thick Liquids;Purees;Semi-Solid   VFSS Eval: Thin Liquid Texture Trial   Mode of Presentation, Thin Liquid cup;spoon;self-fed;fed by clinician   Order of Presentation 1 2 3 6 8   Preparatory Phase WFL   Oral Phase, Thin Liquid Premature pharyngeal entry   Pharyngeal Phase, Thin Liquid WFL   Rosenbek's Penetration Aspiration Scale: Thin Liquid Trial Results 1 - no aspiration, contrast does not enter airway   Diagnostic Statement Premature entry otherwise normal.    VFSS Evaluation: Nectar Thick Liquid Texture Trial   Mode of Presentation, Nectar cup;self-fed   Order of Presentation 4   Preparatory Phase WFL   Oral Phase, Nectar WFL   Pharyngeal Phase, [place] WFL   Rosenbek's Penetration Aspiration Scale: Nectar-Thick Liquid Trial Results 1 - no aspiration, contrast does not enter airway   VFSS Evaluation: Puree Solid Texture Trial   Mode of Presentation, Puree spoon;self-fed   Order of Presentation 5   Preparatory Phase WFL   Oral Phase, Puree Poor AP movement;Residue in oral cavity    Pharyngeal Phase, Puree WFL   Rosenbek's Penetration Aspiration Scale: Puree Food Trial Results 1 - no aspiration, contrast does not enter airway   Diagnostic Statement Minimal oral/BOT residue.    VFSS Evaluation: Semisolid Texture Trial   Mode of Presentation, Semisolid spoon;self-fed   Order of Presentation 7   Preparatory Phase WFL   Oral Phase, Semisolid Poor AP movement;Premature pharyngeal entry   Pharyngeal Phase, Semisolid WFL   Rosenbek's Penetration Aspiration Scale: Semisolid Food Trial Results 1 - no aspiration, contrast does not enter airway   Diagnostic Statement Premature entry to the valleculae with minimal BOT residue.    Swallowing Recommendations   Diet Consistency Recommendations dysphagia level 2 (mechanically altered);thin liquids   Supervision Level for Intake distant supervision needed   Mode of Delivery Recommendations bolus size, small;food moistened;no straws;slow rate of intake   Swallowing Maneuver Recommendations alternate food and liquid intake;extra swallow   Monitoring/Assistance Required (Eating/Swallowing) monitor for cough or change in vocal quality with intake;other (see comments)  (SOB stop eating.)   Recommended Feeding/Eating Techniques (Swallow Eval) maintain upright sitting position for eating;maintain upright posture during/after eating for 30 minutes;set-up and prepare tray   Medication Administration Recommendations, Swallowing (SLP) Crush meds or whole in pudding/apple sauce if unable to crush.    Comment, Swallowing Recommendations Patient presents with mild oral and pharyngeal dysphagia on today's study. Deficits include; mildly reduced bolus control with thin liquids with premature entry, but no penetration/aspiration via the cup. Nectar thick liquids were WFL. Mildly decreased AP movement of a pureed and semi-solid bolus. Premature entry of semi-soft bolus to the valleculae and minimal oral/BOT residue. Patient continues to be at risk if eating/drinking while SOB.     General Therapy Interventions   Planned Therapy Interventions Dysphagia Treatment   Dysphagia treatment Modified diet education;Instruction of safe swallow strategies   SLP Therapy Assessment/Plan   Criteria for Skilled Therapeutic Interventions Met (SLP Eval) yes   SLP Diagnosis Mild oral and pharyngeal dysphagia   Rehab Potential (SLP Eval) good, to achieve stated therapy goals   Therapy Frequency (SLP Eval) 5 times/wk   Predicted Duration of Therapy Intervention (SLP Eval) 1 week   Therapy Plan Review/Discharge Plan (SLP)   Therapy Plan Review (SLP) evaluation/treatment results reviewed;care plan/treatment goals reviewed;risks/benefits reviewed;patient;participants included;participants voiced agreement with care plan   SLP Discharge Planning    SLP Discharge Recommendation (DC Rec) Transitional Care Facility   SLP Rationale for DC Rec Will need continued ST needs at next level of care for safe diet advancement.    SLP Brief overview of current status  Patient with improving swallow function on video swallow study. Recommend: 1. Continue on the DDL 2 and advance to thin liquids. 2. Up in a chair for all meals, no straws, small bites/sips, slow rate of eating and takes frequent breaks to breathe. Alternate dliquids/solids as needed.     Total Evaluation Time   Total Evaluation Time (Minutes) 16

## 2020-10-27 NOTE — PROGRESS NOTES
Patient given nebulizer treatments as ordered. BS are diminished pre and post. SpO2 is 94% on 3 lpm n.c.

## 2020-10-27 NOTE — PLAN OF CARE
DATE & TIME: 10/27/20 0366-0211  Cognitive Concerns/ Orientation : A&Ox4,  forgetful at times  BEHAVIOR & AGGRESSION TOOL COLOR: Green  ABNL VS/O2: VSS 93% on 4L NC, SOB & KELLY, attempted to wean but unable, pt remains at 4Lnc; LS diminished, LLL crackles   MOBILITY:  Up with assist of 1, walker and GB  PAIN MANAGMENT: Denies  DIET: Dysphagia diet, level 2, thin liquids  BOWEL/BLADDER: Incontinent of urine, purewick in place, adequate output.  Colostomy changed today, 1 large very hard stool  ABNL LAB/BG: BG 89, 158, 216  DRAINS/DEVICES: PIV SL; LLQ colostomy; purewick  TELEMETRY RHYTHM: NSR- discontinued  SKIN: Scattered bruising, mepliex to coccyx for prevention- CDI, patches of dry skin.   TESTS/PROCEDURES: XR swallow study, diet advanced to thin liquids  D/C DAY/GOALS/PLACE: Possible discharge tomorrow, will need TCU placement pending  OTHER IMPORTANT INFO:  WOC, PT, SLP following; Pulm consulted yesterday- rec outpatient appointment. Bipap available PRN for sleeping/napping- but patient refusing

## 2020-10-27 NOTE — PROGRESS NOTES
"[facility]    Medicine Progress Note - Hospitalist Service        Date of Admission:  10/17/2020  4:31 AM    Assessment & Plan:   Bella Saucedo is a 74 year old female with PMH significant for COPD on 2 L chronic home O2, insulin-dependent type 2 diabetes, colon cancer status post left hemicolectomy and colostomy, hypertension, heart failure with preserved EF who was directly admitted to the ICU from [facility] on 10/17/2020 with acute exacerbation of COPD and concerns for community-acquired pneumonia requiring intubation. COVID-19 ruled out. Extubated on 10/19/2020, hospitalist service consulted 10/20/2020 to transfer out of ICU and assume care.     Acute hypoxemic and hypercapnic respiratory failure requiring intubation 10/17-10/19  Possible community-acquired pneumonia   Acute exacerbation of severe COPD in the setting of chronic 2 L home O2  Ruled out COVID-19 - Negative 10/17  Presented with hypoxic hypercapnic respiratory failure. ABG on admit c/w severe non-compensated respiratory acidosis with pCO2 >90 and failed attempt at BiPAP therefore she was intubated 10/17-10/19. No infiltrates on CXR. Started as COVID-19 PUI but testing negative and considered low risk therefore not retested. Influenza/RSV Negative. Sputum cultures Neg. Legionella urine Neg. Started on empiric antibiotics for possible CAP. No leukocytosis. Continues to be hypercapnic, likely chronic in setting of COPD. Appears to have a relatively recent smoking hx.  - completed ceftriaxone and azithromycin today(1 week total)  - Scheduled and PRN duo nebs  - it seems that in the past she was on Advair but as per pharm note- she stopped taking it because \"it dries her throat and causes dry cough; she would benefit from a ICS/LABA combination after discharge; started her on Breo Ellipta; also started on Incruse  - Slow improvement, CT chest done on 10/22- no PE; moderate left lower lobe compressive atelectasis and/or infiltrate; small " left effusion  - Transition to oral prednisone 40 mg po daily starting 10/24, plan to taper q4 days  - started Mucinex 600 mg po BID  - noted bicarb trending up 38--37--39--41  - VBG on 10/25- with pCO2 71; pH is 7.41 so she is compensating well; ordered BiPAP 4 times daily; repeat VBG after she used BiPAP for few hours- with improvement pCO2 66  - she would benefit from using BiPAP when at bedtime  -  Pulmonology consult appreciated; no other interventions recommended at this time; needs to follow up with Pulmonology after discharge   - Incentive spirometry, mobilize as able  - Still requiring 3-4 L oxygen, and desaturates more with activity; try to wean down O2 needs if able  - PT rec TCU  - SLP- plan for video swallow study today    Acute encephalopathy, Delirium of critical illness, improving  Improving, pt AAO x2-3.   - Started on Seroquel 50 mg po at bedtime by ICU  - Delirium precautions  - Reorient frequently   - PT, OT following, recommend TCU- the patient and daughter agree with this plan     Coronary artery disease, Hx NSTEMI  Hypertension  Heart failure with preserved ejection fraction   on admit. No peripheral edema.   TTE 2017: LVEF 65-70% with increased RV wall thickness.   - PTA furosemide/KCl (PTA furosemide 40mg 5x/wk)- held on admission given critical illness, resumed on 10/20  - Telemetry  - resumed PTA ASA 81mg/d, atorvastatin 20mg/d  - 2gm Na+ diet  - Does not appear to be on any antihypertensives at home     Chronic venous stasis  - b/l LE erythematous and lichenification but does not appear acutely cellulitic. No fevers/chills.  - General wound care  - No acute concerns for cellulitis      Insulin-dependent type 2 diabetes mellitus  HbA1c 6%.   - PTA on Lantus 1 units at bedtime?  - --304 yesterday  - steroids likely contributing to elevated BS  - started Lantus 4 units at bedtime, BS in am 94; decreased Lantus to 2 units qhs  - ISS  - Hypoglycemia protocol, preprandial and HS  fingerstick checks    Hx stabe IIB colon cancer 2016 s/p left hemicolectomy and colostomy  -WOCN following for ostomy cares     Other Noted History per chart review  Anxiety: Continue PTA citalopram 40mg/d, quetiapine 50mg Q HS  GERD: Continue PPI  Lung nodule  Rt MCA aneurysm s/p coil embolization 2011  PCOM aneurysm rupture s/p coil embolization 2010  Subarachnoid hemorrhage       Diet: Snacks/Supplements Adult: Magic Cup; Between Meals  Combination Diet Dysphagia Diet Level 2: Mechan Altered; Nectar Thickened Liquids (pre-thickened or use instant food thickener) (No straws)     DVT Prophylaxis: Pneumatic Compression Devices   Cornejo Catheter: not present  Code Status: Full Code     Disposition Plan    Expected discharge: possible tomorrow, recommended to TCU    Entered: Mary Kate Romero MD 10/27/2020, 10:44 AM        The patient's care was discussed with the Bedside Nurse, Care Coordinator/ and Patient     Angelica Romero MD  Hospitalist Service  [facility]    ______________________________________________________________________    Interval History  Doing fine, SOB stable, does not really like to use BiPAP, no chest pain, some mildly productive cough; no N/V, no abd pain, no diarrhea    Data reviewed today: I reviewed all medications, new labs and imaging results over the last 24 hours. I personally reviewed no images or EKG's today.    Physical Exam   Vital signs:  Temp: 98.4  F (36.9  C) Temp src: Oral BP: 131/59 Pulse: 75   Resp: 16 SpO2: 95 % O2 Device: Nasal cannula Oxygen Delivery: 3 LPM   Weight: 60.7 kg (133 lb 13.1 oz)  There is no height or weight on file to calculate BMI.      Wt Readings from Last 2 Encounters:   10/26/20 60.7 kg (133 lb 13.1 oz)   03/21/17 51.7 kg (114 lb)       Gen: AAOX 3, NAD  HEENT: no pallor  Resp: Decreased air entry bilaterally with scattered wheeze, normal effort of breathing  CVS: RRR, no murmur  Abd/GI: Soft, non-tender.  Colostomy in place.   BS- normoactive.   Skin: Warm, dry no rashes  MSK: Chronic dermal thickening b/l LE  Neuro- CN- intact.  Globally weak      Data   Recent Labs   Lab 10/26/20  0855 10/24/20  0840 10/23/20  0907 10/22/20  0924   WBC  --  9.2 7.1 6.3   HGB  --  13.2 13.9 14.4   MCV  --  101* 100 100    208 215 215    145* 142 141   POTASSIUM 3.8 3.7 4.0 3.8   CHLORIDE 102 102 102 101   CO2 39* 41* 39* 36*   BUN 18 25 18 21   CR 0.52 0.48* 0.42* 0.47*   ANIONGAP 1* 2* 1* 4   MARILU 8.5 8.4* 8.9 8.7   GLC 82 112* 135* 237*       No results found for this or any previous visit (from the past 24 hour(s)).  Medications     - MEDICATION INSTRUCTIONS -       - MEDICATION INSTRUCTIONS -         aspirin  81 mg Oral Daily     atorvastatin  20 mg Oral Daily     citalopram  40 mg Oral Daily     fluticasone-vilanterol  1 puff Inhalation Daily     furosemide  40 mg Oral Once per day on Mon Tue Wed Fri Sat     guaiFENesin  600 mg Oral BID     heparin ANTICOAGULANT  5,000 Units Subcutaneous Q12H     insulin aspart  1-3 Units Subcutaneous TID AC     insulin aspart  1-3 Units Subcutaneous At Bedtime     insulin glargine  2 Units Subcutaneous At Bedtime     ipratropium - albuterol 0.5 mg/2.5 mg/3 mL  3 mL Nebulization Q4H     pantoprazole  40 mg Oral Daily     potassium chloride ER  10 mEq Oral Once per day on Mon Tue Wed Fri Sat     predniSONE  40 mg Oral Daily     QUEtiapine  50 mg Oral At Bedtime     senna-docusate  1 tablet Oral At Bedtime     umeclidinium  1 puff Inhalation Daily

## 2020-10-28 NOTE — PROGRESS NOTES
CLINICAL NUTRITION SERVICES - REASSESSMENT NOTE    Malnutrition: (10/23)  % Weight Loss:  None noted  % Intake:  </= 50% for >/= 5 days (severe malnutrition)  Subcutaneous Fat Loss:  Upper arm region moderate depletion  Muscle Loss:  Temporal region mild depletion, Clavicle bone region mild depletion, Patellar region mild depletion and Posterior calf region mild depletion  Fluid Retention:  None noted     Malnutrition Diagnosis: Non-Severe malnutrition  In Context of:  Acute illness or injury     EVALUATION OF PROGRESS TOWARD GOALS   Diet: DD2 + Thins  Magic Cup BID between meals     Intake/Tolerance:   - Since last assessment patient has been consuming % of most meals, occasional documentation of 25 or 50%. Orders 2-3 meals/day, these are often quite small.   - Patient states that she feels she is eating very well and is continuing to enjoy the magic cup supplement. She is receiving this between meals, as well as often ordered them with meals.   - States she likes the chocolate, berry, and orange flavors.   - Denies any trouble with appetite, intake, and has been enjoying the meals here.     ASSESSED NUTRITION NEEDS:  Dosing Weight 62.4 kg   Estimated Energy Needs: 4072-1645 kcals (25-30 Kcal/Kg)  Justification: maintenance  Estimated Protein Needs: 75-95 grams protein (1.2-1.5 g pro/Kg)  Justification: hypercatabolism with acute illness  Estimated Fluid Needs: 1200-6853 mL (1 mL/Kcal)  Justification: maintenance    NEW FINDINGS:   - SLP following for dysphagia   - Per MD - medically stable for discharge today, pending placement to TCU.     Previous Goals:   Patient will consume > 75% meals and supplements  Evaluation: Met    Previous Nutrition Diagnosis:   Malnutrition related to limited ability to take po earlier in stay, some evidence of fat and muscle wating as evidenced by patient coding for non-severe malnutrition  Evaluation: Continues below     CURRENT NUTRITION DIAGNOSIS  Malnutrition related to  limited ability to take po and difficulty swallowing as evidenced by patient coding for non-severe malnutrition, evidence of fat and muscle wasting.     INTERVENTIONS  Recommendations / Nutrition Prescription  Diet per SLP  Magic cup BID between meals + PRN    Implementation  None new - ongoing PO encouragement     Goals  Intake of at least 75% meals TID.       MONITORING AND EVALUATION:  Progress towards goals will be monitored and evaluated per protocol and Practice Guidelines    Bertha Villareal RD, LD  [facility] [place], [address], [place]   Pager: [phone]  Weekend Pager: [phone]

## 2020-10-28 NOTE — PLAN OF CARE
SLP: Attempted to see patient x2 am and pm but was to SOB for safe PO intake. Will reschedule for 10/29/20.

## 2020-10-28 NOTE — PROGRESS NOTES
"[facility]    Medicine Progress Note - Hospitalist Service        Date of Admission:  10/17/2020  4:31 AM    Assessment & Plan:   Bella Saucedo is a 74 year old female with PMH significant for COPD on 2 L chronic home O2, insulin-dependent type 2 diabetes, colon cancer status post left hemicolectomy and colostomy, hypertension, heart failure with preserved EF who was directly admitted to the ICU from [facility] on 10/17/2020 with acute exacerbation of COPD and concerns for community-acquired pneumonia requiring intubation. COVID-19 ruled out. Extubated on 10/19/2020, hospitalist service consulted 10/20/2020 to transfer out of ICU and assume care.     Acute hypoxemic and hypercapnic respiratory failure requiring intubation 10/17-10/19  Possible community-acquired pneumonia   Acute exacerbation of severe COPD in the setting of chronic 2 L home O2  Ruled out COVID-19 - Negative 10/17  Presented with hypoxic hypercapnic respiratory failure. ABG on admit c/w severe non-compensated respiratory acidosis with pCO2 >90 and failed attempt at BiPAP therefore she was intubated 10/17-10/19. No infiltrates on CXR. Started as COVID-19 PUI but testing negative and considered low risk therefore not retested. Influenza/RSV Negative. Sputum cultures Neg. Legionella urine Neg. Started on empiric antibiotics for possible CAP. No leukocytosis. Continues to be hypercapnic, likely chronic in setting of COPD. Appears to have a relatively recent smoking hx.  - completed ceftriaxone and azithromycin today(1 week total)  - Scheduled and PRN duo nebs  - it seems that in the past she was on Advair but as per pharm note- she stopped taking it because \"it dries her throat and causes dry cough; she would benefit from a ICS/LABA combination after discharge; started her on Breo Ellipta; also started on Incruse  - Slow improvement, CT chest done on 10/22- no PE; moderate left lower lobe compressive atelectasis and/or infiltrate; small " left effusion  - Transition to oral prednisone 40 mg po daily starting 10/24, now on Prednisone 30 mg po daily, plan to taper q4 days  - started Mucinex 600 mg po BID  - noted bicarb trending up 38--37--39--41  - VBG on 10/25- with pCO2 71; pH is 7.41 so she is compensating well; ordered BiPAP 4 times daily; repeat VBG after she used BiPAP for few hours- with improvement pCO2 66  - she would benefit from using BiPAP when at bedtime  -  Pulmonology consult appreciated; no other interventions recommended at this time; needs to follow up with Pulmonology after discharge   - Incentive spirometry, mobilize as able  - Still requiring 3-4 L oxygen, and desaturates more with activity; try to wean down O2 needs if able; O2 sat goal should be>90%  - PT rec TCU  - SLP-had video swallow study- DD2 with thin liquids  - SW consult    Acute encephalopathy, Delirium of critical illness, improved  Improving, pt AAO x2-3.   - Started on Seroquel 50 mg po at bedtime by ICU  - Delirium precautions  - Reorient frequently   - PT, OT following, recommend TCU- the patient and daughter agree with this plan     Coronary artery disease, Hx NSTEMI  Hypertension  Heart failure with preserved ejection fraction   on admit. No peripheral edema.   TTE 2017: LVEF 65-70% with increased RV wall thickness.   - PTA furosemide/KCl (PTA furosemide 40mg 5x/wk)- held on admission given critical illness, resumed on 10/20  - Telemetry  - resumed PTA ASA 81mg/d, atorvastatin 20mg/d  - 2gm Na+ diet  - Does not appear to be on any antihypertensives at home     Chronic venous stasis  - b/l LE erythematous and lichenification but does not appear acutely cellulitic. No fevers/chills.  - General wound care  - No acute concerns for cellulitis      Insulin-dependent type 2 diabetes mellitus  HbA1c 6%.   - PTA on Lantus 1 units at bedtime?  - --304 yesterday  - steroids likely contributing to elevated BS  - started Lantus 4 units at bedtime, BS in am 94;  decreased Lantus to 2 units qhs  - ISS  - Hypoglycemia protocol, preprandial and HS fingerstick checks    Hx stabe IIB colon cancer 2016 s/p left hemicolectomy and colostomy  -WOCN following for ostomy cares    Non-severe malnutrition  - nutrition consult     Other Noted History per chart review  Anxiety: Continue PTA citalopram 40mg/d, quetiapine 50mg Q HS  GERD: Continue PPI  Lung nodule  Rt MCA aneurysm s/p coil embolization 2011  PCOM aneurysm rupture s/p coil embolization 2010  Subarachnoid hemorrhage       Diet: Snacks/Supplements Adult: Magic Cup; Between Meals  Combination Diet Dysphagia Diet Level 2: Mechan Altered; Thin Liquids (water, ice chips, juice, milk gelatin, ice cream, etc) (No straws)     DVT Prophylaxis: Pneumatic Compression Devices   Cornejo Catheter: not present  Code Status: Full Code     Disposition Plan      Expected discharge: medically stable for discharge, recommended to TCU, pending bed availability.    Entered: Mary Kate Romero MD 10/28/2020, 10:55 AM        The patient's care was discussed with the Bedside Nurse, Care Coordinator/ and Patient     Angelica Romero MD  Hospitalist Service  [facility]    ______________________________________________________________________    Interval History  Doing fine, no events overnight, SOB stable, no chest pain, some mildly productive cough; no N/V, no abd pain, no diarrhea    Data reviewed today: I reviewed all medications, new labs and imaging results over the last 24 hours. I personally reviewed no images or EKG's today.    Physical Exam   Vital signs:  Temp: 97.5  F (36.4  C) Temp src: Oral BP: 125/58 Pulse: 71   Resp: 16 SpO2: 92 % O2 Device: Nasal cannula Oxygen Delivery: 2 LPM   Weight: 59.8 kg (131 lb 13.4 oz)  There is no height or weight on file to calculate BMI.      Wt Readings from Last 2 Encounters:   10/28/20 59.8 kg (131 lb 13.4 oz)   03/21/17 51.7 kg (114 lb)       Gen: AAOX 3, NAD  HEENT: no  pallor  Resp: Decreased air entry bilaterally with scattered wheeze, normal effort of breathing  CVS: RRR, no murmur  Abd/GI: Soft, non-tender.  Colostomy in place.  BS- normoactive.   Skin: Warm, dry no rashes  MSK: Chronic dermal thickening b/l LE  Neuro- CN- intact.  Globally weak      Data   Recent Labs   Lab 10/26/20  0855 10/24/20  0840 10/23/20  0907 10/22/20  0924   WBC  --  9.2 7.1 6.3   HGB  --  13.2 13.9 14.4   MCV  --  101* 100 100    208 215 215    145* 142 141   POTASSIUM 3.8 3.7 4.0 3.8   CHLORIDE 102 102 102 101   CO2 39* 41* 39* 36*   BUN 18 25 18 21   CR 0.52 0.48* 0.42* 0.47*   ANIONGAP 1* 2* 1* 4   MARILU 8.5 8.4* 8.9 8.7   GLC 82 112* 135* 237*       Recent Results (from the past 24 hour(s))   XR Video Swallow with SLP or OT    Narrative    VIDEO SWALLOWING EVALUATION   10/27/2020 1:59 PM     HISTORY: COPD with intubation.    COMPARISON: None.    FLUOROSCOPY TIME: 0.6 minutes     Number of cine runs: 8    FINDINGS:    Thin: Normal.    Nectar: Normal.    Honey: Not administered.    Pudding: Normal.    Semisolid: Premature spill to the vallecula. Otherwise normal.    Solid: Not administered.    This study only includes the cervical esophagus.    ARLEN JOHANSEN MD     Medications     - MEDICATION INSTRUCTIONS -       - MEDICATION INSTRUCTIONS -         aspirin  81 mg Oral Daily     atorvastatin  20 mg Oral Daily     citalopram  40 mg Oral Daily     fluticasone-vilanterol  1 puff Inhalation Daily     furosemide  40 mg Oral Once per day on Mon Tue Wed Fri Sat     guaiFENesin  600 mg Oral BID     heparin ANTICOAGULANT  5,000 Units Subcutaneous Q12H     insulin aspart  1-3 Units Subcutaneous TID AC     insulin aspart  1-3 Units Subcutaneous At Bedtime     insulin glargine  2 Units Subcutaneous At Bedtime     ipratropium - albuterol 0.5 mg/2.5 mg/3 mL  3 mL Nebulization 4x daily     pantoprazole  40 mg Oral Daily     potassium chloride ER  10 mEq Oral Once per day on Mon Tue Wed Fri Sat      predniSONE  30 mg Oral Daily     QUEtiapine  50 mg Oral At Bedtime     senna-docusate  1 tablet Oral At Bedtime     umeclidinium  1 puff Inhalation Daily

## 2020-10-28 NOTE — PLAN OF CARE
"DATE & TIME: 10/28/2020 5688-7827  Cognitive Concerns/ Orientation : A&Ox4,  forgetful at times  BEHAVIOR & AGGRESSION TOOL COLOR: Green  ABNL VS/O2: VSS 90% on 3L NC, increased O2 needs when eating and during ambulation. Very dyspneic but \"feels normal\" per pt.   MOBILITY: Up with assist of 1, walker and GB, encouraging activity but KELLY pretty severe. Up in the chair for meals.  PAIN MANAGMENT: Denies pain.  DIET: Dysphagia diet, level 2, thin liquids  BOWEL/BLADDER: incontinent of urine. Purewick in place.  Colostomy changed yesterday 10/27, stool soft, large.  ABNL LAB/BG: BG 87/202.  DRAINS/DEVICES: PIV SL; LLQ colostomy.   TELEMETRY RHYTHM: NA  SKIN: Scattered bruising, mepliex to coccyx for prevention- CDI, patches of dry skin. Top of feet scaly/dry.  TESTS/PROCEDURES: NA  D/C DAY/GOALS/PLACE: Anticipate discharge tomorrow 10/29 to TCU pending bed availability.  OTHER IMPORTANT INFO:  WOC, PT, SLP following. Bipap available PRN for sleeping/napping, not used this shift. Nursing will continue to monitor.   "

## 2020-10-28 NOTE — PLAN OF CARE
"DATE & TIME: 10/28/2020 7664-5273  Cognitive Concerns/ Orientation : A&Ox4,  forgetful at times  BEHAVIOR & AGGRESSION TOOL COLOR: Green  ABNL VS/O2: VSS. Was at 90% on 3L NC earlier. Increased O2 needs when eating and during ambulation. Very dyspneic but \"feels normal\" per pt. Pt was stating at 90% on 8L of oxy mask. Pt put on BiPap on for an hour, requested to take it off. Now on 5L Oxymizer.   MOBILITY: Up with assist of 1, walker and GB, encouraging activity but KELLY pretty severe. Up in the chair for meals.  PAIN MANAGMENT: Denies pain.  DIET: Dysphagia diet, level 2, thin liquids  BOWEL/BLADDER: incontinent of urine. Purewick in place.  Colostomy changed yesterday 10/27, stool soft, large.  ABNL LAB/BG: BG 87/202.  DRAINS/DEVICES: PIV SL; LLQ colostomy.   TELEMETRY RHYTHM: NA  SKIN: Scattered bruising, mepliex to coccyx for prevention- CDI, patches of dry skin. Top of feet scaly/dry.  TESTS/PROCEDURES: NA  D/C DAY/GOALS/PLACE: Anticipate discharge tomorrow 10/29 to TCU pending bed availability.  OTHER IMPORTANT INFO:  WOC, PT, SLP following. Bipap available PRN for sleeping/napping. Nursing will continue to monitor.   "

## 2020-10-28 NOTE — PROGRESS NOTES
"Care Management Follow Up Note    Length of Stay (days) 11    Patient plan of care discussed at Interdisciplinary Rounds: YES  Expected Discharge Date: 10/28/20  Concerns to be Addressed: adjustment to diagnosis/illness, care coordination/care conferences, discharge planning(. Patient is below her baseline with mobility,fatigues easily,sats drop with any activity,patient would benefit from outpt pulmonary rehab after TCU stay)       On 10/21, TCU referrals were made the CT RN [facility].  [facility] had decline due to lack of rooms and [facility] had accepted.   Today, Dr Romero reports patient is stable for discharge.   Today writer is contacting these facilities to check on availability. Met with patient to review these options. Discussed discharge possibly later today or tomorrow.   She said she isn't ready for discharge today and reports it is because she is feeling \"anxious\".  She shares, in the past she was at [facility] and was very dissatisfied with the facility.          Anticipated Discharge Disposition: Skilled Nursing Facilty  Anticipated Discharge Services:    Anticipated Discharge DME:      Plan:  Will wait to hear back from the TCU's. Patient is not interested in going to facilities further from her home.  If needed, writer will speak with patient regarding facilities [place] and [place].    Carine Blankenship, [facility]   Addendum:  [facility] is not accepting patient's as they have a COVID outbreak.  [facility] has no vacancy and do not anticipate one till Friday or Monday.  In addition their admission department staff (Kathy) stated she did not previous accept patient, thus a new referral was sent to her.   Writer has sent additional referrals to [facility] in [place] and [facility] in [place].  These are the next two closet and both have good Medicare rating.  Will update patient and daughter.  Updated daughter late " afternoon.  [facility] [place] and [facility].  Both facilities were requested by daughter.  [facility] has a vacancy and is assessing patient.  [facility], their liaison- Yadira is contactmyah Culver to check on vacancies.  Daughter is aware discharge is anticipated tomorrow.  She would like to visit patient here before patient discharges tomorrow as she is aware of the no visitors for 14 days at the TCU. She works in the morning but will take off work in order to visit patient  if patient does discharge by early afternoo. She will contact  in the morning for an update.

## 2020-10-28 NOTE — PLAN OF CARE
DATE & TIME: 10/27-10/28/20 Night shift  Cognitive Concerns/ Orientation : A&Ox4,  forgetful at times  BEHAVIOR & AGGRESSION TOOL COLOR: Green  ABNL VS/O2: VSS 93% on 4L NC, SOB & KELLY, attempted to wean but unable, pt remains at 4Lnc; LS diminished, LLL crackles   MOBILITY:  Up with assist of 1, walker and GB  PAIN MANAGMENT: Denies  DIET: Dysphagia diet, level 2, thin liquids  BOWEL/BLADDER: incontinent of urine. Purewick in place.  Colostomy changed yesterday- no stool overnight  ABNL LAB/BG: /131  DRAINS/DEVICES: PIV SL; LLQ colostomy.   TELEMETRY RHYTHM: NSR- discontinued  SKIN: Scattered bruising, mepliex to coccyx for prevention- CDI, patches of dry skin- especially BLE. BLE josette   TESTS/PROCEDURES: XR swallow study, diet advanced to thin liquids  D/C DAY/GOALS/PLACE: Possible discharge today, will need TCU placement   OTHER IMPORTANT INFO:  WOC, PT, SLP following; Pulm consulted- rec outpatient appointment. Bipap available PRN for sleeping/napping

## 2020-10-29 NOTE — PLAN OF CARE
[id number]: Patient alert and oriented, VSS. Was able to wean down to 2.5 L earlier today but during a turn to change pt's brief, pt became very dyspneic and oxygen saturation decreased to 83% requiring 15L O2 to bring sats back up. Pt complained of feeling very anxious about discharge that was supposed to happen at 1500. MD at bedside after this event, discharge put off until tomorrow.   Regular diet, good appetite. BG corrected with sliding scale at lunch. Denies pain. Plan for discharge tomorrow pending oxygen needs tonight.

## 2020-10-29 NOTE — PLAN OF CARE
Cognitive Concerns/ Orientation : A&Ox4,  forgetful at times  BEHAVIOR & AGGRESSION TOOL COLOR: Green  ABNL VS/O2: VSS 90 - 94% on 5L NC. Gets very dyspneic with minimal activity.    MOBILITY: Up with assist of 1, walker and GB.   PAIN MANAGMENT: Denies pain.  DIET: Dysphagia diet, level 2, thin liquids  BOWEL/BLADDER: incontinent of urine. Purewick in use.  Colostomy in place, no stool.  ABNL LAB/BG:   DRAINS/DEVICES: PIV SL; LLQ colostomy.   TELEMETRY RHYTHM: NA  SKIN: Scattered bruising, patches of dry skin top of feet scaly/dry.  TESTS/PROCEDURES: NA  D/C DAY/GOALS/PLACE: Possible discharge today 10/29 to TCU pending bed availability.  OTHER IMPORTANT INFO:  WOC, PT, SLP following. Bipap available PRN for sleeping/napping, declined use overnight.

## 2020-10-29 NOTE — PLAN OF CARE
PT: Since pt not discharging today, pt requesting PT. Arrived for third attempt and pt was on mask at 7L O2. Pt had O2 desaturation and tachypnea per RN with minimal movement. PT will be held today and pt reassessed tomorrow for O2 needs with mobility.

## 2020-10-29 NOTE — PROGRESS NOTES
Care Management Follow Up Note    Length of Stay (days) 12    Patient plan of care discussed at Interdisciplinary Rounds: yes  Expected Discharge Date: 10/30/20  Concerns to be Addressed: discharge planning      Anticipated Discharge Disposition: TCU  Anticipated Discharge Services:  TCU  Anticipated Discharge DME:  None    Plan:  [place] [facility] declined as they can not accomodate a Bipap. SW updated pt and her daughter and they are both in agreement with [facility]. [facility].  [employer] ride set for 1100, Friday. PCS faxed.      ABI Bautista, LGSW  [phone]  [facility]    PAS-RR    D: Per DHS regulation, SW completed and submitted PAS-RR to [place] [employer] Direct Connect via the Senior [employer] Line.  PAS-RR confirmation # is : [id number]    I: SW spoke with ot and they are aware a PAS-RR has been submitted.  SW reviewed with ot that they may be contacted for a follow up appointment within 10 days of hospital discharge if their SNF stay is < 30 days.  Contact information for [facility] Line was also provided.    A: Pt verbalized understanding.    P: Further questions may be directed to [facility] Line at #[phone], option #4 for PAS-RR staff.

## 2020-10-29 NOTE — PLAN OF CARE
PT: Attempted to see for PT. Pt declines any activity at this time, including sitting in chair, stating she is waiting for daughter to arrive and just finished eating. Pt willing to try later. Pt encouraged to sit in chair and move with nursing if not therapy.

## 2020-10-29 NOTE — PROVIDER NOTIFICATION
MD Notification    Person notified:hospitalist    Person Name:Dr. Burden    Date/Time:10/29/20 11:44 AM      Interaction:page    Purpose of Notification:Will pt need Bipap for night at discharge? SW wondering for determining placement.    Orders Received:Yes, pt will discharge with bipap order    Comments:

## 2020-10-30 NOTE — PROGRESS NOTES
Discharge    Patient discharged to TCU  via stretcher with [facility] around 1115.   Care plan note: vss, on 3 LPM NC, KELLY, desats easily, to low 70s with mobility. Lungs wheezy expiratory, diminished inspiratory.  Up in chair for meal. Very anxious about transitioning to TCU this am, PRN ativan oral given 45 minutes prior to discharge. Skin intact, except for bruises. Denied pain. Baseline numbness on LLE. Ax1 with GB/WK to chair. Purewick in place. Colostomy bag changed prior to discharge--stool now soft. Schedule senna and PRN miralax given this am. Discharged in stable condition.     Listed belongings gathered and returned to patient. Yes  Care Plan and Patient education resolved: Yes  Prescriptions if needed, hard copies sent with patient  Yes  Home and hospital acquired medications returned to patient: NA  Medication Bin checked and emptied on discharge Yes  Follow up appointment made for patient: No

## 2020-10-30 NOTE — PLAN OF CARE
DATE & TIME: 10/29/2020 7028-1358  Cognitive Concerns/ Orientation : A&Ox4,  forgetful at times  BEHAVIOR & AGGRESSION TOOL COLOR: Green  ABNL VS/O2: VSS 90 - 93% on 2-3L oximyzer mask. Gets very dyspneic with minimal activity.    MOBILITY: Up with assist of 1, walker and GB.   PAIN MANAGMENT: Denies pain.  DIET: Dysphagia diet, level 2, thin liquids  BOWEL/BLADDER: incontinent of urine. Purewick in place.  Colostomy in place, changed tonight, stool hard, PRN miralax ordered.  ABNL LAB/BG: N/A  DRAINS/DEVICES: PIV SL; LLQ colostomy changed this shift.   TELEMETRY RHYTHM: NA  SKIN: Scattered bruising, mepliex to coccyx for prevention- CDI, patches of dry skin. Top of feet scaly/dry.  TESTS/PROCEDURES: NA  D/C DAY/GOALS/PLACE: Possible discharge 10/30, was planned for 10/29 at 1500, pt had panic attack, was on 15L oximyzer mask sating mid 80s. Discharged held.   OTHER IMPORTANT INFO:  WOC, PT, SLP following. Bipap available PRN for sleeping/napping, declined use overnight. Will hopefull discharge 10/30/20, Discharge needs to be slow and spread out, Pt needs time for discharge to keep anxiety low.

## 2020-10-30 NOTE — PLAN OF CARE
"Speech Language Therapy Discharge Summary    Reason for therapy discharge:    Discharged to transitional care facility.    Progress towards therapy goal(s). See goals on Care Plan in [facility] electronic health record for goal details.  Goals not met.  Barriers to achieving goals:   discharge from facility.    Therapy recommendation(s):    Continued therapy is recommended.  Rationale/Recommendations:  \"Patient with improving swallow function on video swallow study. Recommend: 1. Continue on the DDL 2 and advance to thin liquids. 2. Up in a chair for all meals, no straws, small bites/sips, slow rate of eating and takes frequent breaks to breathe. Alternate dliquids/solids as needed. \".      "

## 2020-10-30 NOTE — DISCHARGE SUMMARY
[facility]    Discharge Summary  Hospitalist    Date of Admission:  10/17/2020  Date of Discharge:  10/30/2020  Discharging Provider: Gela Burden DO  Date of Service (when I saw the patient): 10/30/20    Discharge Diagnoses   Acute on chronic hypoxemic and hypercapnic respiratory failure requiring intubation 10/17-10/19  Possible community-acquired pneumonia   Acute exacerbation of severe COPD in the setting of chronic 2 L home O2  Ruled out COVID-19 - Negative 10/17  Acute encephalopathy, Delirium of critical illness, improved  Coronary artery disease, Hx NSTEMI  Hypertension  Heart failure with preserved ejection fraction  Chronic venous stasis  Insulin-dependent type 2 diabetes mellitus  Hx stabe IIB colon cancer 2016 s/p left hemicolectomy and colostomy  Non-severe malnutrition  Anxiety  GERD  Lung nodule  Rt MCA aneurysm s/p coil embolization 2011  PCOM aneurysm rupture s/p coil embolization 2010  Subarachnoid hemorrhage    History of Present Illness   Bella Saucedo is an 74 year old female who presented as direct admission to the ICU from [facility] on 10/17/2020 with acute exacerbation of COPD and concerns for community-acquired pneumonia requiring intubation. COVID-19 ruled out. Extubated on 10/19/2020, hospitalist service consulted 10/20/2020 to transfer out of ICU and assume care.    Hospital Course   Bella Saucedo was admitted on 10/17/2020.  The following problems were addressed during her hospitalization:    Acute on chronic hypoxemic and hypercapnic respiratory failure requiring intubation 10/17-10/19  Possible community-acquired pneumonia   Acute exacerbation of severe COPD in the setting of chronic 2 L home O2  Ruled out COVID-19 - Negative 10/17  Presented with hypoxic hypercapnic respiratory failure. ABG on admit c/w severe non-compensated respiratory acidosis with pCO2 >90 and failed attempt at BiPAP therefore she was intubated 10/17-10/19. No infiltrates on CXR.  "Started as COVID-19 PUI but testing negative and considered low risk therefore not retested. Influenza/RSV Negative. Sputum cultures Neg. Legionella urine Neg. Started on empiric antibiotics for possible CAP. No leukocytosis. Continues to be hypercapnic, likely chronic in setting of COPD. Appears to have a relatively recent smoking hx.  - Slow improvement, CT chest done on 10/22- no PE; moderate left lower lobe compressive atelectasis and/or infiltrate; small left effusion  - completed ceftriaxone and azithromycin (1 week total)  - it seems that in the past she was on Advair but as per pharm note- she stopped taking it because \"it dries her throat and causes dry cough; she would benefit from a ICS/LABA combination after discharge; started her on Breo Ellipta; also started on Incruse  - Continue duonebs on discharge  - Transitioned to oral prednisone 40 mg po daily starting 10/24, now on Prednisone 30 mg po daily, plan to taper q4 days  - started Mucinex 600 mg po BID  - she would benefit from using BiPAP when at bedtime, discharged with this prescription for 10/5  - her home O2 was continued on discharge (2-3LPM)  -  Pulmonology consult appreciated; no other interventions recommended at this time; needs to follow up with Pulmonology after discharge   - Has anxiety related to her COPD, she has been on diazepam 2mg PRN q6h at home. She was started on lorazepam 0.25mg PRN q6h for anxiety while in the hospital.  - Incentive spirometry, mobilize as able  - Still requiring 2-3 L oxygen, and desaturates more with activity; try to wean down O2 needs if able; O2 sat goal should be>90%  - PT rec TCU  - SLP-had video swallow study- DD2 with thin liquids  - SW consult appreciated for placement, discharging to [facility]  - PT/OT/SLP will continue at TCU     Acute encephalopathy, Delirium of critical illness, improved  Improving, pt AAO x2-3.   - Started on Seroquel 50 mg po at bedtime by ICU  - Delirium precautions  - " Reorient frequently   - PT, OT following, recommend TCU- the patient and daughter agree with this plan     Coronary artery disease, Hx NSTEMI  Hypertension  Heart failure with preserved ejection fraction   on admit. No peripheral edema.   TTE 2017: LVEF 65-70% with increased RV wall thickness.   - PTA furosemide/KCl (PTA furosemide 40mg 5x/wk)- held on admission given critical illness, resumed on 10/20  - resumed PTA ASA 81mg/d, atorvastatin 20mg/d  - 2gm Na+ diet     Chronic venous stasis  - b/l LE erythematous and lichenification but does not appear acutely cellulitic. No fevers/chills.  - General wound care      Insulin-dependent type 2 diabetes mellitus  HbA1c 6%. PTA on Lantus 1 units at bedtime  - steroids likely contributing to elevated BS  - Continue PTA lantus, and medium resistance ssi on discharge given steroid taper  - BG checks at TCU on discharge     Hx stabe IIB colon cancer 2016 s/p left hemicolectomy and colostomy  -WOCN following for ostomy cares  - Daily miralax and senna-docusate to maintain softer stools     Non-severe malnutrition  - nutrition consult appreciated     Other Noted History per chart review  Anxiety: Continue PTA citalopram 40mg/d, quetiapine 50mg Q HS  GERD: Continue PPI  Lung nodule  Rt MCA aneurysm s/p coil embolization 2011  PCOM aneurysm rupture s/p coil embolization 2010  Subarachnoid hemorrhage    Gela Burden, DO    Significant Results and Procedures   See below    Pending Results   NA    Code Status   Full Code       Primary Care Physician   Shannon Olivas    Physical Exam   Temp: 98.4  F (36.9  C) Temp src: Oral BP: 121/62 Pulse: 77   Resp: 20 SpO2: 93 % O2 Device: Nasal cannula Oxygen Delivery: 2 LPM  Vitals:    10/26/20 0636 10/28/20 0630 10/29/20 0446   Weight: 60.7 kg (133 lb 13.1 oz) 59.8 kg (131 lb 13.4 oz) 58 kg (127 lb 13.9 oz)     Vital Signs with Ranges  Temp:  [97.6  F (36.4  C)-98.9  F (37.2  C)] 98.4  F (36.9  C)  Pulse:  [77-79] 77  Resp:  [16-20]  20  BP: (118-146)/(54-66) 121/62  SpO2:  [91 %-95 %] 93 %  I/O last 3 completed shifts:  In: 240 [P.O.:240]  Out: 1250 [Urine:600; Stool:650]    Patient seen and examined on day of discharge. Slept well. Breathing feels comfortable. Saw a beautiful sunrise and ready to try again for discharge to TCU today. Discussed her home anxiety medication and plan to resume low dose med here in the hospital, and continue on discharge to TCU and she is agreeable. No shortness of breath, chest pain, nausea, vomiting. Appears stable for discharge.    Constitutional: Awake, alert, cooperative, no apparent distress.  Eyes: Conjunctiva and pupils examined and normal.  HEENT: Moist mucous membranes, normal dentition.  Respiratory: decreased breath sounds anteriorly, no wheezes.  Cardiovascular: Regular rate and rhythm, normal S1 and S2, and no murmur noted.  GI: Soft, non-distended, non-tender, normal bowel sounds. Colostomy in place--no stool  Lymph/Hematologic: No anterior cervical or supraclavicular adenopathy.  Skin: No rashes, no cyanosis, no edema. Chronic stasis changes bilateral lower extremities.  Musculoskeletal: No joint swelling, erythema or tenderness.  Neurologic: Cranial nerves 2-12 intact, normal strength and sensation.  Psychiatric: Alert, oriented to person, place and time, no obvious anxiety or depression.    Discharge Disposition   Discharged to TCU  Condition at discharge: Stable    Consultations This Hospital Stay   WOUND OSTOMY CONTINENCE NURSE  IP CONSULT  SWALLOW EVAL SPEECH PATH AT BEDSIDE IP CONSULT  PHYSICAL THERAPY ADULT IP CONSULT  CARE MANAGEMENT / SOCIAL WORK IP CONSULT  PULMONARY IP CONSULT  PHYSICAL THERAPY ADULT IP CONSULT  OCCUPATIONAL THERAPY ADULT IP CONSULT  SPEECH LANGUAGE PATH ADULT IP CONSULT    Time Spent on this Encounter   Gela FARIAS DO, personally saw the patient today and spent greater than 30 minutes discharging this patient.    Discharge Orders      General info for SNF     Length of Stay Estimate: Short Term Care: Estimated # of Days <30  Condition at Discharge: Improving  Level of care:skilled  Rehabilitation Potential: Good  Admission H&P remains valid and up-to-date: Yes  Recent Chemotherapy: N/A  Use Nursing Home Standing Orders: Yes     Mantoux instructions    Give two-step Mantoux (PPD) Per Facility Policy Yes     Reason for your hospital stay    Pneumonia with respiratory failure requiring intubation and ventilator support in setting of severe COPD     Glucose monitor nursing POCT    Before meals and at bedtime     Follow Up and recommended labs and tests    Follow up with pulmonology in 1-2 weeks after discharge from TCU    Follow up with PCP within one week from discharge from TCU     Activity    Up with assist     Additional Discharge Instructions    1. You were started on breo and incruse for your COPD    2. You were on prednisone taper at time of discharge--30mg for 1 more day, then on Nov 1st start 20mg daily for 4 days, then on Nov 5th switch to 10mg once daily for 4 days.    3. You will take mucinex twice daily to help with congestion    4. Continue to use oxygen during the day and use bipap at bedtime. Bipap settings at the hospital were: bipap pressure of 10 (ipap) over 5 (epap), back up rate of 12 and 2.5 liters of oxygen bled in.     5. You were discharged on sliding scale insulin because you have had higher blood sugars while you have been on steroids. The insulin can be weaned off as your prednisone dosage decreases.    6. You had been prescribed diazepam in the past for anxiety. A shorter acting version of this medication (lorazepam) was  continued due to anxiety related to your COPD     Full Code     Physical Therapy Adult Consult    Evaluate and treat as clinically indicated.    Reason:  Pneumonia with respiratory failure, deconditioned     Occupational Therapy Adult Consult    Evaluate and treat as clinically indicated.    Reason:  Pneumonia with respiratory  failure, deconditioned     Speech Language Path Adult Consult    Evaluate and treat as clinically indicated.    Reason:  DD2 diet     Fall precautions     Oxygen Adult/Peds    Oxygen Documentation:   I certify that this patient, Bella Saucedo has been under my care (or a nurse practitioner or physican's assistant working with me). This is the face-to-face encounter for oxygen medical necessity.      Bella Saucedo is now in a chronic stable state and continues to require supplemental oxygen. Patient has continued oxygen desaturation due to COPD J44.9.    Alternative treatment(s) tried or considered and deemed clinically infective for treatment of COPD J44.9 include nebulizers, inhalers, steroids and pulmonary toileting.  If portability is ordered, is the patient mobile within the home? yes    **Patients who qualify for home O2 coverage under the CMS guidelines require ABG tests or O2 sat readings obtained closest to, but no earlier than 2 days prior to the discharge, as evidence of the need for home oxygen therapy. Testing must be performed while patient is in the chronic stable state. See notes for O2 sats.**     Non Invasive Ventilator    Vent Documentation:   In effort to reduce and/or prevent hospitalizations and emergency room visits, we are recommending a(n) Non Invasive Ventilator for home use. Bella Saucedo has had numerous hospitalizations recently due to chronic respiratory failure. The patient would benefit from Non Invasive Ventilator with portability for continuous support at night.    I, the undersigned, certify that the above prescribed supplies are medically necessary for this patient and is both reasonable and necessary in reference to accepted standards of medical and necessary in reference to accepted standards of medical practice in the treatment of this patient's condition and is not prescribed as a convenience.     Advance Diet as Tolerated    Follow this diet upon discharge: Magic Cup;  Between Meals      Combination Diet Dysphagia Diet Level 2: Mechan Altered; Thin Liquids (water, ice chips, juice, milk gelatin, ice cream, etc) (No straws)     Discharge Medications   Current Discharge Medication List      START taking these medications    Details   fluticasone-vilanterol (BREO ELLIPTA) 100-25 MCG/INH inhaler Inhale 1 puff into the lungs daily  Qty:      Associated Diagnoses: Severe chronic obstructive pulmonary disease (H)      guaiFENesin (MUCINEX) 600 MG 12 hr tablet Take 1 tablet (600 mg) by mouth 2 times daily  Qty:      Associated Diagnoses: Severe chronic obstructive pulmonary disease (H)      !! insulin aspart (NOVOLOG PEN) 100 UNIT/ML pen Inject 1-7 Units Subcutaneous 3 times daily (before meals)  Qty:      Associated Diagnoses: Borderline diabetes mellitus      !! insulin aspart (NOVOLOG PEN) 100 UNIT/ML pen Inject 1-5 Units Subcutaneous At Bedtime  Qty:      Associated Diagnoses: Borderline diabetes mellitus      ipratropium - albuterol 0.5 mg/2.5 mg/3 mL (DUONEB) 0.5-2.5 (3) MG/3ML neb solution Take 1 vial (3 mLs) by nebulization every 4 hours as needed for shortness of breath / dyspnea or wheezing    Associated Diagnoses: Severe chronic obstructive pulmonary disease (H)      LORazepam (ATIVAN) 0.5 MG tablet Take 0.5 tablets (0.25 mg) by mouth every 6 hours as needed for anxiety  Qty: 8 tablet, Refills: 0    Associated Diagnoses: Severe chronic obstructive pulmonary disease (H)      polyethylene glycol (MIRALAX) 17 g packet Take 17 g by mouth daily  Qty:      Associated Diagnoses: Constipation, unspecified constipation type      !! predniSONE (DELTASONE) 10 MG tablet Take 2 tablets (20 mg) by mouth daily for 4 days    Associated Diagnoses: Severe chronic obstructive pulmonary disease (H)      !! predniSONE (DELTASONE) 10 MG tablet Take 1 tablet (10 mg) by mouth daily for 4 days    Associated Diagnoses: Severe chronic obstructive pulmonary disease (H)      !! predniSONE (DELTASONE) 10 MG  tablet Take 3 tablets (30 mg) by mouth daily for 1 day    Associated Diagnoses: Severe chronic obstructive pulmonary disease (H)      QUEtiapine (SEROQUEL) 50 MG tablet Take 1 tablet (50 mg) by mouth At Bedtime  Qty:      Associated Diagnoses: Severe chronic obstructive pulmonary disease (H)      !! senna-docusate (SENOKOT-S/PERICOLACE) 8.6-50 MG tablet Take 1-2 tablets by mouth 2 times daily  Qty:      Associated Diagnoses: Constipation, unspecified constipation type      !! senna-docusate (SENOKOT-S/PERICOLACE) 8.6-50 MG tablet Take 1 tablet by mouth At Bedtime  Qty:      Associated Diagnoses: Constipation, unspecified constipation type      umeclidinium (INCRUSE ELLIPTA) 62.5 MCG/INH inhaler Inhale 1 puff into the lungs daily  Qty:      Associated Diagnoses: Severe chronic obstructive pulmonary disease (H)      Urea 40 % CREA Apply topically daily as needed for dry skin  Qty:      Associated Diagnoses: Dry skin       !! - Potential duplicate medications found. Please discuss with provider.      CONTINUE these medications which have NOT CHANGED    Details   acetaminophen (TYLENOL) 325 MG tablet Take 325 mg by mouth every 4 hours as needed for pain       aspirin 81 MG EC tablet Take 81 mg by mouth daily      atorvastatin (LIPITOR) 20 MG tablet Take 20 mg by mouth daily       citalopram (CELEXA) 40 MG tablet Take 40 mg by mouth daily       furosemide (LASIX) 40 MG tablet Take 40 mg by mouth daily Every Mon, Tues, Wed, Fri, Sat      insulin glargine (LANTUS) 100 UNIT/ML injection Inject 1 Units Subcutaneous At Bedtime       magnesium 250 MG tablet Take 1 tablet by mouth 2 times daily       pantoprazole (PROTONIX) 40 MG EC tablet Take 40 mg by mouth daily      potassium chloride ER (KLOR-CON M) 10 MEQ CR tablet Take 10 mEq by mouth daily Every Mon, Tues, Wed, Fri, Sat      blood glucose (NO BRAND SPECIFIED) lancets standard Use to test blood sugar 4 times daily or as directed.  Qty: 1 Box, Refills: 3    Associated  Diagnoses: Steroid-induced diabetes mellitus (H)      blood glucose monitoring (NO BRAND SPECIFIED) meter device kit Use to test blood sugar 4 times daily or as directed.  Qty: 1 kit, Refills: 0    Associated Diagnoses: Steroid-induced diabetes mellitus (H)      blood glucose monitoring (NO BRAND SPECIFIED) test strip Use to test blood sugars 4 times daily or as directed  Qty: 100 strip, Refills: 3    Associated Diagnoses: Steroid-induced diabetes mellitus (H)      insulin pen needle 31G X 6 MM Use 4 times daily or as directed.  Qty: 100 each, Refills: 1    Associated Diagnoses: Steroid-induced diabetes mellitus (H)           Allergies   Allergies   Allergen Reactions     Nitroglycerin Anxiety and Other (See Comments)     Mild headache, severe anxiety     Data   Most Recent 3 CBC's:  Recent Labs   Lab Test 10/29/20  0822 10/26/20  0855 10/24/20  0840 10/23/20  0907 10/22/20  0924   WBC  --   --  9.2 7.1 6.3   HGB  --   --  13.2 13.9 14.4   MCV  --   --  101* 100 100    239 208 215 215      Most Recent 3 BMP's:  Recent Labs   Lab Test 10/26/20  0855 10/24/20  0840 10/23/20  0907    145* 142   POTASSIUM 3.8 3.7 4.0   CHLORIDE 102 102 102   CO2 39* 41* 39*   BUN 18 25 18   CR 0.52 0.48* 0.42*   ANIONGAP 1* 2* 1*   MARILU 8.5 8.4* 8.9   GLC 82 112* 135*     Most Recent 2 LFT's:  Recent Labs   Lab Test 10/17/20  0609   AST 28   ALT 44   ALKPHOS 124   BILITOTAL 0.9     Most Recent INR's and Anticoagulation Dosing History:  Anticoagulation Dose History     There is no flowsheet data to display.        Most Recent 3 Troponin's:No lab results found.  Most Recent Cholesterol Panel:No lab results found.  Most Recent 6 Bacteria Isolates From Any Culture (See EPIC Reports for Culture Details):  Recent Labs   Lab Test 10/17/20  0840 10/17/20  0608   CULT No growth No growth  No growth     Most Recent TSH, T4 and A1c Labs:  Recent Labs   Lab Test 10/17/20  2151   A1C 6.0*     Results for orders placed or performed  during the hospital encounter of 10/17/20   XR Chest Port 1 View    Narrative    EXAM: CHEST SINGLE VIEW PORTABLE  LOCATION: [facility]  DATE/TIME: 10/17/2020, 6:05 AM    INDICATION: Endotracheal tube.  COMPARISON: 10/16/2028 at 2233 hours.      Impression    IMPRESSION:   1. Interval placement of an endotracheal tube with distal tip in the trachea, approximately 6 cm proximal to the hugo.  2. Interval placement of an enteric drainage tube with distal tip not visualized, but at least to the gastric cardia.  3. Mildly increased interstitial opacities in the lungs, possibly relating to interstitial pulmonary edema.      XR Abdomen Port 1 View    Narrative    EXAM: ABDOMEN SINGLE VIEW PORTABLE  LOCATION: [facility]  DATE/TIME: 10/17/2020, 6:15 AM    INDICATION: Tube placement.  COMPARISON: 10/17/2020 at 0535 hours - Chest radiograph.      Impression    IMPRESSION: An enteric drainage tube is present with distal tube tip in the gastric body.      CT Chest Pulmonary Embolism w Contrast    Narrative    CT CHEST PULMONARY EMBOLISM WITH CONTRAST 10/22/2020 10:52 AM    CLINICAL HISTORY: COPD AE, not progressing, ongoing hypoxia.    TECHNIQUE: CT angiogram chest during arterial phase injection IV  contrast. 2D and 3D MIP reconstructions were performed by the CT  technologist. Dose reduction techniques were used.     CONTRAST: 58 mL Isovue-370    COMPARISON: None.    FINDINGS:  ANGIOGRAM CHEST: Pulmonary arteries are normal caliber and negative  for pulmonary emboli. Thoracic aorta is negative for dissection. No CT  evidence of right heart strain.    LUNGS AND PLEURA: Emphysema. Small left and trace right pleural  effusion. Moderate left lower lobe compressive atelectasis and/or  infiltrate. Minimal right lower lobe atelectasis and/or infiltrate.    MEDIASTINUM/AXILLAE: No adenopathy or aneurysm. No effusion..    UPPER ABDOMEN: No acute findings.    MUSCULOSKELETAL: No frankly destructive bony  lesions.      Impression    IMPRESSION:  1.  No pulmonary embolism demonstrated.  2.  Moderate left lower lobe compressive atelectasis and/or  infiltrate. Small left effusion.    DELVIN CAIN MD   XR Video Swallow with SLP or OT    Narrative    VIDEO SWALLOWING EVALUATION   10/27/2020 1:59 PM     HISTORY: COPD with intubation.    COMPARISON: None.    FLUOROSCOPY TIME: 0.6 minutes     Number of cine runs: 8    FINDINGS:    Thin: Normal.    Nectar: Normal.    Honey: Not administered.    Pudding: Normal.    Semisolid: Premature spill to the vallecula. Otherwise normal.    Solid: Not administered.    This study only includes the cervical esophagus.    ARLEN JOHANSEN MD

## 2020-10-30 NOTE — PROGRESS NOTES
Care Management Discharge Note    Discharge Planning:  Expected Discharge Date: 10/30/20(TCU)     Concerns to be Addressed: discharge planning      Anticipated Discharge Disposition: Skilled Nursing Facility/TCU  Anticipated Discharge Services:  TCU  Anticipated Discharge DME:  Bipap    Patient/family educated on Medicare website which has current facility and service quality ratings: yes  Referrals Placed by CM/SW:  SLL, transportation, TCU  Education Provided on the Discharge Plan:  yes  Patient/Family in Agreement with the Plan:  yes     Disposition Comments:      Selected Continued Care - Admitted Since 10/17/2020     Destination Coordination complete    Service Provider Selected Services Address Phone Fax Patient Preferred Last Updated    [facility] (SNF)  Skilled Nursing [address] [facility] [id number] [phone] -- Sol Romo LSW 10/29/2020 1246                Additional Information:  Discharge today, as planned. Orders faxed and facility updated.     ABI Bautista, LGSW  [phone]  [facility]

## 2020-10-30 NOTE — PROGRESS NOTES
[place] [facility]  PRIMARY CARE PROVIDER AND CLINIC:  Shannon Olivas MD, [facility] [address] [facility] 5*  Chief Complaint   Patient presents with     Hospital F/U     [facility] Medical Record Number:  [id number]  Place of Service where encounter took place:  [facility] [[id number]]    Bella Saucedo  is a 74 year old  (1945), admitted to the above facility from  [facility]. Hospital stay 10/17/20 through 10/30/20..  Admitted to this facility for  rehab, medical management and nursing care.    HPI:    HPI information obtained from: facility chart records, facility staff, patient report, [facility] chart review and Care Everywhere [facility] chart review.   Brief Summary of Hospital Course:   Patient hospitalized during above noted dates following direct transfer from [facility] to [facility] ICU with acute COPD exacerbation requiring intubation - COVI19 negative. Intubated from 10/17-10/19; successfully extubated on 10/19 with slow respiratory improvement - CT from 10/22 with no acute concerns - discharge to TCU on ongoing Prednisone taper along with Breo Ellipta, Mucinex, and Duonebs - completed regimen of Rocephin + Azithromycin inpatient. Continues on O2 2-3lpm via NC - has anxiety associated with her COPD requiring the use of Valium/Lorazepam PRN - Hospital noting O2 goal of >90%. Suffered acute encephalopathy initially - this cleared with medical improvement. Noted to have presence of chronic venous stasis ulcers without presence of infectious s/sx. Did have notable elevated BG levels inpatient - suspected 2/2 steroids; continues on regimen of Lantus and sliding scale insulin at discharge. CAD/HTN/HFpEF/Stage IIB colon Ca s/p Colectomy with colostomy placement/GERD/Hx of SAH remained stable while inpatient.     Updates on Status Since Skilled nursing Admission:   Patient seen today for admission visit to TCU. Patient notes she is feeling  better than admission to hosptial. Therapies have not yet met with patient as she was just admitted. Appetite is improving. Was sleeping well inpatient. Denies CP, palpitations, fatigue, nausea, vomiting, increased SOB/KELLY, fever, chills, and/or b/b concerns today.      CODE STATUS/ADVANCE DIRECTIVES DISCUSSION:   CPR/Full code   Patient's living condition: lives with spouse  ALLERGIES: Nitroglycerin  PAST MEDICAL HISTORY:  has no past medical history on file.  PAST SURGICAL HISTORY:   has no past surgical history on file.  FAMILY HISTORY: family history is not on file.  SOCIAL HISTORY:   reports that she quit smoking about 2 weeks ago. Her smoking use included cigarettes. She smoked 1.00 pack per day. She does not have any smokeless tobacco history on file.    Post Discharge Medication Reconciliation Status: discharge medications reconciled, continue medications without change  Current Outpatient Medications   Medication Sig Dispense Refill     acetaminophen (TYLENOL) 325 MG tablet Take 325 mg by mouth every 4 hours as needed for pain        aspirin 81 MG EC tablet Take 81 mg by mouth daily       atorvastatin (LIPITOR) 20 MG tablet Take 20 mg by mouth daily        blood glucose (NO BRAND SPECIFIED) lancets standard Use to test blood sugar 4 times daily or as directed. 1 Box 3     blood glucose monitoring (NO BRAND SPECIFIED) meter device kit Use to test blood sugar 4 times daily or as directed. 1 kit 0     blood glucose monitoring (NO BRAND SPECIFIED) test strip Use to test blood sugars 4 times daily or as directed 100 strip 3     citalopram (CELEXA) 40 MG tablet Take 40 mg by mouth daily        fluticasone-vilanterol (BREO ELLIPTA) 100-25 MCG/INH inhaler Inhale 1 puff into the lungs daily       furosemide (LASIX) 40 MG tablet Take 40 mg by mouth daily Every Mon, Tues, Wed, Fri, Sat       guaiFENesin (MUCINEX) 600 MG 12 hr tablet Take 1 tablet (600 mg) by mouth 2 times daily       insulin aspart (NOVOLOG PEN) 100  UNIT/ML pen Inject 1-7 Units Subcutaneous 3 times daily (before meals)       insulin aspart (NOVOLOG PEN) 100 UNIT/ML pen Inject 1-5 Units Subcutaneous At Bedtime       insulin glargine (LANTUS) 100 UNIT/ML injection Inject 1 Units Subcutaneous At Bedtime        insulin pen needle 31G X 6 MM Use 4 times daily or as directed. 100 each 1     ipratropium - albuterol 0.5 mg/2.5 mg/3 mL (DUONEB) 0.5-2.5 (3) MG/3ML neb solution Take 1 vial (3 mLs) by nebulization every 4 hours as needed for shortness of breath / dyspnea or wheezing       LORazepam (ATIVAN) 0.5 MG tablet Take 0.5 tablets (0.25 mg) by mouth every 6 hours as needed for anxiety 8 tablet 0     magnesium 250 MG tablet Take 1 tablet by mouth 2 times daily        pantoprazole (PROTONIX) 40 MG EC tablet Take 40 mg by mouth daily       polyethylene glycol (MIRALAX) 17 g packet Take 17 g by mouth daily       potassium chloride ER (KLOR-CON M) 10 MEQ CR tablet Take 10 mEq by mouth daily Every Mon, Tues, Wed, Fri, Sat       predniSONE (DELTASONE) 10 MG tablet Take 2 tablets (20 mg) by mouth daily for 4 days       [START ON 11/5/2020] predniSONE (DELTASONE) 10 MG tablet Take 1 tablet (10 mg) by mouth daily for 4 days       QUEtiapine (SEROQUEL) 50 MG tablet Take 1 tablet (50 mg) by mouth At Bedtime       senna-docusate (SENOKOT-S/PERICOLACE) 8.6-50 MG tablet Take 1-2 tablets by mouth 2 times daily       senna-docusate (SENOKOT-S/PERICOLACE) 8.6-50 MG tablet Take 1 tablet by mouth At Bedtime       umeclidinium (INCRUSE ELLIPTA) 62.5 MCG/INH inhaler Inhale 1 puff into the lungs daily       Urea 40 % CREA Apply topically daily as needed for dry skin         ROS:  10 point ROS of systems including Constitutional, Eyes, Respiratory, Cardiovascular, Gastroenterology, Genitourinary, Integumentary, Musculoskeletal, Psychiatric were all negative except for pertinent positives noted in my HPI.    Vitals:  /60   Pulse 72   Temp 97.3  F (36.3  C)   Resp 19   Wt 63 kg  (139 lb)   SpO2 92%   Exam:  GENERAL APPEARANCE:  Alert, in no distress, oriented, cooperative  EYES:  EOM, conjunctivae, lids, pupils and irises normal  RESP:  respiratory effort and palpation of chest normal, lungs clear to auscultation , no respiratory distress, diminished breath sounds throughout, O2 present via NC  CV:  Palpation and auscultation of heart done , regular rate and rhythm, no murmur, rub, or gallop, +2 pedal pulses, peripheral edema 2+ in BLE  ABDOMEN:  normal bowel sounds, soft, nontender, no hepatosplenomegaly or other masses, Colostomy present  SKIN:  Inspection of skin and subcutaneous tissue baseline, Palpation of skin and subcutaneous tissue baseline, skin thin and dry  PSYCH:  oriented X 3, normal insight, judgement and memory, affect abnormal flat    Lab/Diagnostic data:  Recent labs in [facility] reviewed by me today.     ASSESSMENT/PLAN:  (J96.21,  J96.22) Acute on chronic hypoxemic and hypercapnic respiratory failure requiring intubation 10/17-10/19 (H)  (primary encounter diagnosis)  (J18.9) Pneumonia due to infectious organism, unspecified laterality, unspecified part of lung  (J44.9) Severe chronic obstructive pulmonary disease (H)  Comment: As noted above - continues on O2 at 3lpm upon admission to TCU - denies respiratory concerns at this time. Prednisone taper through 11/9. Continues on regimen of Breo Ellipta, Mucinex, Duonebs  Plan: continue medications as ordered; follow-up with Pulmonology per their recommendations - Okay for SaO2 level >88%    (G93.40) Encephalopathy  Comment: Noted inpatient - at baseline per discharge summary  Plan: OT eval/tx - adv per their recommendations    (I25.10) Coronary artery disease involving native coronary artery of native heart without angina pectoris  (I25.2) Hx of non-ST elevation myocardial infarction (NSTEMI)  Comment: Chronic - no acute concerns. Managed on regimen of ASA and Lipitor  Plan: Continue medications as ordered    (I10) Essential  hypertension  Comment: Chronic - managed on regimen of Lasix  Plan: Continue medications as ordered; VS per facility protocol; BMP 11/2    (I50.30) Heart failure with preserved ejection fraction, NYHA class I (H)  Comment: Chronic - Lasix as above  Plan: As noted above    (I87.8) Venous stasis  Comment: Chronic - no acute concerns  Plan: Continue wound care as ordered with close monitoring    (E11.59,  Z79.4) Type 2 diabetes mellitus with other circulatory complication, with long-term current use of insulin (H)  Comment: Chronic - managed on adjusted regimen of Lantus + Novolog SSI  Plan: Monitor BG QID - adjust insulin regimen as needed with steroid taper    (C18.9) Hx stabe IIB colon cancer 2016 s/p left hemicolectomy and colostomy (H)  Comment: Per PMHx - no acute concerns  Plan: Stable - Colostomy care    (E46) Malnutrition, unspecified type (H)  Comment: Noted - in-house dietician to eval  Plan: As above    (F41.9) Anxiety  Comment: Chronic - increased with COPD; managed on regimen of Seroquel, Ativan  Plan: Continue medications at this time - would recommend reduction in tx regimen given patient's age    (K21.00) Gastroesophageal reflux disease with esophagitis without hemorrhage  Comment: Chronic - no acute concerns at this time - managed on regimen of Protonix  Plan: Continue medications as ordered    (R53.81) Physical deconditioning  Comment: 2/2 above noted diagnoses and recent hospitalization  Plan: PT eval/tx - adv per their recommendations; SW to assist with discharge planning       Orders written by provider at facility  -CBC & BMP 11/2    Total time spent with patient visit at the skilled nursing facility was 45 min including patient visit and review of past records. Greater than 50% of total time spent with counseling and coordinating care due to medical complexity; acute patient status; discussion with patient re:hosptialization; and ongoing care planning.     Electronically signed by:  Dr. Oconnell  KASHMIR Santizo, FABIAN  [facility]  [facility] Office: [address] [place] [address]   [facility] Cell: [phone]  [facility] Fax: [phone]    [facility] Offce: [address] [place] [address]  [facility] Phone: [phone]  [facility] Voicemail: [phone]    Email: [email or web address]     ~Be the beauty you wish to see in the world~

## 2020-10-30 NOTE — PROVIDER NOTIFICATION
MD Notification    Notified Person: MD    Notified Person Name:  Catherine     Notification Date/Time: 10/29/20 8:07 PM    Notification Interaction: Text Page    Purpose of Notification: Pt has a colostomy, had to change but stool was hard and formed. Taking senna, will give 2 tonight, can we get PRN miralax as well?    Orders Received: PRN Miralax orders placed by MD    Comments:

## 2020-10-30 NOTE — PLAN OF CARE
Physical Therapy Discharge Summary    Reason for therapy discharge:    Discharged to transitional care facility.    Progress towards therapy goal(s). See goals on Care Plan in [facility] electronic health record for goal details.  Goals not met.  Barriers to achieving goals:   limited tolerance for therapy and discharge from facility.    Therapy recommendation(s):    Continued therapy is recommended.  Rationale/Recommendations:  Pt will continue rehab at [facility] to increase functional mobility and independence.

## 2020-11-02 NOTE — PROGRESS NOTES
[place] [facility]  PRIMARY CARE PROVIDER AND CLINIC:  Shannon Olivas MD, [facility] [address] [employer] 5*  Chief Complaint   Patient presents with     Hospital F/U     [facility] Record Number:  [id number]  [facility] of Service where encounter took place[facility] [[id number]]    Bella Saucedo  is a 74 year old  (1945), admitted to the above facility from  [facility]. Hospital stay 10/17/20 through 10/30/20..  Admitted to this facility for  rehab, medical management and nursing care.    HPI:    HPI information obtained from: facility staff, patient report and [facility] chart review.   Brief Summary of Hospital Course:   -Patient with past medical history of HFpEF, respiratory failure/COPD hospitalized with acute COPD exacerbation and concern for CAP, acute on chronic respiratory failure required intubation, completed antibiotic  Meds started: Breo Ellipta , Incruse    Today:  - pt seen and examiend. Pt reports that she uses O2 all the time, 2 liter, now on Bipap machine. Denies wheezing, cough off and on with whitish phlegm (at baseline). Reports SOB with talking  - reports tolerating current diet, and appetite is good.   - denies chest pain or swelling in the legs.   - endorses terrible anxiety, but feeling better here.     =====================================================  CODE STATUS/ADVANCE DIRECTIVES DISCUSSION:   CPR/Full code   Patient's living condition: lives with spouse  ALLERGIES: Nitroglycerin  PAST MEDICAL HISTORY:    chronic hypoxemic and hypercapnic respiratory failure requiring intubation 10/17-10/19  f severe COPD   Coronary artery disease, Hx NSTEMI  Hypertension  Heart failure with preserved ejection fraction  Chronic venous stasis  Insulin-dependent type 2 diabetes mellitus  Non-severe malnutrition  Anxiety  GERD  Lung nodule  Subarachnoid hemorrhage  PAST SURGICAL HISTORY:    - Hx stable IIIB  colon cancer 2016 s/p left hemicolectomy and colostomy  - Rt MCA aneurysm s/p coil embolization 2011  - PCOM aneurysm rupture s/p coil embolization 2010    FAMILY HISTORY: reviewed and non contributory  SOCIAL HISTORY:   reports that she quit smoking about 4 weeks ago. Her smoking use included cigarettes. She smoked 1.00 pack per day. She does not have any smokeless tobacco history on file.    Post Discharge Medication Reconciliation Status: discharge medications reconciled and changed, per note/orders  Current Outpatient Medications   Medication Sig Dispense Refill     GABAPENTIN PO Take 50 mg by mouth 4 times daily as needed       acetaminophen (TYLENOL) 325 MG tablet Take 325 mg by mouth every 4 hours as needed for pain        aspirin 81 MG EC tablet Take 81 mg by mouth daily       atorvastatin (LIPITOR) 20 MG tablet Take 20 mg by mouth daily        blood glucose (NO BRAND SPECIFIED) lancets standard Use to test blood sugar 4 times daily or as directed. 1 Box 3     blood glucose monitoring (NO BRAND SPECIFIED) meter device kit Use to test blood sugar 4 times daily or as directed. 1 kit 0     blood glucose monitoring (NO BRAND SPECIFIED) test strip Use to test blood sugars 4 times daily or as directed 100 strip 3     citalopram (CELEXA) 40 MG tablet Take 20 mg by mouth daily        dextromethorphan-guaiFENesin (MUCINEX DM)  MG per 12 hr tablet Take 1 tablet by mouth 2 times daily as needed for cough       fluticasone-vilanterol (BREO ELLIPTA) 100-25 MCG/INH inhaler Inhale 1 puff into the lungs daily       furosemide (LASIX) 40 MG tablet Take 40 mg by mouth daily Every Mon, Tues, Wed, Fri, Sat       guaiFENesin (MUCINEX) 600 MG 12 hr tablet Take 1 tablet (600 mg) by mouth 2 times daily       insulin aspart (NOVOLOG PEN) 100 UNIT/ML pen Inject 1-7 Units Subcutaneous 3 times daily (before meals)       insulin aspart (NOVOLOG PEN) 100 UNIT/ML pen Inject 1-5 Units Subcutaneous At Bedtime       insulin glargine  (LANTUS) 100 UNIT/ML injection Inject 1 Units Subcutaneous At Bedtime        insulin pen needle 31G X 6 MM Use 4 times daily or as directed. 100 each 1     ipratropium - albuterol 0.5 mg/2.5 mg/3 mL (DUONEB) 0.5-2.5 (3) MG/3ML neb solution Take 1 vial by nebulization 3 times daily as needed for shortness of breath / dyspnea or wheezing       ipratropium - albuterol 0.5 mg/2.5 mg/3 mL (DUONEB) 0.5-2.5 (3) MG/3ML neb solution Take 1 vial (3 mLs) by nebulization every 4 hours as needed for shortness of breath / dyspnea or wheezing       LORazepam (ATIVAN) 0.5 MG tablet Take 0.5 tablets (0.25 mg) by mouth every 6 hours as needed for anxiety 8 tablet 0     magnesium 250 MG tablet Take 1 tablet by mouth 2 times daily        pantoprazole (PROTONIX) 40 MG EC tablet Take 40 mg by mouth daily       polyethylene glycol (MIRALAX) 17 g packet Take 17 g by mouth daily. May also take 17 g daily as needed for constipation.       potassium chloride ER (KLOR-CON M) 10 MEQ CR tablet Take 10 mEq by mouth daily Every Mon, Tues, Wed, Fri, Sat       QUEtiapine (SEROQUEL) 50 MG tablet Take 1 tablet (50 mg) by mouth At Bedtime (Patient taking differently: Take 25 mg by mouth At Bedtime 25 mg PO QD x 4 days, then 12.5 mg PO QD x 3 days, then stop)       senna-docusate (SENOKOT-S/PERICOLACE) 8.6-50 MG tablet Take 2 tablets by mouth 2 times daily       umeclidinium (INCRUSE ELLIPTA) 62.5 MCG/INH inhaler Inhale 1 puff into the lungs daily       Urea 40 % CREA Apply topically daily as needed for dry skin       ROS: 10 point ROS of systems including Constitutional, Eyes, Respiratory, Cardiovascular, Gastroenterology, Genitourinary, Integumentary, Musculoskeletal, Psychiatric were all negative except for pertinent positives noted in my HPI.    Vitals:  /60   Pulse 72   Temp 97.6  F (36.4  C)   Resp 19   Wt 56.2 kg (123 lb 14.4 oz)   SpO2 96%   Exam:  GENERAL APPEARANCE:  in no distress, cooperative  ENT:  Mouth and posterior oropharynx  normal, moist mucous membranes, oral mucosa moist, no lesion noted.   EYES:  EOMI, Pupil rounded and equal.  RESP:  Unlabored breathing, diminished at the bases, no wheezing.   CV:  S1S2 audible, regular HR, no murmur appreciated.   ABDOMEN:  soft, NT/ND, BS audible. no mass appreciated on palpation. ostomy bag in place.   M/S:   no joint deformity noted on observation.   NEURO:   No NFD appreciated on observation. Hand  5/5 b/l  PSYCH:  AAOx person and time but not place. Normal.  insight, judgement and memory, affect and mood normal  SKIN:  No rash.         Lab/Diagnostic data: Reviewed in the chart and EHR.        ASSESSMENT/PLAN:  ---------------------------  #Severe COPD (H)  #Chronic hypoxemic and hypercapnic respiratory failure (H)  #Query CAP  # hx of Lung nodule  - Recent intubation (Oct 1/17-10/19)  -Started on fluticasone furoate/bilateral inhaled and Umiclidinium, DuoNeb, on prednisone taper, Mucinex 6 mg twice daily  -Discharged on prescription for APAP adventurous  -Started on lorazepam 0.25 mg p.o. every 6 hours for anxiety to help with tachypnea and dyspnea  - routine follow up for lung nodule  - Pulmonology referral on outpatient basis.     #Type 2 diabetes mellitus, insulin-dependent: (H)  -HbA1C 6%., over controlled. goal is around 8% given limited life expectancy  - currently on steroid, will continue SSI but JEFFREY one instead of the hospital one  -  In this frail elderly adult with a limited life expectancy, the goal of  the management is to address the hyperglycemia sx if any,  rather than the  BGs numbers per se.       #Oropharyngeal dysphagia: Started on DD2 with thin liquid. Will consult SLP    E recent ICU medical delirium:   - on Seroquel 50 mg HS from the hospital  - will reduce Seroquel from 50 mg to 25 mg PO every day x 4 days, then 12.5 mg PO every day X 3 days, then stop.  - start Gabapentin 50 mg PO QID PRN for agitation, restlessness, physical aggression.       # HFpEF (H)  #CAD,  history of NSTEMI  #Essential hypertension  - compensated.     # PCM (H): There is no height or weight on file to calculate BMI. monitor weight. supplement prn      Anxiety: on Celexa 40 mg, max recommended dose in patient > 60 years old is 20, will reduce to 20 mg. Monitor  For adjustment issue      CHRONIC STABLE CONDITIONS:  -----------------------------------------------  -Chronic venous stasis: no concern  - Hx stable IIIB colon cancer 2016 s/p left hemicolectomy and colostomy (H): routine ostomy cares.  Daily miralax and senna.   - Hx of Rt MCA aneurysm s/p coil embolization 2011  - hx of PCOM aneurysm rupture s/p coil embolization 2010  -- GERD: no concern.       transcribed by : Clarice Hayes  1) Reduce Seroquel from 50 mg to 25 mg PO every day x 4 days, then 12.5 mg PO every day X 3 days, then stop.  2) Gabapentin 50 mg PO QID PRN for agitation, restlessness, physical aggression  3) Discontinue current sliding scale insulin  4) Start JEFFREY of sliding scale insulin  5) Discontinue current bedtime sliding scale insulin  6) Reduce Celexa from 40 mg to 20 mg PO every day  7) Monitor for anxiety exacerbation        Electronically signed by:  Uzma Avila MD

## 2020-11-03 NOTE — LETTER
11/3/2020        RE: Bella Saucedo  [address]  [place] [address]        [facility]  PRIMARY CARE PROVIDER AND CLINIC:  Shannon Olivas MD, [facility] [address] [facility] [address]*  Chief Complaint   Patient presents with     Hospital F/U     [facility] Record Number:  [id number]  Place of Service where encounter took place:  [facility] [[id number]]    Bella Saucedo  is a 74 year old  (1945), admitted to the above facility from  [facility]. Hospital stay 10/17/20 through 10/30/20..  Admitted to this facility for  rehab, medical management and nursing care.    HPI:    HPI information obtained from: facility staff, patient report and [facility] chart review.   Brief Summary of Hospital Course:   -Patient with past medical history of HFpEF, respiratory failure/COPD hospitalized with acute COPD exacerbation and concern for CAP, acute on chronic respiratory failure required intubation, completed antibiotic  Meds started: Omer Concepcion    Today:  - pt seen and examiend. Pt reports that she uses O2 all the time, 2 liter, now on Bipap machine. Denies wheezing, cough off and on with whitish phlegm (at baseline). Reports SOB with talking  - reports tolerating current diet, and appetite is good.   - denies chest pain or swelling in the legs.   - endorses terrible anxiety, but feeling better here.     =====================================================  CODE STATUS/ADVANCE DIRECTIVES DISCUSSION:   CPR/Full code   Patient's living condition: lives with spouse  ALLERGIES: Nitroglycerin  PAST MEDICAL HISTORY:    chronic hypoxemic and hypercapnic respiratory failure requiring intubation 10/17-10/19  f severe COPD   Coronary artery disease, Hx NSTEMI  Hypertension  Heart failure with preserved ejection fraction  Chronic venous stasis  Insulin-dependent type 2 diabetes mellitus  Non-severe  malnutrition  Anxiety  GERD  Lung nodule  Subarachnoid hemorrhage  PAST SURGICAL HISTORY:    - Hx stable IIIB colon cancer 2016 s/p left hemicolectomy and colostomy  - Rt MCA aneurysm s/p coil embolization 2011  - PCOM aneurysm rupture s/p coil embolization 2010    FAMILY HISTORY: reviewed and non contributory  SOCIAL HISTORY:   reports that she quit smoking about 4 weeks ago. Her smoking use included cigarettes. She smoked 1.00 pack per day. She does not have any smokeless tobacco history on file.    Post Discharge Medication Reconciliation Status: discharge medications reconciled and changed, per note/orders  Current Outpatient Medications   Medication Sig Dispense Refill     GABAPENTIN PO Take 50 mg by mouth 4 times daily as needed       acetaminophen (TYLENOL) 325 MG tablet Take 325 mg by mouth every 4 hours as needed for pain        aspirin 81 MG EC tablet Take 81 mg by mouth daily       atorvastatin (LIPITOR) 20 MG tablet Take 20 mg by mouth daily        blood glucose (NO BRAND SPECIFIED) lancets standard Use to test blood sugar 4 times daily or as directed. 1 Box 3     blood glucose monitoring (NO BRAND SPECIFIED) meter device kit Use to test blood sugar 4 times daily or as directed. 1 kit 0     blood glucose monitoring (NO BRAND SPECIFIED) test strip Use to test blood sugars 4 times daily or as directed 100 strip 3     citalopram (CELEXA) 40 MG tablet Take 20 mg by mouth daily        dextromethorphan-guaiFENesin (MUCINEX DM)  MG per 12 hr tablet Take 1 tablet by mouth 2 times daily as needed for cough       fluticasone-vilanterol (BREO ELLIPTA) 100-25 MCG/INH inhaler Inhale 1 puff into the lungs daily       furosemide (LASIX) 40 MG tablet Take 40 mg by mouth daily Every Mon, Tues, Wed, Fri, Sat       guaiFENesin (MUCINEX) 600 MG 12 hr tablet Take 1 tablet (600 mg) by mouth 2 times daily       insulin aspart (NOVOLOG PEN) 100 UNIT/ML pen Inject 1-7 Units Subcutaneous 3 times daily (before meals)        insulin aspart (NOVOLOG PEN) 100 UNIT/ML pen Inject 1-5 Units Subcutaneous At Bedtime       insulin glargine (LANTUS) 100 UNIT/ML injection Inject 1 Units Subcutaneous At Bedtime        insulin pen needle 31G X 6 MM Use 4 times daily or as directed. 100 each 1     ipratropium - albuterol 0.5 mg/2.5 mg/3 mL (DUONEB) 0.5-2.5 (3) MG/3ML neb solution Take 1 vial by nebulization 3 times daily as needed for shortness of breath / dyspnea or wheezing       ipratropium - albuterol 0.5 mg/2.5 mg/3 mL (DUONEB) 0.5-2.5 (3) MG/3ML neb solution Take 1 vial (3 mLs) by nebulization every 4 hours as needed for shortness of breath / dyspnea or wheezing       LORazepam (ATIVAN) 0.5 MG tablet Take 0.5 tablets (0.25 mg) by mouth every 6 hours as needed for anxiety 8 tablet 0     magnesium 250 MG tablet Take 1 tablet by mouth 2 times daily        pantoprazole (PROTONIX) 40 MG EC tablet Take 40 mg by mouth daily       polyethylene glycol (MIRALAX) 17 g packet Take 17 g by mouth daily. May also take 17 g daily as needed for constipation.       potassium chloride ER (KLOR-CON M) 10 MEQ CR tablet Take 10 mEq by mouth daily Every Mon, Tues, Wed, Fri, Sat       QUEtiapine (SEROQUEL) 50 MG tablet Take 1 tablet (50 mg) by mouth At Bedtime (Patient taking differently: Take 25 mg by mouth At Bedtime 25 mg PO QD x 4 days, then 12.5 mg PO QD x 3 days, then stop)       senna-docusate (SENOKOT-S/PERICOLACE) 8.6-50 MG tablet Take 2 tablets by mouth 2 times daily       umeclidinium (INCRUSE ELLIPTA) 62.5 MCG/INH inhaler Inhale 1 puff into the lungs daily       Urea 40 % CREA Apply topically daily as needed for dry skin       ROS: 10 point ROS of systems including Constitutional, Eyes, Respiratory, Cardiovascular, Gastroenterology, Genitourinary, Integumentary, Musculoskeletal, Psychiatric were all negative except for pertinent positives noted in my HPI.    Vitals:  /60   Pulse 72   Temp 97.6  F (36.4  C)   Resp 19   Wt 56.2 kg (123 lb 14.4 oz)    SpO2 96%   Exam:  GENERAL APPEARANCE:  in no distress, cooperative  ENT:  Mouth and posterior oropharynx normal, moist mucous membranes, oral mucosa moist, no lesion noted.   EYES:  EOMI, Pupil rounded and equal.  RESP:  Unlabored breathing, diminished at the bases, no wheezing.   CV:  S1S2 audible, regular HR, no murmur appreciated.   ABDOMEN:  soft, NT/ND, BS audible. no mass appreciated on palpation. ostomy bag in place.   M/S:   no joint deformity noted on observation.   NEURO:   No NFD appreciated on observation. Hand  5/5 b/l  PSYCH:  AAOx person and time but not place. Normal.  insight, judgement and memory, affect and mood normal  SKIN:  No rash.         Lab/Diagnostic data: Reviewed in the chart and EHR.        ASSESSMENT/PLAN:  ---------------------------  #Severe COPD (H)  #Chronic hypoxemic and hypercapnic respiratory failure  #Query CAP  # hx of Lung nodule  - Recent intubation (Oct 1/17-10/19)  -Started on fluticasone furoate/bilateral inhaled and Umiclidinium, DuoNeb, on prednisone taper, Mucinex 6 mg twice daily  -Discharged on prescription for APAP adventurous  -Started on lorazepam 0.25 mg p.o. every 6 hours for anxiety to help with tachypnea and dyspnea  - routine follow up for lung nodule  - Pulmonology referral on outpatient basis.     #Type 2 diabetes mellitus, insulin-dependent:  -HbA1C 6%., over controlled. goal is around 8% given limited life expectancy  - currently on steroid, will continue SSI but [facility] one instead of the hospital one  -  In this frail elderly adult with a limited life expectancy, the goal of  the management is to address the hyperglycemia sx if any,  rather than the  BGs numbers per se.       #Oropharyngeal dysphagia: Started on DD2 with thin liquid. Will consult SLP    E recent ICU medical delirium:   - on Seroquel 50 mg HS from the hospital  - will reduce Seroquel from 50 mg to 25 mg PO every day x 4 days, then 12.5 mg PO every day X 3 days, then stop.  - start  Gabapentin 50 mg PO QID PRN for agitation, restlessness, physical aggression.       # HFpEF  #CAD, history of NSTEMI  #Essential hypertension  - compensated.     # PCM (H): There is no height or weight on file to calculate BMI. monitor weight. supplement prn      Anxiety: on Celexa 40 mg, max recommended dose in patient > 60 years old is 20, will reduce to 20 mg. Monitor  For adjustment issue      CHRONIC STABLE CONDITIONS:  -----------------------------------------------  -Chronic venous stasis: no concern  - Hx stable IIIB colon cancer 2016 s/p left hemicolectomy and colostomy (H): routine ostomy cares.  Daily miralax and senna.   - Hx of Rt MCA aneurysm s/p coil embolization 2011  - hx of PCOM aneurysm rupture s/p coil embolization 2010  -- GERD: no concern.       transcribed by : Clarice Hayes  1) Reduce Seroquel from 50 mg to 25 mg PO every day x 4 days, then 12.5 mg PO every day X 3 days, then stop.  2) Gabapentin 50 mg PO QID PRN for agitation, restlessness, physical aggression  3) Discontinue current sliding scale insulin  4) Start JEFFREY of sliding scale insulin  5) Discontinue current bedtime sliding scale insulin  6) Reduce Celexa from 40 mg to 20 mg PO every day  7) Monitor for anxiety exacerbation        Electronically signed by:  Uzma Avila MD         [facility]  PRIMARY CARE PROVIDER AND CLINIC:  Shannon Olivas MD, [address] 5*  Chief Complaint   Patient presents with     Hospital F/U     [facility] Record Number:  [id number]  [facility] of Service where encounter took place:  [facility] [[id number]]    Bella Saucedo  is a 74 year old  (1945), admitted to the above facility from  [facility]. Hospital stay 10/17/20 through 10/30/20..  Admitted to this facility for  rehab, medical management and nursing care.    HPI:    HPI information obtained from: facility staff, patient  report and [facility] chart review.   Brief Summary of Hospital Course:   -Patient with past medical history of HFpEF, respiratory failure/COPD hospitalized with acute COPD exacerbation and concern for CAP, acute on chronic respiratory failure required intubation, completed antibiotic  Meds started: Omer Concepcion    Today:  - pt seen and examiend. Pt reports that she uses O2 all the time, 2 liter, now on Bipap machine. Denies wheezing, cough off and on with whitish phlegm (at baseline). Reports SOB with talking  - reports tolerating current diet, and appetite is good.   - denies chest pain or swelling in the legs.   - endorses terrible anxiety, but feeling better here.     =====================================================  CODE STATUS/ADVANCE DIRECTIVES DISCUSSION:   CPR/Full code   Patient's living condition: lives with spouse  ALLERGIES: Nitroglycerin  PAST MEDICAL HISTORY:    chronic hypoxemic and hypercapnic respiratory failure requiring intubation 10/17-10/19  f severe COPD   Coronary artery disease, Hx NSTEMI  Hypertension  Heart failure with preserved ejection fraction  Chronic venous stasis  Insulin-dependent type 2 diabetes mellitus  Non-severe malnutrition  Anxiety  GERD  Lung nodule  Subarachnoid hemorrhage  PAST SURGICAL HISTORY:    - Hx stable IIIB colon cancer 2016 s/p left hemicolectomy and colostomy  - Rt MCA aneurysm s/p coil embolization 2011  - PCOM aneurysm rupture s/p coil embolization 2010    FAMILY HISTORY: reviewed and non contributory  SOCIAL HISTORY:   reports that she quit smoking about 4 weeks ago. Her smoking use included cigarettes. She smoked 1.00 pack per day. She does not have any smokeless tobacco history on file.    Post Discharge Medication Reconciliation Status: discharge medications reconciled and changed, per note/orders  Current Outpatient Medications   Medication Sig Dispense Refill     GABAPENTIN PO Take 50 mg by mouth 4 times daily as needed        acetaminophen (TYLENOL) 325 MG tablet Take 325 mg by mouth every 4 hours as needed for pain        aspirin 81 MG EC tablet Take 81 mg by mouth daily       atorvastatin (LIPITOR) 20 MG tablet Take 20 mg by mouth daily        blood glucose (NO BRAND SPECIFIED) lancets standard Use to test blood sugar 4 times daily or as directed. 1 Box 3     blood glucose monitoring (NO BRAND SPECIFIED) meter device kit Use to test blood sugar 4 times daily or as directed. 1 kit 0     blood glucose monitoring (NO BRAND SPECIFIED) test strip Use to test blood sugars 4 times daily or as directed 100 strip 3     citalopram (CELEXA) 40 MG tablet Take 20 mg by mouth daily        dextromethorphan-guaiFENesin (MUCINEX DM)  MG per 12 hr tablet Take 1 tablet by mouth 2 times daily as needed for cough       fluticasone-vilanterol (BREO ELLIPTA) 100-25 MCG/INH inhaler Inhale 1 puff into the lungs daily       furosemide (LASIX) 40 MG tablet Take 40 mg by mouth daily Every Mon, Tues, Wed, Fri, Sat       guaiFENesin (MUCINEX) 600 MG 12 hr tablet Take 1 tablet (600 mg) by mouth 2 times daily       insulin aspart (NOVOLOG PEN) 100 UNIT/ML pen Inject 1-7 Units Subcutaneous 3 times daily (before meals)       insulin aspart (NOVOLOG PEN) 100 UNIT/ML pen Inject 1-5 Units Subcutaneous At Bedtime       insulin glargine (LANTUS) 100 UNIT/ML injection Inject 1 Units Subcutaneous At Bedtime        insulin pen needle 31G X 6 MM Use 4 times daily or as directed. 100 each 1     ipratropium - albuterol 0.5 mg/2.5 mg/3 mL (DUONEB) 0.5-2.5 (3) MG/3ML neb solution Take 1 vial by nebulization 3 times daily as needed for shortness of breath / dyspnea or wheezing       ipratropium - albuterol 0.5 mg/2.5 mg/3 mL (DUONEB) 0.5-2.5 (3) MG/3ML neb solution Take 1 vial (3 mLs) by nebulization every 4 hours as needed for shortness of breath / dyspnea or wheezing       LORazepam (ATIVAN) 0.5 MG tablet Take 0.5 tablets (0.25 mg) by mouth every 6 hours as needed for  anxiety 8 tablet 0     magnesium 250 MG tablet Take 1 tablet by mouth 2 times daily        pantoprazole (PROTONIX) 40 MG EC tablet Take 40 mg by mouth daily       polyethylene glycol (MIRALAX) 17 g packet Take 17 g by mouth daily. May also take 17 g daily as needed for constipation.       potassium chloride ER (KLOR-CON M) 10 MEQ CR tablet Take 10 mEq by mouth daily Every Mon, Tues, Wed, Fri, Sat       QUEtiapine (SEROQUEL) 50 MG tablet Take 1 tablet (50 mg) by mouth At Bedtime (Patient taking differently: Take 25 mg by mouth At Bedtime 25 mg PO QD x 4 days, then 12.5 mg PO QD x 3 days, then stop)       senna-docusate (SENOKOT-S/PERICOLACE) 8.6-50 MG tablet Take 2 tablets by mouth 2 times daily       umeclidinium (INCRUSE ELLIPTA) 62.5 MCG/INH inhaler Inhale 1 puff into the lungs daily       Urea 40 % CREA Apply topically daily as needed for dry skin       ROS: 10 point ROS of systems including Constitutional, Eyes, Respiratory, Cardiovascular, Gastroenterology, Genitourinary, Integumentary, Musculoskeletal, Psychiatric were all negative except for pertinent positives noted in my HPI.    Vitals:  /60   Pulse 72   Temp 97.6  F (36.4  C)   Resp 19   Wt 56.2 kg (123 lb 14.4 oz)   SpO2 96%   Exam:  GENERAL APPEARANCE:  in no distress, cooperative  ENT:  Mouth and posterior oropharynx normal, moist mucous membranes, oral mucosa moist, no lesion noted.   EYES:  EOMI, Pupil rounded and equal.  RESP:  Unlabored breathing, diminished at the bases, no wheezing.   CV:  S1S2 audible, regular HR, no murmur appreciated.   ABDOMEN:  soft, NT/ND, BS audible. no mass appreciated on palpation. ostomy bag in place.   M/S:   no joint deformity noted on observation.   NEURO:   No NFD appreciated on observation. Hand  5/5 b/l  PSYCH:  AAOx person and time but not place. Normal.  insight, judgement and memory, affect and mood normal  SKIN:  No rash.         Lab/Diagnostic data: Reviewed in the chart and EHR.         ASSESSMENT/PLAN:  ---------------------------  #Severe COPD (H)  #Chronic hypoxemic and hypercapnic respiratory failure (H)  #Query CAP  # hx of Lung nodule  - Recent intubation (Oct 1/17-10/19)  -Started on fluticasone furoate/bilateral inhaled and Umiclidinium, DuoNeb, on prednisone taper, Mucinex 6 mg twice daily  -Discharged on prescription for APAP adventurous  -Started on lorazepam 0.25 mg p.o. every 6 hours for anxiety to help with tachypnea and dyspnea  - routine follow up for lung nodule  - Pulmonology referral on outpatient basis.     #Type 2 diabetes mellitus, insulin-dependent: (H)  -HbA1C 6%., over controlled. goal is around 8% given limited life expectancy  - currently on steroid, will continue SSI but [facility] one instead of the hospital one  -  In this frail elderly adult with a limited life expectancy, the goal of  the management is to address the hyperglycemia sx if any,  rather than the  BGs numbers per se.       #Oropharyngeal dysphagia: Started on DD2 with thin liquid. Will consult SLP    E recent ICU medical delirium:   - on Seroquel 50 mg HS from the hospital  - will reduce Seroquel from 50 mg to 25 mg PO every day x 4 days, then 12.5 mg PO every day X 3 days, then stop.  - start Gabapentin 50 mg PO QID PRN for agitation, restlessness, physical aggression.       # HFpEF (H)  #CAD, history of NSTEMI  #Essential hypertension  - compensated.     # PCM (H): There is no height or weight on file to calculate BMI. monitor weight. supplement prn      Anxiety: on Celexa 40 mg, max recommended dose in patient > 60 years old is 20, will reduce to 20 mg. Monitor  For adjustment issue      CHRONIC STABLE CONDITIONS:  -----------------------------------------------  -Chronic venous stasis: no concern  - Hx stable IIIB colon cancer 2016 s/p left hemicolectomy and colostomy (H): routine ostomy cares.  Daily miralax and senna.   - Hx of Rt MCA aneurysm s/p coil embolization 2011  - hx of PCOM aneurysm rupture  s/p coil embolization 2010  -- GERD: no concern.       transcribed by : Clarice Hayes  1) Reduce Seroquel from 50 mg to 25 mg PO every day x 4 days, then 12.5 mg PO every day X 3 days, then stop.  2) Gabapentin 50 mg PO QID PRN for agitation, restlessness, physical aggression  3) Discontinue current sliding scale insulin  4) Start JEFFREY of sliding scale insulin  5) Discontinue current bedtime sliding scale insulin  6) Reduce Celexa from 40 mg to 20 mg PO every day  7) Monitor for anxiety exacerbation        Electronically signed by:  Uzma Avila MD             Sincerely,        Uzma Avila MD

## 2020-11-10 NOTE — PROGRESS NOTES
[place] [facility]  [place] Medical Record Number:  [id number]  Place of Service where encounter took place:  [facility] ([facility]) [[id number]]  Chief Complaint   Patient presents with     Nursing Home Acute       HPI:    Bella Saucedo  is a 74 year old (1945), who is being seen today for an episodic care visit.  HPI information obtained from: facility staff, patient report and [facility] chart review.     Today's concern is:  - pt seen and examiend today. Reports doing good, still on 2 liter of O2. Cough at baseline, no wheezing, tolerating rehab activities.   - reports appetite is great, loves the food here, denies polyuria or polydipsia.   - RN has no concern today.   - reports walks independently using 4ww with one stand by assist.   - reports mood is very good, and does not feel depressed.     ---------------------------------  Past Medical and Surgical History reviewed in Epic today.    MEDICATIONS:  Current Outpatient Medications   Medication Sig Dispense Refill     acetaminophen (TYLENOL) 325 MG tablet Take 325 mg by mouth every 4 hours as needed for pain        aspirin 81 MG EC tablet Take 81 mg by mouth daily       atorvastatin (LIPITOR) 20 MG tablet Take 20 mg by mouth daily        blood glucose (NO BRAND SPECIFIED) lancets standard Use to test blood sugar 4 times daily or as directed. 1 Box 3     blood glucose monitoring (NO BRAND SPECIFIED) meter device kit Use to test blood sugar 4 times daily or as directed. 1 kit 0     blood glucose monitoring (NO BRAND SPECIFIED) test strip Use to test blood sugars 4 times daily or as directed 100 strip 3     citalopram (CELEXA) 40 MG tablet Take 20 mg by mouth daily        dextromethorphan-guaiFENesin (MUCINEX DM)  MG per 12 hr tablet Take 1 tablet by mouth 2 times daily as needed for cough       fluticasone-vilanterol (BREO ELLIPTA) 100-25 MCG/INH inhaler Inhale 1 puff into the lungs daily       furosemide (LASIX) 40 MG  tablet Take 40 mg by mouth daily Every Mon, Tues, Wed, Fri, Sat       GABAPENTIN PO Take 50 mg by mouth 4 times daily as needed       guaiFENesin (MUCINEX) 600 MG 12 hr tablet Take 1 tablet (600 mg) by mouth 2 times daily       insulin aspart (NOVOLOG PEN) 100 UNIT/ML pen Inject 1-7 Units Subcutaneous 3 times daily (before meals)       insulin aspart (NOVOLOG PEN) 100 UNIT/ML pen Inject 1-5 Units Subcutaneous At Bedtime       insulin glargine (LANTUS) 100 UNIT/ML injection Inject 1 Units Subcutaneous At Bedtime        insulin pen needle 31G X 6 MM Use 4 times daily or as directed. 100 each 1     ipratropium - albuterol 0.5 mg/2.5 mg/3 mL (DUONEB) 0.5-2.5 (3) MG/3ML neb solution Take 1 vial by nebulization 3 times daily as needed for shortness of breath / dyspnea or wheezing       ipratropium - albuterol 0.5 mg/2.5 mg/3 mL (DUONEB) 0.5-2.5 (3) MG/3ML neb solution Take 1 vial (3 mLs) by nebulization every 4 hours as needed for shortness of breath / dyspnea or wheezing       LORazepam (ATIVAN) 0.5 MG tablet Take 0.5 tablets (0.25 mg) by mouth every 6 hours as needed for anxiety 8 tablet 0     magnesium 250 MG tablet Take 1 tablet by mouth 2 times daily        pantoprazole (PROTONIX) 40 MG EC tablet Take 40 mg by mouth daily       polyethylene glycol (MIRALAX) 17 g packet Take 17 g by mouth daily. May also take 17 g daily as needed for constipation.       potassium chloride ER (KLOR-CON M) 10 MEQ CR tablet Take 10 mEq by mouth daily Every Mon, Tues, Wed, Fri, Sat       QUEtiapine (SEROQUEL) 50 MG tablet Take 1 tablet (50 mg) by mouth At Bedtime (Patient taking differently: Take 25 mg by mouth At Bedtime 25 mg PO QD x 4 days, then 12.5 mg PO QD x 3 days, then stop)       senna-docusate (SENOKOT-S/PERICOLACE) 8.6-50 MG tablet Take 2 tablets by mouth 2 times daily       umeclidinium (INCRUSE ELLIPTA) 62.5 MCG/INH inhaler Inhale 1 puff into the lungs daily       Urea 40 % CREA Apply topically daily as needed for dry skin    "    REVIEW OF SYSTEMS:  4 point ROS including Respiratory, CV, GI and , other than that noted in the HPI,  is negative    Objective:  /67   Pulse 80   Temp 97.6  F (36.4  C)   Resp 16   Ht 1.626 m (5' 4\")   Wt 56.2 kg (123 lb 14.4 oz)   SpO2 93%   BMI 21.27 kg/m    Exam:  GENERAL APPEARANCE:  in no distress, cooperative  RESP:  Unlabored breathing, diminished at the bases, no wheezing. NC in place  CV:  S1S2 audible, regular HR, no murmur appreciated.   ABDOMEN:  soft, NT/ND, BS audible. no mass appreciated on palpation. ostomy bag in place.   M/S:   no joint deformity noted on observation.   NEURO:   No NFD appreciated on observation. Hand  5/5 b/l  PSYCH:  AAOx 3. Normal.  insight, judgement and memory, affect and mood normal  SKIN:        Labs: Reviewed in the chart and EHR.      ASSESSMENT/PLAN:  #Severe COPD (H)  #Chronic hypoxemic and hypercapnic respiratory failure (H)  #Query CAP  # hx of Lung nodule  - Recent intubation (Oct 1/17-10/19)  -Started on fluticasone furoate/bilateral inhaled and Umiclidinium, DuoNeb, on prednisone taper, Mucinex 6 mg twice daily  -Discharged on prescription for APAP adventurous  -Started on lorazepam 0.25 mg p.o. every 6 hours for anxiety to help with tachypnea and dyspnea  - routine follow up for lung nodule  - Pulmonology referral on outpatient basis.      #Type 2 diabetes mellitus, insulin-dependent: (H)  -HbA1C 6%., over controlled. goal is around 8% given limited life expectancy   In this frail elderly adult with a limited life expectancy, the goal of  the management is to address the hyperglycemia sx if any,  rather than the  BGs numbers per se.   11/03: currently on steroid, will continue SSI but [facility] one instead of the hospital one  11/10: no concern.      #Oropharyngeal dysphagia: Started on DD2 with thin liquid.   11/03: Will consult SLP       E recent ICU medical delirium:   11/03:  -  on Seroquel 50 mg HS from the hospital  - will reduce Seroquel " from 50 mg to 25 mg PO every day x 4 days, then 12.5 mg PO every day X 3 days, then stop.   start Gabapentin 50 mg PO QID PRN for agitation, restlessness, physical aggression.   11/10: no concern. Mentation clearer today.         # HFpEF (H)  #CAD, history of NSTEMI  #Essential hypertension  - compensated.      # PCM (H): There is no height or weight on file to calculate BMI. monitor weight. supplement prn     # Anxiety:   11/03: on Celexa 40 mg, max recommended dose in patient > 60 years old is 20, will reduce to 20 mg. Monitor  For adjustment issue  11/10: adjusting well.           CHRONIC STABLE CONDITIONS:  -----------------------------------------------  -Chronic venous stasis: no concern  - Hx stable IIIB colon cancer 2016 s/p left hemicolectomy and colostomy (H): routine ostomy cares.  Daily miralax and senna.   - Hx of Rt MCA aneurysm s/p coil embolization 2011  - hx of PCOM aneurysm rupture s/p coil embolization 2010  -- GERD: no concern.    Order: See above, otherwise, continue the rest of the current POC.       Electronically signed by:  Uzma Avila MD

## 2020-11-10 NOTE — LETTER
11/10/2020        RE: Bella Saucedo  [address]  [facility] [address]        [place] [facility]  [place] Medical Record Number:  [id number]  Place of Service where encounter took place:  [facility] ([facility]) [[id number]]  Chief Complaint   Patient presents with     Nursing Home Acute       HPI:    Bella Saucedo  is a 74 year old (1945), who is being seen today for an episodic care visit.  HPI information obtained from: facility staff, patient report and [facility] chart review.     Today's concern is:  - pt seen and examiend today. Reports doing good, still on 2 liter of O2. Cough at baseline, no wheezing, tolerating rehab activities.   - reports appetite is great, loves the food here, denies polyuria or polydipsia.   - RN has no concern today.   - reports walks independently using 4ww with one stand by assist.   - reports mood is very good, and does not feel depressed.     ---------------------------------  Past Medical and Surgical History reviewed in Epic today.    MEDICATIONS:  Current Outpatient Medications   Medication Sig Dispense Refill     acetaminophen (TYLENOL) 325 MG tablet Take 325 mg by mouth every 4 hours as needed for pain        aspirin 81 MG EC tablet Take 81 mg by mouth daily       atorvastatin (LIPITOR) 20 MG tablet Take 20 mg by mouth daily        blood glucose (NO BRAND SPECIFIED) lancets standard Use to test blood sugar 4 times daily or as directed. 1 Box 3     blood glucose monitoring (NO BRAND SPECIFIED) meter device kit Use to test blood sugar 4 times daily or as directed. 1 kit 0     blood glucose monitoring (NO BRAND SPECIFIED) test strip Use to test blood sugars 4 times daily or as directed 100 strip 3     citalopram (CELEXA) 40 MG tablet Take 20 mg by mouth daily        dextromethorphan-guaiFENesin (MUCINEX DM)  MG per 12 hr tablet Take 1 tablet by mouth 2 times daily as needed for cough       fluticasone-vilanterol (BREO ELLIPTA)  100-25 MCG/INH inhaler Inhale 1 puff into the lungs daily       furosemide (LASIX) 40 MG tablet Take 40 mg by mouth daily Every Mon, Tues, Wed, Fri, Sat       GABAPENTIN PO Take 50 mg by mouth 4 times daily as needed       guaiFENesin (MUCINEX) 600 MG 12 hr tablet Take 1 tablet (600 mg) by mouth 2 times daily       insulin aspart (NOVOLOG PEN) 100 UNIT/ML pen Inject 1-7 Units Subcutaneous 3 times daily (before meals)       insulin aspart (NOVOLOG PEN) 100 UNIT/ML pen Inject 1-5 Units Subcutaneous At Bedtime       insulin glargine (LANTUS) 100 UNIT/ML injection Inject 1 Units Subcutaneous At Bedtime        insulin pen needle 31G X 6 MM Use 4 times daily or as directed. 100 each 1     ipratropium - albuterol 0.5 mg/2.5 mg/3 mL (DUONEB) 0.5-2.5 (3) MG/3ML neb solution Take 1 vial by nebulization 3 times daily as needed for shortness of breath / dyspnea or wheezing       ipratropium - albuterol 0.5 mg/2.5 mg/3 mL (DUONEB) 0.5-2.5 (3) MG/3ML neb solution Take 1 vial (3 mLs) by nebulization every 4 hours as needed for shortness of breath / dyspnea or wheezing       LORazepam (ATIVAN) 0.5 MG tablet Take 0.5 tablets (0.25 mg) by mouth every 6 hours as needed for anxiety 8 tablet 0     magnesium 250 MG tablet Take 1 tablet by mouth 2 times daily        pantoprazole (PROTONIX) 40 MG EC tablet Take 40 mg by mouth daily       polyethylene glycol (MIRALAX) 17 g packet Take 17 g by mouth daily. May also take 17 g daily as needed for constipation.       potassium chloride ER (KLOR-CON M) 10 MEQ CR tablet Take 10 mEq by mouth daily Every Mon, Tues, Wed, Fri, Sat       QUEtiapine (SEROQUEL) 50 MG tablet Take 1 tablet (50 mg) by mouth At Bedtime (Patient taking differently: Take 25 mg by mouth At Bedtime 25 mg PO QD x 4 days, then 12.5 mg PO QD x 3 days, then stop)       senna-docusate (SENOKOT-S/PERICOLACE) 8.6-50 MG tablet Take 2 tablets by mouth 2 times daily       umeclidinium (INCRUSE ELLIPTA) 62.5 MCG/INH inhaler Inhale 1 puff  "into the lungs daily       Urea 40 % CREA Apply topically daily as needed for dry skin       REVIEW OF SYSTEMS:  4 point ROS including Respiratory, CV, GI and , other than that noted in the HPI,  is negative    Objective:  /67   Pulse 80   Temp 97.6  F (36.4  C)   Resp 16   Ht 1.626 m (5' 4\")   Wt 56.2 kg (123 lb 14.4 oz)   SpO2 93%   BMI 21.27 kg/m    Exam:  GENERAL APPEARANCE:  in no distress, cooperative  RESP:  Unlabored breathing, diminished at the bases, no wheezing. NC in place  CV:  S1S2 audible, regular HR, no murmur appreciated.   ABDOMEN:  soft, NT/ND, BS audible. no mass appreciated on palpation. ostomy bag in place.   M/S:   no joint deformity noted on observation.   NEURO:   No NFD appreciated on observation. Hand  5/5 b/l  PSYCH:  AAOx 3. Normal.  insight, judgement and memory, affect and mood normal  SKIN:        Labs: Reviewed in the chart and EHR.      ASSESSMENT/PLAN:  #Severe COPD (H)  #Chronic hypoxemic and hypercapnic respiratory failure (H)  #Query CAP  # hx of Lung nodule  - Recent intubation (Oct 1/17-10/19)  -Started on fluticasone furoate/bilateral inhaled and Umiclidinium, DuoNeb, on prednisone taper, Mucinex 6 mg twice daily  -Discharged on prescription for APAP adventurous  -Started on lorazepam 0.25 mg p.o. every 6 hours for anxiety to help with tachypnea and dyspnea  - routine follow up for lung nodule  - Pulmonology referral on outpatient basis.      #Type 2 diabetes mellitus, insulin-dependent: (H)  -HbA1C 6%., over controlled. goal is around 8% given limited life expectancy   In this frail elderly adult with a limited life expectancy, the goal of  the management is to address the hyperglycemia sx if any,  rather than the  BGs numbers per se.   11/03: currently on steroid, will continue SSI but [facility] one instead of the hospital one  11/10: no concern.      #Oropharyngeal dysphagia: Started on DD2 with thin liquid.   11/03: Will consult SLP       E recent ICU medical " delirium:   11/03:  -  on Seroquel 50 mg HS from the hospital  - will reduce Seroquel from 50 mg to 25 mg PO every day x 4 days, then 12.5 mg PO every day X 3 days, then stop.   start Gabapentin 50 mg PO QID PRN for agitation, restlessness, physical aggression.   11/10: no concern. Mentation clearer today.         # HFpEF (H)  #CAD, history of NSTEMI  #Essential hypertension  - compensated.      # PCM (H): There is no height or weight on file to calculate BMI. monitor weight. supplement prn     # Anxiety:   11/03: on Celexa 40 mg, max recommended dose in patient > 60 years old is 20, will reduce to 20 mg. Monitor  For adjustment issue  11/10: adjusting well.           CHRONIC STABLE CONDITIONS:  -----------------------------------------------  -Chronic venous stasis: no concern  - Hx stable IIIB colon cancer 2016 s/p left hemicolectomy and colostomy (H): routine ostomy cares.  Daily miralax and senna.   - Hx of Rt MCA aneurysm s/p coil embolization 2011  - hx of PCOM aneurysm rupture s/p coil embolization 2010  -- GERD: no concern.    Order: See above, otherwise, continue the rest of the current POC.       Electronically signed by:  Uzma Avila MD                 Sincerely,        Uzma Avila MD

## 2020-11-13 NOTE — TELEPHONE ENCOUNTER
"[facility] TELEPHONE ENCOUNTER    Chief Complaint   Patient presents with     Constipation       Bella Saucedo is a 74 year old  (1945),Nurse called today to report: patient with colostomy, has been having hard, firm stool cause \"blow-outs\" in her colostomy bag. Nursing staff requesting increased laxatives to help soften stool. She has been drinking adequate fluid.     ASSESSMENT/PLAN      discontinue Senna, increase senna-s to 2 tabs PO BID.     Add miralax 17gm daily PRN in addition to scheduled dose  Electronically signed by:   Tiny Martin, KASHMIR CNP    "

## 2020-11-16 NOTE — PROGRESS NOTES
[place] [facility]  [place] Medical Record Number:  [id number]  Place of Service where encounter took place:  [facility] [place] ([facility]) [[id number]]  Chief Complaint   Patient presents with     Nursing Home Acute       HPI:    Bella Saucedo  is a 74 year old (1945), who is being seen today for an episodic care visit.  HPI information obtained from: facility staff, patient report and [facility] chart review.     Today's concern is:  - pt seen and examiend. reports still on 2.5 L of O2, at home uses 2 L. Reports breathing is good. denies wheezing, and reports cough is at baseline  - reports swallowing is good.   - denies chest pain, tolerating rehab activities.   - reports mood is good. RN reports no behavior concern.   -   - reports colostomy bag is working fine, pasty in consistency.   - reports uses 4ww with one assist.     -----------------------------------------    Past Medical and Surgical History reviewed in Epic today.    MEDICATIONS:  Current Outpatient Medications   Medication Sig Dispense Refill     guaiFENesin (MUCINEX) 600 MG 12 hr tablet Take 1 tablet (600 mg) by mouth 2 times daily as needed for congestion       ipratropium - albuterol 0.5 mg/2.5 mg/3 mL (DUONEB) 0.5-2.5 (3) MG/3ML neb solution Take 1 vial by nebulization 3 times daily as needed for shortness of breath / dyspnea or wheezing       acetaminophen (TYLENOL) 325 MG tablet Take 325 mg by mouth every 4 hours as needed for pain        aspirin 81 MG EC tablet Take 81 mg by mouth daily       atorvastatin (LIPITOR) 20 MG tablet Take 20 mg by mouth daily        blood glucose (NO BRAND SPECIFIED) lancets standard Use to test blood sugar 4 times daily or as directed. 1 Box 3     blood glucose monitoring (NO BRAND SPECIFIED) meter device kit Use to test blood sugar 4 times daily or as directed. 1 kit 0     blood glucose monitoring (NO BRAND SPECIFIED) test strip Use to test blood sugars 4 times daily or as directed 100  strip 3     citalopram (CELEXA) 40 MG tablet Take 20 mg by mouth daily        dextromethorphan-guaiFENesin (MUCINEX DM)  MG per 12 hr tablet Take 1 tablet by mouth 2 times daily as needed for cough       fluticasone-vilanterol (BREO ELLIPTA) 100-25 MCG/INH inhaler Inhale 1 puff into the lungs daily       furosemide (LASIX) 40 MG tablet Take 40 mg by mouth daily Every Mon, Tues, Wed, Fri, Sat       insulin aspart (NOVOLOG PEN) 100 UNIT/ML pen Inject 1-7 Units Subcutaneous 3 times daily (before meals)       insulin aspart (NOVOLOG PEN) 100 UNIT/ML pen Inject 1-5 Units Subcutaneous At Bedtime       insulin glargine (LANTUS) 100 UNIT/ML injection Inject 1 Units Subcutaneous At Bedtime        insulin pen needle 31G X 6 MM Use 4 times daily or as directed. 100 each 1     ipratropium - albuterol 0.5 mg/2.5 mg/3 mL (DUONEB) 0.5-2.5 (3) MG/3ML neb solution Take 1 vial (3 mLs) by nebulization every 4 hours as needed for shortness of breath / dyspnea or wheezing       LORazepam (ATIVAN) 0.5 MG tablet Take 0.5 tablets (0.25 mg) by mouth every 6 hours as needed for anxiety 8 tablet 0     magnesium 250 MG tablet Take 1 tablet by mouth 2 times daily        pantoprazole (PROTONIX) 40 MG EC tablet Take 40 mg by mouth daily       polyethylene glycol (MIRALAX) 17 g packet Take 17 g by mouth daily. May also take 17 g daily as needed for constipation.       potassium chloride ER (KLOR-CON M) 10 MEQ CR tablet Take 10 mEq by mouth daily Every Mon, Tues, Wed, Fri, Sat       QUEtiapine (SEROQUEL) 50 MG tablet Take 1 tablet (50 mg) by mouth At Bedtime (Patient taking differently: Take 25 mg by mouth At Bedtime 25 mg PO QD x 4 days, then 12.5 mg PO QD x 3 days, then stop)       senna-docusate (SENOKOT-S/PERICOLACE) 8.6-50 MG tablet Take 2 tablets by mouth 2 times daily       umeclidinium (INCRUSE ELLIPTA) 62.5 MCG/INH inhaler Inhale 1 puff into the lungs daily       Urea 40 % CREA Apply topically daily as needed for dry skin    "    REVIEW OF SYSTEMS:  4 point ROS including Respiratory, CV, GI and , other than that noted in the HPI,  is negative    Objective:  BP (!) 154/70   Pulse 77   Temp 98.7  F (37.1  C)   Resp 18   Ht 1.626 m (5' 4\")   Wt 56.2 kg (123 lb 14.4 oz)   SpO2 97%   BMI 21.27 kg/m    Exam:  GENERAL APPEARANCE:  in no distress, cooperative  RESP:  Unlabored breathing, diminished at the bases, no wheezing. NC in place  CV:  S1S2 audible, regular HR, no murmur appreciated.   ABDOMEN:  soft, NT/ND, BS audible. no mass appreciated on palpation. ostomy bag in place.   M/S:   no joint deformity noted on observation.   NEURO:   No NFD appreciated on observation. Hand  5/5 b/l  PSYCH:  AAOx 3. Normal.  insight, judgement and memory, affect and mood normal  SKIN:      Labs: Reviewed in the chart and EHR.      ASSESSMENT/PLAN:  --------------------  #Severe COPD  #Chronic hypoxemic and hypercapnic respiratory failure  #Query CAP  # Lung nodule  11/17 still not back to baseline, uses 2.5 L, at home 2 L. Clinically stable.  Change Duoneb scheduled to prn, and change mucinex to prn      # #Type 2 diabetes mellitus, insulin-dependent:  11/17:  BG has been w/i acceptable range.  Change accu check from qid to am, consider stopping at dismissal time.     # Hx of delirium:   11/17: Seroquel successfully GDRed. ON Neurontin 50 mg qid prn. Stable. Will discontinue Neurontin.     # Constipation: on multiple regiment. Stable.     # HFpEF  #CAD, history of NSTEMI  #Essential hypertension  # chronic venous statis:  - VSS.   - on Lasix 40 mg 5x/wk, and KCL 10 meq.   - compensated  - will check BMP on 11/20  - down the road may consider reducing Lasix slowly.       Orders transcribed by : Clarice Hayes  1) Discontinue Gabapentin  2) Change Duoneb to TID PRN for wheezing/SOB  3) Change Mucinex BID scheduled to BID PRN for cough  4) Change Acc check from QID to once in the morning  5) BMP on 11/20      Electronically signed " by:  Uzma Avila MD

## 2020-11-17 NOTE — LETTER
11/17/2020        RE: Bella Saucedo  [address]  [facility] [address]        [facility]  [place] Medical Record Number:  [id number]  Place of Service where encounter took place:  [facility] [[id number]]  Chief Complaint   Patient presents with     Nursing Home Acute       HPI:    Bella Saucedo  is a 74 year old (1945), who is being seen today for an episodic care visit.  HPI information obtained from: facility staff, patient report and [facility] chart review.     Today's concern is:  - pt seen and examiend. reports still on 2.5 L of O2, at home uses 2 L. Reports breathing is good. denies wheezing, and reports cough is at baseline  - reports swallowing is good.   - denies chest pain, tolerating rehab activities.   - reports mood is good. RN reports no behavior concern.   -   - reports colostomy bag is working fine, pasty in consistency.   - reports uses 4ww with one assist.     -----------------------------------------    Past Medical and Surgical History reviewed in Epic today.    MEDICATIONS:  Current Outpatient Medications   Medication Sig Dispense Refill     guaiFENesin (MUCINEX) 600 MG 12 hr tablet Take 1 tablet (600 mg) by mouth 2 times daily as needed for congestion       ipratropium - albuterol 0.5 mg/2.5 mg/3 mL (DUONEB) 0.5-2.5 (3) MG/3ML neb solution Take 1 vial by nebulization 3 times daily as needed for shortness of breath / dyspnea or wheezing       acetaminophen (TYLENOL) 325 MG tablet Take 325 mg by mouth every 4 hours as needed for pain        aspirin 81 MG EC tablet Take 81 mg by mouth daily       atorvastatin (LIPITOR) 20 MG tablet Take 20 mg by mouth daily        blood glucose (NO BRAND SPECIFIED) lancets standard Use to test blood sugar 4 times daily or as directed. 1 Box 3     blood glucose monitoring (NO BRAND SPECIFIED) meter device kit Use to test blood sugar 4 times daily or as directed. 1 kit 0     blood glucose monitoring  (NO BRAND SPECIFIED) test strip Use to test blood sugars 4 times daily or as directed 100 strip 3     citalopram (CELEXA) 40 MG tablet Take 20 mg by mouth daily        dextromethorphan-guaiFENesin (MUCINEX DM)  MG per 12 hr tablet Take 1 tablet by mouth 2 times daily as needed for cough       fluticasone-vilanterol (BREO ELLIPTA) 100-25 MCG/INH inhaler Inhale 1 puff into the lungs daily       furosemide (LASIX) 40 MG tablet Take 40 mg by mouth daily Every Mon, Tues, Wed, Fri, Sat       insulin aspart (NOVOLOG PEN) 100 UNIT/ML pen Inject 1-7 Units Subcutaneous 3 times daily (before meals)       insulin aspart (NOVOLOG PEN) 100 UNIT/ML pen Inject 1-5 Units Subcutaneous At Bedtime       insulin glargine (LANTUS) 100 UNIT/ML injection Inject 1 Units Subcutaneous At Bedtime        insulin pen needle 31G X 6 MM Use 4 times daily or as directed. 100 each 1     ipratropium - albuterol 0.5 mg/2.5 mg/3 mL (DUONEB) 0.5-2.5 (3) MG/3ML neb solution Take 1 vial (3 mLs) by nebulization every 4 hours as needed for shortness of breath / dyspnea or wheezing       LORazepam (ATIVAN) 0.5 MG tablet Take 0.5 tablets (0.25 mg) by mouth every 6 hours as needed for anxiety 8 tablet 0     magnesium 250 MG tablet Take 1 tablet by mouth 2 times daily        pantoprazole (PROTONIX) 40 MG EC tablet Take 40 mg by mouth daily       polyethylene glycol (MIRALAX) 17 g packet Take 17 g by mouth daily. May also take 17 g daily as needed for constipation.       potassium chloride ER (KLOR-CON M) 10 MEQ CR tablet Take 10 mEq by mouth daily Every Mon, Tues, Wed, Fri, Sat       QUEtiapine (SEROQUEL) 50 MG tablet Take 1 tablet (50 mg) by mouth At Bedtime (Patient taking differently: Take 25 mg by mouth At Bedtime 25 mg PO QD x 4 days, then 12.5 mg PO QD x 3 days, then stop)       senna-docusate (SENOKOT-S/PERICOLACE) 8.6-50 MG tablet Take 2 tablets by mouth 2 times daily       umeclidinium (INCRUSE ELLIPTA) 62.5 MCG/INH inhaler Inhale 1 puff into the  "lungs daily       Urea 40 % CREA Apply topically daily as needed for dry skin       REVIEW OF SYSTEMS:  4 point ROS including Respiratory, CV, GI and , other than that noted in the HPI,  is negative    Objective:  BP (!) 154/70   Pulse 77   Temp 98.7  F (37.1  C)   Resp 18   Ht 1.626 m (5' 4\")   Wt 56.2 kg (123 lb 14.4 oz)   SpO2 97%   BMI 21.27 kg/m    Exam:  GENERAL APPEARANCE:  in no distress, cooperative  RESP:  Unlabored breathing, diminished at the bases, no wheezing. NC in place  CV:  S1S2 audible, regular HR, no murmur appreciated.   ABDOMEN:  soft, NT/ND, BS audible. no mass appreciated on palpation. ostomy bag in place.   M/S:   no joint deformity noted on observation.   NEURO:   No NFD appreciated on observation. Hand  5/5 b/l  PSYCH:  AAOx 3. Normal.  insight, judgement and memory, affect and mood normal  SKIN:      Labs: Reviewed in the chart and EHR.      ASSESSMENT/PLAN:  --------------------  #Severe COPD  #Chronic hypoxemic and hypercapnic respiratory failure  #Query CAP  # Lung nodule  11/17 still not back to baseline, uses 2.5 L, at home 2 L. Clinically stable.  Change Duoneb scheduled to prn, and change mucinex to prn      # #Type 2 diabetes mellitus, insulin-dependent:  11/17:  BG has been w/i acceptable range.  Change accu check from qid to am, consider stopping at dismissal time.     # Hx of delirium:   11/17: Seroquel successfully GDRed. ON Neurontin 50 mg qid prn. Stable. Will discontinue Neurontin.     # Constipation: on multiple regiment. Stable.     # HFpEF  #CAD, history of NSTEMI  #Essential hypertension  # chronic venous statis:  - VSS.   - on Lasix 40 mg 5x/wk, and KCL 10 meq.   - compensated  - will check BMP on 11/20  - down the road may consider reducing Lasix slowly.       Orders transcribed by : Clarice Hayes  1) Discontinue Gabapentin  2) Change Duoneb to TID PRN for wheezing/SOB  3) Change Mucinex BID scheduled to BID PRN for cough  4) Change Acc " check from QID to once in the morning  5) BMP on 11/20      Electronically signed by:  Uzma Avila MD                 Sincerely,        Uzma Avila MD

## 2020-11-19 NOTE — PROGRESS NOTES
"[facility]   Bella Saucedo is being evaluated via a billable video visit.   The patient has been notified of following:  \"This video visit will be conducted via a call between you and your provider. We have found that certain health care needs can be provided without the need for an in-person physical exam.  This service lets us provide the care you need with a video conversation. If during the course of the call the provider feels a video visit is not appropriate, you will not be charged for this service.\"   The provider has received verbal consent for a Video Visit from the patient or first contact? Yes  Patient  or facility staff would like the video invitation sent by: N/A   Video Start Time: 11:20  [place] Medical Record Number:  [id number]  Place of Location at the time of visit: [facility]  Chief Complaint   Patient presents with     Nursing Home Acute     Video Visit     HPI:  Bella Saucedo  is a 74 year old (1945), who is being seen today for a visit.  HPI information obtained from: facility staff, patient report and [facility] chart review.       INTERVAL HISTORY  --------------------------  10/17/20 through 10/30/20: Patient with past medical history of HFpEF, respiratory failure/COPD hospitalized with acute COPD exacerbation and concern for CAP, acute on chronic respiratory failure required intubation, completed antibiotic  Meds started: O[facility] Concepcion      Today's concern is:  - Pt seen and examiend. reports doing good, as well as the therapy, reports able to do her ADLs.   - RN reports pt is independently walking, likely home early next week.     ===================================  Past Medical and Surgical History reviewed in Epic today.  MEDICATIONS:  Current Outpatient Medications   Medication Sig Dispense Refill     acetaminophen (TYLENOL) 325 MG tablet Take 325 mg by mouth every 4 hours as needed for pain        aspirin 81 MG EC tablet Take 81 mg by " mouth daily       atorvastatin (LIPITOR) 20 MG tablet Take 20 mg by mouth daily        blood glucose (NO BRAND SPECIFIED) lancets standard Use to test blood sugar 4 times daily or as directed. 1 Box 3     blood glucose monitoring (NO BRAND SPECIFIED) meter device kit Use to test blood sugar 4 times daily or as directed. 1 kit 0     blood glucose monitoring (NO BRAND SPECIFIED) test strip Use to test blood sugars 4 times daily or as directed 100 strip 3     citalopram (CELEXA) 40 MG tablet Take 20 mg by mouth daily        dextromethorphan-guaiFENesin (MUCINEX DM)  MG per 12 hr tablet Take 1 tablet by mouth 2 times daily as needed for cough       fluticasone-vilanterol (BREO ELLIPTA) 100-25 MCG/INH inhaler Inhale 1 puff into the lungs daily       furosemide (LASIX) 40 MG tablet Take 40 mg by mouth daily Every Mon, Tues, Wed, Fri, Sat       guaiFENesin (MUCINEX) 600 MG 12 hr tablet Take 1 tablet (600 mg) by mouth 2 times daily as needed for congestion       insulin aspart (NOVOLOG PEN) 100 UNIT/ML pen Inject 1-7 Units Subcutaneous 3 times daily (before meals)       insulin aspart (NOVOLOG PEN) 100 UNIT/ML pen Inject 1-5 Units Subcutaneous At Bedtime       insulin glargine (LANTUS) 100 UNIT/ML injection Inject 1 Units Subcutaneous At Bedtime        insulin pen needle 31G X 6 MM Use 4 times daily or as directed. 100 each 1     ipratropium - albuterol 0.5 mg/2.5 mg/3 mL (DUONEB) 0.5-2.5 (3) MG/3ML neb solution Take 1 vial by nebulization 3 times daily as needed for shortness of breath / dyspnea or wheezing       ipratropium - albuterol 0.5 mg/2.5 mg/3 mL (DUONEB) 0.5-2.5 (3) MG/3ML neb solution Take 1 vial (3 mLs) by nebulization every 4 hours as needed for shortness of breath / dyspnea or wheezing       magnesium 250 MG tablet Take 1 tablet by mouth 2 times daily        pantoprazole (PROTONIX) 40 MG EC tablet Take 40 mg by mouth daily       polyethylene glycol (MIRALAX) 17 g packet Take 17 g by mouth daily. May  "also take 17 g daily as needed for constipation.       potassium chloride ER (KLOR-CON M) 10 MEQ CR tablet Take 10 mEq by mouth daily Every Mon, Tues, Wed, Fri, Sat       QUEtiapine (SEROQUEL) 50 MG tablet Take 1 tablet (50 mg) by mouth At Bedtime (Patient taking differently: Take 25 mg by mouth At Bedtime 25 mg PO QD x 4 days, then 12.5 mg PO QD x 3 days, then stop)       senna-docusate (SENOKOT-S/PERICOLACE) 8.6-50 MG tablet Take 2 tablets by mouth 2 times daily       umeclidinium (INCRUSE ELLIPTA) 62.5 MCG/INH inhaler Inhale 1 puff into the lungs daily       Urea 40 % CREA Apply topically daily as needed for dry skin       REVIEW OF SYSTEMS: 4 point ROS including Respiratory, CV, GI and , other than that noted in the HPI,  is negative  Objective: BP (!) 141/60   Pulse 84   Temp 97.9  F (36.6  C)   Resp 19   Ht 1.626 m (5' 4\")   Wt 56.2 kg (123 lb 14.4 oz)   SpO2 97%   BMI 21.27 kg/m    Limited visit exam done given COVID-19 precautions.   GENERAL APPEARANCE:  in no distress  RESP:  unlabored breathing  M/S:   no joint deformity noted  SKIN:  no rash noted  NEURO:   no purposeful movement in upper and lower extremities  PSYCH:  AAOx 3. Normal.  insight, judgement and memory, affect and mood normal    Labs: Reviewed in the chart and EHR.          ASSESSMENT/PLAN:  --------------------------  #Severe COPD (H)  #Chronic hypoxemic and hypercapnic respiratory failure (H)  #Query CAP  # hx of Lung nodule  - Recent intubation (Oct 1/17-10/19)  -while hospitalized was started on fluticasone furoate/bilateral inhaled and Umiclidinium, DuoNeb, on prednisone taper, Mucinex 6 mg twice daily  -Discharged on prescription for APAP adventurous  -Started on lorazepam 0.25 mg p.o. every 6 hours for anxiety to help with tachypnea and dyspnea  - routine follow up for lung nodule  - Pulmonology referral on outpatient basis.    - 11/17: Duoneb scheduled to prn, and change mucinex to prn  - today - still not back to baseline, " uses 2.5 L, at home 2 L. Clinically stable.         #Type 2 diabetes mellitus, insulin-dependent: (H)  -HbA1C 6%., over controlled. goal is around 8% given limited life expectancy   In this frail elderly adult with a limited life expectancy, the goal of  the management is to address the hyperglycemia sx if any,  rather than the  BGs numbers per se.   11/03: currently on steroid, will continue SSI but [facility] one instead of the hospital one  11/20:  BG has been w/i acceptable range.  Change accu check from qid to am, consider stopping at dismissal time.        #Oropharyngeal dysphagia: Started on DD2 with thin liquid. tolerating.         Recent ICU medical delirium:   11/03:  -  on Seroquel 50 mg HS from the hospital.   - will reduce Seroquel from 50 mg to 25 mg PO every day x 4 days, then 12.5 mg PO every day X 3 days, then stop.   start Gabapentin 50 mg PO QID PRN for agitation, restlessness, physical aggression.   11/10: no concern. Mentation clearer today.   11/17: Seroquel successfully GDRed. ON Neurontin 50 mg qid prn. Stable. Will discontinue Neurontin  11/20: stable. Will discontinue prn ativan for not using.         # HFpEF (H)  #CAD, history of NSTEMI  #Essential hypertension  - on Lasix 40 mg 5x/wk, and KCL 10 meq  - compensated. down the road may consider reducing Lasix slowly.      # PCM (H): Body mass index is 21.27 kg/m .t. supplement prn     # Anxiety:   11/03: on Celexa 40 mg, max recommended dose in patient > 60 years old is 20, will reduce to 20 mg. Monitor  For adjustment issue  11/20: no concern. .         Physical Deconditioning: improving with therapy, plan is to go home on 11/25 with home service.          CHRONIC STABLE CONDITIONS:  -----------------------------------------------  -Chronic venous stasis: no concern  - Hx stable IIIB colon cancer 2016 s/p left hemicolectomy and colostomy (H): routine ostomy cares.  Daily miralax and senna.   - Hx of Rt MCA aneurysm s/p coil embolization 2011  - hx  of PCOM aneurysm rupture s/p coil embolization 2010  -- GERD: no concern.      Order: stop Neurontin prn and ativan      Electronically signed by:  Uzma Avila MD     Video-Visit Details  Type of service:  Video Visit  Video End Time (time video stopped): 11:24  Distant Location (provider location):  [facility]

## 2020-11-20 NOTE — LETTER
"    11/20/2020        RE: Bella Saucedo  [address]  [facility] [address]        [facility]   Bella Saucedo is being evaluated via a billable video visit.   The patient has been notified of following:  \"This video visit will be conducted via a call between you and your provider. We have found that certain health care needs can be provided without the need for an in-person physical exam.  This service lets us provide the care you need with a video conversation. If during the course of the call the provider feels a video visit is not appropriate, you will not be charged for this service.\"   The provider has received verbal consent for a Video Visit from the patient or first contact? Yes  Patient  or facility staff would like the video invitation sent by: N/A   Video Start Time: 11:20  [place] Medical Record Number:  [id number]  Place of Location at the time of visit: [facility]  Chief Complaint   Patient presents with     Nursing Home Acute     Video Visit     HPI:  Bella Saucedo  is a 74 year old (1945), who is being seen today for a visit.  HPI information obtained from: facility staff, patient report and [facility] chart review.       INTERVAL HISTORY  --------------------------  10/17/20 through 10/30/20: Patient with past medical history of HFpEF, respiratory failure/COPD hospitalized with acute COPD exacerbation and concern for CAP, acute on chronic respiratory failure required intubation, completed antibiotic  Meds started: Omer Concepcion      Today's concern is:  - Pt seen and examiend. reports doing good, as well as the therapy, reports able to do her ADLs.   - RN reports pt is independently walking, likely home early next week.     ===================================  Past Medical and Surgical History reviewed in Epic today.  MEDICATIONS:  Current Outpatient Medications   Medication Sig Dispense Refill     acetaminophen (TYLENOL) 325 MG tablet Take " 325 mg by mouth every 4 hours as needed for pain        aspirin 81 MG EC tablet Take 81 mg by mouth daily       atorvastatin (LIPITOR) 20 MG tablet Take 20 mg by mouth daily        blood glucose (NO BRAND SPECIFIED) lancets standard Use to test blood sugar 4 times daily or as directed. 1 Box 3     blood glucose monitoring (NO BRAND SPECIFIED) meter device kit Use to test blood sugar 4 times daily or as directed. 1 kit 0     blood glucose monitoring (NO BRAND SPECIFIED) test strip Use to test blood sugars 4 times daily or as directed 100 strip 3     citalopram (CELEXA) 40 MG tablet Take 20 mg by mouth daily        dextromethorphan-guaiFENesin (MUCINEX DM)  MG per 12 hr tablet Take 1 tablet by mouth 2 times daily as needed for cough       fluticasone-vilanterol (BREO ELLIPTA) 100-25 MCG/INH inhaler Inhale 1 puff into the lungs daily       furosemide (LASIX) 40 MG tablet Take 40 mg by mouth daily Every Mon, Tues, Wed, Fri, Sat       guaiFENesin (MUCINEX) 600 MG 12 hr tablet Take 1 tablet (600 mg) by mouth 2 times daily as needed for congestion       insulin aspart (NOVOLOG PEN) 100 UNIT/ML pen Inject 1-7 Units Subcutaneous 3 times daily (before meals)       insulin aspart (NOVOLOG PEN) 100 UNIT/ML pen Inject 1-5 Units Subcutaneous At Bedtime       insulin glargine (LANTUS) 100 UNIT/ML injection Inject 1 Units Subcutaneous At Bedtime        insulin pen needle 31G X 6 MM Use 4 times daily or as directed. 100 each 1     ipratropium - albuterol 0.5 mg/2.5 mg/3 mL (DUONEB) 0.5-2.5 (3) MG/3ML neb solution Take 1 vial by nebulization 3 times daily as needed for shortness of breath / dyspnea or wheezing       ipratropium - albuterol 0.5 mg/2.5 mg/3 mL (DUONEB) 0.5-2.5 (3) MG/3ML neb solution Take 1 vial (3 mLs) by nebulization every 4 hours as needed for shortness of breath / dyspnea or wheezing       magnesium 250 MG tablet Take 1 tablet by mouth 2 times daily        pantoprazole (PROTONIX) 40 MG EC tablet Take 40 mg by  "mouth daily       polyethylene glycol (MIRALAX) 17 g packet Take 17 g by mouth daily. May also take 17 g daily as needed for constipation.       potassium chloride ER (KLOR-CON M) 10 MEQ CR tablet Take 10 mEq by mouth daily Every Mon, Tues, Wed, Fri, Sat       QUEtiapine (SEROQUEL) 50 MG tablet Take 1 tablet (50 mg) by mouth At Bedtime (Patient taking differently: Take 25 mg by mouth At Bedtime 25 mg PO QD x 4 days, then 12.5 mg PO QD x 3 days, then stop)       senna-docusate (SENOKOT-S/PERICOLACE) 8.6-50 MG tablet Take 2 tablets by mouth 2 times daily       umeclidinium (INCRUSE ELLIPTA) 62.5 MCG/INH inhaler Inhale 1 puff into the lungs daily       Urea 40 % CREA Apply topically daily as needed for dry skin       REVIEW OF SYSTEMS: 4 point ROS including Respiratory, CV, GI and , other than that noted in the HPI,  is negative  Objective: BP (!) 141/60   Pulse 84   Temp 97.9  F (36.6  C)   Resp 19   Ht 1.626 m (5' 4\")   Wt 56.2 kg (123 lb 14.4 oz)   SpO2 97%   BMI 21.27 kg/m    Limited visit exam done given COVID-19 precautions.   GENERAL APPEARANCE:  in no distress  RESP:  unlabored breathing  M/S:   no joint deformity noted  SKIN:  no rash noted  NEURO:   no purposeful movement in upper and lower extremities  PSYCH:  AAOx 3. Normal.  insight, judgement and memory, affect and mood normal    Labs: Reviewed in the chart and EHR.          ASSESSMENT/PLAN:  --------------------------  #Severe COPD (H)  #Chronic hypoxemic and hypercapnic respiratory failure (H)  #Query CAP  # hx of Lung nodule  - Recent intubation (Oct 1/17-10/19)  -while hospitalized was started on fluticasone furoate/bilateral inhaled and Umiclidinium, DuoNeb, on prednisone taper, Mucinex 6 mg twice daily  -Discharged on prescription for APAP adventurous  -Started on lorazepam 0.25 mg p.o. every 6 hours for anxiety to help with tachypnea and dyspnea  - routine follow up for lung nodule  - Pulmonology referral on outpatient basis.    - 11/17: " Duoneb scheduled to prn, and change mucinex to prn  - today - still not back to baseline, uses 2.5 L, at home 2 L. Clinically stable.         #Type 2 diabetes mellitus, insulin-dependent: (H)  -HbA1C 6%., over controlled. goal is around 8% given limited life expectancy   In this frail elderly adult with a limited life expectancy, the goal of  the management is to address the hyperglycemia sx if any,  rather than the  BGs numbers per se.   11/03: currently on steroid, will continue SSI but [facility] one instead of the hospital one  11/20:  BG has been w/i acceptable range.  Change accu check from qid to am, consider stopping at dismissal time.        #Oropharyngeal dysphagia: Started on DD2 with thin liquid. tolerating.         Recent ICU medical delirium:   11/03:  -  on Seroquel 50 mg HS from the hospital.   - will reduce Seroquel from 50 mg to 25 mg PO every day x 4 days, then 12.5 mg PO every day X 3 days, then stop.   start Gabapentin 50 mg PO QID PRN for agitation, restlessness, physical aggression.   11/10: no concern. Mentation clearer today.   11/17: Seroquel successfully GDRed. ON Neurontin 50 mg qid prn. Stable. Will discontinue Neurontin  11/20: stable. Will discontinue prn ativan for not using.         # HFpEF (H)  #CAD, history of NSTEMI  #Essential hypertension  - on Lasix 40 mg 5x/wk, and KCL 10 meq  - compensated. down the road may consider reducing Lasix slowly.      # PCM (H): Body mass index is 21.27 kg/m .t. supplement prn     # Anxiety:   11/03: on Celexa 40 mg, max recommended dose in patient > 60 years old is 20, will reduce to 20 mg. Monitor  For adjustment issue  11/20: no concern. .         Physical Deconditioning: improving with therapy, plan is to go home on 11/25 with home service.          CHRONIC STABLE CONDITIONS:  -----------------------------------------------  -Chronic venous stasis: no concern  - Hx stable IIIB colon cancer 2016 s/p left hemicolectomy and colostomy (H): routine ostomy  cares.  Daily miralax and senna.   - Hx of Rt MCA aneurysm s/p coil embolization 2011  - hx of PCOM aneurysm rupture s/p coil embolization 2010  -- GERD: no concern.      Order: stop Neurontin prn and ativan      Electronically signed by:  Uzma Avila MD     Video-Visit Details  Type of service:  Video Visit  Video End Time (time video stopped): 11:24  Distant Location (provider location):  [facility]               Sincerely,        Uzam Avila MD

## 2020-11-24 NOTE — LETTER
11/24/2020        RE: Bella Saucedo  [address]  [place] [address]        [place] [facility] DISCHARGE SUMMARY  PATIENT'S NAME: Bella Saucedo  YOB: 1945  MEDICAL RECORD NUMBER:  [id number]  Place of Service where encounter took place:  [facility] [[id number]]    PRIMARY CARE PROVIDER AND CLINIC RESPONSIBLE AFTER TRANSFER:   Shannon Olivas MD, [place] [id number] [facility] / [facility] 5*    Non-FMG Provider     Transferring providers: KASHMIR Coello CNP, Uzma Avila MD  Recent Hospitalization/ED:  [facility] stay 10/17/20 to 10/30/20.  Date of SNF Admission: October / 30 / 2020  Date of SNF (anticipated) Discharge: November / 25 / 2020  Discharged to: previous independent home  Cognitive Scores: BIMS: 10/15, SLUMS: 20/30, CPT: 5.0/5.6 and MOCA: 17/30  Physical Function: Meza Balance Scale: 48/56, TUG 23 sec, Ambulating short distances ft with rolling walker and able to navigate 24 stairs  DME: Walker, Home Nebulizer and Home Oxygen    CODE STATUS/ADVANCE DIRECTIVES DISCUSSION:  Full Code   ALLERGIES: Nitroglycerin    DISCHARGE DIAGNOSIS/NURSING FACILITY COURSE:   Patient hospitalized during above noted dates following direct transfer from [facility] to [facility] ICU with acute COPD exacerbation requiring intubation - COVID19 negative. Intubated from 10/17-10/19; successfully extubated on 10/19 with slow respiratory improvement - CT from 10/22 with no acute concerns - discharge to TCU on ongoing Prednisone taper along with Breo Ellipta, Mucinex, and Duonebs - completed regimen of Rocephin + Azithromycin inpatient. Continues on O2 2-3lpm via NC - has anxiety associated with her COPD requiring the use of Valium/Lorazepam PRN - Hospital noting O2 goal of >90%. Suffered acute encephalopathy initially - this cleared with medical improvement. Did have notable elevated BG levels inpatient - suspected 2/2 steroids;  continued on regimen of Lantus and sliding scale insulin at discharge. CAD/HTN/HFpEF/Stage IIB colon Ca s/p Colectomy with colostomy placement/GERD/Hx of SAH remained stable while inpatient.     Bella reports she is feeling better, dyspnea at baseline and on chronic O2 per regular home routine. She has support of her family at home, will have home care services to assist in the transition to home.     Chronic obstructive pulmonary disease with acute exacerbation (H)  Acute on chronic hypoxemic and hypercapnic respiratory failure requiring intubation 10/17-10/19 (H)  Started on Breo Ellipta and Incruse while in the hospital, these seem to have been very effective for her without causing the dry mouth of her previous regimen.  She has not needed prn duonebs.    -continue Breo and Incruse, O2    Pneumonia due to infectious organism, unspecified laterality, unspecified part of lung  Resolved, completed course of antibiotics and has not had further symptoms.      Oropharyngeal dysphagia  Patient with chronic dysphagia, eating well in TCU.  Weight improving.    Wt Readings from Last 4 Encounters:   11/24/20 57.9 kg (127 lb 11.2 oz)   11/20/20 56.2 kg (123 lb 14.4 oz)   11/17/20 56.2 kg (123 lb 14.4 oz)   11/10/20 56.2 kg (123 lb 14.4 oz)        Coronary artery disease involving native coronary artery of native heart without angina pectoris  Heart failure with preserved ejection fraction, NYHA class I (H)  Patient with history of CHFpEF, on lasix 5 x week and stable disease..  No edema today.  The current medical regimen is effective;  continue present plan and medications.     Type 2 diabetes mellitus with other circulatory complication, with long-term current use of insulin (H)  Fasting blood sugars trending 120 - 160, on glargine 1 unit(s) at hs.  Last A1C was 6.0%.  Goal A1C in this age range 7-8% due to multiple medical comorbid conditions, may not need glargine any longer  -consider discontinuation of glargine    -patient to monitor blood sugar and follow up with PCP re ongoing glargine need.       Past Medical History:  has no past medical history on file. reviewed in Care Everywhere     Discharge Medications:    Current Outpatient Medications   Medication Sig Dispense Refill     acetaminophen (TYLENOL) 325 MG tablet Take 325 mg by mouth every 4 hours as needed for pain        aspirin 81 MG EC tablet Take 81 mg by mouth daily       citalopram (CELEXA) 40 MG tablet Take 20 mg by mouth daily        fluticasone-vilanterol (BREO ELLIPTA) 100-25 MCG/INH inhaler Inhale 1 puff into the lungs daily       furosemide (LASIX) 40 MG tablet Take 40 mg by mouth daily Every Mon, Tues, Wed, Fri, Sat       guaiFENesin (MUCINEX) 600 MG 12 hr tablet Take 1,200 mg by mouth 2 times daily as needed       insulin glargine (LANTUS) 100 UNIT/ML injection Inject 1 Units Subcutaneous At Bedtime        ipratropium - albuterol 0.5 mg/2.5 mg/3 mL (DUONEB) 0.5-2.5 (3) MG/3ML neb solution Take 1 vial by nebulization 3 times daily as needed for shortness of breath / dyspnea or wheezing       magnesium 250 MG tablet Take 1 tablet by mouth 2 times daily        pantoprazole (PROTONIX) 40 MG EC tablet Take 40 mg by mouth daily       polyethylene glycol (MIRALAX) 17 g packet Take 17 g by mouth daily. May also take 17 g daily as needed for constipation.       potassium chloride ER (KLOR-CON M) 10 MEQ CR tablet Take 10 mEq by mouth daily Every Mon, Tues, Wed, Fri, Sat       rosuvastatin (CRESTOR) 10 MG tablet Take 10 mg by mouth daily       senna-docusate (SENOKOT-S/PERICOLACE) 8.6-50 MG tablet Take 2 tablets by mouth 2 times daily       umeclidinium (INCRUSE ELLIPTA) 62.5 MCG/INH inhaler Inhale 1 puff into the lungs daily       Urea 40 % CREA Apply topically daily as needed for dry skin       blood glucose (NO BRAND SPECIFIED) lancets standard Use to test blood sugar 4 times daily or as directed. 1 Box 3     blood glucose monitoring (NO BRAND SPECIFIED) meter  device kit Use to test blood sugar 4 times daily or as directed. 1 kit 0     blood glucose monitoring (NO BRAND SPECIFIED) test strip Use to test blood sugars 4 times daily or as directed 100 strip 3     insulin pen needle 31G X 6 MM Use 4 times daily or as directed. 100 each 1     Medication Changes/Rationale:     Completed course of prednisone    Sliding scale insulin discontinued     Controlled medications sent with patient:   not applicable/none     ROS:   4 point ROS including Respiratory, CV, GI and , other than that noted in the HPI,  is negative    Physical Exam:   Vitals: /54   Pulse 80   Temp 97.5  F (36.4  C)   Resp 30   Wt 57.9 kg (127 lb 11.2 oz)   SpO2 96%   BMI 21.92 kg/m    BMI= Body mass index is 21.92 kg/m .  GENERAL APPEARANCE:  Alert, in no distress   CHEST/RESP:  respiratory effort normal , no respiratory distress  CV:   no peripheral edema   PSYCH:  Alert and oriented to self and surroundings with forgetfulness , affect pleasant , judgement appropriate       SNF labs:   Most Recent 3 CBC's:  Recent Labs   Lab Test 11/02/20  0730 10/29/20  0822 10/26/20  0855 10/24/20  0840 10/23/20  0907   WBC 11.3*  --   --  9.2 7.1   HGB 13.2  --   --  13.2 13.9   MCV 98  --   --  101* 100    250 239 208 215     Most Recent 3 BMP's:  Recent Labs   Lab Test 11/20/20  0800 11/02/20  0730 10/26/20  0855    142 142   POTASSIUM 4.2 3.6 3.8   CHLORIDE 103 102 102   CO2 38* 38* 39*   BUN 19 30 18   CR 0.55 0.58 0.52   ANIONGAP <1* 2* 1*   MARILU 9.1 9.0 8.5   * 82 82     DISCHARGE PLAN:    Follow up labs: No labs orders/due    Medical Follow Up:      Follow up with primary care provider in 1-2 weeks    MTM referral needed and placed by this provider: No    Current [place] scheduled appointments:   No future appointments in the [place] system    Discharge Services: Home Care:  Occupational Therapy, Physical Therapy, Registered Nurse, Home Health Aide and From:  [facility]      TOTAL DISCHARGE TIME:   Greater than 30 minutes  Electronically signed by:  KASHMIR Coello CNP     Documentation of Face to Face and Certification for Home Health Services    I certify that patient: Bella Saucedo is under my care and that I, or a nurse practitioner or physician's assistant working with me, had a face-to-face encounter that meets the physician face-to-face encounter requirements with this patient on: 11/24/2020.    This encounter with the patient was in whole, or in part, for the following medical condition, which is the primary reason for home health care: Chronic obstructive pulmonary disease with acute exacerbation (H) [J44.1] .    I certify that, based on my findings, the following services are medically necessary home health services: Nursing, Occupational Therapy and Physical Therapy.    My clinical findings support the need for the above services because: Nurse is needed: To assess resp status, meds, blood sugars after changes in medications or other medical regimen.., Occupational Therapy Services are needed to assess and treat cognitive ability and address ADL safety due to impairment in cognition. and Physical Therapy Services are needed to assess and treat the following functional impairments: weakness, balance, endurance.    Further, I certify that my clinical findings support that this patient is homebound (i.e. absences from home require considerable and taxing effort and are for medical reasons or Conf[employer]ian[employer] services or infrequently or of short duration when for other reasons) because: Requires assistance of another person or specialized equipment to access medical services because patient: Requires supervision of another for safe transfer...    Based on the above findings. I certify that this patient is confined to the home and needs intermittent skilled nursing care, physical therapy and/or speech therapy.  The patient is under my care, and I have initiated the  establishment of the plan of care.  This patient will be followed by a physician who will periodically review the plan of care.  Physician/Provider to provide follow up care: Shannon Olivas    Attending hospital physician (the Medicare certified PECOS provider): KASHMIR Coello CNP   Physician Signature: See electronic signature associated with these discharge orders.  Date: 11/24/2020                      Sincerely,        KASHMIR Coello CNP

## 2020-11-24 NOTE — PROGRESS NOTES
[place] [facility] DISCHARGE SUMMARY  PATIENT'S NAME: Bella Saucedo  YOB: 1945  MEDICAL RECORD NUMBER:  [id number]  Place of Service where encounter took place:  [facility] [[id number]]    PRIMARY CARE PROVIDER AND CLINIC RESPONSIBLE AFTER TRANSFER:   Shannon Olivas MD, [facility] [address] [facility] / [facility] 5*    Non-FMG Provider     Transferring providers: KASHMIR Coello CNP, Uzma Avila MD  Recent Hospitalization/ED:  [facility] stay 10/17/20 to 10/30/20.  Date of SNF Admission: October / 30 / 2020  Date of SNF (anticipated) Discharge: November / 25 / 2020  Discharged to: previous independent home  Cognitive Scores: BIMS: 10/15, SLUMS: 20/30, CPT: 5.0/5.6 and MOCA: 17/30  Physical Function: Meza Balance Scale: 48/56, TUG 23 sec, Ambulating short distances ft with rolling walker and able to navigate 24 stairs  DME: Walker, Home Nebulizer and Home Oxygen    CODE STATUS/ADVANCE DIRECTIVES DISCUSSION:  Full Code   ALLERGIES: Nitroglycerin    DISCHARGE DIAGNOSIS/NURSING FACILITY COURSE:   Patient hospitalized during above noted dates following direct transfer from [facility] to [facility] ICU with acute COPD exacerbation requiring intubation - COVID19 negative. Intubated from 10/17-10/19; successfully extubated on 10/19 with slow respiratory improvement - CT from 10/22 with no acute concerns - discharge to TCU on ongoing Prednisone taper along with Breo Ellipta, Mucinex, and Duonebs - completed regimen of Rocephin + Azithromycin inpatient. Continues on O2 2-3lpm via NC - has anxiety associated with her COPD requiring the use of Valium/Lorazepam PRN - Hospital noting O2 goal of >90%. Suffered acute encephalopathy initially - this cleared with medical improvement. Did have notable elevated BG levels inpatient - suspected 2/2 steroids; continued on regimen of Lantus and sliding scale insulin at discharge. CAD/HTN/HFpEF/Stage IIB  colon Ca s/p Colectomy with colostomy placement/GERD/Hx of SAH remained stable while inpatient.     eBlla reports she is feeling better, dyspnea at baseline and on chronic O2 per regular home routine. She has support of her family at home, will have home care services to assist in the transition to home.     Chronic obstructive pulmonary disease with acute exacerbation (H)  Acute on chronic hypoxemic and hypercapnic respiratory failure requiring intubation 10/17-10/19 (H)  Started on Breo Ellipta and Incruse while in the hospital, these seem to have been very effective for her without causing the dry mouth of her previous regimen.  She has not needed prn duonebs.    -continue Breo and Incruse, O2    Pneumonia due to infectious organism, unspecified laterality, unspecified part of lung  Resolved, completed course of antibiotics and has not had further symptoms.      Oropharyngeal dysphagia  Patient with chronic dysphagia, eating well in TCU.  Weight improving.    Wt Readings from Last 4 Encounters:   11/24/20 57.9 kg (127 lb 11.2 oz)   11/20/20 56.2 kg (123 lb 14.4 oz)   11/17/20 56.2 kg (123 lb 14.4 oz)   11/10/20 56.2 kg (123 lb 14.4 oz)        Coronary artery disease involving native coronary artery of native heart without angina pectoris  Heart failure with preserved ejection fraction, NYHA class I (H)  Patient with history of CHFpEF, on lasix 5 x week and stable disease..  No edema today.  The current medical regimen is effective;  continue present plan and medications.     Type 2 diabetes mellitus with other circulatory complication, with long-term current use of insulin (H)  Fasting blood sugars trending 120 - 160, on glargine 1 unit(s) at hs.  Last A1C was 6.0%.  Goal A1C in this age range 7-8% due to multiple medical comorbid conditions, may not need glargine any longer  -consider discontinuation of glargine   -patient to monitor blood sugar and follow up with PCP re ongoing glargine need.       Past  Medical History:  has no past medical history on file. reviewed in Care Everywhere     Discharge Medications:    Current Outpatient Medications   Medication Sig Dispense Refill     acetaminophen (TYLENOL) 325 MG tablet Take 325 mg by mouth every 4 hours as needed for pain        aspirin 81 MG EC tablet Take 81 mg by mouth daily       citalopram (CELEXA) 40 MG tablet Take 20 mg by mouth daily        fluticasone-vilanterol (BREO ELLIPTA) 100-25 MCG/INH inhaler Inhale 1 puff into the lungs daily       furosemide (LASIX) 40 MG tablet Take 40 mg by mouth daily Every Mon, Tues, Wed, Fri, Sat       guaiFENesin (MUCINEX) 600 MG 12 hr tablet Take 1,200 mg by mouth 2 times daily as needed       insulin glargine (LANTUS) 100 UNIT/ML injection Inject 1 Units Subcutaneous At Bedtime        ipratropium - albuterol 0.5 mg/2.5 mg/3 mL (DUONEB) 0.5-2.5 (3) MG/3ML neb solution Take 1 vial by nebulization 3 times daily as needed for shortness of breath / dyspnea or wheezing       magnesium 250 MG tablet Take 1 tablet by mouth 2 times daily        pantoprazole (PROTONIX) 40 MG EC tablet Take 40 mg by mouth daily       polyethylene glycol (MIRALAX) 17 g packet Take 17 g by mouth daily. May also take 17 g daily as needed for constipation.       potassium chloride ER (KLOR-CON M) 10 MEQ CR tablet Take 10 mEq by mouth daily Every Mon, Tues, Wed, Fri, Sat       rosuvastatin (CRESTOR) 10 MG tablet Take 10 mg by mouth daily       senna-docusate (SENOKOT-S/PERICOLACE) 8.6-50 MG tablet Take 2 tablets by mouth 2 times daily       umeclidinium (INCRUSE ELLIPTA) 62.5 MCG/INH inhaler Inhale 1 puff into the lungs daily       Urea 40 % CREA Apply topically daily as needed for dry skin       blood glucose (NO BRAND SPECIFIED) lancets standard Use to test blood sugar 4 times daily or as directed. 1 Box 3     blood glucose monitoring (NO BRAND SPECIFIED) meter device kit Use to test blood sugar 4 times daily or as directed. 1 kit 0     blood glucose  monitoring (NO BRAND SPECIFIED) test strip Use to test blood sugars 4 times daily or as directed 100 strip 3     insulin pen needle 31G X 6 MM Use 4 times daily or as directed. 100 each 1     Medication Changes/Rationale:     Completed course of prednisone    Sliding scale insulin discontinued     Controlled medications sent with patient:   not applicable/none     ROS:   4 point ROS including Respiratory, CV, GI and , other than that noted in the HPI,  is negative    Physical Exam:   Vitals: /54   Pulse 80   Temp 97.5  F (36.4  C)   Resp 30   Wt 57.9 kg (127 lb 11.2 oz)   SpO2 96%   BMI 21.92 kg/m    BMI= Body mass index is 21.92 kg/m .  GENERAL APPEARANCE:  Alert, in no distress   CHEST/RESP:  respiratory effort normal , no respiratory distress  CV:   no peripheral edema   PSYCH:  Alert and oriented to self and surroundings with forgetfulness , affect pleasant , judgement appropriate       SNF labs:   Most Recent 3 CBC's:  Recent Labs   Lab Test 11/02/20  0730 10/29/20  0822 10/26/20  0855 10/24/20  0840 10/23/20  0907   WBC 11.3*  --   --  9.2 7.1   HGB 13.2  --   --  13.2 13.9   MCV 98  --   --  101* 100    250 239 208 215     Most Recent 3 BMP's:  Recent Labs   Lab Test 11/20/20  0800 11/02/20  0730 10/26/20  0855    142 142   POTASSIUM 4.2 3.6 3.8   CHLORIDE 103 102 102   CO2 38* 38* 39*   BUN 19 30 18   CR 0.55 0.58 0.52   ANIONGAP <1* 2* 1*   MARILU 9.1 9.0 8.5   * 82 82     DISCHARGE PLAN:    Follow up labs: No labs orders/due    Medical Follow Up:      Follow up with primary care provider in 1-2 weeks    MTM referral needed and placed by this provider: No    Current [place] scheduled appointments:   No future appointments in the [place] system    Discharge Services: Home Care:  Occupational Therapy, Physical Therapy, Registered Nurse, Home Health Aide and From:  [facility]      TOTAL DISCHARGE TIME:   Greater than 30 minutes  Electronically signed by:  KASHMIR Coello  CNP     Documentation of Face to Face and Certification for Home Health Services    I certify that patient: Bella Saucedo is under my care and that I, or a nurse practitioner or physician's assistant working with me, had a face-to-face encounter that meets the physician face-to-face encounter requirements with this patient on: 11/24/2020.    This encounter with the patient was in whole, or in part, for the following medical condition, which is the primary reason for home health care: Chronic obstructive pulmonary disease with acute exacerbation (H) [J44.1] .    I certify that, based on my findings, the following services are medically necessary home health services: Nursing, Occupational Therapy and Physical Therapy.    My clinical findings support the need for the above services because: Nurse is needed: To assess resp status, meds, blood sugars after changes in medications or other medical regimen.., Occupational Therapy Services are needed to assess and treat cognitive ability and address ADL safety due to impairment in cognition. and Physical Therapy Services are needed to assess and treat the following functional impairments: weakness, balance, endurance.    Further, I certify that my clinical findings support that this patient is homebound (i.e. absences from home require considerable and taxing effort and are for medical reasons or [employer] services or infrequently or of short duration when for other reasons) because: Requires assistance of another person or specialized equipment to access medical services because patient: Requires supervision of another for safe transfer...    Based on the above findings. I certify that this patient is confined to the home and needs intermittent skilled nursing care, physical therapy and/or speech therapy.  The patient is under my care, and I have initiated the establishment of the plan of care.  This patient will be followed by a physician who will periodically review  the plan of care.  Physician/Provider to provide follow up care: Shannon Olivas    Attending hospital physician (the Medicare certified PECOS provider): KASHMIR Coello CNP   Physician Signature: See electronic signature associated with these discharge orders.  Date: 11/24/2020

## 2020-12-17 NOTE — LETTER
12/17/2020        RE: Bella Saucedo  [address]  [facility] [address]        .    [place] [facility]  PRIMARY CARE PROVIDER AND CLINIC:  Shannon Olivas MD, [address]   Chief Complaint   Patient presents with     Hospital F/U     [place] Medical Record Number:  [id number]  Place of Service where encounter took place:  [facility] (S) [[id number]]    Bella is a 74-year-old with a history of severe oxygen dependent COPD, diastolic CHF, type 2 diabetes, hypertension, colon cancer status post colostomy several years ago, tobacco use disorder.  Patient presented to the ER with several days of shortness of breath.  No reported fever, no known exposure to Covid.  Her initial chest x-ray was negative for pneumonia, but shows hyperinflation consistent with COPD.  Her sats were in the 80s she was started on DuoNeb and Solu-Medrol with some improvement. She was also felt to have cellulitis of lower extremities and was started on IV Ancef.  Patient symptoms improved during her hospitalization and she was weaned down to her baseline of 2 L of oxygen via nasal cannula.  She was discharged to TCU on oral prednisone and oral doxycycline.    RN admits since admission patient is requesting nebs almost every 2 hours due to feelings of air hunger.  RN reports patient appears anxious.  No reported hypoxia fever or chills.    Met with patient in her room today she is lying in bed.  She admits to feeling very anxious due to air hunger and is open to trialing something to help with her anxiety.  She reports her appetite is diminished.  She has no bowel concerns.  States her colostomy is stable she has had this for several years.  She reports prior to admission to the hospital her legs were very edematous and this has improved significantly.  She denies any pain in her legs today, but does report significant weakness in her  legs.    CODE STATUS/ADVANCE DIRECTIVES DISCUSSION:   CPR/Full code   Patient's living condition: lives with spouse  ALLERGIES: Nitroglycerin  PAST MEDICAL HISTORY:     Adenocarcinoma of colon -- IIB   Completed 6 mos Xeloda 2016     Cerebral aneurysm 2011     Cerebral aneurysm rupture (HC) 10/22/2010   right PCOM rupture s/p coiling     COPD (chronic obstructive pulmonary disease) (HC)   home O2 2l/nc     Diastolic CHF (HC)     DM (diabetes mellitus) (HC)   due to steroids?     HTN (hypertension)     NSTEMI (non-ST elevated myocardial infarction) (HC)     SAH (subarachnoid hemorrhage) (HC) 10/15/2010     Smoker     Tubal pregnancy      PAST SURGICAL HISTORY:    . Laterality Date     APPENDECTOMY    incidental     CARPAL TUNNEL RELEASE    bilateral     HEMICOLECTOMY W/ OSTOMY Left    adenocarcinoma -- IIB     IR ANGIOGRAM CAROTID/CEREBRAL (IA) 11   Dr Olmedo, IR, ANW, f/u     OOPHORECTOMY    [place] salpingectomy, right oopherectomy     [place] UNLISTED PROCEDURE HANDS/FINGERS   right ring/little finger injury     RIGHT OOPHORECTOMY Right    Removed with hemicolectomy     Right PCOM coil embolization 10/2010     FAMILY HISTORY:     Cancer Mother    53 yo,     Diabetes Brother     Cancer-breast Sister   dx 51 yo     Diabetes Son   type 1     Diabetes Son   type 1     Cancer-colon No Family History     Cancer-prostate No Family History     SOCIAL HISTORY:   reports that she quit smoking about 2 months ago. Her smoking use included cigarettes. She smoked 1.00 pack per day. She does not have any smokeless tobacco history on file.    Post Discharge Medication Reconciliation Status: discharge medications reconciled, continue medications without change    Current Outpatient Medications   Medication Sig Dispense Refill     LORazepam (ATIVAN) 0.5 MG tablet Take 0.5 tablets (0.25 mg) by mouth every 8 hours as needed for anxiety 40 tablet 1     acetaminophen (TYLENOL) 325 MG tablet Take 325 mg by  mouth every 4 hours as needed for pain        aspirin 81 MG EC tablet Take 81 mg by mouth daily       blood glucose (NO BRAND SPECIFIED) lancets standard Use to test blood sugar 4 times daily or as directed. 1 Box 3     blood glucose monitoring (NO BRAND SPECIFIED) meter device kit Use to test blood sugar 4 times daily or as directed. 1 kit 0     blood glucose monitoring (NO BRAND SPECIFIED) test strip Use to test blood sugars 4 times daily or as directed 100 strip 3     citalopram (CELEXA) 40 MG tablet Take 20 mg by mouth daily        fluticasone-vilanterol (BREO ELLIPTA) 100-25 MCG/INH inhaler Inhale 1 puff into the lungs daily       furosemide (LASIX) 40 MG tablet Take 40 mg by mouth daily Every Mon, Tues, Wed, Fri, Sat       guaiFENesin (MUCINEX) 600 MG 12 hr tablet Take 1,200 mg by mouth 2 times daily as needed       insulin glargine (LANTUS) 100 UNIT/ML injection Inject 1 Units Subcutaneous At Bedtime        insulin pen needle 31G X 6 MM Use 4 times daily or as directed. 100 each 1     ipratropium - albuterol 0.5 mg/2.5 mg/3 mL (DUONEB) 0.5-2.5 (3) MG/3ML neb solution Take 1 vial by nebulization 3 times daily as needed for shortness of breath / dyspnea or wheezing       magnesium 250 MG tablet Take 1 tablet by mouth 2 times daily        pantoprazole (PROTONIX) 40 MG EC tablet Take 40 mg by mouth daily       polyethylene glycol (MIRALAX) 17 g packet Take 17 g by mouth daily. May also take 17 g daily as needed for constipation.       potassium chloride ER (KLOR-CON M) 10 MEQ CR tablet Take 10 mEq by mouth daily Every Mon, Tues, Wed, Fri, Sat       senna-docusate (SENOKOT-S/PERICOLACE) 8.6-50 MG tablet Take 2 tablets by mouth 2 times daily       umeclidinium (INCRUSE ELLIPTA) 62.5 MCG/INH inhaler Inhale 1 puff into the lungs daily       Urea 40 % CREA Apply topically daily as needed for dry skin         ROS:  10 point ROS of systems including Constitutional, Eyes, Respiratory, Cardiovascular, Gastroenterology,  Genitourinary, Integumentary, Musculoskeletal, Psychiatric were all negative except for pertinent positives noted in my HPI.    Vitals:  BP (!) 176/96   Pulse 87   Temp 97.3  F (36.3  C)   Resp 20   Wt 58 kg (127 lb 12.8 oz)   SpO2 91%   BMI 21.94 kg/m    Exam:  GENERAL APPEARANCE:  Alert, mild distress, lying in bed  RESP:  respiratory effort and palpation of chest normal, auscultation of lungs diminished , using accessorary muscles   CV:  Palpation and auscultation of heart done , rate and rhythm reg,  Trace BLEperipheral edema  ABDOMEN:  normal bowel sounds, soft, nontender, no hepatosplenomegaly or other masses  M/S:  HENNING's, gait not observed  SKIN:  Inspection and Palpation of skin and subcutaneous tissue pale, no rashes or lesions  NEURO: alert, mild tremor  PSYCH:  insight and judgement, memory intact , affect and mood anxious    Lab/Diagnostic data:          ASSESSMENT/PLAN:  Chronic obstructive pulmonary disease with acute exacerbation (H)  - previous smoker  - on oxygen at baseline, frequent exacerbations. Hospitalized 10/17-10/30 was intubated. Pulmonology consult during this hospitalization noted previous PFT's from 2014 with severe obstruction. Patient discontinued to TCU and then home after. Re- hospitalized 6 weeks after  - Current treatment plan: doxy and pred BID- end date 12/21, supplemental oxygen, avdair, albuterol and duo neb Q 4 prn and anoro Ellipta daily.   - Patient requesting albuterol every 2 hours per RN. Appears anxious. Will order ativan to help with air hunger anxiety.   Patient is a FULL CODE, discussed today and does not appear to comprehend severity of disease. Will discuss at follow up. At this stage recommend palliative approach due to severe end stage lung disease  - will have MTM pharmacist review medication regimen    Anxiety  - on celexa, sx's not controlled. Will start low dose ativan for anxiety/ air hunger (orders sent to pharmacy)    Heart failure with preserved  ejection fraction, NYHA class I (H)  - last TTE EF 65-70% with thickened RV wall  - previous edema has improved, no abd distention, continue lasix/KCL. Labs orderd    Coronary artery disease involving native coronary artery of native heart without angina pectoris  - On asa and statin ( no recent LDL) - will hold statin for now due to leg weakness  - b/p elevated, likely 2/2 to anxiety. On no htn meds.   - nsg to update with b/p concerns.    Type 2 diabetes mellitus with other circulatory complication, with long-term current use of insulin (H)  - last A1C 6.2% 12/14/20  - on lantus, monitor for hypoglycemia  - nsg to update w/ BG concerns    Generalized muscle weakness  - will work with physical therapy        Total time spent with patient visit at the [facility] nursing [facility] was 35 min including patient visit and review of past records. Greater than 50% of total time spent with counseling and coordinating care due to review of medical records, review of medication list, coordination of care with RN and discussion regarding goals of care with pt.     Electronically signed by:  KASHMIR Gonzalez CNP           Sincerely,        KASHMIR Gonzalez CNP

## 2020-12-22 NOTE — PROGRESS NOTES
[place] [facility]  PRIMARY CARE PROVIDER AND CLINIC:  Shannon Olivas MD, [facility] [address] [facility]   Chief Complaint   Patient presents with     Hospital F/U     [place] Medical Record Number:  [id number]  Place of Service where encounter took place:  [facility] [[id number]]    Bella is a 74-year-old with a history of severe oxygen dependent COPD, diastolic CHF, type 2 diabetes, hypertension, colon cancer status post colostomy several years ago, tobacco use disorder.  Patient presented to the ER with several days of shortness of breath.  No reported fever, no known exposure to Covid.  Her initial chest x-ray was negative for pneumonia, but shows hyperinflation consistent with COPD.  Her sats were in the 80s she was started on DuoNeb and Solu-Medrol with some improvement. She was also felt to have cellulitis of lower extremities and was started on IV Ancef.  Patient symptoms improved during her hospitalization and she was weaned down to her baseline of 2 L of oxygen via nasal cannula.  She was discharged to TCU on oral prednisone and oral doxycycline.    RN admits since admission patient is requesting nebs almost every 2 hours due to feelings of air hunger.  RN reports patient appears anxious.  No reported hypoxia fever or chills.    Met with patient in her room today she is lying in bed.  She admits to feeling very anxious due to air hunger and is open to trialing something to help with her anxiety.  She reports her appetite is diminished.  She has no bowel concerns.  States her colostomy is stable she has had this for several years.  She reports prior to admission to the hospital her legs were very edematous and this has improved significantly.  She denies any pain in her legs today, but does report significant weakness in her legs.    CODE STATUS/ADVANCE DIRECTIVES DISCUSSION:   CPR/Full code   Patient's living condition: lives  with spouse  ALLERGIES: Nitroglycerin  PAST MEDICAL HISTORY:     Adenocarcinoma of colon -- IIB   Completed 6 mos Xeloda 2016     Cerebral aneurysm 2011     Cerebral aneurysm rupture (HC) 10/22/2010   right PCOM rupture s/p coiling     COPD (chronic obstructive pulmonary disease) (HC)   home O2 2l/nc     Diastolic CHF (HC)     DM (diabetes mellitus) (HC)   due to steroids?     HTN (hypertension)     NSTEMI (non-ST elevated myocardial infarction) (HC)     SAH (subarachnoid hemorrhage) (HC) 10/15/2010     Smoker     Tubal pregnancy      PAST SURGICAL HISTORY:    . Laterality Date     APPENDECTOMY    incidental     CARPAL TUNNEL RELEASE    bilateral     HEMICOLECTOMY W/ OSTOMY Left    adenocarcinoma -- IIB     IR ANGIOGRAM CAROTID/CEREBRAL (IA) 11   Dr Olmedo, IR, ANW, f/u     OOPHORECTOMY    [place] salpingectomy, right oopherectomy     [place] UNLISTED PROCEDURE HANDS/FINGERS   right ring/little finger injury     RIGHT OOPHORECTOMY Right    Removed with hemicolectomy     Right PCOM coil embolization 10/2010     FAMILY HISTORY:     Cancer Mother    53 yo,     Diabetes Brother     Cancer-breast Sister   dx 49 yo     Diabetes Son   type 1     Diabetes Son   type 1     Cancer-colon No Family History     Cancer-prostate No Family History     SOCIAL HISTORY:   reports that she quit smoking about 2 months ago. Her smoking use included cigarettes. She smoked 1.00 pack per day. She does not have any smokeless tobacco history on file.    Post Discharge Medication Reconciliation Status: discharge medications reconciled, continue medications without change    Current Outpatient Medications   Medication Sig Dispense Refill     LORazepam (ATIVAN) 0.5 MG tablet Take 0.5 tablets (0.25 mg) by mouth every 8 hours as needed for anxiety 40 tablet 1     acetaminophen (TYLENOL) 325 MG tablet Take 325 mg by mouth every 4 hours as needed for pain        aspirin 81 MG EC tablet Take 81 mg by mouth daily        blood glucose (NO BRAND SPECIFIED) lancets standard Use to test blood sugar 4 times daily or as directed. 1 Box 3     blood glucose monitoring (NO BRAND SPECIFIED) meter device kit Use to test blood sugar 4 times daily or as directed. 1 kit 0     blood glucose monitoring (NO BRAND SPECIFIED) test strip Use to test blood sugars 4 times daily or as directed 100 strip 3     citalopram (CELEXA) 40 MG tablet Take 20 mg by mouth daily        fluticasone-vilanterol (BREO ELLIPTA) 100-25 MCG/INH inhaler Inhale 1 puff into the lungs daily       furosemide (LASIX) 40 MG tablet Take 40 mg by mouth daily Every Mon, Tues, Wed, Fri, Sat       guaiFENesin (MUCINEX) 600 MG 12 hr tablet Take 1,200 mg by mouth 2 times daily as needed       insulin glargine (LANTUS) 100 UNIT/ML injection Inject 1 Units Subcutaneous At Bedtime        insulin pen needle 31G X 6 MM Use 4 times daily or as directed. 100 each 1     ipratropium - albuterol 0.5 mg/2.5 mg/3 mL (DUONEB) 0.5-2.5 (3) MG/3ML neb solution Take 1 vial by nebulization 3 times daily as needed for shortness of breath / dyspnea or wheezing       magnesium 250 MG tablet Take 1 tablet by mouth 2 times daily        pantoprazole (PROTONIX) 40 MG EC tablet Take 40 mg by mouth daily       polyethylene glycol (MIRALAX) 17 g packet Take 17 g by mouth daily. May also take 17 g daily as needed for constipation.       potassium chloride ER (KLOR-CON M) 10 MEQ CR tablet Take 10 mEq by mouth daily Every Mon, Tues, Wed, Fri, Sat       senna-docusate (SENOKOT-S/PERICOLACE) 8.6-50 MG tablet Take 2 tablets by mouth 2 times daily       umeclidinium (INCRUSE ELLIPTA) 62.5 MCG/INH inhaler Inhale 1 puff into the lungs daily       Urea 40 % CREA Apply topically daily as needed for dry skin         ROS:  10 point ROS of systems including Constitutional, Eyes, Respiratory, Cardiovascular, Gastroenterology, Genitourinary, Integumentary, Musculoskeletal, Psychiatric were all negative except for pertinent  positives noted in my HPI.    Vitals:  BP (!) 176/96   Pulse 87   Temp 97.3  F (36.3  C)   Resp 20   Wt 58 kg (127 lb 12.8 oz)   SpO2 91%   BMI 21.94 kg/m    Exam:  GENERAL APPEARANCE:  Alert, mild distress, lying in bed  RESP:  respiratory effort and palpation of chest normal, auscultation of lungs diminished , using accessorary muscles   CV:  Palpation and auscultation of heart done , rate and rhythm reg,  Trace BLEperipheral edema  ABDOMEN:  normal bowel sounds, soft, nontender, no hepatosplenomegaly or other masses  M/S:  HENNING's, gait not observed  SKIN:  Inspection and Palpation of skin and subcutaneous tissue pale, no rashes or lesions  NEURO: alert, mild tremor  PSYCH:  insight and judgement, memory intact , affect and mood anxious    Lab/Diagnostic data:          ASSESSMENT/PLAN:  Chronic obstructive pulmonary disease with acute exacerbation (H)  - previous smoker  - on oxygen at baseline, frequent exacerbations. Hospitalized 10/17-10/30 was intubated. Pulmonology consult during this hospitalization noted previous PFT's from 2014 with severe obstruction. Patient discontinued to TCU and then home after. Re- hospitalized 6 weeks after  - Current treatment plan: doxy and pred BID- end date 12/21, supplemental oxygen, avdair, albuterol and duo neb Q 4 prn and anoro Ellipta daily.   - Patient requesting albuterol every 2 hours per RN. Appears anxious. Will order ativan to help with air hunger anxiety.   Patient is a FULL CODE, discussed today and does not appear to comprehend severity of disease. Will discuss at follow up. At this stage recommend palliative approach due to severe end stage lung disease  - will have MTM pharmacist review medication regimen    Anxiety  - on celexa, sx's not controlled. Will start low dose ativan for anxiety/ air hunger (orders sent to pharmacy)    Heart failure with preserved ejection fraction, NYHA class I (H)  - last TTE EF 65-70% with thickened RV wall  - previous edema has  improved, no abd distention, continue lasix/KCL. Labs orderd    Coronary artery disease involving native coronary artery of native heart without angina pectoris  - On asa and statin ( no recent LDL) - will hold statin for now due to leg weakness  - b/p elevated, likely 2/2 to anxiety. On no htn meds.   - nsg to update with b/p concerns.    Type 2 diabetes mellitus with other circulatory complication, with long-term current use of insulin (H)  - last A1C 6.2% 12/14/20  - on lantus, monitor for hypoglycemia  - nsg to update w/ BG concerns    Generalized muscle weakness  - will work with physical therapy        Total time spent with patient visit at the skilled nursing facility was 35 min including patient visit and review of past records. Greater than 50% of total time spent with counseling and coordinating care due to review of medical records, review of medication list, coordination of care with RN and discussion regarding goals of care with pt.     Electronically signed by:  KASHMIR Gonzalez CNP

## 2020-12-23 NOTE — PROGRESS NOTES
[place] [facility]  [place] Medical Record Number:  [id number]  Place of Service where encounter took place:  [facility] ([facility]) [[id number]]  Chief Complaint   Patient presents with     Nursing Home Acute       HPI:    Bella Saucedo  is a 75 year old (1945), who is being seen today for an episodic care visit.      Seeing patient for a f/u.   RN has been teaching patient relaxation techniques which seem to be effective.   Patient has used prn ativan 4 times and this has been effective Per RN.   Patient has completed PO doxy/prednisone.     Met with patient in her room today. She is lying in bed. In no distress, pursed lip breathing. Denies increase of cough or sputum production. Continues to report dyspnea with minimal exertion. Reviewed POLST and goals of care again.     Past Medical and Surgical History reviewed in Epic today.    MEDICATIONS:    Current Outpatient Medications   Medication Sig Dispense Refill     acetaminophen (TYLENOL) 325 MG tablet Take 325 mg by mouth every 4 hours as needed for pain        aspirin 81 MG EC tablet Take 81 mg by mouth daily       blood glucose (NO BRAND SPECIFIED) lancets standard Use to test blood sugar 4 times daily or as directed. 1 Box 3     blood glucose monitoring (NO BRAND SPECIFIED) meter device kit Use to test blood sugar 4 times daily or as directed. 1 kit 0     blood glucose monitoring (NO BRAND SPECIFIED) test strip Use to test blood sugars 4 times daily or as directed 100 strip 3     citalopram (CELEXA) 40 MG tablet Take 20 mg by mouth daily        fluticasone-vilanterol (BREO ELLIPTA) 100-25 MCG/INH inhaler Inhale 1 puff into the lungs daily       furosemide (LASIX) 40 MG tablet Take 40 mg by mouth daily Every Mon, Tues, Wed, Fri, Sat       guaiFENesin (MUCINEX) 600 MG 12 hr tablet Take 1,200 mg by mouth 2 times daily as needed       insulin glargine (LANTUS) 100 UNIT/ML injection Inject 1 Units Subcutaneous At Bedtime         insulin pen needle 31G X 6 MM Use 4 times daily or as directed. 100 each 1     ipratropium - albuterol 0.5 mg/2.5 mg/3 mL (DUONEB) 0.5-2.5 (3) MG/3ML neb solution Take 1 vial by nebulization 3 times daily as needed for shortness of breath / dyspnea or wheezing       LORazepam (ATIVAN) 0.5 MG tablet Take 0.5 tablets (0.25 mg) by mouth every 8 hours as needed for anxiety 40 tablet 1     magnesium 250 MG tablet Take 1 tablet by mouth 2 times daily        pantoprazole (PROTONIX) 40 MG EC tablet Take 40 mg by mouth daily       polyethylene glycol (MIRALAX) 17 g packet Take 17 g by mouth daily. May also take 17 g daily as needed for constipation.       potassium chloride ER (KLOR-CON M) 10 MEQ CR tablet Take 10 mEq by mouth daily Every Mon, Tues, Wed, Fri, Sat       senna-docusate (SENOKOT-S/PERICOLACE) 8.6-50 MG tablet Take 2 tablets by mouth 2 times daily       umeclidinium (INCRUSE ELLIPTA) 62.5 MCG/INH inhaler Inhale 1 puff into the lungs daily       Urea 40 % CREA Apply topically daily as needed for dry skin           REVIEW OF SYSTEMS:  4 point ROS including Respiratory, CV, GI and , other than that noted in the HPI,  is negative    Objective:  /69   Pulse 78   Temp 98.1  F (36.7  C)   Resp 18   Wt 57.2 kg (126 lb 3.2 oz)   SpO2 98%   BMI 21.66 kg/m    Exam:  GENERAL APPEARANCE:  Alert, in no distress, frail appearing  RESP:  diminished breath sounds t/o, uses accessorary muscles   CV:  regular rate and rhythm, no murmur, rub, or gallop, no edema  ABDOMEN:  normal bowel sounds, soft, nontender, no hepatosplenomegaly or other masses  SKIN:  pale. dry, no rashes or lesions    Labs:   Labs done in SNF are in [place] EPIC. Please refer to them using [employer] Everywhere.    ASSESSMENT/PLAN:  Chronic obstructive pulmonary disease with acute exacerbation (H)  - previous smoker, quit a few months ago  - on oxygen at baseline, frequent exacerbations. Hospitalized 10/17-10/30 was intubated. Pulmonology  consult during this hospitalization noted previous PFT's from 2014 with severe obstruction. Patient discontinued to TCU and then home after. Re- hospitalized 6 weeks after  - Current treatment plan: doxy and pred BID- end date 12/21, supplemental oxygen, avdair, albuterol and duo neb Q 4  and anoro Ellipta daily.   - ativan has helped with frequent requests for albuterol  - RN teaching distraction techniques  Patient is a FULL CODE, discussed today and does not appear to comprehend severity of disease. Will discuss at follow up. At this stage recommend palliative approach due to severe end stage lung disease  - will have MTM pharmacist review medication regimen       Anxiety  - on celexa, sx's not controlled.   - low dose ativan effective for air hunger, continue    Electronically signed by:  KASHMIR Gonzalez CNP

## 2020-12-29 NOTE — PROGRESS NOTES
"[facility]  [place] Medical Record Number:  [id number]  Place of Service where encounter took place:  [facility] ([facility]) [[id number]]  Chief Complaint   Patient presents with     Nursing Home Acute       HPI:    Bella Saucedo  is a 75 year old (1945), who is being seen today for an episodic care visit.      Seeing patient for a f/u.     Patient with ongoing hypoxia and increasing C02 on labs. RN reports ongoing refusal to get OOB and participate in therapy due to dyspnea (since admission to TCU). Ativan given this morning for air hunger and tachypnea. Patient asking for nebs every 2 to 4 hours, RN feels this is secondary to anxiety.     Labs reviewed today    Met with patient in her room. She is lying in bed. She reports pain in LLQ of her abdomen. No reported nausea, constipation or changes in BM's. No epigastric burning or indigestion sx's.     Reports ongoing difficulty breathing. No fever or chills.   Denies productive cough. Reports legs \"feel like rubber\"      Past Medical and Surgical History reviewed in Epic today.    MEDICATIONS:    Current Outpatient Medications   Medication Sig Dispense Refill     Vitamin D, Cholecalciferol, 25 MCG (1000 UT) CAPS Take 4,000 Int'l Units by mouth daily       acetaminophen (TYLENOL) 325 MG tablet Take 325 mg by mouth every 4 hours as needed for pain        aspirin 81 MG EC tablet Take 81 mg by mouth daily       blood glucose (NO BRAND SPECIFIED) lancets standard Use to test blood sugar 4 times daily or as directed. 1 Box 3     blood glucose monitoring (NO BRAND SPECIFIED) meter device kit Use to test blood sugar 4 times daily or as directed. 1 kit 0     blood glucose monitoring (NO BRAND SPECIFIED) test strip Use to test blood sugars 4 times daily or as directed 100 strip 3     citalopram (CELEXA) 40 MG tablet Take 20 mg by mouth daily        fluticasone-vilanterol (BREO ELLIPTA) 100-25 MCG/INH inhaler Inhale 1 puff into the lungs " daily       furosemide (LASIX) 40 MG tablet Take 40 mg by mouth daily Every Mon, Tues, Wed, Fri, Sat       guaiFENesin (MUCINEX) 600 MG 12 hr tablet Take 1,200 mg by mouth 2 times daily as needed       insulin glargine (LANTUS) 100 UNIT/ML injection Inject 1 Units Subcutaneous At Bedtime        insulin pen needle 31G X 6 MM Use 4 times daily or as directed. 100 each 1     ipratropium - albuterol 0.5 mg/2.5 mg/3 mL (DUONEB) 0.5-2.5 (3) MG/3ML neb solution Take 1 vial by nebulization 3 times daily as needed for shortness of breath / dyspnea or wheezing       LORazepam (ATIVAN) 0.5 MG tablet Take 0.5 tablets (0.25 mg) by mouth every 8 hours as needed for anxiety 40 tablet 1     magnesium 250 MG tablet Take 1 tablet by mouth 2 times daily        pantoprazole (PROTONIX) 40 MG EC tablet Take 20 mg by mouth daily       polyethylene glycol (MIRALAX) 17 g packet Take 17 g by mouth daily. May also take 17 g daily as needed for constipation.       potassium chloride ER (KLOR-CON M) 10 MEQ CR tablet Take 10 mEq by mouth daily Every Mon, Tues, Wed, Fri, Sat       senna-docusate (SENOKOT-S/PERICOLACE) 8.6-50 MG tablet Take 2 tablets by mouth 2 times daily       umeclidinium (INCRUSE ELLIPTA) 62.5 MCG/INH inhaler Inhale 1 puff into the lungs daily       Urea 40 % CREA Apply topically daily as needed for dry skin           REVIEW OF SYSTEMS:  4 point ROS including Respiratory, CV, GI and , other than that noted in the HPI,  is negative    Objective:  BP (!) 151/73   Pulse 78   Temp 97.6  F (36.4  C)   Resp 20   Wt 56.7 kg (125 lb)   SpO2 97%   BMI 21.46 kg/m    Exam:  GENERAL APPEARANCE:  Alert, frail appearing, lying in bed.   ENT: mucous membranes dry, pink, no ulcers  RESP:  diminished breath sounds t/o- no wheezing or rhonchi, pursed lip breathing, using accessorary muscles  CV:  regular rate and rhythm, no murmur, rub, or gallop, no edema  ABDOMEN:  brown stool in colostomy, semi-firm, mildly tender around stoma, stoma  pink  SKIN:  pink, dry, legs pink/ no warmth  PSYCH:  oriented X 3, calm    Labs:     Most Recent 3 CBC's:  Recent Labs   Lab Test 12/24/20  1245 11/02/20  0730 10/29/20  0822 10/24/20  0840 10/24/20  0840   WBC 10.9 11.3*  --   --  9.2   HGB 14.4 13.2  --   --  13.2   MCV 99 98  --   --  101*    223 250   < > 208    < > = values in this interval not displayed.     Most Recent 3 BMP's:  Recent Labs   Lab Test 12/24/20  1245 11/20/20  0800 11/02/20  0730    141 142   POTASSIUM 4.7 4.2 3.6   CHLORIDE 98 103 102   CO2 42* 38* 38*   BUN 32* 19 30   CR 0.59 0.55 0.58   ANIONGAP <1* <1* 2*   MARILU 9.1 9.1 9.0   * 106* 82     Most Recent 2 LFT's:  Recent Labs   Lab Test 12/24/20  1245 10/17/20  0609   AST 19 28   ALT 24 44   ALKPHOS 106 124   BILITOTAL 0.4 0.9     Most Recent TSH and T4:  Recent Labs   Lab Test 12/24/20  1245   TSH 1.24         ASSESSMENT/PLAN:     Chronic respiratory failure with hypoxia and hypercapnia (H)  Chronic obstructive pulmonary disease with acute exacerbation (H)  Coronary artery disease involving native coronary artery of native heart without angina pectoris  Anxiety  Generalized muscle weakness  Vitamin D deficiency   - quit smoking earlier this year, no smoking in TCU  - Patient with minimal improvement since admission to TCU. Refusing therapy, rarely getting OOB.   - MTM pharmacist reviewed med regimen: recommends aero chamber with or without a mask for inhalers. Pulmonology consultation to see what they think about Daliresp  - Discussed pulmonology referral with patient. She declines, states she does not want to leave facility or pursue any further testing. Discussed hospice, patient states she understands hospice as she had had family members on hospice and would like to pursue this as she does not want to go to the hospital  - discussed orders with SW and RN  - FYI: appears dry, wt down 3lbs, not taking in much PO, consider decreasing lasix at f/u.        Orders written  by provider at facility  - Vitamin D3 4,000 international unit(s) every day DX: vitamin D def  - Oxygen goal ~88%, keep oxygen at 2L per NC DX: respiratory failure with hypercapnia  - Decrease Protonix to 20 mg every day DX: asymptomatic GERD       RN called back later in the afternoon to report patient's oxygen sats 60-70's, RR 40's. No fever or chills. B/P and temp at baseline. Orders given to give one time dose of ativan and use relaxation/breathing techniques we discussed earlier today. Ok to increase oxygen to 4 L until sats improve.     Update one hour later, RN reports no improvement. Reviewed that patient was wanting no hospitalizations and hospice earlier today. RN discussed with patient and she agrees to stay at facility and start hospice. RN updated POLST to DNR with verbal ok by provider. Advised RN to update POA.     Call one hour later, patient now wanting to go to hospital. EMS called and patient en route to [facility].     Total time spent with patient visit at the skilled nursing facility was 50 including patient visit, review of past records and end of life discussion with pt, multiple calls from RN to coordinate cares. Greater than 50% of total time spent with counseling and coordinating care due to end stage lung disease.  Electronically signed by:  KASHMIR Gonzalez CNP

## 2020-12-29 NOTE — LETTER
"    12/29/2020        RE: Bella Saucedo  [address]  [facility] [address]        [facility]  [facility] Record Number:  [id number]  Place of Service where encounter took place:  [facility] ([facility]) [[id number]]  Chief Complaint   Patient presents with     Nursing Home Acute       HPI:    Bella Saucedo  is a 75 year old (1945), who is being seen today for an episodic care visit.      Seeing patient for a f/u.     Patient with ongoing hypoxia and increasing C02 on labs. RN reports ongoing refusal to get OOB and participate in therapy due to dyspnea (since admission to TCU). Ativan given this morning for air hunger and tachypnea. Patient asking for nebs every 2 to 4 hours, RN feels this is secondary to anxiety.     Labs reviewed today    Met with patient in her room. She is lying in bed. She reports pain in LLQ of her abdomen. No reported nausea, constipation or changes in BM's. No epigastric burning or indigestion sx's.     Reports ongoing difficulty breathing. No fever or chills.   Denies productive cough. Reports legs \"feel like rubber\"      Past Medical and Surgical History reviewed in Epic today.    MEDICATIONS:    Current Outpatient Medications   Medication Sig Dispense Refill     Vitamin D, Cholecalciferol, 25 MCG (1000 UT) CAPS Take 4,000 Int'l Units by mouth daily       acetaminophen (TYLENOL) 325 MG tablet Take 325 mg by mouth every 4 hours as needed for pain        aspirin 81 MG EC tablet Take 81 mg by mouth daily       blood glucose (NO BRAND SPECIFIED) lancets standard Use to test blood sugar 4 times daily or as directed. 1 Box 3     blood glucose monitoring (NO BRAND SPECIFIED) meter device kit Use to test blood sugar 4 times daily or as directed. 1 kit 0     blood glucose monitoring (NO BRAND SPECIFIED) test strip Use to test blood sugars 4 times daily or as directed 100 strip 3     citalopram (CELEXA) 40 MG tablet Take 20 mg by mouth daily        " fluticasone-vilanterol (BREO ELLIPTA) 100-25 MCG/INH inhaler Inhale 1 puff into the lungs daily       furosemide (LASIX) 40 MG tablet Take 40 mg by mouth daily Every Mon, Tues, Wed, Fri, Sat       guaiFENesin (MUCINEX) 600 MG 12 hr tablet Take 1,200 mg by mouth 2 times daily as needed       insulin glargine (LANTUS) 100 UNIT/ML injection Inject 1 Units Subcutaneous At Bedtime        insulin pen needle 31G X 6 MM Use 4 times daily or as directed. 100 each 1     ipratropium - albuterol 0.5 mg/2.5 mg/3 mL (DUONEB) 0.5-2.5 (3) MG/3ML neb solution Take 1 vial by nebulization 3 times daily as needed for shortness of breath / dyspnea or wheezing       LORazepam (ATIVAN) 0.5 MG tablet Take 0.5 tablets (0.25 mg) by mouth every 8 hours as needed for anxiety 40 tablet 1     magnesium 250 MG tablet Take 1 tablet by mouth 2 times daily        pantoprazole (PROTONIX) 40 MG EC tablet Take 20 mg by mouth daily       polyethylene glycol (MIRALAX) 17 g packet Take 17 g by mouth daily. May also take 17 g daily as needed for constipation.       potassium chloride ER (KLOR-CON M) 10 MEQ CR tablet Take 10 mEq by mouth daily Every Mon, Tues, Wed, Fri, Sat       senna-docusate (SENOKOT-S/PERICOLACE) 8.6-50 MG tablet Take 2 tablets by mouth 2 times daily       umeclidinium (INCRUSE ELLIPTA) 62.5 MCG/INH inhaler Inhale 1 puff into the lungs daily       Urea 40 % CREA Apply topically daily as needed for dry skin           REVIEW OF SYSTEMS:  4 point ROS including Respiratory, CV, GI and , other than that noted in the HPI,  is negative    Objective:  BP (!) 151/73   Pulse 78   Temp 97.6  F (36.4  C)   Resp 20   Wt 56.7 kg (125 lb)   SpO2 97%   BMI 21.46 kg/m    Exam:  GENERAL APPEARANCE:  Alert, frail appearing, lying in bed.   ENT: mucous membranes dry, pink, no ulcers  RESP:  diminished breath sounds t/o- no wheezing or rhonchi, pursed lip breathing, using accessorary muscles  CV:  regular rate and rhythm, no murmur, rub, or gallop,  no edema  ABDOMEN:  brown stool in colostomy, semi-firm, mildly tender around stoma, stoma pink  SKIN:  pink, dry, legs pink/ no warmth  PSYCH:  oriented X 3, calm    Labs:     Most Recent 3 CBC's:  Recent Labs   Lab Test 12/24/20  1245 11/02/20  0730 10/29/20  0822 10/24/20  0840 10/24/20  0840   WBC 10.9 11.3*  --   --  9.2   HGB 14.4 13.2  --   --  13.2   MCV 99 98  --   --  101*    223 250   < > 208    < > = values in this interval not displayed.     Most Recent 3 BMP's:  Recent Labs   Lab Test 12/24/20  1245 11/20/20  0800 11/02/20  0730    141 142   POTASSIUM 4.7 4.2 3.6   CHLORIDE 98 103 102   CO2 42* 38* 38*   BUN 32* 19 30   CR 0.59 0.55 0.58   ANIONGAP <1* <1* 2*   MARILU 9.1 9.1 9.0   * 106* 82     Most Recent 2 LFT's:  Recent Labs   Lab Test 12/24/20  1245 10/17/20  0609   AST 19 28   ALT 24 44   ALKPHOS 106 124   BILITOTAL 0.4 0.9     Most Recent TSH and T4:  Recent Labs   Lab Test 12/24/20  1245   TSH 1.24         ASSESSMENT/PLAN:     Chronic respiratory failure with hypoxia and hypercapnia (H)  Chronic obstructive pulmonary disease with acute exacerbation (H)  Coronary artery disease involving native coronary artery of native heart without angina pectoris  Anxiety  Generalized muscle weakness  Vitamin D deficiency   - quit smoking earlier this year, no smoking in TCU  - Patient with minimal improvement since admission to TCU. Refusing therapy, rarely getting OOB.   - MTM pharmacist reviewed med regimen: recommends aero chamber with or without a mask for inhalers. Pulmonology consultation to see what they think about Daliresp  - Discussed pulmonology referral with patient. She declines, states she does not want to leave facility or pursue any further testing. Discussed hospice, patient states she understands hospice as she had had family members on hospice and would like to pursue this as she does not want to go to the hospital  - discussed orders with SW and RN  - FYI: appears dry, wt  down 3lbs, not taking in much PO, consider decreasing lasix at f/u.        Orders written by provider at facility  - Vitamin D3 4,000 international unit(s) every day DX: vitamin D def  - Oxygen goal ~88%, keep oxygen at 2L per NC DX: respiratory failure with hypercapnia  - Decrease Protonix to 20 mg every day DX: asymptomatic GERD       RN called back later in the afternoon to report patient's oxygen sats 60-70's, RR 40's. No fever or chills. B/P and temp at baseline. Orders given to give one time dose of ativan and use relaxation/breathing techniques we discussed earlier today. Ok to increase oxygen to 4 L until sats improve.     Update one hour later, RN reports no improvement. Reviewed that patient was wanting no hospitalizations and hospice earlier today. RN discussed with patient and she agrees to stay at facility and start hospice. RN updated POLST to DNR with verbal ok by provider. Advised RN to update POA.     Call one hour later, patient now wanting to go to hospital. EMS called and patient en route to [facility].     Total time spent with patient visit at the skilled nursing facility was 50 including patient visit, review of past records and end of life discussion with pt, multiple calls from RN to coordinate cares. Greater than 50% of total time spent with counseling and coordinating care due to end stage lung disease.  Electronically signed by:  KASHMIR Gonzalez CNP                 Sincerely,        KASHMIR Gonzalez CNP

## 2020-12-31 NOTE — TELEPHONE ENCOUNTER
Received a update from the [facility] ED provider after Bella has been at the ED for 4 hours.    They will be sending her back to the nursing home as they are unable to find a reason for her to desat as deep as she does when she returns to the nursing home each time.      ED did a CT scan for a PE and that came back negative.  She is holding her own with 2L/min of oxygen.    The ED provider did speak with the nursing home.  The one suggestion was for the facility to look at their oxygen delivery system (the concentrator) and check to see if that is faulty because Bella has been transported often back to a ED right after she returns back to the nursing home.    The nursing home did say to the on call provider that she has gone in and out of a [facility] and [facility] because she desats deeply and the is fine with the ED staff.      She does have COPD and understand the position of the nursing home but do question if there is something wrong with the delivery system of the oxygen at the nursing home.    Will pass this note on to the primary NP as a FYI for the call from the ED provider.    Electronically signed by Caridad Brice RN, CNP

## 2020-12-31 NOTE — ED NOTES
Called on call answering service for MD to speak to regarding sending pt back to the [facility], they will page the NP on call and have her call us.

## 2020-12-31 NOTE — ED TRIAGE NOTES
Pt was at [facility] 2 times today and lives at the [facility] for the past week. COPD, has been in and out of the hospital multiple times. Pt O2 demand increased from 2L to 6L, the estates can not go above 5L. Pt arrives on 2L of O2 but EMS states she will need to be increased as she tends to trend back down quickly

## 2020-12-31 NOTE — ED AVS SNAPSHOT
[facility]  [address] [facility] [address]  Phone: [phone]  Fax: [phone]                                    Bella Saucedo   MRN: [id number]    Department: [facility]   Date of Visit: 12/31/2020           After Visit Summary Signature Page    I have received my discharge instructions, and my questions have been answered. I have discussed any challenges I see with this plan with the nurse or doctor.    ..........................................................................................................................................  Patient/Patient Representative Signature      ..........................................................................................................................................  Patient Representative Print Name and Relationship to Patient    ..................................................               ................................................  Date                                   Time    ..........................................................................................................................................  Reviewed by Signature/Title    ...................................................              ..............................................  Date                                               Time          22EPIC Rev 08/18

## 2020-12-31 NOTE — TELEPHONE ENCOUNTER
Resident demonstrating low oxygen saturations again and not able to get they up near 90%    Nursing stated they already sent her back to [facility].  This has been occurring before and has been at the [facility] as well.      Already sent out, ok to do so based on clinical presentation.    Electronically signed by Caridad Brice RN, CNP

## 2020-12-31 NOTE — ED PROVIDER NOTES
History     Chief Complaint   Patient presents with     Shortness of Breath     HPI  Bella Saucedo is a 75 year old female with history of COPD presenting for evaluation of hypoxia at her nursing home.  Has been recently hospitalized at [facility] December 29-30 for COPD exacerbation.  Was discharged to transitional care unit yesterday but had repeated desaturations so was sent back to [facility] where she was evaluated.  No significant abnormalities were found and she was returned to her nursing home however had another episode of desaturation there tonight.  EMS brought patient here for further evaluation.  In the ED patient has no complaints but EMS does report that they witnessed oxygen saturations in the 60s with a good waveform and patient had some dyspnea at that time.  She however normalized her oxygen saturation quickly when they switched her onto EMS oxygen and subsequently was titrated back to her normal home oxygen levels without any further difficulty breathing.  Patient denying difficulty breathing or chest pain in the ED.  Denies abdominal pain or nausea.  Denies headache or dizziness.  Denies fever or chills.  Per chart review patient is on 750 mg of Levaquin daily as well as prednisone.      ==================================================================    CHART REVIEW:      Ed visit 12/31/20:  Impression and Plan:  Bella Saucedo is a 75 y.o. female with a history of COPD who is dependent on 2 L of oxygen nasal cannula, presents emergency department today for evaluation of a transient episode of hypoxia at her TCU. See HPI. Vitals show mild tachycardia with adequate oxygen saturations on 2 L of nasal cannula. She remains clinically and vitally stable with us for over 2.5 hours until she was transported back to her TCU. I talked to the nurse at her TCU about the incident that happened. It sounds like she was moving in bed and exerting herself where she dropped her sats. Chart  reviewed from yesterday. She is on antibiotics and steroids for COPD exacerbation. Upon my evaluation, she feels good. She denies any worsening symptoms. I feel that her episode of hypoxia is due to her being chronically dependent on oxygen and her underlying condition. She has not required more than 2 L of oxygen here. We will allow her TCU to increase her oxygen up to 6 L given that the nurse said she was not able to increase it without an order and there is no provider onsite this evening. Should she require any more than this though, she needs to be sent back to the emergency department immediately. We will hold off on further work-up at this time given she just had a plethora of labs and imaging that all were reassuring. Patient is comfortable with this plan. She is discharged back via ambulance. I talked to the patient's daughter who is in full agreement of the plan.    Diagnosis:   ENCOUNTER DIAGNOSES   ICD-10-CM   1. COPD exacerbation (HC) J44.1       END CHART REVIEW  ==================================================================    Allergies:  Allergies   Allergen Reactions     Nitroglycerin Anxiety and Other (See Comments)     Mild headache, severe anxiety  Mild headache, severe anxiety       Problem List:    Patient Active Problem List    Diagnosis Date Noted     Respiratory failure (H) 10/17/2020     Priority: Medium     Mild cognitive impairment 03/08/2017     Priority: Medium     Pneumonia due to infectious organism, unspecified laterality, unspecified part of lung 01/11/2017     Priority: Medium     Benign essential hypertension 01/11/2017     Priority: Medium     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     Priority: Medium     Elevated troponin I level 01/04/2017     Priority: Medium     Hyponatremia 01/04/2017     Priority: Medium     Acute non-ST elevation myocardial infarction (NSTEMI) (H) 01/04/2017     Priority: Medium     Shortness of breath 01/04/2017     Priority: Medium      Weakness 01/04/2017     Priority: Medium     Tremor 10/18/2016     Priority: Medium     Dizziness 10/13/2016     Priority: Medium     Intermittent tremor 10/13/2016     Priority: Medium     Abnormal weight loss 06/30/2016     Priority: Medium     Malignant neoplasm of sigmoid colon (H) 05/05/2016     Priority: Medium     Malignant neoplasm of colon (H) 03/15/2016     Priority: Medium     Overview:   Status post left hemicolectomy with a left lower quadrant colostomy       Chronic obstructive pulmonary disease with acute exacerbation (H) 03/15/2016     Priority: Medium     Hypomagnesemia 03/15/2016     Priority: Medium     Congestive heart failure (H) 03/14/2016     Priority: Medium     Acute respiratory failure (H) 03/13/2016     Priority: Medium     Hypoxia 03/11/2016     Priority: Medium     Bandemia 03/10/2016     Priority: Medium     Lung mass 03/10/2016     Priority: Medium     Nicotine dependence 03/10/2016     Priority: Medium     Ventricular premature beats 03/10/2016     Priority: Medium     Perforation of sigmoid colon (H) 03/09/2016     Priority: Medium     Systemic infection (H) 03/09/2016     Priority: Medium     Borderline diabetes mellitus 09/11/2015     Priority: Medium     Severe chronic obstructive pulmonary disease (H) 04/02/2014     Priority: Medium     Overview:   PFT 3/2014:  Severely obstructive spirometry with significant improvement after   bronchodilators with an increased total lung capacity and a moderately   reduced diffusion capacity.   Patient's resting room air saturation is 97% percent with a pulse of 65.        Cerebral aneurysm 05/02/2011     Priority: Medium     Overview:   Unruptured right middle cerebral artery aneurysm treated with coil embolization 5/2/2011 GDC coils, MRI compatible to 3 EVI       Advance directive discussed with patient 04/25/2011     Priority: Medium     Full Code  1/11/2017        Intracranial subarachnoid hemorrhage (H) 03/28/2011     Priority: Medium      Hemorrhage into subarachnoid space of neuraxis (H) 2010     Priority: Medium     Hypertension 10/27/2010     Priority: Medium     Cerebral arterial aneurysm 10/22/2010     Priority: Medium     Tobacco use 10/15/2010     Priority: Medium        Past Medical History:    No past medical history on file.    Past Surgical History:    No past surgical history on file.    Family History:    No family history on file.    Social History:  Marital Status:   [2]  Social History     Tobacco Use     Smoking status: Former Smoker     Packs/day: 1.00     Types: Cigarettes     Quit date: 10/17/2020     Years since quittin.2   Substance Use Topics     Alcohol use: Not on file     Drug use: Not on file        Medications:         acetaminophen (TYLENOL) 325 MG tablet       aspirin 81 MG EC tablet       blood glucose (NO BRAND SPECIFIED) lancets standard       blood glucose monitoring (NO BRAND SPECIFIED) meter device kit       blood glucose monitoring (NO BRAND SPECIFIED) test strip       citalopram (CELEXA) 40 MG tablet       fluticasone-vilanterol (BREO ELLIPTA) 100-25 MCG/INH inhaler       furosemide (LASIX) 40 MG tablet       guaiFENesin (MUCINEX) 600 MG 12 hr tablet       insulin glargine (LANTUS) 100 UNIT/ML injection       insulin pen needle 31G X 6 MM       ipratropium - albuterol 0.5 mg/2.5 mg/3 mL (DUONEB) 0.5-2.5 (3) MG/3ML neb solution       LORazepam (ATIVAN) 0.5 MG tablet       magnesium 250 MG tablet       pantoprazole (PROTONIX) 40 MG EC tablet       polyethylene glycol (MIRALAX) 17 g packet       potassium chloride ER (KLOR-CON M) 10 MEQ CR tablet       senna-docusate (SENOKOT-S/PERICOLACE) 8.6-50 MG tablet       umeclidinium (INCRUSE ELLIPTA) 62.5 MCG/INH inhaler       Urea 40 % CREA       Vitamin D, Cholecalciferol, 25 MCG (1000 UT) CAPS          Review of Systems   Constitutional: Negative for appetite change, chills, fatigue and fever.   HENT: Negative for congestion.    Respiratory: Negative  for cough, chest tightness and shortness of breath.    Cardiovascular: Negative for chest pain.   Gastrointestinal: Negative for abdominal pain, nausea and vomiting.   Musculoskeletal: Negative for back pain.   Skin: Negative for rash.   Neurological: Negative for weakness, light-headedness and headaches.   Psychiatric/Behavioral: Negative for confusion.   All other systems reviewed and are negative.      Physical Exam   BP: 136/82  Pulse: 114  Temp: 98.1  F (36.7  C)  Resp: 24  Weight: 56.7 kg (125 lb)  SpO2: 94 %      Physical Exam  Vitals signs and nursing note reviewed.   HENT:      Head: Atraumatic.      Nose: Nose normal.      Mouth/Throat:      Mouth: Mucous membranes are moist.   Eyes:      Conjunctiva/sclera: Conjunctivae normal.   Neck:      Musculoskeletal: Normal range of motion.   Cardiovascular:      Rate and Rhythm: Normal rate and regular rhythm.      Pulses: Normal pulses.   Pulmonary:      Effort: Pulmonary effort is normal. No respiratory distress.      Breath sounds: No wheezing or rhonchi.      Comments: Somewhat diminished throughout  Abdominal:      Palpations: Abdomen is soft.      Tenderness: There is no abdominal tenderness.   Musculoskeletal: Normal range of motion.   Skin:     General: Skin is warm and dry.      Capillary Refill: Capillary refill takes less than 2 seconds.   Neurological:      Mental Status: She is alert.      Comments: Calm and cooperative.  Oriented to person and month.  Did not remember which hospital she was at   Psychiatric:         Mood and Affect: Mood normal.         ED Course        Procedures               EKG Interpretation:      Interpreted by Burton Louise MD  Time reviewed: 0012  Symptoms at time of EKG: none   Rhythm: sinus tachycardia  Rate: 107  Axis: Normal  Ectopy: none  Conduction: normal  ST Segments/ T Waves: No acute ischemic changes  Q Waves: none  Comparison to prior: No old EKG available    Clinical Impression: Sinus tachycardia with  motion artifact.  No acute ischemic abnormality                Critical Care time:  none               Results for orders placed or performed during the hospital encounter of 12/31/20 (from the past 24 hour(s))   CBC with platelets differential   Result Value Ref Range    WBC 11.2 (H) 4.0 - 11.0 10e9/L    RBC Count 4.23 3.8 - 5.2 10e12/L    Hemoglobin 12.9 11.7 - 15.7 g/dL    Hematocrit 41.1 35.0 - 47.0 %    MCV 97 78 - 100 fl    MCH 30.5 26.5 - 33.0 pg    MCHC 31.4 (L) 31.5 - 36.5 g/dL    RDW 13.7 10.0 - 15.0 %    Platelet Count 272 150 - 450 10e9/L    Diff Method Automated Method     % Neutrophils 72.8 %    % Lymphocytes 14.2 %    % Monocytes 12.3 %    % Eosinophils 0.1 %    % Basophils 0.2 %    % Immature Granulocytes 0.4 %    Nucleated RBCs 0 0 /100    Absolute Neutrophil 8.1 1.6 - 8.3 10e9/L    Absolute Lymphocytes 1.6 0.8 - 5.3 10e9/L    Absolute Monocytes 1.4 (H) 0.0 - 1.3 10e9/L    Absolute Eosinophils 0.0 0.0 - 0.7 10e9/L    Absolute Basophils 0.0 0.0 - 0.2 10e9/L    Abs Immature Granulocytes 0.1 0 - 0.4 10e9/L    Absolute Nucleated RBC 0.0    Comprehensive metabolic panel   Result Value Ref Range    Sodium 141 133 - 144 mmol/L    Potassium 3.7 3.4 - 5.3 mmol/L    Chloride 106 94 - 109 mmol/L    Carbon Dioxide 35 (H) 20 - 32 mmol/L    Anion Gap <1 (L) 3 - 14 mmol/L    Glucose 191 (H) 70 - 99 mg/dL    Urea Nitrogen 27 7 - 30 mg/dL    Creatinine 0.64 0.52 - 1.04 mg/dL    GFR Estimate 87 >60 mL/min/[1.73_m2]    GFR Estimate If Black >90 >60 mL/min/[1.73_m2]    Calcium 8.9 8.5 - 10.1 mg/dL    Bilirubin Total 0.4 0.2 - 1.3 mg/dL    Albumin 3.1 (L) 3.4 - 5.0 g/dL    Protein Total 6.8 6.8 - 8.8 g/dL    Alkaline Phosphatase 95 40 - 150 U/L    ALT 22 0 - 50 U/L    AST 17 0 - 45 U/L   Blood gas venous   Result Value Ref Range    Ph Venous 7.32 7.32 - 7.43 pH    PCO2 Venous 69 (H) 40 - 50 mm Hg    PO2 Venous 50 (H) 25 - 47 mm Hg    Bicarbonate Venous 35 (H) 21 - 28 mmol/L    Base Excess Venous 6.5 mmol/L    FIO2 4L     Symptomatic Influenza A/B & SARS-CoV2 (COVID-19) Virus PCR Multiplex    Specimen: Nasopharyngeal   Result Value Ref Range    Flu A/B & SARS-COV-2 PCR Source Nasopharyngeal     SARS-CoV-2 PCR Result NEGATIVE     Influenza A PCR Negative NEG^Negative    Influenza B PCR Negative NEG^Negative    Respiratory Syncytial Virus PCR (Note)     Flu A/B & SARS-CoV-2 PCR Comment (Note)    XR Chest Port 1 View    Narrative    EXAM: XR CHEST PORT 1 VW  LOCATION: [facility]  DATE/TIME: 12/31/2020 1:52 AM    INDICATION: Dyspnea  COMPARISON: 10/17/2020      Impression    IMPRESSION: Borderline enlarged cardiac silhouette. Atherosclerotic aorta. No pneumothorax or pleural effusion. No focal consolidation.   CT Chest Pulmonary Embolism w Contrast    Narrative    EXAM: CT CHEST PULMONARY EMBOLISM W CONTRAST  LOCATION: [facility]  DATE/TIME: 12/31/2020 3:35 AM    INDICATION: Dyspnea. Hypoxia.  COMPARISON: Chest radiograph today. CTA chest 10/22/2020.  TECHNIQUE: CT chest pulmonary angiogram during arterial phase injection of IV contrast. Multiplanar reformats and MIP reconstructions were performed. Dose reduction techniques were used.   CONTRAST: 65mL Isovue-370    FINDINGS:  ANGIOGRAM CHEST: No pulmonary embolus. Moderately advanced atherosclerosis of the aorta and branch vessels. No aortic aneurysm or dissection. Advanced multivessel coronary artery calcification.    LUNGS AND PLEURA: Severe centrilobular emphysema predominantly affecting the upper lobes. Small benign calcified granuloma in the left lower lobe. Mild bibasilar atelectasis. No pleural effusion or pneumothorax.    MEDIASTINUM/AXILLAE: Small calcified left hilar lymph nodes related to benign granulomatous disease.    UPPER ABDOMEN: Tiny benign calcified granulomata in the spleen.    MUSCULOSKELETAL: Mild chronic appearing loss of height of the T8 and T11 vertebral bodies.      Impression    IMPRESSION:  1.  No pulmonary embolus.  2.  Severe  upper lobe predominant emphysema.  3.  Advanced multivessel coronary artery disease.       Medications   iopamidol (ISOVUE-370) solution 65 mL (65 mLs Intravenous Given 12/31/20 0336)   sodium chloride 0.9 % bag 500mL for CT scan flush use (64 mLs Intravenous Given 12/31/20 0337)     5:26 AM: Discussed with Apple Brice NP covering for [facility] for patient at nursing home.  Gave her the update of patient's work-up and my thoughts about possible oxygen concentrator malfunction leading to her transient hypoxia.    Assessments & Plan (with Medical Decision Making)  75-year-old female with history of severe COPD presenting for evaluation of episodic hypoxia.  Recently hospitalized 2 days ago for COPD and is currently on prednisone and Levaquin.  Had repeated desaturation events at her nursing home prompting repeat evaluation to the hospital yesterday and night.  No objective abnormality seen in the ED.  Patient was back to her baseline oxygen need upon arrival in the ED although EMS reports she did have documented hypoxia at the nursing home with increased work of breathing, these both resolved with short-term increase in oxygen.  In the ED she remained without respiratory distress although initially mildly tachypneic.  Tachypnea resolved and patient had no further episodes of hypoxia despite steady titration down of her oxygen.  Given her recurrent ED visits for hypoxia, work-up expanded to include CT to evaluate for PE: Negative for PE but did show severe emphysema.  VBG shows chronic hypercapnia without acidosis to suggest an acute respiratory failure.  Patient alert in the ED and breathing easily without distress on low nasal cannula oxygen needs.  No tachypnea here.  Does have history of anxiety and reportedly had Ativan which may be contributing factor to her hyperventilation earlier although this is speculative.  No episodes of hypoxia in the ED and after initial tachypnea upon arrival, her  respiratory rate has normalized.  She remained alert and easily arousable despite mild hypercapnia.  Exact cause of her recurrent mild transient hypoxia is unclear but likely secondary to her significant end-stage COPD.  Nurse practitioner covering her nursing home updated by phone of work-up and plan for return as well as a plan to try different oxygen concentrator due to possible equipment failure leading to her recurrent hypoxia.     I have reviewed the nursing notes.    I have reviewed the findings, diagnosis, plan and need for follow up with the patient.       New Prescriptions    No medications on file       Final diagnoses:   COPD, severe (H)       12/31/2020   [facility] EMERGENCY DEPT     Louise, Burton Parham MD  12/31/20 1478

## 2021-01-01 ENCOUNTER — NURSING HOME VISIT (OUTPATIENT)
Dept: GERIATRICS | Facility: CLINIC | Age: 76
End: 2021-01-01
Payer: COMMERCIAL

## 2021-01-01 ENCOUNTER — TELEPHONE (OUTPATIENT)
Dept: GERIATRICS | Facility: CLINIC | Age: 76
End: 2021-01-01

## 2021-01-01 ENCOUNTER — TRANSFERRED RECORDS (OUTPATIENT)
Dept: HEALTH INFORMATION MANAGEMENT | Facility: CLINIC | Age: 76
End: 2021-01-01

## 2021-01-01 ENCOUNTER — HOSPITAL LABORATORY (OUTPATIENT)
Facility: OTHER | Age: 76
End: 2021-01-01

## 2021-01-01 ENCOUNTER — VIRTUAL VISIT (OUTPATIENT)
Dept: GERIATRICS | Facility: CLINIC | Age: 76
End: 2021-01-01
Payer: COMMERCIAL

## 2021-01-01 ENCOUNTER — ANESTHESIA EVENT (OUTPATIENT)
Dept: SURGERY | Facility: CLINIC | Age: 76
DRG: 335 | End: 2021-01-01
Payer: COMMERCIAL

## 2021-01-01 ENCOUNTER — DOCUMENTATION ONLY (OUTPATIENT)
Dept: OTHER | Facility: CLINIC | Age: 76
End: 2021-01-01

## 2021-01-01 ENCOUNTER — NURSING HOME VISIT (OUTPATIENT)
Dept: GERIATRICS | Facility: CLINIC | Age: 76
End: 2021-01-01
Payer: MEDICARE

## 2021-01-01 ENCOUNTER — APPOINTMENT (OUTPATIENT)
Dept: SURGERY | Facility: PHYSICIAN GROUP | Age: 76
End: 2021-01-01
Payer: COMMERCIAL

## 2021-01-01 ENCOUNTER — APPOINTMENT (OUTPATIENT)
Dept: GENERAL RADIOLOGY | Facility: CLINIC | Age: 76
DRG: 335 | End: 2021-01-01
Attending: INTERNAL MEDICINE
Payer: COMMERCIAL

## 2021-01-01 ENCOUNTER — RESULTS ONLY (OUTPATIENT)
Facility: CLINIC | Age: 76
End: 2021-01-01

## 2021-01-01 ENCOUNTER — HOSPITAL ENCOUNTER (EMERGENCY)
Facility: CLINIC | Age: 76
Discharge: ANOTHER HOSPITAL | End: 2021-02-11
Attending: FAMILY MEDICINE | Admitting: FAMILY MEDICINE
Payer: COMMERCIAL

## 2021-01-01 ENCOUNTER — ANESTHESIA (OUTPATIENT)
Dept: SURGERY | Facility: CLINIC | Age: 76
DRG: 335 | End: 2021-01-01
Payer: COMMERCIAL

## 2021-01-01 ENCOUNTER — HOSPITAL ENCOUNTER (INPATIENT)
Facility: CLINIC | Age: 76
LOS: 17 days | Discharge: HOSPICE | DRG: 335 | End: 2021-03-01
Attending: STUDENT IN AN ORGANIZED HEALTH CARE EDUCATION/TRAINING PROGRAM | Admitting: STUDENT IN AN ORGANIZED HEALTH CARE EDUCATION/TRAINING PROGRAM
Payer: COMMERCIAL

## 2021-01-01 ENCOUNTER — APPOINTMENT (OUTPATIENT)
Dept: CT IMAGING | Facility: CLINIC | Age: 76
End: 2021-01-01
Attending: FAMILY MEDICINE
Payer: COMMERCIAL

## 2021-01-01 ENCOUNTER — APPOINTMENT (OUTPATIENT)
Dept: PHYSICAL THERAPY | Facility: CLINIC | Age: 76
DRG: 335 | End: 2021-01-01
Attending: INTERNAL MEDICINE
Payer: COMMERCIAL

## 2021-01-01 ENCOUNTER — TELEPHONE (OUTPATIENT)
Dept: SURGERY | Facility: CLINIC | Age: 76
End: 2021-01-01

## 2021-01-01 ENCOUNTER — APPOINTMENT (OUTPATIENT)
Dept: GENERAL RADIOLOGY | Facility: CLINIC | Age: 76
DRG: 335 | End: 2021-01-01
Attending: HOSPITALIST
Payer: COMMERCIAL

## 2021-01-01 ENCOUNTER — APPOINTMENT (OUTPATIENT)
Dept: GENERAL RADIOLOGY | Facility: CLINIC | Age: 76
DRG: 335 | End: 2021-01-01
Attending: SURGERY
Payer: COMMERCIAL

## 2021-01-01 ENCOUNTER — APPOINTMENT (OUTPATIENT)
Dept: GENERAL RADIOLOGY | Facility: CLINIC | Age: 76
DRG: 335 | End: 2021-01-01
Attending: PHYSICIAN ASSISTANT
Payer: COMMERCIAL

## 2021-01-01 ENCOUNTER — APPOINTMENT (OUTPATIENT)
Dept: GENERAL RADIOLOGY | Facility: CLINIC | Age: 76
DRG: 335 | End: 2021-01-01
Attending: NURSE PRACTITIONER
Payer: COMMERCIAL

## 2021-01-01 VITALS
WEIGHT: 126 LBS | BODY MASS INDEX: 21.51 KG/M2 | RESPIRATION RATE: 20 BRPM | HEIGHT: 64 IN | OXYGEN SATURATION: 95 % | TEMPERATURE: 97.6 F | HEART RATE: 64 BPM | DIASTOLIC BLOOD PRESSURE: 53 MMHG | SYSTOLIC BLOOD PRESSURE: 144 MMHG

## 2021-01-01 VITALS
DIASTOLIC BLOOD PRESSURE: 54 MMHG | TEMPERATURE: 97.5 F | SYSTOLIC BLOOD PRESSURE: 119 MMHG | HEART RATE: 78 BPM | OXYGEN SATURATION: 93 % | BODY MASS INDEX: 24.96 KG/M2 | RESPIRATION RATE: 22 BRPM | HEIGHT: 63 IN | WEIGHT: 140.87 LBS

## 2021-01-01 VITALS
WEIGHT: 130 LBS | BODY MASS INDEX: 22.2 KG/M2 | OXYGEN SATURATION: 98 % | SYSTOLIC BLOOD PRESSURE: 106 MMHG | HEART RATE: 66 BPM | RESPIRATION RATE: 20 BRPM | TEMPERATURE: 97.1 F | HEIGHT: 64 IN | DIASTOLIC BLOOD PRESSURE: 64 MMHG

## 2021-01-01 VITALS
TEMPERATURE: 97.4 F | RESPIRATION RATE: 18 BRPM | HEIGHT: 64 IN | BODY MASS INDEX: 22.71 KG/M2 | WEIGHT: 133 LBS | DIASTOLIC BLOOD PRESSURE: 72 MMHG | SYSTOLIC BLOOD PRESSURE: 152 MMHG | OXYGEN SATURATION: 91 % | HEART RATE: 77 BPM

## 2021-01-01 VITALS
TEMPERATURE: 96.9 F | RESPIRATION RATE: 22 BRPM | DIASTOLIC BLOOD PRESSURE: 60 MMHG | BODY MASS INDEX: 22.06 KG/M2 | OXYGEN SATURATION: 99 % | SYSTOLIC BLOOD PRESSURE: 120 MMHG | WEIGHT: 128.5 LBS | HEART RATE: 86 BPM

## 2021-01-01 VITALS
HEART RATE: 109 BPM | DIASTOLIC BLOOD PRESSURE: 75 MMHG | SYSTOLIC BLOOD PRESSURE: 160 MMHG | TEMPERATURE: 97.5 F | OXYGEN SATURATION: 95 % | WEIGHT: 128.5 LBS | BODY MASS INDEX: 22.06 KG/M2 | RESPIRATION RATE: 20 BRPM

## 2021-01-01 VITALS
HEART RATE: 105 BPM | WEIGHT: 132 LBS | OXYGEN SATURATION: 95 % | BODY MASS INDEX: 22.66 KG/M2 | DIASTOLIC BLOOD PRESSURE: 82 MMHG | SYSTOLIC BLOOD PRESSURE: 142 MMHG | TEMPERATURE: 98.6 F

## 2021-01-01 VITALS
SYSTOLIC BLOOD PRESSURE: 132 MMHG | HEART RATE: 76 BPM | DIASTOLIC BLOOD PRESSURE: 55 MMHG | OXYGEN SATURATION: 97 % | TEMPERATURE: 97.3 F | WEIGHT: 125 LBS | BODY MASS INDEX: 21.46 KG/M2 | RESPIRATION RATE: 20 BRPM

## 2021-01-01 VITALS
WEIGHT: 128.5 LBS | DIASTOLIC BLOOD PRESSURE: 65 MMHG | SYSTOLIC BLOOD PRESSURE: 146 MMHG | BODY MASS INDEX: 22.06 KG/M2 | HEART RATE: 89 BPM | TEMPERATURE: 97.6 F | RESPIRATION RATE: 20 BRPM | OXYGEN SATURATION: 92 %

## 2021-01-01 VITALS
OXYGEN SATURATION: 99 % | SYSTOLIC BLOOD PRESSURE: 116 MMHG | HEIGHT: 64 IN | BODY MASS INDEX: 22.71 KG/M2 | WEIGHT: 133 LBS | RESPIRATION RATE: 18 BRPM | TEMPERATURE: 96.3 F | DIASTOLIC BLOOD PRESSURE: 63 MMHG | HEART RATE: 72 BPM

## 2021-01-01 VITALS
OXYGEN SATURATION: 97 % | WEIGHT: 128.5 LBS | HEART RATE: 83 BPM | BODY MASS INDEX: 22.06 KG/M2 | SYSTOLIC BLOOD PRESSURE: 155 MMHG | DIASTOLIC BLOOD PRESSURE: 74 MMHG | TEMPERATURE: 97.8 F | RESPIRATION RATE: 20 BRPM

## 2021-01-01 VITALS
SYSTOLIC BLOOD PRESSURE: 158 MMHG | WEIGHT: 125 LBS | RESPIRATION RATE: 20 BRPM | BODY MASS INDEX: 21.34 KG/M2 | TEMPERATURE: 97.4 F | OXYGEN SATURATION: 98 % | DIASTOLIC BLOOD PRESSURE: 79 MMHG | HEART RATE: 83 BPM | HEIGHT: 64 IN

## 2021-01-01 VITALS
WEIGHT: 134.2 LBS | TEMPERATURE: 97.6 F | RESPIRATION RATE: 26 BRPM | OXYGEN SATURATION: 92 % | DIASTOLIC BLOOD PRESSURE: 57 MMHG | HEART RATE: 115 BPM | BODY MASS INDEX: 23.04 KG/M2 | SYSTOLIC BLOOD PRESSURE: 129 MMHG

## 2021-01-01 DIAGNOSIS — E55.9 VITAMIN D DEFICIENCY: ICD-10-CM

## 2021-01-01 DIAGNOSIS — R11.2 NON-INTRACTABLE VOMITING WITH NAUSEA, UNSPECIFIED VOMITING TYPE: ICD-10-CM

## 2021-01-01 DIAGNOSIS — R11.2 NAUSEA AND VOMITING, INTRACTABILITY OF VOMITING NOT SPECIFIED, UNSPECIFIED VOMITING TYPE: ICD-10-CM

## 2021-01-01 DIAGNOSIS — E11.59 TYPE 2 DIABETES MELLITUS WITH OTHER CIRCULATORY COMPLICATION, WITH LONG-TERM CURRENT USE OF INSULIN (H): ICD-10-CM

## 2021-01-01 DIAGNOSIS — Z98.890 STATUS POST EXPLORATORY LAPAROTOMY: Primary | ICD-10-CM

## 2021-01-01 DIAGNOSIS — J96.11 CHRONIC RESPIRATORY FAILURE WITH HYPOXIA AND HYPERCAPNIA (H): ICD-10-CM

## 2021-01-01 DIAGNOSIS — R13.12 OROPHARYNGEAL DYSPHAGIA: ICD-10-CM

## 2021-01-01 DIAGNOSIS — J44.9 COPD, SEVERE (H): Primary | ICD-10-CM

## 2021-01-01 DIAGNOSIS — I50.32 CHRONIC HEART FAILURE WITH PRESERVED EJECTION FRACTION (H): ICD-10-CM

## 2021-01-01 DIAGNOSIS — K56.609 SBO (SMALL BOWEL OBSTRUCTION) (H): Primary | ICD-10-CM

## 2021-01-01 DIAGNOSIS — Z51.5 HOSPICE CARE PATIENT: ICD-10-CM

## 2021-01-01 DIAGNOSIS — F32.5 DEPRESSION, MAJOR, IN REMISSION (H): ICD-10-CM

## 2021-01-01 DIAGNOSIS — J96.11 CHRONIC RESPIRATORY FAILURE WITH HYPOXIA AND HYPERCAPNIA (H): Primary | ICD-10-CM

## 2021-01-01 DIAGNOSIS — J44.9 COPD, SEVERE (H): ICD-10-CM

## 2021-01-01 DIAGNOSIS — J44.1 CHRONIC OBSTRUCTIVE PULMONARY DISEASE WITH ACUTE EXACERBATION (H): ICD-10-CM

## 2021-01-01 DIAGNOSIS — R19.5 LOOSE STOOLS: ICD-10-CM

## 2021-01-01 DIAGNOSIS — I10 ESSENTIAL HYPERTENSION: ICD-10-CM

## 2021-01-01 DIAGNOSIS — Z71.89 ACP (ADVANCE CARE PLANNING): ICD-10-CM

## 2021-01-01 DIAGNOSIS — E44.1 MILD PROTEIN-CALORIE MALNUTRITION (H): ICD-10-CM

## 2021-01-01 DIAGNOSIS — C18.9 MALIGNANT NEOPLASM OF COLON, UNSPECIFIED PART OF COLON (H): ICD-10-CM

## 2021-01-01 DIAGNOSIS — J96.12 CHRONIC RESPIRATORY FAILURE WITH HYPOXIA AND HYPERCAPNIA (H): Primary | ICD-10-CM

## 2021-01-01 DIAGNOSIS — F41.9 ANXIETY: ICD-10-CM

## 2021-01-01 DIAGNOSIS — K56.609 SMALL BOWEL OBSTRUCTION (H): ICD-10-CM

## 2021-01-01 DIAGNOSIS — Z79.4 TYPE 2 DIABETES MELLITUS WITH OTHER CIRCULATORY COMPLICATION, WITH LONG-TERM CURRENT USE OF INSULIN (H): ICD-10-CM

## 2021-01-01 DIAGNOSIS — I50.30 HEART FAILURE WITH PRESERVED EJECTION FRACTION, NYHA CLASS I (H): ICD-10-CM

## 2021-01-01 DIAGNOSIS — Z93.3 COLOSTOMY IN PLACE (H): ICD-10-CM

## 2021-01-01 DIAGNOSIS — B37.31 YEAST VAGINITIS: ICD-10-CM

## 2021-01-01 DIAGNOSIS — M25.421 SWELLING OF JOINT OF UPPER ARM, RIGHT: ICD-10-CM

## 2021-01-01 DIAGNOSIS — E46 MALNUTRITION, UNSPECIFIED TYPE (H): ICD-10-CM

## 2021-01-01 DIAGNOSIS — Z98.890 STATUS POST EXPLORATORY LAPAROTOMY: ICD-10-CM

## 2021-01-01 DIAGNOSIS — J96.12 CHRONIC RESPIRATORY FAILURE WITH HYPOXIA AND HYPERCAPNIA (H): ICD-10-CM

## 2021-01-01 DIAGNOSIS — B35.1 ONYCHOMYCOSIS: ICD-10-CM

## 2021-01-01 DIAGNOSIS — I25.10 CORONARY ARTERY DISEASE INVOLVING NATIVE CORONARY ARTERY OF NATIVE HEART WITHOUT ANGINA PECTORIS: ICD-10-CM

## 2021-01-01 DIAGNOSIS — K21.9 GASTROESOPHAGEAL REFLUX DISEASE WITHOUT ESOPHAGITIS: ICD-10-CM

## 2021-01-01 DIAGNOSIS — R10.84 ABDOMINAL PAIN, GENERALIZED: Primary | ICD-10-CM

## 2021-01-01 DIAGNOSIS — R26.81 GAIT INSTABILITY: ICD-10-CM

## 2021-01-01 DIAGNOSIS — E11.9 DIET-CONTROLLED DIABETES MELLITUS (H): ICD-10-CM

## 2021-01-01 DIAGNOSIS — K59.01 SLOW TRANSIT CONSTIPATION: ICD-10-CM

## 2021-01-01 LAB
ALBUMIN SERPL-MCNC: 2.1 G/DL (ref 3.4–5)
ALBUMIN SERPL-MCNC: 2.3 G/DL (ref 3.4–5)
ALBUMIN SERPL-MCNC: 2.3 G/DL (ref 3.4–5)
ALBUMIN SERPL-MCNC: 2.5 G/DL (ref 3.4–5)
ALBUMIN SERPL-MCNC: 2.6 G/DL (ref 3.4–5)
ALBUMIN SERPL-MCNC: 2.6 G/DL (ref 3.4–5)
ALBUMIN SERPL-MCNC: 2.9 G/DL (ref 3.4–5)
ALBUMIN SERPL-MCNC: 3.1 G/DL (ref 3.4–5)
ALBUMIN SERPL-MCNC: 3.6 G/DL (ref 3.4–5)
ALBUMIN UR-MCNC: 30 MG/DL
ALBUMIN UR-MCNC: 30 MG/DL
ALP SERPL-CCNC: 103 U/L (ref 40–150)
ALP SERPL-CCNC: 112 U/L (ref 40–150)
ALP SERPL-CCNC: 50 U/L (ref 40–150)
ALP SERPL-CCNC: 51 U/L (ref 40–150)
ALP SERPL-CCNC: 53 U/L (ref 40–150)
ALP SERPL-CCNC: 58 U/L (ref 40–150)
ALP SERPL-CCNC: 67 U/L (ref 40–150)
ALP SERPL-CCNC: 69 U/L (ref 40–150)
ALP SERPL-CCNC: 97 U/L (ref 40–150)
ALT SERPL W P-5'-P-CCNC: 20 U/L (ref 0–50)
ALT SERPL W P-5'-P-CCNC: 21 U/L (ref 0–50)
ALT SERPL W P-5'-P-CCNC: 26 U/L (ref 0–50)
ALT SERPL W P-5'-P-CCNC: 26 U/L (ref 0–50)
ALT SERPL W P-5'-P-CCNC: 28 U/L (ref 0–50)
ALT SERPL W P-5'-P-CCNC: 35 U/L (ref 0–50)
ALT SERPL W P-5'-P-CCNC: 41 U/L (ref 0–50)
ALT SERPL W P-5'-P-CCNC: 71 U/L (ref 0–50)
ALT SERPL W P-5'-P-CCNC: 79 U/L (ref 0–50)
ANION GAP SERPL CALCULATED.3IONS-SCNC: 1 MMOL/L (ref 3–14)
ANION GAP SERPL CALCULATED.3IONS-SCNC: 2 MMOL/L (ref 3–14)
ANION GAP SERPL CALCULATED.3IONS-SCNC: 2 MMOL/L (ref 3–14)
ANION GAP SERPL CALCULATED.3IONS-SCNC: 3 MMOL/L (ref 3–14)
ANION GAP SERPL CALCULATED.3IONS-SCNC: 4 MMOL/L (ref 3–14)
ANION GAP SERPL CALCULATED.3IONS-SCNC: 7 MMOL/L (ref 3–14)
ANION GAP SERPL CALCULATED.3IONS-SCNC: <1 MMOL/L (ref 3–14)
APPEARANCE UR: ABNORMAL
APPEARANCE UR: ABNORMAL
AST SERPL W P-5'-P-CCNC: 10 U/L (ref 0–45)
AST SERPL W P-5'-P-CCNC: 11 U/L (ref 0–45)
AST SERPL W P-5'-P-CCNC: 13 U/L (ref 0–45)
AST SERPL W P-5'-P-CCNC: 15 U/L (ref 0–45)
AST SERPL W P-5'-P-CCNC: 16 U/L (ref 0–45)
AST SERPL W P-5'-P-CCNC: 18 U/L (ref 0–45)
AST SERPL W P-5'-P-CCNC: 23 U/L (ref 0–45)
AST SERPL W P-5'-P-CCNC: 24 U/L (ref 0–45)
AST SERPL W P-5'-P-CCNC: 8 U/L (ref 0–45)
BACTERIA #/AREA URNS HPF: ABNORMAL /HPF
BACTERIA #/AREA URNS HPF: ABNORMAL /HPF
BACTERIA SPEC CULT: ABNORMAL
BACTERIA SPEC CULT: NO GROWTH
BASE EXCESS BLDA CALC-SCNC: 11.9 MMOL/L
BASE EXCESS BLDV CALC-SCNC: 12 MMOL/L
BASE EXCESS BLDV CALC-SCNC: 12.8 MMOL/L
BASE EXCESS BLDV CALC-SCNC: 14.5 MMOL/L
BASOPHILS # BLD AUTO: 0 10E9/L (ref 0–0.2)
BASOPHILS # BLD AUTO: 0 10E9/L (ref 0–0.2)
BASOPHILS NFR BLD AUTO: 0.2 %
BASOPHILS NFR BLD AUTO: 0.2 %
BILIRUB DIRECT SERPL-MCNC: 0.1 MG/DL (ref 0–0.2)
BILIRUB DIRECT SERPL-MCNC: <0.1 MG/DL (ref 0–0.2)
BILIRUB SERPL-MCNC: 0.3 MG/DL (ref 0.2–1.3)
BILIRUB SERPL-MCNC: 0.5 MG/DL (ref 0.2–1.3)
BILIRUB SERPL-MCNC: 0.5 MG/DL (ref 0.2–1.3)
BILIRUB SERPL-MCNC: 0.6 MG/DL (ref 0.2–1.3)
BILIRUB SERPL-MCNC: 0.6 MG/DL (ref 0.2–1.3)
BILIRUB SERPL-MCNC: 0.7 MG/DL (ref 0.2–1.3)
BILIRUB SERPL-MCNC: 0.8 MG/DL (ref 0.2–1.3)
BILIRUB UR QL STRIP: NEGATIVE
BILIRUB UR QL STRIP: NEGATIVE
BUN SERPL-MCNC: 14 MG/DL (ref 7–30)
BUN SERPL-MCNC: 17 MG/DL (ref 7–30)
BUN SERPL-MCNC: 18 MG/DL (ref 7–30)
BUN SERPL-MCNC: 19 MG/DL (ref 7–30)
BUN SERPL-MCNC: 19 MG/DL (ref 7–30)
BUN SERPL-MCNC: 21 MG/DL (ref 7–30)
BUN SERPL-MCNC: 23 MG/DL (ref 7–30)
BUN SERPL-MCNC: 24 MG/DL (ref 7–30)
BUN SERPL-MCNC: 24 MG/DL (ref 7–30)
BUN SERPL-MCNC: 27 MG/DL (ref 7–30)
BUN SERPL-MCNC: 28 MG/DL (ref 7–30)
BUN SERPL-MCNC: 29 MG/DL (ref 7–30)
BUN SERPL-MCNC: 30 MG/DL (ref 7–30)
BUN SERPL-MCNC: 30 MG/DL (ref 7–30)
BUN SERPL-MCNC: 36 MG/DL (ref 7–30)
CALCIUM SERPL-MCNC: 8 MG/DL (ref 8.5–10.1)
CALCIUM SERPL-MCNC: 8.1 MG/DL (ref 8.5–10.1)
CALCIUM SERPL-MCNC: 8.1 MG/DL (ref 8.5–10.1)
CALCIUM SERPL-MCNC: 8.2 MG/DL (ref 8.5–10.1)
CALCIUM SERPL-MCNC: 8.3 MG/DL (ref 8.5–10.1)
CALCIUM SERPL-MCNC: 8.3 MG/DL (ref 8.5–10.1)
CALCIUM SERPL-MCNC: 8.4 MG/DL (ref 8.5–10.1)
CALCIUM SERPL-MCNC: 8.5 MG/DL (ref 8.5–10.1)
CALCIUM SERPL-MCNC: 8.6 MG/DL (ref 8.5–10.1)
CALCIUM SERPL-MCNC: 8.7 MG/DL (ref 8.5–10.1)
CALCIUM SERPL-MCNC: 8.7 MG/DL (ref 8.5–10.1)
CALCIUM SERPL-MCNC: 8.8 MG/DL (ref 8.5–10.1)
CALCIUM SERPL-MCNC: 8.9 MG/DL (ref 8.5–10.1)
CALCIUM SERPL-MCNC: 9 MG/DL (ref 8.5–10.1)
CALCIUM SERPL-MCNC: 9.6 MG/DL (ref 8.5–10.1)
CHLORIDE SERPL-SCNC: 100 MMOL/L (ref 94–109)
CHLORIDE SERPL-SCNC: 101 MMOL/L (ref 94–109)
CHLORIDE SERPL-SCNC: 102 MMOL/L (ref 94–109)
CHLORIDE SERPL-SCNC: 103 MMOL/L (ref 94–109)
CHLORIDE SERPL-SCNC: 103 MMOL/L (ref 94–109)
CHLORIDE SERPL-SCNC: 104 MMOL/L (ref 94–109)
CHLORIDE SERPL-SCNC: 105 MMOL/L (ref 94–109)
CHLORIDE SERPL-SCNC: 106 MMOL/L (ref 94–109)
CHLORIDE SERPL-SCNC: 96 MMOL/L (ref 94–109)
CHLORIDE SERPL-SCNC: 99 MMOL/L (ref 94–109)
CO2 SERPL-SCNC: 30 MMOL/L (ref 20–32)
CO2 SERPL-SCNC: 34 MMOL/L (ref 20–32)
CO2 SERPL-SCNC: 35 MMOL/L (ref 20–32)
CO2 SERPL-SCNC: 36 MMOL/L (ref 20–32)
CO2 SERPL-SCNC: 36 MMOL/L (ref 20–32)
CO2 SERPL-SCNC: 37 MMOL/L (ref 20–32)
CO2 SERPL-SCNC: 38 MMOL/L (ref 20–32)
CO2 SERPL-SCNC: 39 MMOL/L (ref 20–32)
CO2 SERPL-SCNC: 40 MMOL/L (ref 20–32)
CO2 SERPL-SCNC: 42 MMOL/L (ref 20–32)
CO2 SERPL-SCNC: 42 MMOL/L (ref 20–32)
COLOR UR AUTO: ABNORMAL
COLOR UR AUTO: YELLOW
CREAT SERPL-MCNC: 0.4 MG/DL (ref 0.52–1.04)
CREAT SERPL-MCNC: 0.41 MG/DL (ref 0.52–1.04)
CREAT SERPL-MCNC: 0.49 MG/DL (ref 0.52–1.04)
CREAT SERPL-MCNC: 0.5 MG/DL (ref 0.52–1.04)
CREAT SERPL-MCNC: 0.5 MG/DL (ref 0.52–1.04)
CREAT SERPL-MCNC: 0.51 MG/DL (ref 0.52–1.04)
CREAT SERPL-MCNC: 0.52 MG/DL (ref 0.52–1.04)
CREAT SERPL-MCNC: 0.53 MG/DL (ref 0.52–1.04)
CREAT SERPL-MCNC: 0.54 MG/DL (ref 0.52–1.04)
CREAT SERPL-MCNC: 0.54 MG/DL (ref 0.52–1.04)
CREAT SERPL-MCNC: 0.55 MG/DL (ref 0.52–1.04)
CREAT SERPL-MCNC: 0.56 MG/DL (ref 0.52–1.04)
CREAT SERPL-MCNC: 0.6 MG/DL (ref 0.52–1.04)
CREAT SERPL-MCNC: 0.65 MG/DL (ref 0.52–1.04)
CREAT SERPL-MCNC: 0.85 MG/DL (ref 0.52–1.04)
CRP SERPL-MCNC: 10.8 MG/L (ref 0–8)
DIFFERENTIAL METHOD BLD: ABNORMAL
DIFFERENTIAL METHOD BLD: ABNORMAL
EOSINOPHIL # BLD AUTO: 0 10E9/L (ref 0–0.7)
EOSINOPHIL # BLD AUTO: 0 10E9/L (ref 0–0.7)
EOSINOPHIL NFR BLD AUTO: 0 %
EOSINOPHIL NFR BLD AUTO: 0.1 %
ERYTHROCYTE [DISTWIDTH] IN BLOOD BY AUTOMATED COUNT: 12.6 % (ref 10–15)
ERYTHROCYTE [DISTWIDTH] IN BLOOD BY AUTOMATED COUNT: 12.8 % (ref 10–15)
ERYTHROCYTE [DISTWIDTH] IN BLOOD BY AUTOMATED COUNT: 12.8 % (ref 10–15)
ERYTHROCYTE [DISTWIDTH] IN BLOOD BY AUTOMATED COUNT: 12.9 % (ref 10–15)
ERYTHROCYTE [DISTWIDTH] IN BLOOD BY AUTOMATED COUNT: 13 % (ref 10–15)
ERYTHROCYTE [DISTWIDTH] IN BLOOD BY AUTOMATED COUNT: 13 % (ref 10–15)
ERYTHROCYTE [DISTWIDTH] IN BLOOD BY AUTOMATED COUNT: 13.1 % (ref 10–15)
ERYTHROCYTE [DISTWIDTH] IN BLOOD BY AUTOMATED COUNT: 13.2 % (ref 10–15)
ERYTHROCYTE [DISTWIDTH] IN BLOOD BY AUTOMATED COUNT: 13.3 % (ref 10–15)
ERYTHROCYTE [DISTWIDTH] IN BLOOD BY AUTOMATED COUNT: 13.6 % (ref 10–15)
ERYTHROCYTE [DISTWIDTH] IN BLOOD BY AUTOMATED COUNT: 13.6 % (ref 10–15)
ERYTHROCYTE [DISTWIDTH] IN BLOOD BY AUTOMATED COUNT: 13.7 % (ref 10–15)
ERYTHROCYTE [DISTWIDTH] IN BLOOD BY AUTOMATED COUNT: 13.9 % (ref 10–15)
GFR SERPL CREATININE-BSD FRML MDRD: 67 ML/MIN/{1.73_M2}
GFR SERPL CREATININE-BSD FRML MDRD: 87 ML/MIN/{1.73_M2}
GFR SERPL CREATININE-BSD FRML MDRD: 89 ML/MIN/{1.73_M2}
GFR SERPL CREATININE-BSD FRML MDRD: >90 ML/MIN/{1.73_M2}
GLUCOSE BLDC GLUCOMTR-MCNC: 100 MG/DL (ref 70–99)
GLUCOSE BLDC GLUCOMTR-MCNC: 102 MG/DL (ref 70–99)
GLUCOSE BLDC GLUCOMTR-MCNC: 107 MG/DL (ref 70–99)
GLUCOSE BLDC GLUCOMTR-MCNC: 110 MG/DL (ref 70–99)
GLUCOSE BLDC GLUCOMTR-MCNC: 112 MG/DL (ref 70–99)
GLUCOSE BLDC GLUCOMTR-MCNC: 114 MG/DL (ref 70–99)
GLUCOSE BLDC GLUCOMTR-MCNC: 116 MG/DL (ref 70–99)
GLUCOSE BLDC GLUCOMTR-MCNC: 124 MG/DL (ref 70–99)
GLUCOSE BLDC GLUCOMTR-MCNC: 128 MG/DL (ref 70–99)
GLUCOSE BLDC GLUCOMTR-MCNC: 132 MG/DL (ref 70–99)
GLUCOSE BLDC GLUCOMTR-MCNC: 134 MG/DL (ref 70–99)
GLUCOSE BLDC GLUCOMTR-MCNC: 135 MG/DL (ref 70–99)
GLUCOSE BLDC GLUCOMTR-MCNC: 140 MG/DL (ref 70–99)
GLUCOSE BLDC GLUCOMTR-MCNC: 141 MG/DL (ref 70–99)
GLUCOSE BLDC GLUCOMTR-MCNC: 146 MG/DL (ref 70–99)
GLUCOSE BLDC GLUCOMTR-MCNC: 147 MG/DL (ref 70–99)
GLUCOSE BLDC GLUCOMTR-MCNC: 151 MG/DL (ref 70–99)
GLUCOSE BLDC GLUCOMTR-MCNC: 151 MG/DL (ref 70–99)
GLUCOSE BLDC GLUCOMTR-MCNC: 153 MG/DL (ref 70–99)
GLUCOSE BLDC GLUCOMTR-MCNC: 153 MG/DL (ref 70–99)
GLUCOSE BLDC GLUCOMTR-MCNC: 155 MG/DL (ref 70–99)
GLUCOSE BLDC GLUCOMTR-MCNC: 156 MG/DL (ref 70–99)
GLUCOSE BLDC GLUCOMTR-MCNC: 156 MG/DL (ref 70–99)
GLUCOSE BLDC GLUCOMTR-MCNC: 157 MG/DL (ref 70–99)
GLUCOSE BLDC GLUCOMTR-MCNC: 161 MG/DL (ref 70–99)
GLUCOSE BLDC GLUCOMTR-MCNC: 161 MG/DL (ref 70–99)
GLUCOSE BLDC GLUCOMTR-MCNC: 164 MG/DL (ref 70–99)
GLUCOSE BLDC GLUCOMTR-MCNC: 166 MG/DL (ref 70–99)
GLUCOSE BLDC GLUCOMTR-MCNC: 167 MG/DL (ref 70–99)
GLUCOSE BLDC GLUCOMTR-MCNC: 170 MG/DL (ref 70–99)
GLUCOSE BLDC GLUCOMTR-MCNC: 172 MG/DL (ref 70–99)
GLUCOSE BLDC GLUCOMTR-MCNC: 176 MG/DL (ref 70–99)
GLUCOSE BLDC GLUCOMTR-MCNC: 178 MG/DL (ref 70–99)
GLUCOSE BLDC GLUCOMTR-MCNC: 178 MG/DL (ref 70–99)
GLUCOSE BLDC GLUCOMTR-MCNC: 180 MG/DL (ref 70–99)
GLUCOSE BLDC GLUCOMTR-MCNC: 181 MG/DL (ref 70–99)
GLUCOSE BLDC GLUCOMTR-MCNC: 183 MG/DL (ref 70–99)
GLUCOSE BLDC GLUCOMTR-MCNC: 184 MG/DL (ref 70–99)
GLUCOSE BLDC GLUCOMTR-MCNC: 186 MG/DL (ref 70–99)
GLUCOSE BLDC GLUCOMTR-MCNC: 187 MG/DL (ref 70–99)
GLUCOSE BLDC GLUCOMTR-MCNC: 188 MG/DL (ref 70–99)
GLUCOSE BLDC GLUCOMTR-MCNC: 191 MG/DL (ref 70–99)
GLUCOSE BLDC GLUCOMTR-MCNC: 192 MG/DL (ref 70–99)
GLUCOSE BLDC GLUCOMTR-MCNC: 194 MG/DL (ref 70–99)
GLUCOSE BLDC GLUCOMTR-MCNC: 195 MG/DL (ref 70–99)
GLUCOSE BLDC GLUCOMTR-MCNC: 196 MG/DL (ref 70–99)
GLUCOSE BLDC GLUCOMTR-MCNC: 196 MG/DL (ref 70–99)
GLUCOSE BLDC GLUCOMTR-MCNC: 203 MG/DL (ref 70–99)
GLUCOSE BLDC GLUCOMTR-MCNC: 206 MG/DL (ref 70–99)
GLUCOSE BLDC GLUCOMTR-MCNC: 209 MG/DL (ref 70–99)
GLUCOSE BLDC GLUCOMTR-MCNC: 212 MG/DL (ref 70–99)
GLUCOSE BLDC GLUCOMTR-MCNC: 213 MG/DL (ref 70–99)
GLUCOSE BLDC GLUCOMTR-MCNC: 215 MG/DL (ref 70–99)
GLUCOSE BLDC GLUCOMTR-MCNC: 219 MG/DL (ref 70–99)
GLUCOSE BLDC GLUCOMTR-MCNC: 220 MG/DL (ref 70–99)
GLUCOSE BLDC GLUCOMTR-MCNC: 224 MG/DL (ref 70–99)
GLUCOSE BLDC GLUCOMTR-MCNC: 225 MG/DL (ref 70–99)
GLUCOSE BLDC GLUCOMTR-MCNC: 230 MG/DL (ref 70–99)
GLUCOSE BLDC GLUCOMTR-MCNC: 243 MG/DL (ref 70–99)
GLUCOSE BLDC GLUCOMTR-MCNC: 244 MG/DL (ref 70–99)
GLUCOSE BLDC GLUCOMTR-MCNC: 247 MG/DL (ref 70–99)
GLUCOSE BLDC GLUCOMTR-MCNC: 247 MG/DL (ref 70–99)
GLUCOSE BLDC GLUCOMTR-MCNC: 249 MG/DL (ref 70–99)
GLUCOSE BLDC GLUCOMTR-MCNC: 249 MG/DL (ref 70–99)
GLUCOSE BLDC GLUCOMTR-MCNC: 251 MG/DL (ref 70–99)
GLUCOSE BLDC GLUCOMTR-MCNC: 257 MG/DL (ref 70–99)
GLUCOSE BLDC GLUCOMTR-MCNC: 259 MG/DL (ref 70–99)
GLUCOSE BLDC GLUCOMTR-MCNC: 264 MG/DL (ref 70–99)
GLUCOSE BLDC GLUCOMTR-MCNC: 264 MG/DL (ref 70–99)
GLUCOSE BLDC GLUCOMTR-MCNC: 268 MG/DL (ref 70–99)
GLUCOSE BLDC GLUCOMTR-MCNC: 268 MG/DL (ref 70–99)
GLUCOSE BLDC GLUCOMTR-MCNC: 271 MG/DL (ref 70–99)
GLUCOSE BLDC GLUCOMTR-MCNC: 273 MG/DL (ref 70–99)
GLUCOSE BLDC GLUCOMTR-MCNC: 281 MG/DL (ref 70–99)
GLUCOSE BLDC GLUCOMTR-MCNC: 288 MG/DL (ref 70–99)
GLUCOSE BLDC GLUCOMTR-MCNC: 298 MG/DL (ref 70–99)
GLUCOSE BLDC GLUCOMTR-MCNC: 315 MG/DL (ref 70–99)
GLUCOSE BLDC GLUCOMTR-MCNC: 325 MG/DL (ref 70–99)
GLUCOSE BLDC GLUCOMTR-MCNC: 345 MG/DL (ref 70–99)
GLUCOSE SERPL-MCNC: 102 MG/DL (ref 70–99)
GLUCOSE SERPL-MCNC: 112 MG/DL (ref 70–99)
GLUCOSE SERPL-MCNC: 114 MG/DL (ref 70–99)
GLUCOSE SERPL-MCNC: 128 MG/DL (ref 70–99)
GLUCOSE SERPL-MCNC: 140 MG/DL (ref 70–99)
GLUCOSE SERPL-MCNC: 142 MG/DL (ref 70–99)
GLUCOSE SERPL-MCNC: 143 MG/DL (ref 70–99)
GLUCOSE SERPL-MCNC: 152 MG/DL (ref 70–99)
GLUCOSE SERPL-MCNC: 167 MG/DL (ref 70–99)
GLUCOSE SERPL-MCNC: 171 MG/DL (ref 70–99)
GLUCOSE SERPL-MCNC: 173 MG/DL (ref 70–99)
GLUCOSE SERPL-MCNC: 187 MG/DL (ref 70–99)
GLUCOSE SERPL-MCNC: 204 MG/DL (ref 70–99)
GLUCOSE SERPL-MCNC: 230 MG/DL (ref 70–99)
GLUCOSE SERPL-MCNC: 245 MG/DL (ref 70–99)
GLUCOSE SERPL-MCNC: 258 MG/DL (ref 70–99)
GLUCOSE SERPL-MCNC: 261 MG/DL (ref 70–99)
GLUCOSE SERPL-MCNC: 293 MG/DL (ref 70–99)
GLUCOSE UR STRIP-MCNC: 30 MG/DL
GLUCOSE UR STRIP-MCNC: NEGATIVE MG/DL
HBA1C MFR BLD: 6.3 % (ref 0–5.6)
HCO3 BLD-SCNC: 39 MMOL/L (ref 21–28)
HCO3 BLDV-SCNC: 41 MMOL/L (ref 21–28)
HCO3 BLDV-SCNC: 41 MMOL/L (ref 21–28)
HCO3 BLDV-SCNC: 42 MMOL/L (ref 21–28)
HCT VFR BLD AUTO: 34.8 % (ref 35–47)
HCT VFR BLD AUTO: 35.6 % (ref 35–47)
HCT VFR BLD AUTO: 35.7 % (ref 35–47)
HCT VFR BLD AUTO: 36.2 % (ref 35–47)
HCT VFR BLD AUTO: 36.6 % (ref 35–47)
HCT VFR BLD AUTO: 38.2 % (ref 35–47)
HCT VFR BLD AUTO: 39.5 % (ref 35–47)
HCT VFR BLD AUTO: 39.5 % (ref 35–47)
HCT VFR BLD AUTO: 39.6 % (ref 35–47)
HCT VFR BLD AUTO: 40 % (ref 35–47)
HCT VFR BLD AUTO: 40.3 % (ref 35–47)
HCT VFR BLD AUTO: 40.4 % (ref 35–47)
HCT VFR BLD AUTO: 42.6 % (ref 35–47)
HGB BLD-MCNC: 10.1 G/DL (ref 11.7–15.7)
HGB BLD-MCNC: 10.6 G/DL (ref 11.7–15.7)
HGB BLD-MCNC: 10.9 G/DL (ref 11.7–15.7)
HGB BLD-MCNC: 10.9 G/DL (ref 11.7–15.7)
HGB BLD-MCNC: 11.3 G/DL (ref 11.7–15.7)
HGB BLD-MCNC: 11.4 G/DL (ref 11.7–15.7)
HGB BLD-MCNC: 11.9 G/DL (ref 11.7–15.7)
HGB BLD-MCNC: 12.1 G/DL (ref 11.7–15.7)
HGB BLD-MCNC: 12.2 G/DL (ref 11.7–15.7)
HGB BLD-MCNC: 12.3 G/DL (ref 11.7–15.7)
HGB BLD-MCNC: 12.4 G/DL (ref 11.7–15.7)
HGB BLD-MCNC: 12.5 G/DL (ref 11.7–15.7)
HGB BLD-MCNC: 12.6 G/DL (ref 11.7–15.7)
HGB BLD-MCNC: 13.8 G/DL (ref 11.7–15.7)
HGB UR QL STRIP: ABNORMAL
HGB UR QL STRIP: ABNORMAL
HYALINE CASTS #/AREA URNS LPF: 22 /LPF (ref 0–2)
HYALINE CASTS #/AREA URNS LPF: 36 /LPF (ref 0–2)
IMM GRANULOCYTES # BLD: 0.1 10E9/L (ref 0–0.4)
IMM GRANULOCYTES # BLD: 0.1 10E9/L (ref 0–0.4)
IMM GRANULOCYTES NFR BLD: 0.4 %
IMM GRANULOCYTES NFR BLD: 1.3 %
INR PPP: 1.14 (ref 0.86–1.14)
INTERPRETATION ECG - MUSE: NORMAL
KETONES UR STRIP-MCNC: 10 MG/DL
KETONES UR STRIP-MCNC: 5 MG/DL
LABORATORY COMMENT REPORT: NORMAL
LABORATORY COMMENT REPORT: NORMAL
LACTATE BLD-SCNC: 0.9 MMOL/L (ref 0.7–2)
LACTATE BLD-SCNC: 1 MMOL/L (ref 0.7–2)
LACTATE BLD-SCNC: 1.1 MMOL/L (ref 0.7–2)
LACTATE BLD-SCNC: 1.3 MMOL/L (ref 0.7–2)
LACTATE BLD-SCNC: 1.3 MMOL/L (ref 0.7–2)
LACTATE BLD-SCNC: 1.4 MMOL/L (ref 0.7–2)
LACTATE BLD-SCNC: 2.3 MMOL/L (ref 0.7–2)
LACTATE BLD-SCNC: 3.4 MMOL/L (ref 0.7–2)
LACTATE BLD-SCNC: 4.3 MMOL/L (ref 0.7–2)
LACTATE BLD-SCNC: 5.4 MMOL/L (ref 0.7–2)
LEUKOCYTE ESTERASE UR QL STRIP: ABNORMAL
LEUKOCYTE ESTERASE UR QL STRIP: NEGATIVE
LIPASE SERPL-CCNC: 36 U/L (ref 73–393)
LYMPHOCYTES # BLD AUTO: 0.4 10E9/L (ref 0.8–5.3)
LYMPHOCYTES # BLD AUTO: 1.1 10E9/L (ref 0.8–5.3)
LYMPHOCYTES NFR BLD AUTO: 4.3 %
LYMPHOCYTES NFR BLD AUTO: 9.3 %
Lab: ABNORMAL
Lab: NORMAL
MAGNESIUM SERPL-MCNC: 2.1 MG/DL (ref 1.6–2.3)
MAGNESIUM SERPL-MCNC: 2.2 MG/DL (ref 1.6–2.3)
MAGNESIUM SERPL-MCNC: 2.3 MG/DL (ref 1.6–2.3)
MAGNESIUM SERPL-MCNC: 2.3 MG/DL (ref 1.6–2.3)
MAGNESIUM SERPL-MCNC: 2.4 MG/DL (ref 1.6–2.3)
MAGNESIUM SERPL-MCNC: 2.5 MG/DL (ref 1.6–2.3)
MCH RBC QN AUTO: 29.8 PG (ref 26.5–33)
MCH RBC QN AUTO: 29.8 PG (ref 26.5–33)
MCH RBC QN AUTO: 29.9 PG (ref 26.5–33)
MCH RBC QN AUTO: 29.9 PG (ref 26.5–33)
MCH RBC QN AUTO: 30 PG (ref 26.5–33)
MCH RBC QN AUTO: 30.1 PG (ref 26.5–33)
MCH RBC QN AUTO: 30.2 PG (ref 26.5–33)
MCH RBC QN AUTO: 30.2 PG (ref 26.5–33)
MCH RBC QN AUTO: 30.3 PG (ref 26.5–33)
MCH RBC QN AUTO: 30.4 PG (ref 26.5–33)
MCH RBC QN AUTO: 30.6 PG (ref 26.5–33)
MCH RBC QN AUTO: 30.7 PG (ref 26.5–33)
MCH RBC QN AUTO: 30.9 PG (ref 26.5–33)
MCHC RBC AUTO-ENTMCNC: 30.3 G/DL (ref 31.5–36.5)
MCHC RBC AUTO-ENTMCNC: 30.4 G/DL (ref 31.5–36.5)
MCHC RBC AUTO-ENTMCNC: 30.5 G/DL (ref 31.5–36.5)
MCHC RBC AUTO-ENTMCNC: 30.5 G/DL (ref 31.5–36.5)
MCHC RBC AUTO-ENTMCNC: 30.6 G/DL (ref 31.5–36.5)
MCHC RBC AUTO-ENTMCNC: 30.8 G/DL (ref 31.5–36.5)
MCHC RBC AUTO-ENTMCNC: 31.1 G/DL (ref 31.5–36.5)
MCHC RBC AUTO-ENTMCNC: 31.2 G/DL (ref 31.5–36.5)
MCHC RBC AUTO-ENTMCNC: 31.2 G/DL (ref 31.5–36.5)
MCHC RBC AUTO-ENTMCNC: 31.3 G/DL (ref 31.5–36.5)
MCHC RBC AUTO-ENTMCNC: 31.4 G/DL (ref 31.5–36.5)
MCHC RBC AUTO-ENTMCNC: 31.6 G/DL (ref 31.5–36.5)
MCHC RBC AUTO-ENTMCNC: 32.4 G/DL (ref 31.5–36.5)
MCV RBC AUTO: 95 FL (ref 78–100)
MCV RBC AUTO: 95 FL (ref 78–100)
MCV RBC AUTO: 97 FL (ref 78–100)
MCV RBC AUTO: 98 FL (ref 78–100)
MCV RBC AUTO: 98 FL (ref 78–100)
MCV RBC AUTO: 99 FL (ref 78–100)
MONOCYTES # BLD AUTO: 0.7 10E9/L (ref 0–1.3)
MONOCYTES # BLD AUTO: 1.9 10E9/L (ref 0–1.3)
MONOCYTES NFR BLD AUTO: 16.2 %
MONOCYTES NFR BLD AUTO: 6.3 %
MUCOUS THREADS #/AREA URNS LPF: PRESENT /LPF
MUCOUS THREADS #/AREA URNS LPF: PRESENT /LPF
NEUTROPHILS # BLD AUTO: 8.6 10E9/L (ref 1.6–8.3)
NEUTROPHILS # BLD AUTO: 9 10E9/L (ref 1.6–8.3)
NEUTROPHILS NFR BLD AUTO: 73.9 %
NEUTROPHILS NFR BLD AUTO: 87.8 %
NITRATE UR QL: NEGATIVE
NITRATE UR QL: POSITIVE
NRBC # BLD AUTO: 0 10*3/UL
NRBC # BLD AUTO: 0 10*3/UL
NRBC BLD AUTO-RTO: 0 /100
NRBC BLD AUTO-RTO: 0 /100
NT-PROBNP SERPL-MCNC: 102 PG/ML (ref 0–450)
NT-PROBNP SERPL-MCNC: 464 PG/ML (ref 0–1800)
NT-PROBNP SERPL-MCNC: 708 PG/ML (ref 0–1800)
O2/TOTAL GAS SETTING VFR VENT: ABNORMAL %
O2/TOTAL GAS SETTING VFR VENT: ABNORMAL %
OXYHGB MFR BLD: 92 % (ref 92–100)
OXYHGB MFR BLDV: 39 %
OXYHGB MFR BLDV: 51 %
OXYHGB MFR BLDV: 90 %
PCO2 BLD: 64 MM HG (ref 35–45)
PCO2 BLDV: 66 MM HG (ref 40–50)
PCO2 BLDV: 70 MM HG (ref 40–50)
PCO2 BLDV: 74 MM HG (ref 40–50)
PH BLD: 7.4 PH (ref 7.35–7.45)
PH BLDV: 7.35 PH (ref 7.32–7.43)
PH BLDV: 7.38 PH (ref 7.32–7.43)
PH BLDV: 7.41 PH (ref 7.32–7.43)
PH UR STRIP: 5 PH (ref 5–7)
PH UR STRIP: 6 PH (ref 5–7)
PHOSPHATE SERPL-MCNC: 2.1 MG/DL (ref 2.5–4.5)
PHOSPHATE SERPL-MCNC: 2.6 MG/DL (ref 2.5–4.5)
PHOSPHATE SERPL-MCNC: 2.7 MG/DL (ref 2.5–4.5)
PHOSPHATE SERPL-MCNC: 2.8 MG/DL (ref 2.5–4.5)
PHOSPHATE SERPL-MCNC: 2.9 MG/DL (ref 2.5–4.5)
PHOSPHATE SERPL-MCNC: 3 MG/DL (ref 2.5–4.5)
PLATELET # BLD AUTO: 219 10E9/L (ref 150–450)
PLATELET # BLD AUTO: 220 10E9/L (ref 150–450)
PLATELET # BLD AUTO: 221 10E9/L (ref 150–450)
PLATELET # BLD AUTO: 243 10E9/L (ref 150–450)
PLATELET # BLD AUTO: 251 10E9/L (ref 150–450)
PLATELET # BLD AUTO: 258 10E9/L (ref 150–450)
PLATELET # BLD AUTO: 258 10E9/L (ref 150–450)
PLATELET # BLD AUTO: 281 10E9/L (ref 150–450)
PLATELET # BLD AUTO: 290 10E9/L (ref 150–450)
PLATELET # BLD AUTO: 308 10E9/L (ref 150–450)
PLATELET # BLD AUTO: 309 10E9/L (ref 150–450)
PLATELET # BLD AUTO: 323 10E9/L (ref 150–450)
PLATELET # BLD AUTO: 329 10E9/L (ref 150–450)
PO2 BLD: 69 MM HG (ref 80–105)
PO2 BLDV: 24 MM HG (ref 25–47)
PO2 BLDV: 30 MM HG (ref 25–47)
PO2 BLDV: 61 MM HG (ref 25–47)
POTASSIUM SERPL-SCNC: 3.3 MMOL/L (ref 3.4–5.3)
POTASSIUM SERPL-SCNC: 3.4 MMOL/L (ref 3.4–5.3)
POTASSIUM SERPL-SCNC: 3.4 MMOL/L (ref 3.4–5.3)
POTASSIUM SERPL-SCNC: 3.7 MMOL/L (ref 3.4–5.3)
POTASSIUM SERPL-SCNC: 3.8 MMOL/L (ref 3.4–5.3)
POTASSIUM SERPL-SCNC: 3.8 MMOL/L (ref 3.4–5.3)
POTASSIUM SERPL-SCNC: 3.9 MMOL/L (ref 3.4–5.3)
POTASSIUM SERPL-SCNC: 4 MMOL/L (ref 3.4–5.3)
POTASSIUM SERPL-SCNC: 4.2 MMOL/L (ref 3.4–5.3)
POTASSIUM SERPL-SCNC: 4.4 MMOL/L (ref 3.4–5.3)
PROT SERPL-MCNC: 4.6 G/DL (ref 6.8–8.8)
PROT SERPL-MCNC: 4.8 G/DL (ref 6.8–8.8)
PROT SERPL-MCNC: 4.9 G/DL (ref 6.8–8.8)
PROT SERPL-MCNC: 5.3 G/DL (ref 6.8–8.8)
PROT SERPL-MCNC: 5.3 G/DL (ref 6.8–8.8)
PROT SERPL-MCNC: 5.7 G/DL (ref 6.8–8.8)
PROT SERPL-MCNC: 5.8 G/DL (ref 6.8–8.8)
PROT SERPL-MCNC: 6 G/DL (ref 6.8–8.8)
PROT SERPL-MCNC: 7.4 G/DL (ref 6.8–8.8)
RBC # BLD AUTO: 3.53 10E12/L (ref 3.8–5.2)
RBC # BLD AUTO: 3.66 10E12/L (ref 3.8–5.2)
RBC # BLD AUTO: 3.66 10E12/L (ref 3.8–5.2)
RBC # BLD AUTO: 3.75 10E12/L (ref 3.8–5.2)
RBC # BLD AUTO: 3.78 10E12/L (ref 3.8–5.2)
RBC # BLD AUTO: 3.88 10E12/L (ref 3.8–5.2)
RBC # BLD AUTO: 3.99 10E12/L (ref 3.8–5.2)
RBC # BLD AUTO: 4.05 10E12/L (ref 3.8–5.2)
RBC # BLD AUTO: 4.09 10E12/L (ref 3.8–5.2)
RBC # BLD AUTO: 4.12 10E12/L (ref 3.8–5.2)
RBC # BLD AUTO: 4.14 10E12/L (ref 3.8–5.2)
RBC # BLD AUTO: 4.15 10E12/L (ref 3.8–5.2)
RBC # BLD AUTO: 4.47 10E12/L (ref 3.8–5.2)
RBC #/AREA URNS AUTO: 94 /HPF (ref 0–2)
RBC #/AREA URNS AUTO: >182 /HPF (ref 0–2)
SARS-COV-2 RNA RESP QL NAA+PROBE: NEGATIVE
SARS-COV-2 RNA RESP QL NAA+PROBE: NEGATIVE
SODIUM SERPL-SCNC: 139 MMOL/L (ref 133–144)
SODIUM SERPL-SCNC: 140 MMOL/L (ref 133–144)
SODIUM SERPL-SCNC: 141 MMOL/L (ref 133–144)
SODIUM SERPL-SCNC: 142 MMOL/L (ref 133–144)
SODIUM SERPL-SCNC: 143 MMOL/L (ref 133–144)
SODIUM SERPL-SCNC: 144 MMOL/L (ref 133–144)
SOURCE: ABNORMAL
SOURCE: ABNORMAL
SP GR UR STRIP: 1.02 (ref 1–1.03)
SP GR UR STRIP: 1.03 (ref 1–1.03)
SPECIMEN SOURCE: ABNORMAL
SPECIMEN SOURCE: NORMAL
SQUAMOUS #/AREA URNS AUTO: 3 /HPF (ref 0–1)
SQUAMOUS #/AREA URNS AUTO: 4 /HPF (ref 0–1)
TRANS CELLS #/AREA URNS HPF: 1 /HPF (ref 0–1)
TRANS CELLS #/AREA URNS HPF: 2 /HPF (ref 0–1)
TRIGL SERPL-MCNC: 149 MG/DL
UROBILINOGEN UR STRIP-MCNC: 0 MG/DL (ref 0–2)
UROBILINOGEN UR STRIP-MCNC: 0 MG/DL (ref 0–2)
WBC # BLD AUTO: 10.3 10E9/L (ref 4–11)
WBC # BLD AUTO: 10.7 10E9/L (ref 4–11)
WBC # BLD AUTO: 11.4 10E9/L (ref 4–11)
WBC # BLD AUTO: 11.4 10E9/L (ref 4–11)
WBC # BLD AUTO: 11.6 10E9/L (ref 4–11)
WBC # BLD AUTO: 11.7 10E9/L (ref 4–11)
WBC # BLD AUTO: 13.8 10E9/L (ref 4–11)
WBC # BLD AUTO: 14.5 10E9/L (ref 4–11)
WBC # BLD AUTO: 19.9 10E9/L (ref 4–11)
WBC # BLD AUTO: 20.5 10E9/L (ref 4–11)
WBC # BLD AUTO: 27.3 10E9/L (ref 4–11)
WBC # BLD AUTO: 34.8 10E9/L (ref 4–11)
WBC # BLD AUTO: 6.1 10E9/L (ref 4–11)
WBC #/AREA URNS AUTO: 31 /HPF (ref 0–5)
WBC #/AREA URNS AUTO: 89 /HPF (ref 0–5)
WBC CLUMPS #/AREA URNS HPF: PRESENT /HPF
YEAST #/AREA URNS HPF: ABNORMAL /HPF

## 2021-01-01 PROCEDURE — 250N000009 HC RX 250

## 2021-01-01 PROCEDURE — 250N000011 HC RX IP 250 OP 636: Performed by: NURSE ANESTHETIST, CERTIFIED REGISTERED

## 2021-01-01 PROCEDURE — 99223 1ST HOSP IP/OBS HIGH 75: CPT | Mod: GW | Performed by: NURSE PRACTITIONER

## 2021-01-01 PROCEDURE — 250N000013 HC RX MED GY IP 250 OP 250 PS 637: Performed by: INTERNAL MEDICINE

## 2021-01-01 PROCEDURE — C9113 INJ PANTOPRAZOLE SODIUM, VIA: HCPCS | Performed by: INTERNAL MEDICINE

## 2021-01-01 PROCEDURE — 250N000011 HC RX IP 250 OP 636: Performed by: PHYSICIAN ASSISTANT

## 2021-01-01 PROCEDURE — 83735 ASSAY OF MAGNESIUM: CPT | Performed by: STUDENT IN AN ORGANIZED HEALTH CARE EDUCATION/TRAINING PROGRAM

## 2021-01-01 PROCEDURE — 999N000157 HC STATISTIC RCP TIME EA 10 MIN

## 2021-01-01 PROCEDURE — C9113 INJ PANTOPRAZOLE SODIUM, VIA: HCPCS | Performed by: PHYSICIAN ASSISTANT

## 2021-01-01 PROCEDURE — 83036 HEMOGLOBIN GLYCOSYLATED A1C: CPT | Performed by: INTERNAL MEDICINE

## 2021-01-01 PROCEDURE — 80053 COMPREHEN METABOLIC PANEL: CPT | Performed by: STUDENT IN AN ORGANIZED HEALTH CARE EDUCATION/TRAINING PROGRAM

## 2021-01-01 PROCEDURE — 93010 ELECTROCARDIOGRAM REPORT: CPT | Mod: GW | Performed by: INTERNAL MEDICINE

## 2021-01-01 PROCEDURE — 120N000001 HC R&B MED SURG/OB

## 2021-01-01 PROCEDURE — 250N000013 HC RX MED GY IP 250 OP 250 PS 637: Performed by: STUDENT IN AN ORGANIZED HEALTH CARE EDUCATION/TRAINING PROGRAM

## 2021-01-01 PROCEDURE — 99232 SBSQ HOSP IP/OBS MODERATE 35: CPT | Performed by: HOSPITALIST

## 2021-01-01 PROCEDURE — 250N000011 HC RX IP 250 OP 636: Performed by: STUDENT IN AN ORGANIZED HEALTH CARE EDUCATION/TRAINING PROGRAM

## 2021-01-01 PROCEDURE — 87635 SARS-COV-2 COVID-19 AMP PRB: CPT | Performed by: PHYSICIAN ASSISTANT

## 2021-01-01 PROCEDURE — 83605 ASSAY OF LACTIC ACID: CPT | Performed by: INTERNAL MEDICINE

## 2021-01-01 PROCEDURE — 250N000012 HC RX MED GY IP 250 OP 636 PS 637: Performed by: STUDENT IN AN ORGANIZED HEALTH CARE EDUCATION/TRAINING PROGRAM

## 2021-01-01 PROCEDURE — 96375 TX/PRO/DX INJ NEW DRUG ADDON: CPT | Performed by: FAMILY MEDICINE

## 2021-01-01 PROCEDURE — 80053 COMPREHEN METABOLIC PANEL: CPT | Performed by: HOSPITALIST

## 2021-01-01 PROCEDURE — 250N000011 HC RX IP 250 OP 636: Performed by: INTERNAL MEDICINE

## 2021-01-01 PROCEDURE — 85027 COMPLETE CBC AUTOMATED: CPT | Performed by: INTERNAL MEDICINE

## 2021-01-01 PROCEDURE — G0463 HOSPITAL OUTPT CLINIC VISIT: HCPCS

## 2021-01-01 PROCEDURE — 999N001017 HC STATISTIC GLUCOSE BY METER IP

## 2021-01-01 PROCEDURE — 84100 ASSAY OF PHOSPHORUS: CPT | Performed by: HOSPITALIST

## 2021-01-01 PROCEDURE — 94640 AIRWAY INHALATION TREATMENT: CPT

## 2021-01-01 PROCEDURE — 999N000190 HC STATISTIC VAT ROUNDS

## 2021-01-01 PROCEDURE — P9041 ALBUMIN (HUMAN),5%, 50ML: HCPCS | Performed by: NURSE ANESTHETIST, CERTIFIED REGISTERED

## 2021-01-01 PROCEDURE — 36415 COLL VENOUS BLD VENIPUNCTURE: CPT | Performed by: INTERNAL MEDICINE

## 2021-01-01 PROCEDURE — 258N000003 HC RX IP 258 OP 636: Performed by: ANESTHESIOLOGY

## 2021-01-01 PROCEDURE — 99207 PR CDG-MDM COMPONENT: MEETS MODERATE - DOWN CODED: CPT | Performed by: INTERNAL MEDICINE

## 2021-01-01 PROCEDURE — 99291 CRITICAL CARE FIRST HOUR: CPT | Performed by: NURSE PRACTITIONER

## 2021-01-01 PROCEDURE — C9803 HOPD COVID-19 SPEC COLLECT: HCPCS | Performed by: FAMILY MEDICINE

## 2021-01-01 PROCEDURE — 80053 COMPREHEN METABOLIC PANEL: CPT | Performed by: INTERNAL MEDICINE

## 2021-01-01 PROCEDURE — 272N000001 HC OR GENERAL SUPPLY STERILE: Performed by: SURGERY

## 2021-01-01 PROCEDURE — 82805 BLOOD GASES W/O2 SATURATION: CPT | Performed by: INTERNAL MEDICINE

## 2021-01-01 PROCEDURE — 999N000065 XR ABDOMEN PORT 1 VW

## 2021-01-01 PROCEDURE — 83605 ASSAY OF LACTIC ACID: CPT | Performed by: NURSE PRACTITIONER

## 2021-01-01 PROCEDURE — 84132 ASSAY OF SERUM POTASSIUM: CPT | Performed by: ANESTHESIOLOGY

## 2021-01-01 PROCEDURE — 82805 BLOOD GASES W/O2 SATURATION: CPT | Performed by: NURSE PRACTITIONER

## 2021-01-01 PROCEDURE — 99239 HOSP IP/OBS DSCHRG MGMT >30: CPT | Performed by: INTERNAL MEDICINE

## 2021-01-01 PROCEDURE — 84132 ASSAY OF SERUM POTASSIUM: CPT | Performed by: HOSPITALIST

## 2021-01-01 PROCEDURE — 83605 ASSAY OF LACTIC ACID: CPT | Performed by: STUDENT IN AN ORGANIZED HEALTH CARE EDUCATION/TRAINING PROGRAM

## 2021-01-01 PROCEDURE — 94660 CPAP INITIATION&MGMT: CPT

## 2021-01-01 PROCEDURE — 83605 ASSAY OF LACTIC ACID: CPT | Performed by: FAMILY MEDICINE

## 2021-01-01 PROCEDURE — 85027 COMPLETE CBC AUTOMATED: CPT | Performed by: HOSPITALIST

## 2021-01-01 PROCEDURE — 36415 COLL VENOUS BLD VENIPUNCTURE: CPT | Performed by: STUDENT IN AN ORGANIZED HEALTH CARE EDUCATION/TRAINING PROGRAM

## 2021-01-01 PROCEDURE — 99222 1ST HOSP IP/OBS MODERATE 55: CPT | Performed by: SURGERY

## 2021-01-01 PROCEDURE — 999N000200 HC STATISTIC WOC PT EDUCATION, 46-60 MIN

## 2021-01-01 PROCEDURE — 250N000013 HC RX MED GY IP 250 OP 250 PS 637: Performed by: PHYSICIAN ASSISTANT

## 2021-01-01 PROCEDURE — 999N000040 HC STATISTIC CONSULT NO CHARGE VASC ACCESS

## 2021-01-01 PROCEDURE — 99232 SBSQ HOSP IP/OBS MODERATE 35: CPT | Mod: GW | Performed by: INTERNAL MEDICINE

## 2021-01-01 PROCEDURE — 370N000017 HC ANESTHESIA TECHNICAL FEE, PER MIN: Performed by: SURGERY

## 2021-01-01 PROCEDURE — 85025 COMPLETE CBC W/AUTO DIFF WBC: CPT | Performed by: FAMILY MEDICINE

## 2021-01-01 PROCEDURE — 250N000009 HC RX 250: Performed by: INTERNAL MEDICINE

## 2021-01-01 PROCEDURE — 258N000003 HC RX IP 258 OP 636: Performed by: STUDENT IN AN ORGANIZED HEALTH CARE EDUCATION/TRAINING PROGRAM

## 2021-01-01 PROCEDURE — 93005 ELECTROCARDIOGRAM TRACING: CPT | Performed by: FAMILY MEDICINE

## 2021-01-01 PROCEDURE — 80048 BASIC METABOLIC PNL TOTAL CA: CPT | Performed by: STUDENT IN AN ORGANIZED HEALTH CARE EDUCATION/TRAINING PROGRAM

## 2021-01-01 PROCEDURE — 99233 SBSQ HOSP IP/OBS HIGH 50: CPT | Mod: GW | Performed by: INTERNAL MEDICINE

## 2021-01-01 PROCEDURE — 36415 COLL VENOUS BLD VENIPUNCTURE: CPT | Performed by: NURSE PRACTITIONER

## 2021-01-01 PROCEDURE — 250N000011 HC RX IP 250 OP 636: Performed by: FAMILY MEDICINE

## 2021-01-01 PROCEDURE — 250N000011 HC RX IP 250 OP 636: Performed by: NURSE PRACTITIONER

## 2021-01-01 PROCEDURE — 99309 SBSQ NF CARE MODERATE MDM 30: CPT | Mod: GV | Performed by: NURSE PRACTITIONER

## 2021-01-01 PROCEDURE — 84100 ASSAY OF PHOSPHORUS: CPT | Performed by: STUDENT IN AN ORGANIZED HEALTH CARE EDUCATION/TRAINING PROGRAM

## 2021-01-01 PROCEDURE — 96365 THER/PROPH/DIAG IV INF INIT: CPT | Mod: 59 | Performed by: FAMILY MEDICINE

## 2021-01-01 PROCEDURE — 86140 C-REACTIVE PROTEIN: CPT | Performed by: FAMILY MEDICINE

## 2021-01-01 PROCEDURE — 74018 RADEX ABDOMEN 1 VIEW: CPT

## 2021-01-01 PROCEDURE — 360N000076 HC SURGERY LEVEL 3, PER MIN: Performed by: SURGERY

## 2021-01-01 PROCEDURE — 250N000009 HC RX 250: Performed by: STUDENT IN AN ORGANIZED HEALTH CARE EDUCATION/TRAINING PROGRAM

## 2021-01-01 PROCEDURE — 85025 COMPLETE CBC W/AUTO DIFF WBC: CPT | Performed by: PHYSICIAN ASSISTANT

## 2021-01-01 PROCEDURE — 93005 ELECTROCARDIOGRAM TRACING: CPT

## 2021-01-01 PROCEDURE — 999N001017 HC STATISTIC GLUCOSE BY METER IP: Performed by: STUDENT IN AN ORGANIZED HEALTH CARE EDUCATION/TRAINING PROGRAM

## 2021-01-01 PROCEDURE — 74283 THER NMA RDCTJ INTUS/OBSTRCJ: CPT

## 2021-01-01 PROCEDURE — 84132 ASSAY OF SERUM POTASSIUM: CPT | Performed by: STUDENT IN AN ORGANIZED HEALTH CARE EDUCATION/TRAINING PROGRAM

## 2021-01-01 PROCEDURE — 250N000013 HC RX MED GY IP 250 OP 250 PS 637: Performed by: HOSPITALIST

## 2021-01-01 PROCEDURE — 0DN80ZZ RELEASE SMALL INTESTINE, OPEN APPROACH: ICD-10-PCS | Performed by: SURGERY

## 2021-01-01 PROCEDURE — 74177 CT ABD & PELVIS W/CONTRAST: CPT

## 2021-01-01 PROCEDURE — 87086 URINE CULTURE/COLONY COUNT: CPT | Performed by: FAMILY MEDICINE

## 2021-01-01 PROCEDURE — 250N000009 HC RX 250: Performed by: NURSE ANESTHETIST, CERTIFIED REGISTERED

## 2021-01-01 PROCEDURE — 250N000009 HC RX 250: Performed by: PHYSICIAN ASSISTANT

## 2021-01-01 PROCEDURE — 80076 HEPATIC FUNCTION PANEL: CPT | Performed by: NURSE PRACTITIONER

## 2021-01-01 PROCEDURE — 999N000215 HC STATISTIC HFNC ADULT NON-CPAP

## 2021-01-01 PROCEDURE — 99233 SBSQ HOSP IP/OBS HIGH 50: CPT | Performed by: HOSPITALIST

## 2021-01-01 PROCEDURE — 99309 SBSQ NF CARE MODERATE MDM 30: CPT | Performed by: NURSE PRACTITIONER

## 2021-01-01 PROCEDURE — 85027 COMPLETE CBC AUTOMATED: CPT | Performed by: NURSE PRACTITIONER

## 2021-01-01 PROCEDURE — 83735 ASSAY OF MAGNESIUM: CPT | Performed by: HOSPITALIST

## 2021-01-01 PROCEDURE — 250N000013 HC RX MED GY IP 250 OP 250 PS 637: Performed by: NURSE ANESTHETIST, CERTIFIED REGISTERED

## 2021-01-01 PROCEDURE — 99207 PR CDG-CODE CATEGORY CHANGED: CPT | Performed by: NURSE PRACTITIONER

## 2021-01-01 PROCEDURE — 87635 SARS-COV-2 COVID-19 AMP PRB: CPT | Performed by: FAMILY MEDICINE

## 2021-01-01 PROCEDURE — 85027 COMPLETE CBC AUTOMATED: CPT | Performed by: STUDENT IN AN ORGANIZED HEALTH CARE EDUCATION/TRAINING PROGRAM

## 2021-01-01 PROCEDURE — 94640 AIRWAY INHALATION TREATMENT: CPT | Mod: 76

## 2021-01-01 PROCEDURE — 80076 HEPATIC FUNCTION PANEL: CPT | Performed by: STUDENT IN AN ORGANIZED HEALTH CARE EDUCATION/TRAINING PROGRAM

## 2021-01-01 PROCEDURE — 258N000003 HC RX IP 258 OP 636: Performed by: NURSE PRACTITIONER

## 2021-01-01 PROCEDURE — 258N000003 HC RX IP 258 OP 636: Performed by: INTERNAL MEDICINE

## 2021-01-01 PROCEDURE — 80048 BASIC METABOLIC PNL TOTAL CA: CPT | Performed by: HOSPITALIST

## 2021-01-01 PROCEDURE — 80048 BASIC METABOLIC PNL TOTAL CA: CPT | Performed by: NURSE PRACTITIONER

## 2021-01-01 PROCEDURE — 250N000009 HC RX 250: Performed by: FAMILY MEDICINE

## 2021-01-01 PROCEDURE — 99305 1ST NF CARE MODERATE MDM 35: CPT | Mod: GV | Performed by: FAMILY MEDICINE

## 2021-01-01 PROCEDURE — 84478 ASSAY OF TRIGLYCERIDES: CPT | Performed by: STUDENT IN AN ORGANIZED HEALTH CARE EDUCATION/TRAINING PROGRAM

## 2021-01-01 PROCEDURE — 99233 SBSQ HOSP IP/OBS HIGH 50: CPT | Performed by: STUDENT IN AN ORGANIZED HEALTH CARE EDUCATION/TRAINING PROGRAM

## 2021-01-01 PROCEDURE — 96361 HYDRATE IV INFUSION ADD-ON: CPT | Performed by: FAMILY MEDICINE

## 2021-01-01 PROCEDURE — 81001 URINALYSIS AUTO W/SCOPE: CPT | Performed by: NURSE PRACTITIONER

## 2021-01-01 PROCEDURE — 2894A PR DEBRIDEMENT OF NAILS, 6 OR MORE: CPT | Performed by: NURSE PRACTITIONER

## 2021-01-01 PROCEDURE — 97162 PT EVAL MOD COMPLEX 30 MIN: CPT | Mod: GP

## 2021-01-01 PROCEDURE — 80048 BASIC METABOLIC PNL TOTAL CA: CPT | Performed by: INTERNAL MEDICINE

## 2021-01-01 PROCEDURE — 99232 SBSQ HOSP IP/OBS MODERATE 35: CPT | Performed by: SURGERY

## 2021-01-01 PROCEDURE — 80053 COMPREHEN METABOLIC PANEL: CPT | Performed by: FAMILY MEDICINE

## 2021-01-01 PROCEDURE — 250N000011 HC RX IP 250 OP 636: Performed by: ANESTHESIOLOGY

## 2021-01-01 PROCEDURE — 87086 URINE CULTURE/COLONY COUNT: CPT | Performed by: STUDENT IN AN ORGANIZED HEALTH CARE EDUCATION/TRAINING PROGRAM

## 2021-01-01 PROCEDURE — 250N000012 HC RX MED GY IP 250 OP 636 PS 637: Performed by: HOSPITALIST

## 2021-01-01 PROCEDURE — 36415 COLL VENOUS BLD VENIPUNCTURE: CPT | Performed by: ANESTHESIOLOGY

## 2021-01-01 PROCEDURE — 272N000452 HC KIT SHRLOCK 5FR POWER PICC TRIPLE LUMEN

## 2021-01-01 PROCEDURE — 0DNU0ZZ RELEASE OMENTUM, OPEN APPROACH: ICD-10-PCS | Performed by: SURGERY

## 2021-01-01 PROCEDURE — 83735 ASSAY OF MAGNESIUM: CPT | Performed by: NURSE PRACTITIONER

## 2021-01-01 PROCEDURE — 250N000025 HC SEVOFLURANE, PER MIN: Performed by: SURGERY

## 2021-01-01 PROCEDURE — 99232 SBSQ HOSP IP/OBS MODERATE 35: CPT | Performed by: STUDENT IN AN ORGANIZED HEALTH CARE EDUCATION/TRAINING PROGRAM

## 2021-01-01 PROCEDURE — 99207 PR NO CHARGE LOS: CPT | Performed by: NURSE PRACTITIONER

## 2021-01-01 PROCEDURE — 99207 PR CONSULT E&M CHANGED TO INITIAL LEVEL: CPT | Performed by: NURSE PRACTITIONER

## 2021-01-01 PROCEDURE — 36600 WITHDRAWAL OF ARTERIAL BLOOD: CPT

## 2021-01-01 PROCEDURE — 99223 1ST HOSP IP/OBS HIGH 75: CPT | Mod: AI | Performed by: STUDENT IN AN ORGANIZED HEALTH CARE EDUCATION/TRAINING PROGRAM

## 2021-01-01 PROCEDURE — 250N000009 HC RX 250: Performed by: NURSE PRACTITIONER

## 2021-01-01 PROCEDURE — 250N000011 HC RX IP 250 OP 636: Performed by: SURGERY

## 2021-01-01 PROCEDURE — 250N000026 HC DESFLURANE, PER MIN: Performed by: SURGERY

## 2021-01-01 PROCEDURE — 82947 ASSAY GLUCOSE BLOOD QUANT: CPT | Performed by: ANESTHESIOLOGY

## 2021-01-01 PROCEDURE — 85018 HEMOGLOBIN: CPT | Performed by: STUDENT IN AN ORGANIZED HEALTH CARE EDUCATION/TRAINING PROGRAM

## 2021-01-01 PROCEDURE — 999N000128 HC STATISTIC PERIPHERAL IV START W/O US GUIDANCE

## 2021-01-01 PROCEDURE — 97530 THERAPEUTIC ACTIVITIES: CPT | Mod: GP

## 2021-01-01 PROCEDURE — 81001 URINALYSIS AUTO W/SCOPE: CPT | Performed by: FAMILY MEDICINE

## 2021-01-01 PROCEDURE — 93010 ELECTROCARDIOGRAM REPORT: CPT | Performed by: FAMILY MEDICINE

## 2021-01-01 PROCEDURE — 99231 SBSQ HOSP IP/OBS SF/LOW 25: CPT | Performed by: SURGERY

## 2021-01-01 PROCEDURE — 258N000003 HC RX IP 258 OP 636: Performed by: FAMILY MEDICINE

## 2021-01-01 PROCEDURE — 84100 ASSAY OF PHOSPHORUS: CPT | Performed by: NURSE PRACTITIONER

## 2021-01-01 PROCEDURE — 85610 PROTHROMBIN TIME: CPT | Performed by: FAMILY MEDICINE

## 2021-01-01 PROCEDURE — 83690 ASSAY OF LIPASE: CPT | Performed by: FAMILY MEDICINE

## 2021-01-01 PROCEDURE — 258N000003 HC RX IP 258 OP 636: Performed by: NURSE ANESTHETIST, CERTIFIED REGISTERED

## 2021-01-01 PROCEDURE — 44180 LAP ENTEROLYSIS: CPT | Mod: AS | Performed by: PHYSICIAN ASSISTANT

## 2021-01-01 PROCEDURE — 99285 EMERGENCY DEPT VISIT HI MDM: CPT | Mod: 25 | Performed by: FAMILY MEDICINE

## 2021-01-01 PROCEDURE — 83735 ASSAY OF MAGNESIUM: CPT | Performed by: INTERNAL MEDICINE

## 2021-01-01 PROCEDURE — 83605 ASSAY OF LACTIC ACID: CPT | Performed by: HOSPITALIST

## 2021-01-01 PROCEDURE — 71045 X-RAY EXAM CHEST 1 VIEW: CPT

## 2021-01-01 PROCEDURE — 87040 BLOOD CULTURE FOR BACTERIA: CPT | Mod: XU | Performed by: FAMILY MEDICINE

## 2021-01-01 PROCEDURE — 99305 1ST NF CARE MODERATE MDM 35: CPT | Mod: AI | Performed by: FAMILY MEDICINE

## 2021-01-01 PROCEDURE — 710N000010 HC RECOVERY PHASE 1, LEVEL 2, PER MIN: Performed by: SURGERY

## 2021-01-01 PROCEDURE — 83880 ASSAY OF NATRIURETIC PEPTIDE: CPT | Performed by: STUDENT IN AN ORGANIZED HEALTH CARE EDUCATION/TRAINING PROGRAM

## 2021-01-01 PROCEDURE — 999N000141 HC STATISTIC PRE-PROCEDURE NURSING ASSESSMENT: Performed by: SURGERY

## 2021-01-01 PROCEDURE — 36415 COLL VENOUS BLD VENIPUNCTURE: CPT | Performed by: HOSPITALIST

## 2021-01-01 PROCEDURE — 36569 INSJ PICC 5 YR+ W/O IMAGING: CPT

## 2021-01-01 PROCEDURE — 99207 PR NO CHARGE LOS: CPT | Performed by: INTERNAL MEDICINE

## 2021-01-01 PROCEDURE — 250N000012 HC RX MED GY IP 250 OP 636 PS 637: Performed by: INTERNAL MEDICINE

## 2021-01-01 PROCEDURE — 44180 LAP ENTEROLYSIS: CPT | Mod: GW | Performed by: SURGERY

## 2021-01-01 PROCEDURE — 83880 ASSAY OF NATRIURETIC PEPTIDE: CPT | Performed by: HOSPITALIST

## 2021-01-01 PROCEDURE — 99309 SBSQ NF CARE MODERATE MDM 30: CPT | Mod: GW | Performed by: NURSE PRACTITIONER

## 2021-01-01 RX ORDER — ALBUTEROL SULFATE 90 UG/1
AEROSOL, METERED RESPIRATORY (INHALATION) PRN
Status: DISCONTINUED | OUTPATIENT
Start: 2021-01-01 | End: 2021-01-01

## 2021-01-01 RX ORDER — HEPARIN SODIUM,PORCINE 10 UNIT/ML
2-5 VIAL (ML) INTRAVENOUS
Status: ACTIVE | OUTPATIENT
Start: 2021-01-01 | End: 2021-01-01

## 2021-01-01 RX ORDER — METHYLPREDNISOLONE SODIUM SUCCINATE 125 MG/2ML
60 INJECTION, POWDER, LYOPHILIZED, FOR SOLUTION INTRAMUSCULAR; INTRAVENOUS EVERY 12 HOURS
Status: DISCONTINUED | OUTPATIENT
Start: 2021-01-01 | End: 2021-01-01

## 2021-01-01 RX ORDER — LIDOCAINE 40 MG/G
CREAM TOPICAL
Status: DISCONTINUED | OUTPATIENT
Start: 2021-01-01 | End: 2021-01-01

## 2021-01-01 RX ORDER — ONDANSETRON 2 MG/ML
4 INJECTION INTRAMUSCULAR; INTRAVENOUS EVERY 6 HOURS PRN
Status: DISCONTINUED | OUTPATIENT
Start: 2021-01-01 | End: 2021-01-01 | Stop reason: HOSPADM

## 2021-01-01 RX ORDER — NALOXONE HYDROCHLORIDE 0.4 MG/ML
0.4 INJECTION, SOLUTION INTRAMUSCULAR; INTRAVENOUS; SUBCUTANEOUS
Status: DISCONTINUED | OUTPATIENT
Start: 2021-01-01 | End: 2021-01-01

## 2021-01-01 RX ORDER — PANTOPRAZOLE SODIUM 40 MG/1
40 TABLET, DELAYED RELEASE ORAL
DISCHARGE
Start: 2021-01-01

## 2021-01-01 RX ORDER — NALOXONE HYDROCHLORIDE 0.4 MG/ML
0.2 INJECTION, SOLUTION INTRAMUSCULAR; INTRAVENOUS; SUBCUTANEOUS
Status: DISCONTINUED | OUTPATIENT
Start: 2021-01-01 | End: 2021-01-01 | Stop reason: HOSPADM

## 2021-01-01 RX ORDER — DEXAMETHASONE SODIUM PHOSPHATE 4 MG/ML
2 INJECTION, SOLUTION INTRA-ARTICULAR; INTRALESIONAL; INTRAMUSCULAR; INTRAVENOUS; SOFT TISSUE EVERY 24 HOURS
Status: DISCONTINUED | OUTPATIENT
Start: 2021-01-01 | End: 2021-01-01

## 2021-01-01 RX ORDER — LORAZEPAM 0.5 MG/1
0.5 TABLET ORAL EVERY 6 HOURS PRN
Qty: 40 TABLET | Refills: 1 | Status: ON HOLD | COMMUNITY
Start: 2021-01-01 | End: 2021-01-01

## 2021-01-01 RX ORDER — NALOXONE HYDROCHLORIDE 0.4 MG/ML
0.2 INJECTION, SOLUTION INTRAMUSCULAR; INTRAVENOUS; SUBCUTANEOUS
Status: DISCONTINUED | OUTPATIENT
Start: 2021-01-01 | End: 2021-01-01

## 2021-01-01 RX ORDER — SODIUM CHLORIDE, SODIUM LACTATE, POTASSIUM CHLORIDE, CALCIUM CHLORIDE 600; 310; 30; 20 MG/100ML; MG/100ML; MG/100ML; MG/100ML
INJECTION, SOLUTION INTRAVENOUS CONTINUOUS
Status: DISCONTINUED | OUTPATIENT
Start: 2021-01-01 | End: 2021-01-01 | Stop reason: HOSPADM

## 2021-01-01 RX ORDER — CEFAZOLIN SODIUM 2 G/100ML
2 INJECTION, SOLUTION INTRAVENOUS
Status: COMPLETED | OUTPATIENT
Start: 2021-01-01 | End: 2021-01-01

## 2021-01-01 RX ORDER — ONDANSETRON 2 MG/ML
4 INJECTION INTRAMUSCULAR; INTRAVENOUS EVERY 30 MIN PRN
Status: DISCONTINUED | OUTPATIENT
Start: 2021-01-01 | End: 2021-01-01 | Stop reason: HOSPADM

## 2021-01-01 RX ORDER — ONDANSETRON 4 MG/1
4 TABLET, ORALLY DISINTEGRATING ORAL EVERY 6 HOURS PRN
DISCHARGE
Start: 2021-01-01

## 2021-01-01 RX ORDER — BUPIVACAINE HYDROCHLORIDE 2.5 MG/ML
INJECTION, SOLUTION INFILTRATION; PERINEURAL PRN
Status: DISCONTINUED | OUTPATIENT
Start: 2021-01-01 | End: 2021-01-01 | Stop reason: HOSPADM

## 2021-01-01 RX ORDER — CITALOPRAM HYDROBROMIDE 20 MG/1
40 TABLET ORAL DAILY
Status: DISCONTINUED | OUTPATIENT
Start: 2021-01-01 | End: 2021-01-01 | Stop reason: HOSPADM

## 2021-01-01 RX ORDER — POLYETHYLENE GLYCOL 3350 17 G/17G
17 POWDER, FOR SOLUTION ORAL DAILY
Status: DISCONTINUED | OUTPATIENT
Start: 2021-01-01 | End: 2021-01-01

## 2021-01-01 RX ORDER — AZITHROMYCIN 250 MG/1
250 TABLET, FILM COATED ORAL DAILY
Status: ON HOLD | COMMUNITY
End: 2021-01-01

## 2021-01-01 RX ORDER — PREDNISONE 5 MG/1
10 TABLET ORAL DAILY
Status: DISCONTINUED | OUTPATIENT
Start: 2021-01-01 | End: 2021-01-01 | Stop reason: HOSPADM

## 2021-01-01 RX ORDER — HYDRALAZINE HYDROCHLORIDE 20 MG/ML
10 INJECTION INTRAMUSCULAR; INTRAVENOUS EVERY 4 HOURS PRN
Status: DISCONTINUED | OUTPATIENT
Start: 2021-01-01 | End: 2021-01-01 | Stop reason: HOSPADM

## 2021-01-01 RX ORDER — ACETAMINOPHEN 650 MG/1
650 SUPPOSITORY RECTAL EVERY 4 HOURS PRN
Status: DISCONTINUED | OUTPATIENT
Start: 2021-01-01 | End: 2021-01-01 | Stop reason: HOSPADM

## 2021-01-01 RX ORDER — LORAZEPAM 2 MG/ML
1 CONCENTRATE ORAL
Status: ON HOLD | COMMUNITY
End: 2021-01-01

## 2021-01-01 RX ORDER — MORPHINE SULFATE 20 MG/ML
5 SOLUTION ORAL
Status: DISCONTINUED | OUTPATIENT
Start: 2021-01-01 | End: 2021-01-01 | Stop reason: HOSPADM

## 2021-01-01 RX ORDER — LORAZEPAM 2 MG/ML
0.5 INJECTION INTRAMUSCULAR EVERY 8 HOURS PRN
Status: DISCONTINUED | OUTPATIENT
Start: 2021-01-01 | End: 2021-01-01

## 2021-01-01 RX ORDER — SODIUM CHLORIDE 9 MG/ML
INJECTION, SOLUTION INTRAVENOUS CONTINUOUS
Status: DISCONTINUED | OUTPATIENT
Start: 2021-01-01 | End: 2021-01-01

## 2021-01-01 RX ORDER — ONDANSETRON 4 MG/1
4 TABLET, ORALLY DISINTEGRATING ORAL EVERY 30 MIN PRN
Status: DISCONTINUED | OUTPATIENT
Start: 2021-01-01 | End: 2021-01-01 | Stop reason: HOSPADM

## 2021-01-01 RX ORDER — POTASSIUM CHLORIDE 7.45 MG/ML
10 INJECTION INTRAVENOUS
Status: COMPLETED | OUTPATIENT
Start: 2021-01-01 | End: 2021-01-01

## 2021-01-01 RX ORDER — MAGNESIUM HYDROXIDE/ALUMINUM HYDROXICE/SIMETHICONE 120; 1200; 1200 MG/30ML; MG/30ML; MG/30ML
30 SUSPENSION ORAL EVERY 6 HOURS PRN
COMMUNITY

## 2021-01-01 RX ORDER — DEXTROSE MONOHYDRATE 25 G/50ML
25-50 INJECTION, SOLUTION INTRAVENOUS
Status: DISCONTINUED | OUTPATIENT
Start: 2021-01-01 | End: 2021-01-01 | Stop reason: HOSPADM

## 2021-01-01 RX ORDER — METOPROLOL TARTRATE 1 MG/ML
INJECTION, SOLUTION INTRAVENOUS
Status: DISPENSED
Start: 2021-01-01 | End: 2021-01-01

## 2021-01-01 RX ORDER — FUROSEMIDE 10 MG/ML
20 INJECTION INTRAMUSCULAR; INTRAVENOUS ONCE
Status: COMPLETED | OUTPATIENT
Start: 2021-01-01 | End: 2021-01-01

## 2021-01-01 RX ORDER — LORAZEPAM 2 MG/ML
0.5 INJECTION INTRAMUSCULAR EVERY 4 HOURS PRN
Status: DISCONTINUED | OUTPATIENT
Start: 2021-01-01 | End: 2021-01-01 | Stop reason: HOSPADM

## 2021-01-01 RX ORDER — MORPHINE SULFATE 20 MG/ML
5 SOLUTION ORAL
Qty: 15 ML | Refills: 0 | Status: SHIPPED | OUTPATIENT
Start: 2021-01-01

## 2021-01-01 RX ORDER — LORAZEPAM 2 MG/ML
1 CONCENTRATE ORAL
Status: DISCONTINUED | OUTPATIENT
Start: 2021-01-01 | End: 2021-01-01 | Stop reason: HOSPADM

## 2021-01-01 RX ORDER — GUAIFENESIN 600 MG/1
1200 TABLET, EXTENDED RELEASE ORAL 2 TIMES DAILY
Status: DISCONTINUED | OUTPATIENT
Start: 2021-01-01 | End: 2021-01-01 | Stop reason: HOSPADM

## 2021-01-01 RX ORDER — GUAIFENESIN 600 MG/1
1200 TABLET, EXTENDED RELEASE ORAL 2 TIMES DAILY
COMMUNITY

## 2021-01-01 RX ORDER — HYDROMORPHONE HYDROCHLORIDE 1 MG/ML
0.2 INJECTION, SOLUTION INTRAMUSCULAR; INTRAVENOUS; SUBCUTANEOUS
Status: DISCONTINUED | OUTPATIENT
Start: 2021-01-01 | End: 2021-01-01

## 2021-01-01 RX ORDER — CEFAZOLIN SODIUM 1 G/3ML
1 INJECTION, POWDER, FOR SOLUTION INTRAMUSCULAR; INTRAVENOUS SEE ADMIN INSTRUCTIONS
Status: DISCONTINUED | OUTPATIENT
Start: 2021-01-01 | End: 2021-01-01 | Stop reason: HOSPADM

## 2021-01-01 RX ORDER — LORAZEPAM 2 MG/ML
0.25 INJECTION INTRAMUSCULAR ONCE
Status: COMPLETED | OUTPATIENT
Start: 2021-01-01 | End: 2021-01-01

## 2021-01-01 RX ORDER — PREDNISONE 10 MG/1
10 TABLET ORAL DAILY
COMMUNITY

## 2021-01-01 RX ORDER — HYDROMORPHONE HYDROCHLORIDE 1 MG/ML
0.3 INJECTION, SOLUTION INTRAMUSCULAR; INTRAVENOUS; SUBCUTANEOUS
Status: DISCONTINUED | OUTPATIENT
Start: 2021-01-01 | End: 2021-01-01

## 2021-01-01 RX ORDER — PANTOPRAZOLE SODIUM 40 MG/1
40 TABLET, DELAYED RELEASE ORAL
Status: DISCONTINUED | OUTPATIENT
Start: 2021-01-01 | End: 2021-01-01 | Stop reason: HOSPADM

## 2021-01-01 RX ORDER — AMOXICILLIN 250 MG
2 CAPSULE ORAL 2 TIMES DAILY
Status: DISCONTINUED | OUTPATIENT
Start: 2021-01-01 | End: 2021-01-01

## 2021-01-01 RX ORDER — LORAZEPAM 0.5 MG/1
0.5 TABLET ORAL EVERY 6 HOURS PRN
Status: DISCONTINUED | OUTPATIENT
Start: 2021-01-01 | End: 2021-01-01 | Stop reason: HOSPADM

## 2021-01-01 RX ORDER — ACETAMINOPHEN 325 MG/1
650 TABLET ORAL EVERY 4 HOURS PRN
Status: DISCONTINUED | OUTPATIENT
Start: 2021-01-01 | End: 2021-01-01 | Stop reason: HOSPADM

## 2021-01-01 RX ORDER — PANTOPRAZOLE SODIUM 40 MG/1
40 TABLET, DELAYED RELEASE ORAL
Status: DISCONTINUED | OUTPATIENT
Start: 2021-01-01 | End: 2021-01-01

## 2021-01-01 RX ORDER — LORAZEPAM 2 MG/ML
0.5 INJECTION INTRAMUSCULAR EVERY 6 HOURS
Status: DISCONTINUED | OUTPATIENT
Start: 2021-01-01 | End: 2021-01-01

## 2021-01-01 RX ORDER — ONDANSETRON 4 MG/1
4 TABLET, ORALLY DISINTEGRATING ORAL EVERY 6 HOURS PRN
Status: DISCONTINUED | OUTPATIENT
Start: 2021-01-01 | End: 2021-01-01 | Stop reason: HOSPADM

## 2021-01-01 RX ORDER — METOPROLOL TARTRATE 1 MG/ML
2.5 INJECTION, SOLUTION INTRAVENOUS ONCE
Status: COMPLETED | OUTPATIENT
Start: 2021-01-01 | End: 2021-01-01

## 2021-01-01 RX ORDER — ALBUTEROL SULFATE 90 UG/1
2 AEROSOL, METERED RESPIRATORY (INHALATION) EVERY 4 HOURS PRN
COMMUNITY

## 2021-01-01 RX ORDER — PIPERACILLIN SODIUM, TAZOBACTAM SODIUM 3; .375 G/15ML; G/15ML
3.38 INJECTION, POWDER, LYOPHILIZED, FOR SOLUTION INTRAVENOUS EVERY 6 HOURS
Status: DISCONTINUED | OUTPATIENT
Start: 2021-01-01 | End: 2021-01-01

## 2021-01-01 RX ORDER — METOPROLOL TARTRATE 1 MG/ML
2.5 INJECTION, SOLUTION INTRAVENOUS EVERY 4 HOURS PRN
Status: DISCONTINUED | OUTPATIENT
Start: 2021-01-01 | End: 2021-01-01 | Stop reason: HOSPADM

## 2021-01-01 RX ORDER — METHYLPREDNISOLONE SODIUM SUCCINATE 40 MG/ML
20 INJECTION, POWDER, LYOPHILIZED, FOR SOLUTION INTRAMUSCULAR; INTRAVENOUS DAILY
Status: DISCONTINUED | OUTPATIENT
Start: 2021-01-01 | End: 2021-01-01

## 2021-01-01 RX ORDER — ALBUMIN, HUMAN INJ 5% 5 %
SOLUTION INTRAVENOUS CONTINUOUS PRN
Status: DISCONTINUED | OUTPATIENT
Start: 2021-01-01 | End: 2021-01-01

## 2021-01-01 RX ORDER — HYDROMORPHONE HYDROCHLORIDE 1 MG/ML
.3-.5 INJECTION, SOLUTION INTRAMUSCULAR; INTRAVENOUS; SUBCUTANEOUS EVERY 5 MIN PRN
Status: DISCONTINUED | OUTPATIENT
Start: 2021-01-01 | End: 2021-01-01 | Stop reason: HOSPADM

## 2021-01-01 RX ORDER — LORAZEPAM 2 MG/ML
1 CONCENTRATE ORAL
Qty: 30 ML | Refills: 0 | Status: SHIPPED | OUTPATIENT
Start: 2021-01-01

## 2021-01-01 RX ORDER — HYDROMORPHONE HYDROCHLORIDE 1 MG/ML
.2-.4 INJECTION, SOLUTION INTRAMUSCULAR; INTRAVENOUS; SUBCUTANEOUS
Status: DISCONTINUED | OUTPATIENT
Start: 2021-01-01 | End: 2021-01-01 | Stop reason: HOSPADM

## 2021-01-01 RX ORDER — PROPOFOL 10 MG/ML
INJECTION, EMULSION INTRAVENOUS PRN
Status: DISCONTINUED | OUTPATIENT
Start: 2021-01-01 | End: 2021-01-01

## 2021-01-01 RX ORDER — POTASSIUM CHLORIDE 7.45 MG/ML
10 INJECTION INTRAVENOUS ONCE
Status: COMPLETED | OUTPATIENT
Start: 2021-01-01 | End: 2021-01-01

## 2021-01-01 RX ORDER — LIDOCAINE 40 MG/G
CREAM TOPICAL
Status: ACTIVE | OUTPATIENT
Start: 2021-01-01 | End: 2021-01-01

## 2021-01-01 RX ORDER — LORAZEPAM 2 MG/ML
0.5 INJECTION INTRAMUSCULAR EVERY 6 HOURS PRN
Status: DISCONTINUED | OUTPATIENT
Start: 2021-01-01 | End: 2021-01-01

## 2021-01-01 RX ORDER — LORAZEPAM 2 MG/ML
0.5 INJECTION INTRAMUSCULAR ONCE
Status: COMPLETED | OUTPATIENT
Start: 2021-01-01 | End: 2021-01-01

## 2021-01-01 RX ORDER — IPRATROPIUM BROMIDE AND ALBUTEROL SULFATE 2.5; .5 MG/3ML; MG/3ML
1 SOLUTION RESPIRATORY (INHALATION) EVERY 4 HOURS PRN
Status: DISCONTINUED | OUTPATIENT
Start: 2021-01-01 | End: 2021-01-01 | Stop reason: HOSPADM

## 2021-01-01 RX ORDER — FENTANYL CITRATE 50 UG/ML
25-50 INJECTION, SOLUTION INTRAMUSCULAR; INTRAVENOUS
Status: DISCONTINUED | OUTPATIENT
Start: 2021-01-01 | End: 2021-01-01 | Stop reason: HOSPADM

## 2021-01-01 RX ORDER — FUROSEMIDE 20 MG
10 TABLET ORAL DAILY
Status: ON HOLD | COMMUNITY
End: 2021-01-01

## 2021-01-01 RX ORDER — METHYLPREDNISOLONE SODIUM SUCCINATE 125 MG/2ML
60 INJECTION, POWDER, LYOPHILIZED, FOR SOLUTION INTRAMUSCULAR; INTRAVENOUS EVERY 8 HOURS
Status: DISCONTINUED | OUTPATIENT
Start: 2021-01-01 | End: 2021-01-01

## 2021-01-01 RX ORDER — PREDNISONE 20 MG/1
60 TABLET ORAL DAILY
Status: DISCONTINUED | OUTPATIENT
Start: 2021-01-01 | End: 2021-01-01

## 2021-01-01 RX ORDER — LABETALOL HYDROCHLORIDE 5 MG/ML
10 INJECTION, SOLUTION INTRAVENOUS
Status: DISCONTINUED | OUTPATIENT
Start: 2021-01-01 | End: 2021-01-01 | Stop reason: HOSPADM

## 2021-01-01 RX ORDER — LIDOCAINE 40 MG/G
CREAM TOPICAL
Status: DISCONTINUED | OUTPATIENT
Start: 2021-01-01 | End: 2021-01-01 | Stop reason: HOSPADM

## 2021-01-01 RX ORDER — LIDOCAINE HYDROCHLORIDE 20 MG/ML
INJECTION, SOLUTION INFILTRATION; PERINEURAL PRN
Status: DISCONTINUED | OUTPATIENT
Start: 2021-01-01 | End: 2021-01-01

## 2021-01-01 RX ORDER — METHYLPREDNISOLONE SODIUM SUCCINATE 40 MG/ML
40 INJECTION, POWDER, LYOPHILIZED, FOR SOLUTION INTRAMUSCULAR; INTRAVENOUS EVERY 12 HOURS
Status: DISCONTINUED | OUTPATIENT
Start: 2021-01-01 | End: 2021-01-01

## 2021-01-01 RX ORDER — NITROGLYCERIN 0.4 MG/1
0.4 TABLET SUBLINGUAL EVERY 5 MIN PRN
Status: DISCONTINUED | OUTPATIENT
Start: 2021-01-01 | End: 2021-01-01 | Stop reason: HOSPADM

## 2021-01-01 RX ORDER — ONDANSETRON 2 MG/ML
INJECTION INTRAMUSCULAR; INTRAVENOUS PRN
Status: DISCONTINUED | OUTPATIENT
Start: 2021-01-01 | End: 2021-01-01

## 2021-01-01 RX ORDER — SODIUM CHLORIDE, SODIUM LACTATE, POTASSIUM CHLORIDE, CALCIUM CHLORIDE 600; 310; 30; 20 MG/100ML; MG/100ML; MG/100ML; MG/100ML
INJECTION, SOLUTION INTRAVENOUS CONTINUOUS
Status: DISCONTINUED | OUTPATIENT
Start: 2021-01-01 | End: 2021-01-01

## 2021-01-01 RX ORDER — LORAZEPAM 0.5 MG/1
0.5 TABLET ORAL EVERY 6 HOURS PRN
Status: DISCONTINUED | OUTPATIENT
Start: 2021-01-01 | End: 2021-01-01

## 2021-01-01 RX ORDER — MEPERIDINE HYDROCHLORIDE 25 MG/ML
12.5 INJECTION INTRAMUSCULAR; INTRAVENOUS; SUBCUTANEOUS EVERY 5 MIN PRN
Status: DISCONTINUED | OUTPATIENT
Start: 2021-01-01 | End: 2021-01-01 | Stop reason: HOSPADM

## 2021-01-01 RX ORDER — POLYETHYLENE GLYCOL 3350 17 G/17G
17 POWDER, FOR SOLUTION ORAL DAILY
Status: DISCONTINUED | OUTPATIENT
Start: 2021-01-01 | End: 2021-01-01 | Stop reason: HOSPADM

## 2021-01-01 RX ORDER — MORPHINE SULFATE 20 MG/ML
5 SOLUTION ORAL
Status: ON HOLD | COMMUNITY
End: 2021-01-01

## 2021-01-01 RX ORDER — HYDROMORPHONE HYDROCHLORIDE 1 MG/ML
0.2 INJECTION, SOLUTION INTRAMUSCULAR; INTRAVENOUS; SUBCUTANEOUS
Status: DISCONTINUED | OUTPATIENT
Start: 2021-01-01 | End: 2021-01-01 | Stop reason: HOSPADM

## 2021-01-01 RX ORDER — FENTANYL CITRATE 50 UG/ML
INJECTION, SOLUTION INTRAMUSCULAR; INTRAVENOUS PRN
Status: DISCONTINUED | OUTPATIENT
Start: 2021-01-01 | End: 2021-01-01

## 2021-01-01 RX ORDER — IPRATROPIUM BROMIDE AND ALBUTEROL SULFATE 2.5; .5 MG/3ML; MG/3ML
1 SOLUTION RESPIRATORY (INHALATION) 3 TIMES DAILY PRN
Status: DISCONTINUED | OUTPATIENT
Start: 2021-01-01 | End: 2021-01-01

## 2021-01-01 RX ORDER — NALOXONE HYDROCHLORIDE 0.4 MG/ML
0.4 INJECTION, SOLUTION INTRAMUSCULAR; INTRAVENOUS; SUBCUTANEOUS
Status: DISCONTINUED | OUTPATIENT
Start: 2021-01-01 | End: 2021-01-01 | Stop reason: HOSPADM

## 2021-01-01 RX ORDER — POLYETHYLENE GLYCOL 3350 17 G/17G
1 POWDER, FOR SOLUTION ORAL DAILY
COMMUNITY

## 2021-01-01 RX ORDER — NICOTINE POLACRILEX 4 MG
15-30 LOZENGE BUCCAL
Status: DISCONTINUED | OUTPATIENT
Start: 2021-01-01 | End: 2021-01-01 | Stop reason: HOSPADM

## 2021-01-01 RX ORDER — IOPAMIDOL 755 MG/ML
64 INJECTION, SOLUTION INTRAVASCULAR ONCE
Status: COMPLETED | OUTPATIENT
Start: 2021-01-01 | End: 2021-01-01

## 2021-01-01 RX ORDER — METHYLPREDNISOLONE SODIUM SUCCINATE 125 MG/2ML
125 INJECTION, POWDER, LYOPHILIZED, FOR SOLUTION INTRAMUSCULAR; INTRAVENOUS EVERY 24 HOURS
Status: DISCONTINUED | OUTPATIENT
Start: 2021-01-01 | End: 2021-01-01

## 2021-01-01 RX ADMIN — ROCURONIUM BROMIDE 10 MG: 10 INJECTION INTRAVENOUS at 12:21

## 2021-01-01 RX ADMIN — HYDROMORPHONE HYDROCHLORIDE 0.2 MG: 1 INJECTION, SOLUTION INTRAMUSCULAR; INTRAVENOUS; SUBCUTANEOUS at 14:29

## 2021-01-01 RX ADMIN — PHENYLEPHRINE HYDROCHLORIDE 50 MCG: 10 INJECTION INTRAVENOUS at 12:37

## 2021-01-01 RX ADMIN — PANTOPRAZOLE SODIUM 40 MG: 40 INJECTION, POWDER, FOR SOLUTION INTRAVENOUS at 09:19

## 2021-01-01 RX ADMIN — HYDROMORPHONE HYDROCHLORIDE 0.4 MG: 1 INJECTION, SOLUTION INTRAMUSCULAR; INTRAVENOUS; SUBCUTANEOUS at 04:11

## 2021-01-01 RX ADMIN — HYDROMORPHONE HYDROCHLORIDE 0.4 MG: 1 INJECTION, SOLUTION INTRAMUSCULAR; INTRAVENOUS; SUBCUTANEOUS at 06:55

## 2021-01-01 RX ADMIN — METHYLPREDNISOLONE SODIUM SUCCINATE 62.5 MG: 125 INJECTION, POWDER, FOR SOLUTION INTRAMUSCULAR; INTRAVENOUS at 17:13

## 2021-01-01 RX ADMIN — LORAZEPAM 0.5 MG: 2 INJECTION INTRAMUSCULAR; INTRAVENOUS at 08:13

## 2021-01-01 RX ADMIN — POTASSIUM CHLORIDE 10 MEQ: 7.46 INJECTION, SOLUTION INTRAVENOUS at 17:26

## 2021-01-01 RX ADMIN — PANTOPRAZOLE SODIUM 40 MG: 40 INJECTION, POWDER, FOR SOLUTION INTRAVENOUS at 09:26

## 2021-01-01 RX ADMIN — METHYLPREDNISOLONE SODIUM SUCCINATE 62.5 MG: 125 INJECTION, POWDER, FOR SOLUTION INTRAMUSCULAR; INTRAVENOUS at 08:42

## 2021-01-01 RX ADMIN — LORAZEPAM 0.5 MG: 2 INJECTION INTRAMUSCULAR; INTRAVENOUS at 21:12

## 2021-01-01 RX ADMIN — HYDROMORPHONE HYDROCHLORIDE 0.2 MG: 1 INJECTION, SOLUTION INTRAMUSCULAR; INTRAVENOUS; SUBCUTANEOUS at 19:51

## 2021-01-01 RX ADMIN — HYDROMORPHONE HYDROCHLORIDE 0.2 MG: 1 INJECTION, SOLUTION INTRAMUSCULAR; INTRAVENOUS; SUBCUTANEOUS at 19:21

## 2021-01-01 RX ADMIN — CITALOPRAM HYDROBROMIDE 40 MG: 20 TABLET ORAL at 09:27

## 2021-01-01 RX ADMIN — PIPERACILLIN SODIUM AND TAZOBACTAM SODIUM 3.38 G: 3; .375 INJECTION, POWDER, LYOPHILIZED, FOR SOLUTION INTRAVENOUS at 10:15

## 2021-01-01 RX ADMIN — POTASSIUM CHLORIDE 10 MEQ: 7.46 INJECTION, SOLUTION INTRAVENOUS at 18:23

## 2021-01-01 RX ADMIN — METOPROLOL TARTRATE 2.5 MG: 1 INJECTION, SOLUTION INTRAVENOUS at 01:34

## 2021-01-01 RX ADMIN — LORAZEPAM 0.5 MG: 2 INJECTION INTRAMUSCULAR; INTRAVENOUS at 12:15

## 2021-01-01 RX ADMIN — DEXMEDETOMIDINE HYDROCHLORIDE 8 MCG: 100 INJECTION, SOLUTION INTRAVENOUS at 12:37

## 2021-01-01 RX ADMIN — PHENYLEPHRINE HYDROCHLORIDE 200 MCG: 10 INJECTION INTRAVENOUS at 11:46

## 2021-01-01 RX ADMIN — DIATRIZOATE MEGLUMINE AND DIATRIZOATE SODIUM 20 ML: 660; 100 SOLUTION ORAL; RECTAL at 16:01

## 2021-01-01 RX ADMIN — GUAIFENESIN 1200 MG: 600 TABLET, EXTENDED RELEASE ORAL at 09:27

## 2021-01-01 RX ADMIN — FENTANYL CITRATE 25 MCG: 50 INJECTION, SOLUTION INTRAMUSCULAR; INTRAVENOUS at 15:51

## 2021-01-01 RX ADMIN — IPRATROPIUM BROMIDE AND ALBUTEROL SULFATE 3 ML: .5; 3 SOLUTION RESPIRATORY (INHALATION) at 13:20

## 2021-01-01 RX ADMIN — FUROSEMIDE 20 MG: 10 INJECTION, SOLUTION INTRAVENOUS at 14:27

## 2021-01-01 RX ADMIN — HYDRALAZINE HYDROCHLORIDE 10 MG: 20 INJECTION INTRAMUSCULAR; INTRAVENOUS at 17:14

## 2021-01-01 RX ADMIN — FLUTICASONE FUROATE AND VILANTEROL TRIFENATATE 1 PUFF: 100; 25 POWDER RESPIRATORY (INHALATION) at 08:58

## 2021-01-01 RX ADMIN — FLUTICASONE FUROATE AND VILANTEROL TRIFENATATE 1 PUFF: 100; 25 POWDER RESPIRATORY (INHALATION) at 09:05

## 2021-01-01 RX ADMIN — SODIUM CHLORIDE 56 ML: 9 INJECTION, SOLUTION INTRAVENOUS at 18:48

## 2021-01-01 RX ADMIN — I.V. FAT EMULSION 250 ML: 20 EMULSION INTRAVENOUS at 20:00

## 2021-01-01 RX ADMIN — SODIUM CHLORIDE: 9 INJECTION, SOLUTION INTRAVENOUS at 21:03

## 2021-01-01 RX ADMIN — I.V. FAT EMULSION 250 ML: 20 EMULSION INTRAVENOUS at 21:33

## 2021-01-01 RX ADMIN — SENNOSIDES 10 ML: 8.8 SYRUP ORAL at 08:45

## 2021-01-01 RX ADMIN — MORPHINE SULFATE 5 MG: 100 SOLUTION ORAL at 13:15

## 2021-01-01 RX ADMIN — FENTANYL CITRATE 50 MCG: 50 INJECTION, SOLUTION INTRAMUSCULAR; INTRAVENOUS at 12:03

## 2021-01-01 RX ADMIN — PANTOPRAZOLE SODIUM 40 MG: 40 INJECTION, POWDER, FOR SOLUTION INTRAVENOUS at 08:19

## 2021-01-01 RX ADMIN — PIPERACILLIN SODIUM AND TAZOBACTAM SODIUM 3.38 G: 3; .375 INJECTION, POWDER, LYOPHILIZED, FOR SOLUTION INTRAVENOUS at 18:15

## 2021-01-01 RX ADMIN — FLUTICASONE FUROATE AND VILANTEROL TRIFENATATE 1 PUFF: 100; 25 POWDER RESPIRATORY (INHALATION) at 12:57

## 2021-01-01 RX ADMIN — HYDROMORPHONE HYDROCHLORIDE 0.4 MG: 1 INJECTION, SOLUTION INTRAMUSCULAR; INTRAVENOUS; SUBCUTANEOUS at 20:51

## 2021-01-01 RX ADMIN — METHYLPREDNISOLONE SODIUM SUCCINATE 62.5 MG: 125 INJECTION, POWDER, FOR SOLUTION INTRAMUSCULAR; INTRAVENOUS at 08:57

## 2021-01-01 RX ADMIN — FENTANYL CITRATE 25 MCG: 50 INJECTION, SOLUTION INTRAMUSCULAR; INTRAVENOUS at 15:09

## 2021-01-01 RX ADMIN — METHYLPREDNISOLONE SODIUM SUCCINATE 62.5 MG: 125 INJECTION, POWDER, FOR SOLUTION INTRAMUSCULAR; INTRAVENOUS at 09:22

## 2021-01-01 RX ADMIN — METHYLPREDNISOLONE SODIUM SUCCINATE 62.5 MG: 125 INJECTION, POWDER, FOR SOLUTION INTRAMUSCULAR; INTRAVENOUS at 00:11

## 2021-01-01 RX ADMIN — DOCUSATE SODIUM 100 MG: 50 LIQUID ORAL at 08:45

## 2021-01-01 RX ADMIN — PHENYLEPHRINE HYDROCHLORIDE 50 MCG: 10 INJECTION INTRAVENOUS at 13:13

## 2021-01-01 RX ADMIN — METHYLPREDNISOLONE SODIUM SUCCINATE 20 MG: 40 INJECTION, POWDER, FOR SOLUTION INTRAMUSCULAR; INTRAVENOUS at 08:45

## 2021-01-01 RX ADMIN — CEFAZOLIN SODIUM 2 G: 2 INJECTION, SOLUTION INTRAVENOUS at 11:57

## 2021-01-01 RX ADMIN — HYDROMORPHONE HYDROCHLORIDE 0.4 MG: 1 INJECTION, SOLUTION INTRAMUSCULAR; INTRAVENOUS; SUBCUTANEOUS at 14:37

## 2021-01-01 RX ADMIN — POTASSIUM CHLORIDE 10 MEQ: 7.46 INJECTION, SOLUTION INTRAVENOUS at 00:36

## 2021-01-01 RX ADMIN — LORAZEPAM 0.5 MG: 2 INJECTION INTRAMUSCULAR; INTRAVENOUS at 08:32

## 2021-01-01 RX ADMIN — LORAZEPAM 0.5 MG: 2 INJECTION INTRAMUSCULAR; INTRAVENOUS at 14:12

## 2021-01-01 RX ADMIN — ROCURONIUM BROMIDE 50 MG: 10 INJECTION INTRAVENOUS at 11:42

## 2021-01-01 RX ADMIN — LORAZEPAM 0.5 MG: 2 INJECTION INTRAMUSCULAR; INTRAVENOUS at 01:35

## 2021-01-01 RX ADMIN — METHYLPREDNISOLONE SODIUM SUCCINATE 125 MG: 125 INJECTION, POWDER, FOR SOLUTION INTRAMUSCULAR; INTRAVENOUS at 14:31

## 2021-01-01 RX ADMIN — IPRATROPIUM BROMIDE AND ALBUTEROL SULFATE 3 ML: .5; 3 SOLUTION RESPIRATORY (INHALATION) at 05:17

## 2021-01-01 RX ADMIN — METHYLPREDNISOLONE SODIUM SUCCINATE 62.5 MG: 125 INJECTION, POWDER, FOR SOLUTION INTRAMUSCULAR; INTRAVENOUS at 08:32

## 2021-01-01 RX ADMIN — LORAZEPAM 0.5 MG: 2 INJECTION INTRAMUSCULAR; INTRAVENOUS at 14:33

## 2021-01-01 RX ADMIN — METHYLPREDNISOLONE SODIUM SUCCINATE 20 MG: 40 INJECTION, POWDER, FOR SOLUTION INTRAMUSCULAR; INTRAVENOUS at 09:26

## 2021-01-01 RX ADMIN — SODIUM CHLORIDE: 9 INJECTION, SOLUTION INTRAVENOUS at 15:42

## 2021-01-01 RX ADMIN — DOCUSATE SODIUM 100 MG: 50 LIQUID ORAL at 20:09

## 2021-01-01 RX ADMIN — PIPERACILLIN SODIUM AND TAZOBACTAM SODIUM 3.38 G: 3; .375 INJECTION, POWDER, LYOPHILIZED, FOR SOLUTION INTRAVENOUS at 22:44

## 2021-01-01 RX ADMIN — HYDROMORPHONE HYDROCHLORIDE 0.2 MG: 1 INJECTION, SOLUTION INTRAMUSCULAR; INTRAVENOUS; SUBCUTANEOUS at 06:45

## 2021-01-01 RX ADMIN — HYDROMORPHONE HYDROCHLORIDE 0.4 MG: 1 INJECTION, SOLUTION INTRAMUSCULAR; INTRAVENOUS; SUBCUTANEOUS at 11:43

## 2021-01-01 RX ADMIN — PIPERACILLIN SODIUM AND TAZOBACTAM SODIUM 3.38 G: 3; .375 INJECTION, POWDER, LYOPHILIZED, FOR SOLUTION INTRAVENOUS at 21:21

## 2021-01-01 RX ADMIN — FENTANYL CITRATE 25 MCG: 50 INJECTION, SOLUTION INTRAMUSCULAR; INTRAVENOUS at 16:17

## 2021-01-01 RX ADMIN — PANTOPRAZOLE SODIUM 40 MG: 40 INJECTION, POWDER, FOR SOLUTION INTRAVENOUS at 08:45

## 2021-01-01 RX ADMIN — IOPAMIDOL 64 ML: 755 INJECTION, SOLUTION INTRAVENOUS at 18:47

## 2021-01-01 RX ADMIN — IPRATROPIUM BROMIDE AND ALBUTEROL SULFATE 3 ML: .5; 3 SOLUTION RESPIRATORY (INHALATION) at 21:00

## 2021-01-01 RX ADMIN — SODIUM CHLORIDE, POTASSIUM CHLORIDE, SODIUM LACTATE AND CALCIUM CHLORIDE: 600; 310; 30; 20 INJECTION, SOLUTION INTRAVENOUS at 14:29

## 2021-01-01 RX ADMIN — LORAZEPAM 0.5 MG: 2 INJECTION INTRAMUSCULAR; INTRAVENOUS at 18:53

## 2021-01-01 RX ADMIN — ASCORBIC ACID, VITAMIN A PALMITATE, CHOLECALCIFEROL, THIAMINE HYDROCHLORIDE, RIBOFLAVIN-5 PHOSPHATE SODIUM, PYRIDOXINE HYDROCHLORIDE, NIACINAMIDE, DEXPANTHENOL, ALPHA-TOCOPHEROL ACETATE, VITAMIN K1, FOLIC ACID, BIOTIN, CYANOCOBALAMIN: 200; 3300; 200; 6; 3.6; 6; 40; 15; 10; 150; 600; 60; 5 INJECTION, SOLUTION INTRAVENOUS at 09:43

## 2021-01-01 RX ADMIN — METHYLPREDNISOLONE SODIUM SUCCINATE 20 MG: 40 INJECTION, POWDER, FOR SOLUTION INTRAMUSCULAR; INTRAVENOUS at 09:19

## 2021-01-01 RX ADMIN — FLUTICASONE FUROATE AND VILANTEROL TRIFENATATE 1 PUFF: 100; 25 POWDER RESPIRATORY (INHALATION) at 10:07

## 2021-01-01 RX ADMIN — LORAZEPAM 0.5 MG: 2 INJECTION INTRAMUSCULAR; INTRAVENOUS at 02:14

## 2021-01-01 RX ADMIN — SODIUM CHLORIDE, POTASSIUM CHLORIDE, SODIUM LACTATE AND CALCIUM CHLORIDE: 600; 310; 30; 20 INJECTION, SOLUTION INTRAVENOUS at 11:01

## 2021-01-01 RX ADMIN — ROCURONIUM BROMIDE 10 MG: 10 INJECTION INTRAVENOUS at 13:25

## 2021-01-01 RX ADMIN — IPRATROPIUM BROMIDE AND ALBUTEROL SULFATE 3 ML: .5; 3 SOLUTION RESPIRATORY (INHALATION) at 16:33

## 2021-01-01 RX ADMIN — POLYETHYLENE GLYCOL 3350 17 G: 17 POWDER, FOR SOLUTION ORAL at 21:00

## 2021-01-01 RX ADMIN — HYDROMORPHONE HYDROCHLORIDE 0.4 MG: 1 INJECTION, SOLUTION INTRAMUSCULAR; INTRAVENOUS; SUBCUTANEOUS at 20:53

## 2021-01-01 RX ADMIN — LORAZEPAM 0.5 MG: 2 INJECTION INTRAMUSCULAR; INTRAVENOUS at 21:15

## 2021-01-01 RX ADMIN — SENNOSIDES 10 ML: 8.8 SYRUP ORAL at 21:04

## 2021-01-01 RX ADMIN — PANTOPRAZOLE SODIUM 40 MG: 40 INJECTION, POWDER, FOR SOLUTION INTRAVENOUS at 08:57

## 2021-01-01 RX ADMIN — PANTOPRAZOLE SODIUM 40 MG: 40 INJECTION, POWDER, FOR SOLUTION INTRAVENOUS at 09:22

## 2021-01-01 RX ADMIN — SUGAMMADEX 150 MG: 100 INJECTION, SOLUTION INTRAVENOUS at 14:10

## 2021-01-01 RX ADMIN — DOCUSATE SODIUM 100 MG: 50 LIQUID ORAL at 09:27

## 2021-01-01 RX ADMIN — HYDROMORPHONE HYDROCHLORIDE 0.2 MG: 1 INJECTION, SOLUTION INTRAMUSCULAR; INTRAVENOUS; SUBCUTANEOUS at 01:51

## 2021-01-01 RX ADMIN — GUAIFENESIN 1200 MG: 600 TABLET, EXTENDED RELEASE ORAL at 20:09

## 2021-01-01 RX ADMIN — INSULIN ASPART 1 UNITS: 100 INJECTION, SOLUTION INTRAVENOUS; SUBCUTANEOUS at 09:15

## 2021-01-01 RX ADMIN — POTASSIUM CHLORIDE 10 MEQ: 7.46 INJECTION, SOLUTION INTRAVENOUS at 19:30

## 2021-01-01 RX ADMIN — FLUTICASONE FUROATE AND VILANTEROL TRIFENATATE 1 PUFF: 100; 25 POWDER RESPIRATORY (INHALATION) at 09:57

## 2021-01-01 RX ADMIN — METHYLPREDNISOLONE SODIUM SUCCINATE 62.5 MG: 125 INJECTION, POWDER, FOR SOLUTION INTRAMUSCULAR; INTRAVENOUS at 19:43

## 2021-01-01 RX ADMIN — PIPERACILLIN SODIUM AND TAZOBACTAM SODIUM 3.38 G: 3; .375 INJECTION, POWDER, LYOPHILIZED, FOR SOLUTION INTRAVENOUS at 04:35

## 2021-01-01 RX ADMIN — FLUTICASONE FUROATE AND VILANTEROL TRIFENATATE 1 PUFF: 100; 25 POWDER RESPIRATORY (INHALATION) at 09:23

## 2021-01-01 RX ADMIN — ONDANSETRON 4 MG: 2 INJECTION INTRAMUSCULAR; INTRAVENOUS at 09:23

## 2021-01-01 RX ADMIN — DOCUSATE SODIUM 100 MG: 50 LIQUID ORAL at 20:59

## 2021-01-01 RX ADMIN — CITALOPRAM HYDROBROMIDE 40 MG: 20 TABLET ORAL at 08:42

## 2021-01-01 RX ADMIN — HYDROMORPHONE HYDROCHLORIDE 0.2 MG: 1 INJECTION, SOLUTION INTRAMUSCULAR; INTRAVENOUS; SUBCUTANEOUS at 16:17

## 2021-01-01 RX ADMIN — SODIUM CHLORIDE: 9 INJECTION, SOLUTION INTRAVENOUS at 20:32

## 2021-01-01 RX ADMIN — FLUTICASONE FUROATE AND VILANTEROL TRIFENATATE 1 PUFF: 100; 25 POWDER RESPIRATORY (INHALATION) at 09:28

## 2021-01-01 RX ADMIN — PANTOPRAZOLE SODIUM 40 MG: 40 INJECTION, POWDER, FOR SOLUTION INTRAVENOUS at 09:11

## 2021-01-01 RX ADMIN — TAZOBACTAM SODIUM AND PIPERACILLIN SODIUM 4.5 G: 500; 4 INJECTION, SOLUTION INTRAVENOUS at 18:01

## 2021-01-01 RX ADMIN — METHYLPREDNISOLONE SODIUM SUCCINATE 62.5 MG: 125 INJECTION, POWDER, FOR SOLUTION INTRAMUSCULAR; INTRAVENOUS at 23:28

## 2021-01-01 RX ADMIN — PANTOPRAZOLE SODIUM 40 MG: 40 INJECTION, POWDER, FOR SOLUTION INTRAVENOUS at 09:18

## 2021-01-01 RX ADMIN — UMECLIDINIUM 1 PUFF: 62.5 AEROSOL, POWDER ORAL at 09:06

## 2021-01-01 RX ADMIN — FENTANYL CITRATE 50 MCG: 50 INJECTION, SOLUTION INTRAMUSCULAR; INTRAVENOUS at 11:42

## 2021-01-01 RX ADMIN — METOPROLOL TARTRATE 2.5 MG: 1 INJECTION, SOLUTION INTRAVENOUS at 06:16

## 2021-01-01 RX ADMIN — HYDROMORPHONE HYDROCHLORIDE 0.4 MG: 1 INJECTION, SOLUTION INTRAMUSCULAR; INTRAVENOUS; SUBCUTANEOUS at 01:35

## 2021-01-01 RX ADMIN — METHYLPREDNISOLONE SODIUM SUCCINATE 20 MG: 40 INJECTION, POWDER, FOR SOLUTION INTRAMUSCULAR; INTRAVENOUS at 09:00

## 2021-01-01 RX ADMIN — DOCUSATE SODIUM 100 MG: 50 LIQUID ORAL at 21:03

## 2021-01-01 RX ADMIN — I.V. FAT EMULSION 250 ML: 20 EMULSION INTRAVENOUS at 20:59

## 2021-01-01 RX ADMIN — PANTOPRAZOLE SODIUM 40 MG: 40 INJECTION, POWDER, FOR SOLUTION INTRAVENOUS at 08:48

## 2021-01-01 RX ADMIN — METHYLPREDNISOLONE SODIUM SUCCINATE 40 MG: 40 INJECTION, POWDER, FOR SOLUTION INTRAMUSCULAR; INTRAVENOUS at 08:48

## 2021-01-01 RX ADMIN — DOCUSATE SODIUM 100 MG: 50 LIQUID ORAL at 09:18

## 2021-01-01 RX ADMIN — HYDROMORPHONE HYDROCHLORIDE 0.4 MG: 1 INJECTION, SOLUTION INTRAMUSCULAR; INTRAVENOUS; SUBCUTANEOUS at 16:24

## 2021-01-01 RX ADMIN — DEXAMETHASONE SODIUM PHOSPHATE 2 MG: 4 INJECTION, SOLUTION INTRAMUSCULAR; INTRAVENOUS at 10:01

## 2021-01-01 RX ADMIN — LORAZEPAM 0.5 MG: 2 INJECTION INTRAMUSCULAR; INTRAVENOUS at 22:51

## 2021-01-01 RX ADMIN — PIPERACILLIN SODIUM AND TAZOBACTAM SODIUM 3.38 G: 3; .375 INJECTION, POWDER, LYOPHILIZED, FOR SOLUTION INTRAVENOUS at 12:07

## 2021-01-01 RX ADMIN — FENTANYL CITRATE 50 MCG: 50 INJECTION, SOLUTION INTRAMUSCULAR; INTRAVENOUS at 12:21

## 2021-01-01 RX ADMIN — LORAZEPAM 0.5 MG: 2 INJECTION INTRAMUSCULAR; INTRAVENOUS at 14:46

## 2021-01-01 RX ADMIN — HYDROMORPHONE HYDROCHLORIDE 0.2 MG: 1 INJECTION, SOLUTION INTRAMUSCULAR; INTRAVENOUS; SUBCUTANEOUS at 19:42

## 2021-01-01 RX ADMIN — LORAZEPAM 1 MG: 2 LIQUID ORAL at 21:06

## 2021-01-01 RX ADMIN — GUAIFENESIN 1200 MG: 600 TABLET, EXTENDED RELEASE ORAL at 20:39

## 2021-01-01 RX ADMIN — LORAZEPAM 0.5 MG: 2 INJECTION INTRAMUSCULAR; INTRAVENOUS at 21:43

## 2021-01-01 RX ADMIN — LORAZEPAM 1 MG: 2 LIQUID ORAL at 18:01

## 2021-01-01 RX ADMIN — ONDANSETRON 4 MG: 2 INJECTION INTRAMUSCULAR; INTRAVENOUS at 20:09

## 2021-01-01 RX ADMIN — DEXMEDETOMIDINE HYDROCHLORIDE 4 MCG: 100 INJECTION, SOLUTION INTRAVENOUS at 12:43

## 2021-01-01 RX ADMIN — IPRATROPIUM BROMIDE AND ALBUTEROL SULFATE 3 ML: .5; 3 SOLUTION RESPIRATORY (INHALATION) at 05:51

## 2021-01-01 RX ADMIN — UMECLIDINIUM 1 PUFF: 62.5 AEROSOL, POWDER ORAL at 09:28

## 2021-01-01 RX ADMIN — PIPERACILLIN SODIUM AND TAZOBACTAM SODIUM 3.38 G: 3; .375 INJECTION, POWDER, LYOPHILIZED, FOR SOLUTION INTRAVENOUS at 05:14

## 2021-01-01 RX ADMIN — GUAIFENESIN 1200 MG: 600 TABLET, EXTENDED RELEASE ORAL at 08:57

## 2021-01-01 RX ADMIN — HYDROMORPHONE HYDROCHLORIDE 0.4 MG: 1 INJECTION, SOLUTION INTRAMUSCULAR; INTRAVENOUS; SUBCUTANEOUS at 04:35

## 2021-01-01 RX ADMIN — ONDANSETRON 4 MG: 2 INJECTION INTRAMUSCULAR; INTRAVENOUS at 19:21

## 2021-01-01 RX ADMIN — UMECLIDINIUM 1 PUFF: 62.5 AEROSOL, POWDER ORAL at 08:42

## 2021-01-01 RX ADMIN — INSULIN ASPART 2 UNITS: 100 INJECTION, SOLUTION INTRAVENOUS; SUBCUTANEOUS at 17:50

## 2021-01-01 RX ADMIN — METHYLPREDNISOLONE SODIUM SUCCINATE 62.5 MG: 125 INJECTION, POWDER, FOR SOLUTION INTRAMUSCULAR; INTRAVENOUS at 23:11

## 2021-01-01 RX ADMIN — CITALOPRAM HYDROBROMIDE 40 MG: 20 TABLET ORAL at 16:24

## 2021-01-01 RX ADMIN — HYDROMORPHONE HYDROCHLORIDE 0.4 MG: 1 INJECTION, SOLUTION INTRAMUSCULAR; INTRAVENOUS; SUBCUTANEOUS at 08:47

## 2021-01-01 RX ADMIN — HYDROMORPHONE HYDROCHLORIDE 0.2 MG: 1 INJECTION, SOLUTION INTRAMUSCULAR; INTRAVENOUS; SUBCUTANEOUS at 11:01

## 2021-01-01 RX ADMIN — PIPERACILLIN SODIUM AND TAZOBACTAM SODIUM 3.38 G: 3; .375 INJECTION, POWDER, LYOPHILIZED, FOR SOLUTION INTRAVENOUS at 01:34

## 2021-01-01 RX ADMIN — LORAZEPAM 0.5 MG: 2 INJECTION INTRAMUSCULAR; INTRAVENOUS at 17:26

## 2021-01-01 RX ADMIN — LORAZEPAM 0.5 MG: 2 INJECTION INTRAMUSCULAR; INTRAVENOUS at 02:07

## 2021-01-01 RX ADMIN — SODIUM CHLORIDE, POTASSIUM CHLORIDE, SODIUM LACTATE AND CALCIUM CHLORIDE: 600; 310; 30; 20 INJECTION, SOLUTION INTRAVENOUS at 23:30

## 2021-01-01 RX ADMIN — METHYLPREDNISOLONE SODIUM SUCCINATE 62.5 MG: 125 INJECTION, POWDER, FOR SOLUTION INTRAMUSCULAR; INTRAVENOUS at 15:33

## 2021-01-01 RX ADMIN — GUAIFENESIN 1200 MG: 600 TABLET, EXTENDED RELEASE ORAL at 20:17

## 2021-01-01 RX ADMIN — METHYLPREDNISOLONE SODIUM SUCCINATE 62.5 MG: 125 INJECTION, POWDER, FOR SOLUTION INTRAMUSCULAR; INTRAVENOUS at 08:46

## 2021-01-01 RX ADMIN — LORAZEPAM 0.5 MG: 2 INJECTION INTRAMUSCULAR; INTRAVENOUS at 10:52

## 2021-01-01 RX ADMIN — FENTANYL CITRATE 25 MCG: 50 INJECTION, SOLUTION INTRAMUSCULAR; INTRAVENOUS at 13:52

## 2021-01-01 RX ADMIN — METOPROLOL TARTRATE 2.5 MG: 1 INJECTION, SOLUTION INTRAVENOUS at 18:48

## 2021-01-01 RX ADMIN — PHENYLEPHRINE HYDROCHLORIDE 50 MCG: 10 INJECTION INTRAVENOUS at 12:51

## 2021-01-01 RX ADMIN — LORAZEPAM 0.5 MG: 2 INJECTION INTRAMUSCULAR; INTRAVENOUS at 09:57

## 2021-01-01 RX ADMIN — DOCUSATE SODIUM 100 MG: 50 LIQUID ORAL at 21:48

## 2021-01-01 RX ADMIN — METHYLPREDNISOLONE SODIUM SUCCINATE 40 MG: 40 INJECTION, POWDER, FOR SOLUTION INTRAMUSCULAR; INTRAVENOUS at 20:21

## 2021-01-01 RX ADMIN — IPRATROPIUM BROMIDE AND ALBUTEROL SULFATE 3 ML: .5; 3 SOLUTION RESPIRATORY (INHALATION) at 15:51

## 2021-01-01 RX ADMIN — FLUTICASONE FUROATE AND VILANTEROL TRIFENATATE 1 PUFF: 100; 25 POWDER RESPIRATORY (INHALATION) at 08:50

## 2021-01-01 RX ADMIN — POTASSIUM CHLORIDE 10 MEQ: 7.46 INJECTION, SOLUTION INTRAVENOUS at 17:27

## 2021-01-01 RX ADMIN — HYDROMORPHONE HYDROCHLORIDE 0.4 MG: 1 INJECTION, SOLUTION INTRAMUSCULAR; INTRAVENOUS; SUBCUTANEOUS at 23:58

## 2021-01-01 RX ADMIN — UMECLIDINIUM 1 PUFF: 62.5 AEROSOL, POWDER ORAL at 09:57

## 2021-01-01 RX ADMIN — PREDNISONE 10 MG: 5 TABLET ORAL at 08:44

## 2021-01-01 RX ADMIN — POTASSIUM CHLORIDE 10 MEQ: 7.46 INJECTION, SOLUTION INTRAVENOUS at 07:52

## 2021-01-01 RX ADMIN — PIPERACILLIN SODIUM AND TAZOBACTAM SODIUM 3.38 G: 3; .375 INJECTION, POWDER, LYOPHILIZED, FOR SOLUTION INTRAVENOUS at 21:42

## 2021-01-01 RX ADMIN — UMECLIDINIUM 1 PUFF: 62.5 AEROSOL, POWDER ORAL at 09:21

## 2021-01-01 RX ADMIN — PHENYLEPHRINE HYDROCHLORIDE 100 MCG: 10 INJECTION INTRAVENOUS at 11:49

## 2021-01-01 RX ADMIN — INSULIN ASPART 1 UNITS: 100 INJECTION, SOLUTION INTRAVENOUS; SUBCUTANEOUS at 18:04

## 2021-01-01 RX ADMIN — POTASSIUM CHLORIDE 10 MEQ: 7.46 INJECTION, SOLUTION INTRAVENOUS at 00:32

## 2021-01-01 RX ADMIN — METHYLPREDNISOLONE SODIUM SUCCINATE 62.5 MG: 125 INJECTION, POWDER, FOR SOLUTION INTRAMUSCULAR; INTRAVENOUS at 20:18

## 2021-01-01 RX ADMIN — PANTOPRAZOLE SODIUM 40 MG: 40 INJECTION, POWDER, FOR SOLUTION INTRAVENOUS at 09:13

## 2021-01-01 RX ADMIN — GUAIFENESIN 1200 MG: 600 TABLET, EXTENDED RELEASE ORAL at 08:42

## 2021-01-01 RX ADMIN — ASCORBIC ACID, VITAMIN A PALMITATE, CHOLECALCIFEROL, THIAMINE HYDROCHLORIDE, RIBOFLAVIN-5 PHOSPHATE SODIUM, PYRIDOXINE HYDROCHLORIDE, NIACINAMIDE, DEXPANTHENOL, ALPHA-TOCOPHEROL ACETATE, VITAMIN K1, FOLIC ACID, BIOTIN, CYANOCOBALAMIN: 200; 3300; 200; 6; 3.6; 6; 40; 15; 10; 150; 600; 60; 5 INJECTION, SOLUTION INTRAVENOUS at 15:58

## 2021-01-01 RX ADMIN — LORAZEPAM 0.5 MG: 2 INJECTION INTRAMUSCULAR; INTRAVENOUS at 22:01

## 2021-01-01 RX ADMIN — SODIUM CHLORIDE: 9 INJECTION, SOLUTION INTRAVENOUS at 06:10

## 2021-01-01 RX ADMIN — LORAZEPAM 0.5 MG: 2 INJECTION INTRAMUSCULAR; INTRAVENOUS at 20:21

## 2021-01-01 RX ADMIN — LORAZEPAM 0.5 MG: 2 INJECTION INTRAMUSCULAR; INTRAVENOUS at 22:39

## 2021-01-01 RX ADMIN — LORAZEPAM 0.5 MG: 2 INJECTION INTRAMUSCULAR; INTRAVENOUS at 14:27

## 2021-01-01 RX ADMIN — INSULIN ASPART 1 UNITS: 100 INJECTION, SOLUTION INTRAVENOUS; SUBCUTANEOUS at 18:19

## 2021-01-01 RX ADMIN — POLYETHYLENE GLYCOL 3350 17 G: 17 POWDER, FOR SOLUTION ORAL at 21:48

## 2021-01-01 RX ADMIN — UMECLIDINIUM 1 PUFF: 62.5 AEROSOL, POWDER ORAL at 10:07

## 2021-01-01 RX ADMIN — PANTOPRAZOLE SODIUM 40 MG: 40 INJECTION, POWDER, FOR SOLUTION INTRAVENOUS at 09:01

## 2021-01-01 RX ADMIN — HYDROMORPHONE HYDROCHLORIDE 0.4 MG: 1 INJECTION, SOLUTION INTRAMUSCULAR; INTRAVENOUS; SUBCUTANEOUS at 22:22

## 2021-01-01 RX ADMIN — PHENYLEPHRINE HYDROCHLORIDE 100 MCG: 10 INJECTION INTRAVENOUS at 13:16

## 2021-01-01 RX ADMIN — SENNOSIDES 10 ML: 8.8 SYRUP ORAL at 09:18

## 2021-01-01 RX ADMIN — MORPHINE SULFATE 5 MG: 100 SOLUTION ORAL at 21:05

## 2021-01-01 RX ADMIN — DOCUSATE SODIUM 100 MG: 50 LIQUID ORAL at 21:23

## 2021-01-01 RX ADMIN — CEFAZOLIN SODIUM 1 G: 2 INJECTION, SOLUTION INTRAVENOUS at 13:57

## 2021-01-01 RX ADMIN — ASCORBIC ACID, VITAMIN A PALMITATE, CHOLECALCIFEROL, THIAMINE HYDROCHLORIDE, RIBOFLAVIN-5 PHOSPHATE SODIUM, PYRIDOXINE HYDROCHLORIDE, NIACINAMIDE, DEXPANTHENOL, ALPHA-TOCOPHEROL ACETATE, VITAMIN K1, FOLIC ACID, BIOTIN, CYANOCOBALAMIN: 200; 3300; 200; 6; 3.6; 6; 40; 15; 10; 150; 600; 60; 5 INJECTION, SOLUTION INTRAVENOUS at 10:37

## 2021-01-01 RX ADMIN — ALBUMIN HUMAN: 0.05 INJECTION, SOLUTION INTRAVENOUS at 13:21

## 2021-01-01 RX ADMIN — LORAZEPAM 0.5 MG: 2 INJECTION INTRAMUSCULAR; INTRAVENOUS at 14:09

## 2021-01-01 RX ADMIN — I.V. FAT EMULSION 250 ML: 20 EMULSION INTRAVENOUS at 19:53

## 2021-01-01 RX ADMIN — POLYETHYLENE GLYCOL 3350 17 G: 17 POWDER, FOR SOLUTION ORAL at 21:04

## 2021-01-01 RX ADMIN — SODIUM CHLORIDE, POTASSIUM CHLORIDE, SODIUM LACTATE AND CALCIUM CHLORIDE: 600; 310; 30; 20 INJECTION, SOLUTION INTRAVENOUS at 13:39

## 2021-01-01 RX ADMIN — LORAZEPAM 0.5 MG: 2 INJECTION INTRAMUSCULAR; INTRAVENOUS at 22:48

## 2021-01-01 RX ADMIN — PANTOPRAZOLE SODIUM 40 MG: 40 INJECTION, POWDER, FOR SOLUTION INTRAVENOUS at 08:15

## 2021-01-01 RX ADMIN — ROCURONIUM BROMIDE 10 MG: 10 INJECTION INTRAVENOUS at 13:09

## 2021-01-01 RX ADMIN — PIPERACILLIN SODIUM AND TAZOBACTAM SODIUM 3.38 G: 3; .375 INJECTION, POWDER, LYOPHILIZED, FOR SOLUTION INTRAVENOUS at 04:44

## 2021-01-01 RX ADMIN — HYDRALAZINE HYDROCHLORIDE 10 MG: 20 INJECTION INTRAMUSCULAR; INTRAVENOUS at 12:07

## 2021-01-01 RX ADMIN — LORAZEPAM 0.5 MG: 2 INJECTION INTRAMUSCULAR; INTRAVENOUS at 13:42

## 2021-01-01 RX ADMIN — ACETAMINOPHEN 650 MG: 325 TABLET, FILM COATED ORAL at 21:35

## 2021-01-01 RX ADMIN — IPRATROPIUM BROMIDE AND ALBUTEROL SULFATE 3 ML: .5; 3 SOLUTION RESPIRATORY (INHALATION) at 05:16

## 2021-01-01 RX ADMIN — UMECLIDINIUM 1 PUFF: 62.5 AEROSOL, POWDER ORAL at 08:58

## 2021-01-01 RX ADMIN — LORAZEPAM 0.5 MG: 2 INJECTION INTRAMUSCULAR; INTRAVENOUS at 10:14

## 2021-01-01 RX ADMIN — METHYLPREDNISOLONE SODIUM SUCCINATE 40 MG: 40 INJECTION, POWDER, FOR SOLUTION INTRAMUSCULAR; INTRAVENOUS at 09:11

## 2021-01-01 RX ADMIN — ONDANSETRON 4 MG: 2 INJECTION INTRAMUSCULAR; INTRAVENOUS at 14:10

## 2021-01-01 RX ADMIN — UMECLIDINIUM 1 PUFF: 62.5 AEROSOL, POWDER ORAL at 09:23

## 2021-01-01 RX ADMIN — LORAZEPAM 0.5 MG: 2 INJECTION INTRAMUSCULAR; INTRAVENOUS at 05:16

## 2021-01-01 RX ADMIN — HYDROMORPHONE HYDROCHLORIDE 0.4 MG: 1 INJECTION, SOLUTION INTRAMUSCULAR; INTRAVENOUS; SUBCUTANEOUS at 02:14

## 2021-01-01 RX ADMIN — ROCURONIUM BROMIDE 10 MG: 10 INJECTION INTRAVENOUS at 12:39

## 2021-01-01 RX ADMIN — SODIUM CHLORIDE, POTASSIUM CHLORIDE, SODIUM LACTATE AND CALCIUM CHLORIDE 1000 ML: 600; 310; 30; 20 INJECTION, SOLUTION INTRAVENOUS at 18:22

## 2021-01-01 RX ADMIN — HYDROMORPHONE HYDROCHLORIDE 0.4 MG: 1 INJECTION, SOLUTION INTRAMUSCULAR; INTRAVENOUS; SUBCUTANEOUS at 13:50

## 2021-01-01 RX ADMIN — PREDNISONE 10 MG: 5 TABLET ORAL at 09:00

## 2021-01-01 RX ADMIN — INSULIN ASPART 3 UNITS: 100 INJECTION, SOLUTION INTRAVENOUS; SUBCUTANEOUS at 12:29

## 2021-01-01 RX ADMIN — HYDROMORPHONE HYDROCHLORIDE 0.4 MG: 1 INJECTION, SOLUTION INTRAMUSCULAR; INTRAVENOUS; SUBCUTANEOUS at 23:21

## 2021-01-01 RX ADMIN — PIPERACILLIN SODIUM AND TAZOBACTAM SODIUM 3.38 G: 3; .375 INJECTION, POWDER, LYOPHILIZED, FOR SOLUTION INTRAVENOUS at 01:07

## 2021-01-01 RX ADMIN — LORAZEPAM 0.25 MG: 2 INJECTION INTRAMUSCULAR; INTRAVENOUS at 06:10

## 2021-01-01 RX ADMIN — HYDROMORPHONE HYDROCHLORIDE 0.2 MG: 1 INJECTION, SOLUTION INTRAMUSCULAR; INTRAVENOUS; SUBCUTANEOUS at 09:12

## 2021-01-01 RX ADMIN — METOPROLOL TARTRATE 2.5 MG: 1 INJECTION, SOLUTION INTRAVENOUS at 16:49

## 2021-01-01 RX ADMIN — PROPOFOL 20 MG: 10 INJECTION, EMULSION INTRAVENOUS at 14:14

## 2021-01-01 RX ADMIN — PANTOPRAZOLE SODIUM 40 MG: 40 INJECTION, POWDER, FOR SOLUTION INTRAVENOUS at 08:42

## 2021-01-01 RX ADMIN — PROPOFOL 100 MG: 10 INJECTION, EMULSION INTRAVENOUS at 11:42

## 2021-01-01 RX ADMIN — INSULIN ASPART 1 UNITS: 100 INJECTION, SOLUTION INTRAVENOUS; SUBCUTANEOUS at 09:16

## 2021-01-01 RX ADMIN — LORAZEPAM 0.5 MG: 2 INJECTION INTRAMUSCULAR; INTRAVENOUS at 14:49

## 2021-01-01 RX ADMIN — HYDROMORPHONE HYDROCHLORIDE 0.2 MG: 1 INJECTION, SOLUTION INTRAMUSCULAR; INTRAVENOUS; SUBCUTANEOUS at 10:20

## 2021-01-01 RX ADMIN — CITALOPRAM HYDROBROMIDE 40 MG: 20 TABLET ORAL at 09:00

## 2021-01-01 RX ADMIN — METHYLPREDNISOLONE SODIUM SUCCINATE 62.5 MG: 125 INJECTION, POWDER, FOR SOLUTION INTRAMUSCULAR; INTRAVENOUS at 09:19

## 2021-01-01 RX ADMIN — DEXMEDETOMIDINE HYDROCHLORIDE 4 MCG: 100 INJECTION, SOLUTION INTRAVENOUS at 13:27

## 2021-01-01 RX ADMIN — PIPERACILLIN SODIUM AND TAZOBACTAM SODIUM 3.38 G: 3; .375 INJECTION, POWDER, LYOPHILIZED, FOR SOLUTION INTRAVENOUS at 09:00

## 2021-01-01 RX ADMIN — DOCUSATE SODIUM 100 MG: 50 LIQUID ORAL at 09:01

## 2021-01-01 RX ADMIN — ALBUTEROL SULFATE 8 PUFF: 90 AEROSOL, METERED RESPIRATORY (INHALATION) at 13:48

## 2021-01-01 RX ADMIN — IPRATROPIUM BROMIDE AND ALBUTEROL SULFATE 3 ML: .5; 3 SOLUTION RESPIRATORY (INHALATION) at 18:51

## 2021-01-01 RX ADMIN — SODIUM CHLORIDE: 9 INJECTION, SOLUTION INTRAVENOUS at 10:46

## 2021-01-01 RX ADMIN — PANTOPRAZOLE SODIUM 40 MG: 40 TABLET, DELAYED RELEASE ORAL at 09:00

## 2021-01-01 RX ADMIN — PHENYLEPHRINE HYDROCHLORIDE 50 MCG: 10 INJECTION INTRAVENOUS at 11:55

## 2021-01-01 RX ADMIN — SODIUM PHOSPHATE, MONOBASIC, MONOHYDRATE 9 MMOL: 276; 142 INJECTION, SOLUTION INTRAVENOUS at 09:20

## 2021-01-01 RX ADMIN — SENNOSIDES 10 ML: 8.8 SYRUP ORAL at 21:23

## 2021-01-01 RX ADMIN — PIPERACILLIN SODIUM AND TAZOBACTAM SODIUM 3.38 G: 3; .375 INJECTION, POWDER, LYOPHILIZED, FOR SOLUTION INTRAVENOUS at 16:28

## 2021-01-01 RX ADMIN — LORAZEPAM 0.5 MG: 2 INJECTION INTRAMUSCULAR; INTRAVENOUS at 15:04

## 2021-01-01 RX ADMIN — SODIUM CHLORIDE: 9 INJECTION, SOLUTION INTRAVENOUS at 23:11

## 2021-01-01 RX ADMIN — ASCORBIC ACID, VITAMIN A PALMITATE, CHOLECALCIFEROL, THIAMINE HYDROCHLORIDE, RIBOFLAVIN-5 PHOSPHATE SODIUM, PYRIDOXINE HYDROCHLORIDE, NIACINAMIDE, DEXPANTHENOL, ALPHA-TOCOPHEROL ACETATE, VITAMIN K1, FOLIC ACID, BIOTIN, CYANOCOBALAMIN: 200; 3300; 200; 6; 3.6; 6; 40; 15; 10; 150; 600; 60; 5 INJECTION, SOLUTION INTRAVENOUS at 18:49

## 2021-01-01 RX ADMIN — CITALOPRAM HYDROBROMIDE 40 MG: 20 TABLET ORAL at 09:20

## 2021-01-01 RX ADMIN — LORAZEPAM 0.5 MG: 2 INJECTION INTRAMUSCULAR; INTRAVENOUS at 20:51

## 2021-01-01 RX ADMIN — HYDROMORPHONE HYDROCHLORIDE 0.4 MG: 1 INJECTION, SOLUTION INTRAMUSCULAR; INTRAVENOUS; SUBCUTANEOUS at 02:05

## 2021-01-01 RX ADMIN — ACETAMINOPHEN 650 MG: 650 SUPPOSITORY RECTAL at 08:35

## 2021-01-01 RX ADMIN — SODIUM CHLORIDE, POTASSIUM CHLORIDE, SODIUM LACTATE AND CALCIUM CHLORIDE: 600; 310; 30; 20 INJECTION, SOLUTION INTRAVENOUS at 21:04

## 2021-01-01 RX ADMIN — METOPROLOL TARTRATE 2.5 MG: 1 INJECTION, SOLUTION INTRAVENOUS at 20:36

## 2021-01-01 RX ADMIN — METHYLPREDNISOLONE SODIUM SUCCINATE 40 MG: 40 INJECTION, POWDER, FOR SOLUTION INTRAMUSCULAR; INTRAVENOUS at 08:14

## 2021-01-01 RX ADMIN — SODIUM CHLORIDE: 9 INJECTION, SOLUTION INTRAVENOUS at 09:25

## 2021-01-01 RX ADMIN — SENNOSIDES 10 ML: 8.8 SYRUP ORAL at 21:48

## 2021-01-01 RX ADMIN — GUAIFENESIN 1200 MG: 600 TABLET, EXTENDED RELEASE ORAL at 21:23

## 2021-01-01 RX ADMIN — GUAIFENESIN 1200 MG: 600 TABLET, EXTENDED RELEASE ORAL at 09:20

## 2021-01-01 RX ADMIN — HYDROMORPHONE HYDROCHLORIDE 0.2 MG: 1 INJECTION, SOLUTION INTRAMUSCULAR; INTRAVENOUS; SUBCUTANEOUS at 12:35

## 2021-01-01 RX ADMIN — IPRATROPIUM BROMIDE AND ALBUTEROL SULFATE 3 ML: .5; 3 SOLUTION RESPIRATORY (INHALATION) at 23:39

## 2021-01-01 RX ADMIN — LORAZEPAM 0.5 MG: 2 INJECTION INTRAMUSCULAR; INTRAVENOUS at 02:42

## 2021-01-01 RX ADMIN — LORAZEPAM 0.5 MG: 2 INJECTION INTRAMUSCULAR; INTRAVENOUS at 16:02

## 2021-01-01 RX ADMIN — GUAIFENESIN 1200 MG: 600 TABLET, EXTENDED RELEASE ORAL at 21:03

## 2021-01-01 RX ADMIN — HYDROMORPHONE HYDROCHLORIDE 0.4 MG: 1 INJECTION, SOLUTION INTRAMUSCULAR; INTRAVENOUS; SUBCUTANEOUS at 21:12

## 2021-01-01 RX ADMIN — PHENYLEPHRINE HYDROCHLORIDE 0.3 MCG/KG/MIN: 10 INJECTION INTRAVENOUS at 13:21

## 2021-01-01 RX ADMIN — I.V. FAT EMULSION 250 ML: 20 EMULSION INTRAVENOUS at 20:06

## 2021-01-01 RX ADMIN — I.V. FAT EMULSION 250 ML: 20 EMULSION INTRAVENOUS at 21:30

## 2021-01-01 RX ADMIN — SODIUM CHLORIDE, POTASSIUM CHLORIDE, SODIUM LACTATE AND CALCIUM CHLORIDE 500 ML: 600; 310; 30; 20 INJECTION, SOLUTION INTRAVENOUS at 20:19

## 2021-01-01 RX ADMIN — LIDOCAINE HYDROCHLORIDE ANHYDROUS 3 ML: 10 INJECTION, SOLUTION INFILTRATION at 15:41

## 2021-01-01 RX ADMIN — SODIUM CHLORIDE: 9 INJECTION, SOLUTION INTRAVENOUS at 01:05

## 2021-01-01 RX ADMIN — PHENYLEPHRINE HYDROCHLORIDE 100 MCG: 10 INJECTION INTRAVENOUS at 13:10

## 2021-01-01 RX ADMIN — IPRATROPIUM BROMIDE AND ALBUTEROL SULFATE 3 ML: .5; 3 SOLUTION RESPIRATORY (INHALATION) at 10:07

## 2021-01-01 RX ADMIN — FENTANYL CITRATE 25 MCG: 50 INJECTION, SOLUTION INTRAMUSCULAR; INTRAVENOUS at 15:24

## 2021-01-01 RX ADMIN — POTASSIUM CHLORIDE 10 MEQ: 7.46 INJECTION, SOLUTION INTRAVENOUS at 19:25

## 2021-01-01 RX ADMIN — LORAZEPAM 0.5 MG: 2 INJECTION INTRAMUSCULAR; INTRAVENOUS at 03:11

## 2021-01-01 RX ADMIN — LORAZEPAM 0.5 MG: 2 INJECTION INTRAMUSCULAR; INTRAVENOUS at 07:52

## 2021-01-01 RX ADMIN — HYDROMORPHONE HYDROCHLORIDE 0.4 MG: 1 INJECTION, SOLUTION INTRAMUSCULAR; INTRAVENOUS; SUBCUTANEOUS at 20:58

## 2021-01-01 RX ADMIN — METHYLPREDNISOLONE SODIUM SUCCINATE 20 MG: 40 INJECTION, POWDER, FOR SOLUTION INTRAMUSCULAR; INTRAVENOUS at 08:15

## 2021-01-01 RX ADMIN — HYDROMORPHONE HYDROCHLORIDE 0.2 MG: 1 INJECTION, SOLUTION INTRAMUSCULAR; INTRAVENOUS; SUBCUTANEOUS at 12:20

## 2021-01-01 RX ADMIN — METHYLPREDNISOLONE SODIUM SUCCINATE 62.5 MG: 125 INJECTION, POWDER, FOR SOLUTION INTRAMUSCULAR; INTRAVENOUS at 15:57

## 2021-01-01 RX ADMIN — PIPERACILLIN SODIUM AND TAZOBACTAM SODIUM 3.38 G: 3; .375 INJECTION, POWDER, LYOPHILIZED, FOR SOLUTION INTRAVENOUS at 20:07

## 2021-01-01 RX ADMIN — METHYLPREDNISOLONE SODIUM SUCCINATE 62.5 MG: 125 INJECTION, POWDER, FOR SOLUTION INTRAMUSCULAR; INTRAVENOUS at 20:07

## 2021-01-01 RX ADMIN — LORAZEPAM 0.5 MG: 2 INJECTION INTRAMUSCULAR; INTRAVENOUS at 18:14

## 2021-01-01 RX ADMIN — PIPERACILLIN SODIUM AND TAZOBACTAM SODIUM 3.38 G: 3; .375 INJECTION, POWDER, LYOPHILIZED, FOR SOLUTION INTRAVENOUS at 11:37

## 2021-01-01 RX ADMIN — ASCORBIC ACID, VITAMIN A PALMITATE, CHOLECALCIFEROL, THIAMINE HYDROCHLORIDE, RIBOFLAVIN-5 PHOSPHATE SODIUM, PYRIDOXINE HYDROCHLORIDE, NIACINAMIDE, DEXPANTHENOL, ALPHA-TOCOPHEROL ACETATE, VITAMIN K1, FOLIC ACID, BIOTIN, CYANOCOBALAMIN: 200; 3300; 200; 6; 3.6; 6; 40; 15; 10; 150; 600; 60; 5 INJECTION, SOLUTION INTRAVENOUS at 11:38

## 2021-01-01 RX ADMIN — METHYLPREDNISOLONE SODIUM SUCCINATE 62.5 MG: 125 INJECTION, POWDER, FOR SOLUTION INTRAMUSCULAR; INTRAVENOUS at 07:52

## 2021-01-01 RX ADMIN — SODIUM CHLORIDE, POTASSIUM CHLORIDE, SODIUM LACTATE AND CALCIUM CHLORIDE 500 ML: 600; 310; 30; 20 INJECTION, SOLUTION INTRAVENOUS at 23:25

## 2021-01-01 RX ADMIN — LORAZEPAM 0.5 MG: 2 INJECTION INTRAMUSCULAR; INTRAVENOUS at 10:25

## 2021-01-01 RX ADMIN — LORAZEPAM 0.5 MG: 2 INJECTION INTRAMUSCULAR; INTRAVENOUS at 05:14

## 2021-01-01 RX ADMIN — LORAZEPAM 1 MG: 2 LIQUID ORAL at 15:45

## 2021-01-01 RX ADMIN — FLUTICASONE FUROATE AND VILANTEROL TRIFENATATE 1 PUFF: 100; 25 POWDER RESPIRATORY (INHALATION) at 08:42

## 2021-01-01 RX ADMIN — FENTANYL CITRATE 25 MCG: 50 INJECTION, SOLUTION INTRAMUSCULAR; INTRAVENOUS at 14:14

## 2021-01-01 RX ADMIN — LORAZEPAM 0.5 MG: 0.5 TABLET ORAL at 18:50

## 2021-01-01 RX ADMIN — IPRATROPIUM BROMIDE AND ALBUTEROL SULFATE 3 ML: .5; 3 SOLUTION RESPIRATORY (INHALATION) at 10:41

## 2021-01-01 RX ADMIN — LORAZEPAM 0.5 MG: 2 INJECTION INTRAMUSCULAR; INTRAVENOUS at 22:22

## 2021-01-01 RX ADMIN — PHENYLEPHRINE HYDROCHLORIDE 50 MCG: 10 INJECTION INTRAVENOUS at 12:56

## 2021-01-01 RX ADMIN — SENNOSIDES 10 ML: 8.8 SYRUP ORAL at 21:01

## 2021-01-01 RX ADMIN — METOPROLOL TARTRATE 2.5 MG: 1 INJECTION, SOLUTION INTRAVENOUS at 10:14

## 2021-01-01 RX ADMIN — METHYLPREDNISOLONE SODIUM SUCCINATE 40 MG: 40 INJECTION, POWDER, FOR SOLUTION INTRAMUSCULAR; INTRAVENOUS at 21:20

## 2021-01-01 RX ADMIN — PIPERACILLIN SODIUM AND TAZOBACTAM SODIUM 3.38 G: 3; .375 INJECTION, POWDER, LYOPHILIZED, FOR SOLUTION INTRAVENOUS at 15:56

## 2021-01-01 RX ADMIN — HYDROMORPHONE HYDROCHLORIDE 0.2 MG: 1 INJECTION, SOLUTION INTRAMUSCULAR; INTRAVENOUS; SUBCUTANEOUS at 09:27

## 2021-01-01 RX ADMIN — LORAZEPAM 0.5 MG: 2 INJECTION INTRAMUSCULAR; INTRAVENOUS at 19:40

## 2021-01-01 RX ADMIN — SENNOSIDES 10 ML: 8.8 SYRUP ORAL at 09:00

## 2021-01-01 RX ADMIN — PIPERACILLIN SODIUM AND TAZOBACTAM SODIUM 3.38 G: 3; .375 INJECTION, POWDER, LYOPHILIZED, FOR SOLUTION INTRAVENOUS at 13:02

## 2021-01-01 RX ADMIN — PANTOPRAZOLE SODIUM 40 MG: 40 INJECTION, POWDER, FOR SOLUTION INTRAVENOUS at 15:27

## 2021-01-01 RX ADMIN — INSULIN ASPART 3 UNITS: 100 INJECTION, SOLUTION INTRAVENOUS; SUBCUTANEOUS at 08:58

## 2021-01-01 RX ADMIN — INSULIN ASPART 1 UNITS: 100 INJECTION, SOLUTION INTRAVENOUS; SUBCUTANEOUS at 12:47

## 2021-01-01 RX ADMIN — PIPERACILLIN SODIUM AND TAZOBACTAM SODIUM 3.38 G: 3; .375 INJECTION, POWDER, LYOPHILIZED, FOR SOLUTION INTRAVENOUS at 17:22

## 2021-01-01 RX ADMIN — PIPERACILLIN SODIUM AND TAZOBACTAM SODIUM 3.38 G: 3; .375 INJECTION, POWDER, LYOPHILIZED, FOR SOLUTION INTRAVENOUS at 02:12

## 2021-01-01 RX ADMIN — DIATRIZOATE MEGLUMINE AND DIATRIZOATE SODIUM 480 ML: 660; 100 SOLUTION ORAL; RECTAL at 13:03

## 2021-01-01 RX ADMIN — HYDROMORPHONE HYDROCHLORIDE 0.4 MG: 1 INJECTION, SOLUTION INTRAMUSCULAR; INTRAVENOUS; SUBCUTANEOUS at 12:37

## 2021-01-01 RX ADMIN — SODIUM CHLORIDE, POTASSIUM CHLORIDE, SODIUM LACTATE AND CALCIUM CHLORIDE 1000 ML: 600; 310; 30; 20 INJECTION, SOLUTION INTRAVENOUS at 19:11

## 2021-01-01 RX ADMIN — POTASSIUM CHLORIDE 10 MEQ: 7.46 INJECTION, SOLUTION INTRAVENOUS at 17:22

## 2021-01-01 RX ADMIN — PIPERACILLIN SODIUM AND TAZOBACTAM SODIUM 3.38 G: 3; .375 INJECTION, POWDER, LYOPHILIZED, FOR SOLUTION INTRAVENOUS at 19:42

## 2021-01-01 RX ADMIN — PANTOPRAZOLE SODIUM 40 MG: 40 INJECTION, POWDER, FOR SOLUTION INTRAVENOUS at 08:32

## 2021-01-01 RX ADMIN — PIPERACILLIN SODIUM AND TAZOBACTAM SODIUM 3.38 G: 3; .375 INJECTION, POWDER, LYOPHILIZED, FOR SOLUTION INTRAVENOUS at 06:16

## 2021-01-01 RX ADMIN — ALBUTEROL SULFATE 8 PUFF: 90 AEROSOL, METERED RESPIRATORY (INHALATION) at 12:02

## 2021-01-01 RX ADMIN — ACETAMINOPHEN 650 MG: 325 TABLET, FILM COATED ORAL at 00:55

## 2021-01-01 RX ADMIN — LORAZEPAM 0.5 MG: 2 INJECTION INTRAMUSCULAR; INTRAVENOUS at 20:36

## 2021-01-01 RX ADMIN — UMECLIDINIUM 1 PUFF: 62.5 AEROSOL, POWDER ORAL at 12:57

## 2021-01-01 RX ADMIN — ASCORBIC ACID, VITAMIN A PALMITATE, CHOLECALCIFEROL, THIAMINE HYDROCHLORIDE, RIBOFLAVIN-5 PHOSPHATE SODIUM, PYRIDOXINE HYDROCHLORIDE, NIACINAMIDE, DEXPANTHENOL, ALPHA-TOCOPHEROL ACETATE, VITAMIN K1, FOLIC ACID, BIOTIN, CYANOCOBALAMIN: 200; 3300; 200; 6; 3.6; 6; 40; 15; 10; 150; 600; 60; 5 INJECTION, SOLUTION INTRAVENOUS at 02:25

## 2021-01-01 RX ADMIN — UMECLIDINIUM 1 PUFF: 62.5 AEROSOL, POWDER ORAL at 08:50

## 2021-01-01 RX ADMIN — FLUTICASONE FUROATE AND VILANTEROL TRIFENATATE 1 PUFF: 100; 25 POWDER RESPIRATORY (INHALATION) at 09:21

## 2021-01-01 RX ADMIN — LORAZEPAM 0.5 MG: 2 INJECTION INTRAMUSCULAR; INTRAVENOUS at 08:59

## 2021-01-01 RX ADMIN — PIPERACILLIN SODIUM AND TAZOBACTAM SODIUM 3.38 G: 3; .375 INJECTION, POWDER, LYOPHILIZED, FOR SOLUTION INTRAVENOUS at 06:46

## 2021-01-01 RX ADMIN — METHYLPREDNISOLONE SODIUM SUCCINATE 40 MG: 40 INJECTION, POWDER, FOR SOLUTION INTRAMUSCULAR; INTRAVENOUS at 19:54

## 2021-01-01 RX ADMIN — HYDROMORPHONE HYDROCHLORIDE 0.4 MG: 1 INJECTION, SOLUTION INTRAMUSCULAR; INTRAVENOUS; SUBCUTANEOUS at 21:42

## 2021-01-01 RX ADMIN — IPRATROPIUM BROMIDE AND ALBUTEROL SULFATE 3 ML: .5; 3 SOLUTION RESPIRATORY (INHALATION) at 19:37

## 2021-01-01 RX ADMIN — IPRATROPIUM BROMIDE AND ALBUTEROL SULFATE 3 ML: .5; 3 SOLUTION RESPIRATORY (INHALATION) at 12:47

## 2021-01-01 RX ADMIN — LIDOCAINE HYDROCHLORIDE 60 MG: 20 INJECTION, SOLUTION INFILTRATION; PERINEURAL at 11:42

## 2021-01-01 ASSESSMENT — ACTIVITIES OF DAILY LIVING (ADL)
ADLS_ACUITY_SCORE: 18
ADLS_ACUITY_SCORE: 18
ADLS_ACUITY_SCORE: 20
ADLS_ACUITY_SCORE: 17
ADLS_ACUITY_SCORE: 22
ADLS_ACUITY_SCORE: 17
ADLS_ACUITY_SCORE: 21
ADLS_ACUITY_SCORE: 18
ADLS_ACUITY_SCORE: 18
ADLS_ACUITY_SCORE: 17
ADLS_ACUITY_SCORE: 17
ADLS_ACUITY_SCORE: 21
ADLS_ACUITY_SCORE: 21
ADLS_ACUITY_SCORE: 18
ADLS_ACUITY_SCORE: 19
ADLS_ACUITY_SCORE: 20
ADLS_ACUITY_SCORE: 22
ADLS_ACUITY_SCORE: 20
ADLS_ACUITY_SCORE: 17
ADLS_ACUITY_SCORE: 20
ADLS_ACUITY_SCORE: 18
ADLS_ACUITY_SCORE: 17
ADLS_ACUITY_SCORE: 16
ADLS_ACUITY_SCORE: 22
ADLS_ACUITY_SCORE: 20
ADLS_ACUITY_SCORE: 18
ADLS_ACUITY_SCORE: 22
ADLS_ACUITY_SCORE: 22
ADLS_ACUITY_SCORE: 16
ADLS_ACUITY_SCORE: 22
ADLS_ACUITY_SCORE: 21
ADLS_ACUITY_SCORE: 20
ADLS_ACUITY_SCORE: 18
ADLS_ACUITY_SCORE: 20
ADLS_ACUITY_SCORE: 19
ADLS_ACUITY_SCORE: 20
ADLS_ACUITY_SCORE: 20
ADLS_ACUITY_SCORE: 19
ADLS_ACUITY_SCORE: 18
ADLS_ACUITY_SCORE: 18
ADLS_ACUITY_SCORE: 22
ADLS_ACUITY_SCORE: 22
ADLS_ACUITY_SCORE: 20
ADLS_ACUITY_SCORE: 20
ADLS_ACUITY_SCORE: 22
ADLS_ACUITY_SCORE: 18
ADLS_ACUITY_SCORE: 20
ADLS_ACUITY_SCORE: 20
ADLS_ACUITY_SCORE: 18
ADLS_ACUITY_SCORE: 22
ADLS_ACUITY_SCORE: 18
ADLS_ACUITY_SCORE: 22
ADLS_ACUITY_SCORE: 20
ADLS_ACUITY_SCORE: 22
ADLS_ACUITY_SCORE: 22
ADLS_ACUITY_SCORE: 17
ADLS_ACUITY_SCORE: 20
ADLS_ACUITY_SCORE: 22
ADLS_ACUITY_SCORE: 20
ADLS_ACUITY_SCORE: 18
ADLS_ACUITY_SCORE: 21
ADLS_ACUITY_SCORE: 17
ADLS_ACUITY_SCORE: 22
ADLS_ACUITY_SCORE: 17
ADLS_ACUITY_SCORE: 17
ADLS_ACUITY_SCORE: 18
ADLS_ACUITY_SCORE: 22
ADLS_ACUITY_SCORE: 22
ADLS_ACUITY_SCORE: 17
ADLS_ACUITY_SCORE: 22
ADLS_ACUITY_SCORE: 20
ADLS_ACUITY_SCORE: 18
ADLS_ACUITY_SCORE: 22
ADLS_ACUITY_SCORE: 20
ADLS_ACUITY_SCORE: 18
ADLS_ACUITY_SCORE: 18
ADLS_ACUITY_SCORE: 20
ADLS_ACUITY_SCORE: 21
ADLS_ACUITY_SCORE: 19
ADLS_ACUITY_SCORE: 18
ADLS_ACUITY_SCORE: 19
ADLS_ACUITY_SCORE: 17
ADLS_ACUITY_SCORE: 20
ADLS_ACUITY_SCORE: 18
ADLS_ACUITY_SCORE: 17
ADLS_ACUITY_SCORE: 21
ADLS_ACUITY_SCORE: 18
ADLS_ACUITY_SCORE: 22
ADLS_ACUITY_SCORE: 18
ADLS_ACUITY_SCORE: 22
ADLS_ACUITY_SCORE: 20
ADLS_ACUITY_SCORE: 22
ADLS_ACUITY_SCORE: 20
ADLS_ACUITY_SCORE: 18
ADLS_ACUITY_SCORE: 22
ADLS_ACUITY_SCORE: 22
ADLS_ACUITY_SCORE: 17
ADLS_ACUITY_SCORE: 18
ADLS_ACUITY_SCORE: 22
ADLS_ACUITY_SCORE: 17
ADLS_ACUITY_SCORE: 20

## 2021-01-01 ASSESSMENT — MIFFLIN-ST. JEOR
SCORE: 1069.68
SCORE: 1047
SCORE: 1051.53
SCORE: 1103.13
SCORE: 1093.13
SCORE: 1116.13
SCORE: 1087.13
SCORE: 1083.28
SCORE: 1088.73
SCORE: 1089.13
SCORE: 1083.28

## 2021-01-01 ASSESSMENT — LIFESTYLE VARIABLES: TOBACCO_USE: 1

## 2021-01-01 ASSESSMENT — COPD QUESTIONNAIRES
COPD: 1
CAT_SEVERITY: SEVERE

## 2021-01-11 NOTE — PROGRESS NOTES
"[facility]    Chief Complaint   Patient presents with     Nursing Home Acute     HPI:    Bella Saucedo is a 75 year old  (1945), who is being seen today for an episodic care visit at: [facility] ([facility]) [[id number]]    Seeing patient for a f/u.   Patient admitted with no CPAP orders. Patients daughter called to request patient use this at noc. Re-ordered previous settings. Patient states she is able to tolerate for about 30 minutes due to anxiety with use. No recent reports of hypoxia with supplemental oxygen. Patient reports minimal activity due to dyspnea. No fever or chills.     Patient denies abd pain or GERD. States she is having \"blow outs\". Per RN patient was previously having hard stools.     No edema, patient states she feels \"dry\".     RN reports labia reddened and white creamy vaginal discharge. Patient denies itching or pain.     PMH/PSH reviewed in [facility] today.  REVIEW OF SYSTEMS:  4 point ROS including Respiratory, CV, GI and , other than that noted in the HPI,  is negative    EXAM:  /55   Pulse 76   Temp 97.3  F (36.3  C)   Resp 20   Wt 56.7 kg (125 lb)   SpO2 97%   BMI 21.46 kg/m    GENERAL APPEARANCE:  Alert, in no distress, lying in bed  RESP: auscultation of lungs clear , no respiratory distress  CV:  Palpation and auscultation of heart done , rate and rhythm reg, no peripheral edema  ABDOMEN:  normal bowel sounds, soft, nontender, no hepatosplenomegaly or other masses  SKIN:  Dry/pale, no rashes or lesions to exposed skin  NEURO: 2-12 in normal limits and at patient's baseline  PSYCH:  insight and judgement, memory intact , affect and mood Pleasant       Most Recent 3 CBC's:  Recent Labs   Lab Test 12/31/20  0134 12/24/20  1245 11/02/20  0730   WBC 11.2* 10.9 11.3*   HGB 12.9 14.4 13.2   MCV 97 99 98    267 223     Most Recent 3 BMP's:  Recent Labs   Lab Test 12/31/20  0134 12/24/20  1245 11/20/20  0800    140 141 "   POTASSIUM 3.7 4.7 4.2   CHLORIDE 106 98 103   CO2 35* 42* 38*   BUN 27 32* 19   CR 0.64 0.59 0.55   ANIONGAP <1* <1* <1*   MARILU 8.9 9.1 9.1   * 110* 106*     Most Recent 2 LFT's:  Recent Labs   Lab Test 12/31/20  0134 12/24/20  1245   AST 17 19   ALT 22 24   ALKPHOS 95 106   BILITOTAL 0.4 0.4     Most Recent 3 BNP's:No lab results found.  Most Recent TSH and T4:  Recent Labs   Lab Test 12/24/20  1245   TSH 1.24     Most Recent Anemia Panel:  Recent Labs   Lab Test 12/31/20  0134 12/24/20  1245   WBC 11.2* 10.9   HGB 12.9 14.4   HCT 41.1 46.6   MCV 97 99    267   B12  --  680   FOLIC  --  14.5       Assessment/Plan:      COPD, severe (H)  Heart failure with preserved ejection fraction, NYHA class I (H)  Chronic respiratory failure with hypoxia and hypercapnia (H)  - previous smoker  - on oxygen at baseline, frequent exacerbations. Hospitalized 10/17-10/30 was intubated. Pulmonology consult during this hospitalization noted previous PFT's from 2014 with severe obstruction. Patient discontinued to TCU and then home after. Re- hospitalized 6 weeks after this episode. Treated with doxy and predneisone  - 2 recent ER visits for hypoxia, now sat's are stable  - Discussed pulmonology referral with patient. She declines, states she does not want to leave facility or pursue any further testing. Initially wanted to start hospice, but has changed her mind  - treatment regimen: supplemental oxygen, avdair, albuterol, prednisone duo neb Q 4  and anoro Ellipta daily.   - ativan has helped with frequent requests for albuterol  - Patient appears dry, will decrease lasix from 40 to 20     Anxiety  - stable on celexa and prn ativan    Malignant neoplasm of colon, unspecified part of colon (H)  - hx of colon resection, has colostomy    Type 2 diabetes mellitus with other circulatory complication, with long-term current use of insulin (H)  - BG reviewed in [facility]: range 111-350, no lows  - continue current regimen    Yeast  vaginitis  - will start clotrimazole at bedtime x 7 days    Vitamin D deficiency  - level 13, increased supplement to 4,000 IU    GERD  - asymptomatic, will reduce PPI to QOD    Orders:  - clotrimazole 1%, 5 gm PV at bedtime x 7 days  - CBC, BMP, MG DX: COPD, CHF  - Decrease lasix to 20 mg every day  - Decrease protonix to 20 mg every other day  - Increase vitamin D3 to 4,000 international unit(s) every day      Electronically signed by:  KASHMIR Gonzalez CNP

## 2021-01-11 NOTE — LETTER
"    1/11/2021        RE: Bella Saucedo  [address]  [facility] [address]        [place] [facility]    Chief Complaint   Patient presents with     Nursing Home Acute     HPI:    Bella Saucedo is a 75 year old  (1945), who is being seen today for an episodic care visit at: [facility] ([facility]) [[id number]]    Seeing patient for a f/u.   Patient admitted with no CPAP orders. Patients daughter called to request patient use this at noc. Re-ordered previous settings. Patient states she is able to tolerate for about 30 minutes due to anxiety with use. No recent reports of hypoxia with supplemental oxygen. Patient reports minimal activity due to dyspnea. No fever or chills.     Patient denies abd pain or GERD. States she is having \"blow outs\". Per RN patient was previously having hard stools.     No edema, patient states she feels \"dry\".     RN reports labia reddened and white creamy vaginal discharge. Patient denies itching or pain.     PMH/PSH reviewed in [facility] today.  REVIEW OF SYSTEMS:  4 point ROS including Respiratory, CV, GI and , other than that noted in the HPI,  is negative    EXAM:  /55   Pulse 76   Temp 97.3  F (36.3  C)   Resp 20   Wt 56.7 kg (125 lb)   SpO2 97%   BMI 21.46 kg/m    GENERAL APPEARANCE:  Alert, in no distress, lying in bed  RESP: auscultation of lungs clear , no respiratory distress  CV:  Palpation and auscultation of heart done , rate and rhythm reg, no peripheral edema  ABDOMEN:  normal bowel sounds, soft, nontender, no hepatosplenomegaly or other masses  SKIN:  Dry/pale, no rashes or lesions to exposed skin  NEURO: 2-12 in normal limits and at patient's baseline  PSYCH:  insight and judgement, memory intact , affect and mood Pleasant       Most Recent 3 CBC's:  Recent Labs   Lab Test 12/31/20  0134 12/24/20  1245 11/02/20  0730   WBC 11.2* 10.9 11.3*   HGB 12.9 14.4 13.2   MCV 97 99 98    267 223     Most Recent 3 " BMP's:  Recent Labs   Lab Test 12/31/20  0134 12/24/20  1245 11/20/20  0800    140 141   POTASSIUM 3.7 4.7 4.2   CHLORIDE 106 98 103   CO2 35* 42* 38*   BUN 27 32* 19   CR 0.64 0.59 0.55   ANIONGAP <1* <1* <1*   MARILU 8.9 9.1 9.1   * 110* 106*     Most Recent 2 LFT's:  Recent Labs   Lab Test 12/31/20 0134 12/24/20  1245   AST 17 19   ALT 22 24   ALKPHOS 95 106   BILITOTAL 0.4 0.4     Most Recent 3 BNP's:No lab results found.  Most Recent TSH and T4:  Recent Labs   Lab Test 12/24/20  1245   TSH 1.24     Most Recent Anemia Panel:  Recent Labs   Lab Test 12/31/20 0134 12/24/20  1245   WBC 11.2* 10.9   HGB 12.9 14.4   HCT 41.1 46.6   MCV 97 99    267   B12  --  680   FOLIC  --  14.5       Assessment/Plan:      COPD, severe (H)  Heart failure with preserved ejection fraction, NYHA class I (H)  Chronic respiratory failure with hypoxia and hypercapnia (H)  - previous smoker  - on oxygen at baseline, frequent exacerbations. Hospitalized 10/17-10/30 was intubated. Pulmonology consult during this hospitalization noted previous PFT's from 2014 with severe obstruction. Patient discontinued to TCU and then home after. Re- hospitalized 6 weeks after this episode. Treated with doxy and predneisone  - 2 recent ER visits for hypoxia, now sat's are stable  - Discussed pulmonology referral with patient. She declines, states she does not want to leave facility or pursue any further testing. Initially wanted to start hospice, but has changed her mind  - treatment regimen: supplemental oxygen, avdair, albuterol, prednisone duo neb Q 4  and anoro Ellipta daily.   - ativan has helped with frequent requests for albuterol  - Patient appears dry, will decrease lasix from 40 to 20     Anxiety  - stable on celexa and prn ativan    Malignant neoplasm of colon, unspecified part of colon (H)  - hx of colon resection, has colostomy    Type 2 diabetes mellitus with other circulatory complication, with long-term current use of  insulin (H)  - BG reviewed in [facility]: range 111-350, no lows  - continue current regimen    Yeast vaginitis  - will start clotrimazole at bedtime x 7 days    Vitamin D deficiency  - level 13, increased supplement to 4,000 IU    GERD  - asymptomatic, will reduce PPI to QOD    Orders:  - clotrimazole 1%, 5 gm PV at bedtime x 7 days  - CBC, BMP, MG DX: COPD, CHF  - Decrease lasix to 20 mg every day  - Decrease protonix to 20 mg every other day  - Increase vitamin D3 to 4,000 international unit(s) every day      Electronically signed by:  KASHMIR Gonzalez CNP          Sincerely,        KASHMIR Gonzalez CNP

## 2021-01-12 PROBLEM — E11.59 TYPE 2 DIABETES MELLITUS WITH OTHER CIRCULATORY COMPLICATION, WITH LONG-TERM CURRENT USE OF INSULIN (H): Status: ACTIVE | Noted: 2021-01-01

## 2021-01-12 PROBLEM — Z79.4 TYPE 2 DIABETES MELLITUS WITH OTHER CIRCULATORY COMPLICATION, WITH LONG-TERM CURRENT USE OF INSULIN (H): Status: ACTIVE | Noted: 2021-01-01

## 2021-01-12 PROBLEM — E46 MALNUTRITION, UNSPECIFIED TYPE (H): Status: ACTIVE | Noted: 2021-01-01

## 2021-01-13 NOTE — LETTER
"    1/13/2021        RE: Bella Saucedo  [address]  [facility] [address]         [facility]  Bella Saucedo is being evaluated via a billable video.   The patient has been notified of following:  \"This video visit will be conducted via a call between you and your provider. We have found that certain health care needs can be provided without the need for an in-person physical exam.  This service lets us provide the care you need with a video conversation. If during the course of the call the provider feels a video visit is not appropriate, you will not be charged for this service.\"   The provider has received verbal consent for a Video Visit from the patient and or first contact? Yes  Patient/facility staff would like the video invitation sent by: N/A   Video Start Time: 11:59  Which Facility the Patient is at during the time of visit: [facility]  PRIMARY CARE PROVIDER AND CLINIC:  Shannon Olivas MD, [address] / [facility]*  Chief Complaint   Patient presents with     Hospital F/U     Video Visit     [place] Medical Record Number:  [id number]  Bella Saucedo  is a 75 year old  (1945), admitted to the above facility from  [facility]. Hospital stay 12/14/20 through 12/16/20..  Admitted to this facility for  rehab, medical management and nursing care.  HPI:    HPI information obtained from: facility staff, patient report and [facility] chart review.     INTERVAL HISTORY  --------------------------  10/17/20 through 10/30/20: Patient with past medical history of HFpEF, respiratory failure/COPD hospitalized with acute COPD exacerbation and concern for CAP, acute on chronic respiratory failure required intubation, completed antibiotic  Meds started: [facility] Concepcion  -11/24: [place] [facility] [place]      Brief Summary of Hospital Course:   -Hospital at this time with ACOPDE treated with doxycycline and " prednisone.      Updates on Status Since Skilled nursing Admission:   -To recent ER visit for hypoxia    Today:  - Pt seen in the presence of RN who graciously assisted with the virtual visit  - COPD:  Reports on 2 lit of O2, breathing is very good. Reports KELLY, cough about the same.   - anxiety: reports it aggravates her SOB. Reports  Gets anxious about every thing. Reports meds does help.   - Rehab: reports it is going great.   - CHF: endorses chest pain behind the center feels like spasm, no aggravating factor, lasts for only about 10-15 Seconds. Denies heart burn. RN reports no edema.   - PMH: reports appetite is great.       ==================================    CODE STATUS/ADVANCE DIRECTIVES DISCUSSION:   CPR/Full code   Patient's living condition: lives with spouse  ALLERGIES: Nitroglycerin    PAST MEDICAL HISTORY:    chronic hypoxemic and hypercapnic respiratory failure requiring intubation 10/17-10/19  f severe COPD   Coronary artery disease, Hx NSTEMI  Hypertension  Heart failure with preserved ejection fraction  Chronic venous stasis  Insulin-dependent type 2 diabetes mellitus  Non-severe malnutrition  Anxiety  GERD  Lung nodule  Subarachnoid hemorrhage  PAST SURGICAL HISTORY:    - Hx stable IIIB colon cancer 2016 s/p left hemicolectomy and colostomy  - Rt MCA aneurysm s/p coil embolization 2011  - PCOM aneurysm rupture s/p coil embolization 2010     FAMILY HISTORY: reviewed and non contributory  SOCIAL HISTORY:   reports that she quit smoking about 4 weeks ago. Her smoking use included cigarettes. She smoked 1.00 pack per day. She does not have any smokeless tobacco history on file.      Current Outpatient Medications   Medication Sig Dispense Refill     acetaminophen (TYLENOL) 325 MG tablet Take 325 mg by mouth every 4 hours as needed for pain        aspirin 81 MG EC tablet Take 81 mg by mouth daily       blood glucose (NO BRAND SPECIFIED) lancets standard Use to test blood sugar 4 times daily or as  directed. 1 Box 3     blood glucose monitoring (NO BRAND SPECIFIED) meter device kit Use to test blood sugar 4 times daily or as directed. 1 kit 0     blood glucose monitoring (NO BRAND SPECIFIED) test strip Use to test blood sugars 4 times daily or as directed 100 strip 3     citalopram (CELEXA) 40 MG tablet Take 20 mg by mouth every other day       fluticasone-vilanterol (BREO ELLIPTA) 100-25 MCG/INH inhaler Inhale 1 puff into the lungs daily       furosemide (LASIX) 40 MG tablet Take 20 mg by mouth daily       guaiFENesin (MUCINEX) 600 MG 12 hr tablet Take 1,200 mg by mouth 2 times daily as needed       insulin glargine (LANTUS) 100 UNIT/ML injection Inject 1 Units Subcutaneous At Bedtime        insulin pen needle 31G X 6 MM Use 4 times daily or as directed. 100 each 1     ipratropium - albuterol 0.5 mg/2.5 mg/3 mL (DUONEB) 0.5-2.5 (3) MG/3ML neb solution Take 1 vial by nebulization 3 times daily as needed for shortness of breath / dyspnea or wheezing       LORazepam (ATIVAN) 0.5 MG tablet Take 0.5 tablets (0.25 mg) by mouth every 8 hours as needed for anxiety 40 tablet 1     magnesium 250 MG tablet Take 1 tablet by mouth 2 times daily        pantoprazole (PROTONIX) 40 MG EC tablet Take 20 mg by mouth daily       polyethylene glycol (MIRALAX) 17 g packet Take 17 g by mouth daily. May also take 17 g daily as needed for constipation.       potassium chloride ER (KLOR-CON M) 10 MEQ CR tablet Take 10 mEq by mouth daily Every Mon, Tues, Wed, Fri, Sat       senna-docusate (SENOKOT-S/PERICOLACE) 8.6-50 MG tablet Take 2 tablets by mouth 2 times daily       umeclidinium (INCRUSE ELLIPTA) 62.5 MCG/INH inhaler Inhale 1 puff into the lungs daily       Urea 40 % CREA Apply topically daily as needed for dry skin       Vitamin D, Cholecalciferol, 25 MCG (1000 UT) CAPS Take 4,000 Int'l Units by mouth daily       discharge medications reconciled and changed, per note/orders  ROS: 10 point ROS of systems including Constitutional,  "Eyes, Respiratory, Cardiovascular, Gastroenterology, Genitourinary, Integumentary, Musculoskeletal, Psychiatric were all negative except for pertinent positives noted in my HPI.  Vitals:BP (!) 158/79   Pulse 83   Temp 97.4  F (36.3  C)   Resp 20   Ht 1.626 m (5' 4\")   Wt 56.7 kg (125 lb)   SpO2 98%   BMI 21.46 kg/m     Limited Visit Exam done given COVID-19 precautions:  GENERAL APPEARANCE:  in no distress  RESP:  unlabored breathing  M/S:   no joint deformity noted  SKIN:  no rash noted  NEURO:   no purposeful movement in upper and lower extremities  PSYCH:  affect and mood normal    Lab/Diagnostic data: Reviewed in the chart and EHR.        ASSESSMENT/PLAN:  # Severe COPD (H)  #Chronic hypoxemic and hypercapnic respiratory failure (H)  # hx of Lung nodule  - compensating.   - Ok to increase O2 level up to 4 liter during rehab to avoid hypoxia.       #Type 2 diabetes mellitus, insulin-dependent: (H)   A1C 6.0 10/17/2020   - over controlled.  In this frail elderly adult with a limited life expectancy, the goal of  the management is to address the hyperglycemia sx if any,  rather than the  BGs numbers per se.       # HFpEF (H)  #CAD, history of NSTEMI  #Essential hypertension  - compensated, felt to be dry, lasix reduced to 20 mg (1/11)    # PCM , mild (H): Body mass index is 21.46 kg/m . Supplement.     #Oropharyngeal dysphagia: no concern.   # History of colon cancer, status post resection and colostomy: ostomy/bag management. No concern  # Vitamin D deficiency: on supplement: level low, dose increased to 4K.     Order: See above, otherwise, continue the rest of the current POC.       Electronically signed by:  Uzma Avila MD     Video-Visit Details  Type of service:  Video Visit  Video End Time (time video stopped): 12:04  Distant Location (provider location):  [facility]                   Sincerely,        Uzma Avila MD    "

## 2021-01-13 NOTE — PROGRESS NOTES
" [facility]  Bella Saucedo is being evaluated via a billable video.   The patient has been notified of following:  \"This video visit will be conducted via a call between you and your provider. We have found that certain health care needs can be provided without the need for an in-person physical exam.  This service lets us provide the care you need with a video conversation. If during the course of the call the provider feels a video visit is not appropriate, you will not be charged for this service.\"   The provider has received verbal consent for a Video Visit from the patient and or first contact? Yes  Patient/facility staff would like the video invitation sent by: N/A   Video Start Time: 11:59  Which Facility the Patient is at during the time of visit: [facility]  PRIMARY CARE PROVIDER AND CLINIC:  Shannon Olivas MD, [place] [address]*  Chief Complaint   Patient presents with     Hospital F/U     Video Visit     [place] Medical Record Number:  [id number]  Bella Saucedo  is a 75 year old  (1945), admitted to the above facility from  [facility]. Hospital stay 12/14/20 through 12/16/20..  Admitted to this facility for  rehab, medical management and nursing care.  HPI:    HPI information obtained from: facility staff, patient report and [facility] chart review.     INTERVAL HISTORY  --------------------------  10/17/20 through 10/30/20: Patient with past medical history of HFpEF, respiratory failure/COPD hospitalized with acute COPD exacerbation and concern for CAP, acute on chronic respiratory failure required intubation, completed antibiotic  Meds started: Breo Ellipta , Incruse  -11/24: [place] [facility] [place]      Brief Summary of Hospital Course:   -Hospital at this time with ACOPDE treated with doxycycline and prednisone.      Updates on Status Since Skilled nursing Admission:   -To recent ER visit for " hypoxia    Today:  - Pt seen in the presence of RN who graciously assisted with the virtual visit  - COPD:  Reports on 2 lit of O2, breathing is very good. Reports KELLY, cough about the same.   - anxiety: reports it aggravates her SOB. Reports  Gets anxious about every thing. Reports meds does help.   - Rehab: reports it is going great.   - CHF: endorses chest pain behind the center feels like spasm, no aggravating factor, lasts for only about 10-15 Seconds. Denies heart burn. RN reports no edema.   - PMH: reports appetite is great.       ==================================    CODE STATUS/ADVANCE DIRECTIVES DISCUSSION:   CPR/Full code   Patient's living condition: lives with spouse  ALLERGIES: Nitroglycerin    PAST MEDICAL HISTORY:    chronic hypoxemic and hypercapnic respiratory failure requiring intubation 10/17-10/19  f severe COPD   Coronary artery disease, Hx NSTEMI  Hypertension  Heart failure with preserved ejection fraction  Chronic venous stasis  Insulin-dependent type 2 diabetes mellitus  Non-severe malnutrition  Anxiety  GERD  Lung nodule  Subarachnoid hemorrhage  PAST SURGICAL HISTORY:    - Hx stable IIIB colon cancer 2016 s/p left hemicolectomy and colostomy  - Rt MCA aneurysm s/p coil embolization 2011  - PCOM aneurysm rupture s/p coil embolization 2010     FAMILY HISTORY: reviewed and non contributory  SOCIAL HISTORY:   reports that she quit smoking about 4 weeks ago. Her smoking use included cigarettes. She smoked 1.00 pack per day. She does not have any smokeless tobacco history on file.      Current Outpatient Medications   Medication Sig Dispense Refill     acetaminophen (TYLENOL) 325 MG tablet Take 325 mg by mouth every 4 hours as needed for pain        aspirin 81 MG EC tablet Take 81 mg by mouth daily       blood glucose (NO BRAND SPECIFIED) lancets standard Use to test blood sugar 4 times daily or as directed. 1 Box 3     blood glucose monitoring (NO BRAND SPECIFIED) meter device kit Use to test  blood sugar 4 times daily or as directed. 1 kit 0     blood glucose monitoring (NO BRAND SPECIFIED) test strip Use to test blood sugars 4 times daily or as directed 100 strip 3     citalopram (CELEXA) 40 MG tablet Take 20 mg by mouth every other day       fluticasone-vilanterol (BREO ELLIPTA) 100-25 MCG/INH inhaler Inhale 1 puff into the lungs daily       furosemide (LASIX) 40 MG tablet Take 20 mg by mouth daily       guaiFENesin (MUCINEX) 600 MG 12 hr tablet Take 1,200 mg by mouth 2 times daily as needed       insulin glargine (LANTUS) 100 UNIT/ML injection Inject 1 Units Subcutaneous At Bedtime        insulin pen needle 31G X 6 MM Use 4 times daily or as directed. 100 each 1     ipratropium - albuterol 0.5 mg/2.5 mg/3 mL (DUONEB) 0.5-2.5 (3) MG/3ML neb solution Take 1 vial by nebulization 3 times daily as needed for shortness of breath / dyspnea or wheezing       LORazepam (ATIVAN) 0.5 MG tablet Take 0.5 tablets (0.25 mg) by mouth every 8 hours as needed for anxiety 40 tablet 1     magnesium 250 MG tablet Take 1 tablet by mouth 2 times daily        pantoprazole (PROTONIX) 40 MG EC tablet Take 20 mg by mouth daily       polyethylene glycol (MIRALAX) 17 g packet Take 17 g by mouth daily. May also take 17 g daily as needed for constipation.       potassium chloride ER (KLOR-CON M) 10 MEQ CR tablet Take 10 mEq by mouth daily Every Mon, Tues, Wed, Fri, Sat       senna-docusate (SENOKOT-S/PERICOLACE) 8.6-50 MG tablet Take 2 tablets by mouth 2 times daily       umeclidinium (INCRUSE ELLIPTA) 62.5 MCG/INH inhaler Inhale 1 puff into the lungs daily       Urea 40 % CREA Apply topically daily as needed for dry skin       Vitamin D, Cholecalciferol, 25 MCG (1000 UT) CAPS Take 4,000 Int'l Units by mouth daily       discharge medications reconciled and changed, per note/orders  ROS: 10 point ROS of systems including Constitutional, Eyes, Respiratory, Cardiovascular, Gastroenterology, Genitourinary, Integumentary,  "Musculoskeletal, Psychiatric were all negative except for pertinent positives noted in my HPI.  Vitals:BP (!) 158/79   Pulse 83   Temp 97.4  F (36.3  C)   Resp 20   Ht 1.626 m (5' 4\")   Wt 56.7 kg (125 lb)   SpO2 98%   BMI 21.46 kg/m     Limited Visit Exam done given COVID-19 precautions:  GENERAL APPEARANCE:  in no distress  RESP:  unlabored breathing  M/S:   no joint deformity noted  SKIN:  no rash noted  NEURO:   no purposeful movement in upper and lower extremities  PSYCH:  affect and mood normal    Lab/Diagnostic data: Reviewed in the chart and EHR.        ASSESSMENT/PLAN:  # Severe COPD (H)  #Chronic hypoxemic and hypercapnic respiratory failure (H)  # hx of Lung nodule  - compensating.   - Ok to increase O2 level up to 4 liter during rehab to avoid hypoxia.       #Type 2 diabetes mellitus, insulin-dependent: (H)   A1C 6.0 10/17/2020   - over controlled.  In this frail elderly adult with a limited life expectancy, the goal of  the management is to address the hyperglycemia sx if any,  rather than the  BGs numbers per se.       # HFpEF (H)  #CAD, history of NSTEMI  #Essential hypertension  - compensated, felt to be dry, lasix reduced to 20 mg (1/11)    # PCM , mild (H): Body mass index is 21.46 kg/m . Supplement.     #Oropharyngeal dysphagia: no concern.   # History of colon cancer, status post resection and colostomy: ostomy/bag management. No concern  # Vitamin D deficiency: on supplement: level low, dose increased to 4K.     Order: See above, otherwise, continue the rest of the current POC.       Electronically signed by:  Uzma Avila MD     Video-Visit Details  Type of service:  Video Visit  Video End Time (time video stopped): 12:04  Distant Location (provider location):  [place] [facility]             "

## 2021-01-20 NOTE — PROGRESS NOTES
[facility]    Chief Complaint   Patient presents with     Nursing Home Acute     HPI:    Bella Saucedo is a 75 year old  (1945), who is being seen today for an episodic care visit at: [facility] ([facility]) [[id number]]    Seeing patient for a f/u.   RN reports ongoing abd distention, firm BM's that cause stoma to fall off.   Patient denies abd pain.   Patient working with therapy and is now assist of one. Does get short of breath with minimal exertion. No fever or chills. Less lethargic today.       PMH/PSH reviewed in EPIC today.  REVIEW OF SYSTEMS:  4 point ROS including Respiratory, CV, GI and , other than that noted in the HPI,  is negative    EXAM:  BP (!) 155/74   Pulse 83   Temp 97.8  F (36.6  C)   Resp 20   Wt 58.3 kg (128 lb 8 oz)   SpO2 97%   BMI 22.06 kg/m    GENERAL APPEARANCE:  Alert, in no distress  RESP: auscultation of lungs diminished with scattered insp and exp wheezing , using supplemental oxygen  CV:  Palpation and auscultation of heart done , rate and rhythm reg, systolic murmur, no peripheral edema  ABDOMEN:  normal bowel sounds, soft, nontender, no hepatosplenomegaly or other masses  SKIN:  Pale, dry, no rashes or lesions  NEURO: 2-12 in normal limits and at patient's baseline  PSYCH:  insight and judgement, memory impaired , affect and mood Pleasant      Most Recent 3 CBC's:  Recent Labs   Lab Test 01/14/21  0630 12/31/20  0134 12/24/20  1245   WBC 11.4* 11.2* 10.9   HGB 12.2 12.9 14.4   MCV 99 97 99    272 267     Most Recent 3 BMP's:  Recent Labs   Lab Test 01/14/21  0630 12/31/20  0134 12/24/20  1245    141 140   POTASSIUM 3.7 3.7 4.7   CHLORIDE 106 106 98   CO2 37* 35* 42*   BUN 28 27 32*   CR 0.52 0.64 0.59   ANIONGAP <1* <1* <1*   MARILU 9.0 8.9 9.1   * 191* 110*     Most Recent 2 LFT's:  Recent Labs   Lab Test 12/31/20  0134 12/24/20  1245   AST 17 19   ALT 22 24   ALKPHOS 95 106   BILITOTAL 0.4 0.4     Most  Recent TSH and T4:  Recent Labs   Lab Test 12/24/20  1245   TSH 1.24       Assessment/Plan:  Chronic respiratory failure with hypoxia and hypercapnia (H)  COPD, severe (H)  - sx's stable. On supplemental oxygen, breo ellipta, incruse, duo nebs  - Patient considering hospice eval. She is aware of the severity of her lung disease  - Monitor, patient high risk for exacerbation/hypoxia    Type 2 diabetes mellitus with other circulatory complication, with long-term current use of insulin (H)  -last A1c 6.0%, overcontrolled  - BG reviewed in PCC: lows noted, will reduce basaglar from 5 to 3 units Q HS  - Nsg to update with BG concerns    Chronic heart failure with preserved ejection fraction (H)  - wt's and edema stable since lasix reduction  - Patient appears dry today, will reduce lasix from 20 to 10 mg  - monitor edema and wt's, nsg to update with concerns    Gastroesophageal reflux disease without esophagitis  - has tolerated every other day dosing of PPI, will stop    Slow transit constipation  - chronic, hx of colon resection. Colostomy bag frequently falling off due to large, hard stools  - will schedule miralx and continue senna      Electronically signed by:  KASHMIR Gonzalez CNP

## 2021-01-20 NOTE — LETTER
1/20/2021        RE: Bella Saucedo  [address]  [facility] [address]        [place] [facility]    Chief Complaint   Patient presents with     Nursing Home Acute     HPI:    Bella Saucedo is a 75 year old  (1945), who is being seen today for an episodic care visit at: [facility] ([facility]) [[id number]]    Seeing patient for a f/u.   RN reports ongoing abd distention, firm BM's that cause stoma to fall off.   Patient denies abd pain.   Patient working with therapy and is now assist of one. Does get short of breath with minimal exertion. No fever or chills. Less lethargic today.       PMH/PSH reviewed in [facility] today.  REVIEW OF SYSTEMS:  4 point ROS including Respiratory, CV, GI and , other than that noted in the HPI,  is negative    EXAM:  BP (!) 155/74   Pulse 83   Temp 97.8  F (36.6  C)   Resp 20   Wt 58.3 kg (128 lb 8 oz)   SpO2 97%   BMI 22.06 kg/m    GENERAL APPEARANCE:  Alert, in no distress  RESP: auscultation of lungs diminished with scattered insp and exp wheezing , using supplemental oxygen  CV:  Palpation and auscultation of heart done , rate and rhythm reg, systolic murmur, no peripheral edema  ABDOMEN:  normal bowel sounds, soft, nontender, no hepatosplenomegaly or other masses  SKIN:  Pale, dry, no rashes or lesions  NEURO: 2-12 in normal limits and at patient's baseline  PSYCH:  insight and judgement, memory impaired , affect and mood Pleasant      Most Recent 3 CBC's:  Recent Labs   Lab Test 01/14/21  0630 12/31/20  0134 12/24/20  1245   WBC 11.4* 11.2* 10.9   HGB 12.2 12.9 14.4   MCV 99 97 99    272 267     Most Recent 3 BMP's:  Recent Labs   Lab Test 01/14/21  0630 12/31/20  0134 12/24/20  1245    141 140   POTASSIUM 3.7 3.7 4.7   CHLORIDE 106 106 98   CO2 37* 35* 42*   BUN 28 27 32*   CR 0.52 0.64 0.59   ANIONGAP <1* <1* <1*   MARILU 9.0 8.9 9.1   * 191* 110*     Most Recent 2 LFT's:  Recent Labs   Lab Test  12/31/20  0134 12/24/20  1245   AST 17 19   ALT 22 24   ALKPHOS 95 106   BILITOTAL 0.4 0.4     Most Recent TSH and T4:  Recent Labs   Lab Test 12/24/20  1245   TSH 1.24       Assessment/Plan:  Chronic respiratory failure with hypoxia and hypercapnia (H)  COPD, severe (H)  - sx's stable. On supplemental oxygen, breo ellipta, incruse, duo nebs  - Patient considering hospice eval. She is aware of the severity of her lung disease  - Monitor, patient high risk for exacerbation/hypoxia    Type 2 diabetes mellitus with other circulatory complication, with long-term current use of insulin (H)  -last A1c 6.0%, overcontrolled  - BG reviewed in PCC: lows noted, will reduce basaglar from 5 to 3 units Q HS  - Nsg to update with BG concerns    Chronic heart failure with preserved ejection fraction (H)  - wt's and edema stable since lasix reduction  - Patient appears dry today, will reduce lasix from 20 to 10 mg  - monitor edema and wt's, nsg to update with concerns    Gastroesophageal reflux disease without esophagitis  - has tolerated every other day dosing of PPI, will stop    Slow transit constipation  - chronic, hx of colon resection. Colostomy bag frequently falling off due to large, hard stools  - will schedule miralx and continue senna      Electronically signed by:  KASHMIR Gonzalez CNP          Sincerely,        KASHMIR Gonzalez CNP

## 2021-02-01 NOTE — LETTER
2/1/2021        RE: Bella Saucedo  [address]  [facility] [address]        [place] [facility]    Chief Complaint   Patient presents with     Nursing Home Acute     HPI:    Bella Saucedo is a 75 year old  (1945), who is being seen today for an episodic care visit at: [facility] ([facility]) [[id number]]    Seeing patient for a f/u.   Patient with recent increase of dyspnea and hypoxia. CXR obtained and showed RLL infiltrate. Patient started on doxycycline 100 BID x 7 day and 10 mg per day of prednisone per telephone order.   Patient has started on hospice, her goal is comfort and no further hospitalizations.   Today she continues to have dyspnea, cough and wheezing. No fever or chills. No CP.       PMH/PSH reviewed in [facility] today.  REVIEW OF SYSTEMS:  4 point ROS including Respiratory, CV, GI and , other than that noted in the HPI,  is negative    EXAM:  BP (!) 146/65   Pulse 89   Temp 97.6  F (36.4  C)   Resp 20   Wt 58.3 kg (128 lb 8 oz)   SpO2 92%   BMI 22.06 kg/m    GENERAL APPEARANCE:  Alert, frail appearing-lying in bed  RESP:  respiratory effort and palpation of chest normal, auscultation of lungs diffuse wheezing, Crackles RLL , pursed lip breathing  CV:  Palpation and auscultation of heart done , rate and rhythm reg, systolic murmur, +1 BLE peripheral edema  ABDOMEN:  normal bowel sounds, soft, nontender, no hepatosplenomegaly or other masses  M/S:   Digits and nails thick, brittle, long  SKIN:  Inspection and Palpation of skin and subcutaneous tissue erythematous skin folds  NEURO: 2-12 in normal limits and at patient's baseline  PSYCH:  insight and judgement, memory intact , affect and mood Pleasant         Most Recent 3 CBC's:  Recent Labs   Lab Test 01/14/21  0630 12/31/20  0134 12/24/20  1245   WBC 11.4* 11.2* 10.9   HGB 12.2 12.9 14.4   MCV 99 97 99    272 267     Most Recent 3 BMP's:  Recent Labs   Lab Test 01/21/21  0705  01/14/21  0630 12/31/20  0134    142 141   POTASSIUM 4.4 3.7 3.7   CHLORIDE 106 106 106   CO2 35* 37* 35*   BUN 24 28 27   CR 0.54 0.52 0.64   ANIONGAP 1* <1* <1*   MARILU 8.9 9.0 8.9   * 142* 191*     Most Recent 2 LFT's:  Recent Labs   Lab Test 12/31/20  0134 12/24/20  1245   AST 17 19   ALT 22 24   ALKPHOS 95 106   BILITOTAL 0.4 0.4     Most Recent TSH and T4:  Recent Labs   Lab Test 12/24/20  1245   TSH 1.24     Most Recent Anemia Panel:  Recent Labs   Lab Test 01/14/21  0630 12/24/20  1245 12/24/20  1245   WBC 11.4*   < > 10.9   HGB 12.2   < > 14.4   HCT 39.6   < > 46.6   MCV 99   < > 99      < > 267   B12  --   --  680   FOLIC  --   --  14.5    < > = values in this interval not displayed.       Assessment/Plan:     Chronic respiratory failure with hypoxia and hypercapnia (H)  COPD, severe (H)  Chronic heart failure with preserved ejection fraction (H)  Hospice care patient  ACP (advance care planning)  Chronic obstructive pulmonary disease with acute exacerbation (H)   - patient has started on hospice with a goal of comfort. POLST updated and signed  - continue doxycycline and prednisone, has prn albuterol available  - consider increasing lasix if signs of fluid overload  - plan to check labs and consider f/u CXR to f/u on RLL infiltrate       Electronically signed by:  KASHMIR Gonzalez CNP          Sincerely,        KASHMIR Gonzalez CNP

## 2021-02-01 NOTE — PROGRESS NOTES
[place] [facility]    Chief Complaint   Patient presents with     Nursing Home Acute     HPI:    Bella Saucedo is a 75 year old  (1945), who is being seen today for an episodic care visit at: THE [facility] [[id number]]    Seeing patient for a f/u.   Patient with recent increase of dyspnea and hypoxia. CXR obtained and showed RLL infiltrate. Patient started on doxycycline 100 BID x 7 day and 10 mg per day of prednisone per telephone order.   Patient has started on hospice, her goal is comfort and no further hospitalizations.   Today she continues to have dyspnea, cough and wheezing. No fever or chills. No CP.       PMH/PSH reviewed in [facility] today.  REVIEW OF SYSTEMS:  4 point ROS including Respiratory, CV, GI and , other than that noted in the HPI,  is negative    EXAM:  BP (!) 146/65   Pulse 89   Temp 97.6  F (36.4  C)   Resp 20   Wt 58.3 kg (128 lb 8 oz)   SpO2 92%   BMI 22.06 kg/m    GENERAL APPEARANCE:  Alert, frail appearing-lying in bed  RESP:  respiratory effort and palpation of chest normal, auscultation of lungs diffuse wheezing, Crackles RLL , pursed lip breathing  CV:  Palpation and auscultation of heart done , rate and rhythm reg, systolic murmur, +1 BLE peripheral edema  ABDOMEN:  normal bowel sounds, soft, nontender, no hepatosplenomegaly or other masses  M/S:   Digits and nails thick, brittle, long  SKIN:  Inspection and Palpation of skin and subcutaneous tissue erythematous skin folds  NEURO: 2-12 in normal limits and at patient's baseline  PSYCH:  insight and judgement, memory intact , affect and mood Pleasant         Most Recent 3 CBC's:  Recent Labs   Lab Test 01/14/21  0630 12/31/20  0134 12/24/20  1245   WBC 11.4* 11.2* 10.9   HGB 12.2 12.9 14.4   MCV 99 97 99    272 267     Most Recent 3 BMP's:  Recent Labs   Lab Test 01/21/21  0705 01/14/21  0630 12/31/20  0134    142 141   POTASSIUM 4.4 3.7 3.7   CHLORIDE 106 106 106   CO2  35* 37* 35*   BUN 24 28 27   CR 0.54 0.52 0.64   ANIONGAP 1* <1* <1*   MARILU 8.9 9.0 8.9   * 142* 191*     Most Recent 2 LFT's:  Recent Labs   Lab Test 12/31/20  0134 12/24/20  1245   AST 17 19   ALT 22 24   ALKPHOS 95 106   BILITOTAL 0.4 0.4     Most Recent TSH and T4:  Recent Labs   Lab Test 12/24/20  1245   TSH 1.24     Most Recent Anemia Panel:  Recent Labs   Lab Test 01/14/21  0630 12/24/20  1245 12/24/20  1245   WBC 11.4*   < > 10.9   HGB 12.2   < > 14.4   HCT 39.6   < > 46.6   MCV 99   < > 99      < > 267   B12  --   --  680   FOLIC  --   --  14.5    < > = values in this interval not displayed.       Assessment/Plan:     Chronic respiratory failure with hypoxia and hypercapnia (H)  COPD, severe (H)  Chronic heart failure with preserved ejection fraction (H)  Hospice care patient  ACP (advance care planning)  Chronic obstructive pulmonary disease with acute exacerbation (H)   - patient has started on hospice with a goal of comfort. POLST updated and signed  - continue doxycycline and prednisone, has prn albuterol available  - consider increasing lasix if signs of fluid overload  - plan to check labs and consider f/u CXR to f/u on RLL infiltrate       Electronically signed by:  Tonja Bocanegra, KASHMIR CNP

## 2021-02-03 PROBLEM — E11.9 TYPE II DIABETES MELLITUS (H): Status: ACTIVE | Noted: 2017-01-05

## 2021-02-03 PROBLEM — K94.19 ALTERED BOWEL ELIMINATION DUE TO INTESTINAL OSTOMY (H): Status: ACTIVE | Noted: 2020-01-01

## 2021-02-03 PROBLEM — F32.5 DEPRESSION, MAJOR, IN REMISSION (H): Status: ACTIVE | Noted: 2019-05-13

## 2021-02-03 PROBLEM — I49.8 BIGEMINY: Status: ACTIVE | Noted: 2018-06-09

## 2021-02-03 PROBLEM — Z79.899 CHRONIC PRESCRIPTION BENZODIAZEPINE USE: Status: ACTIVE | Noted: 2018-06-09

## 2021-02-03 PROBLEM — I21.4 NSTEMI (NON-ST ELEVATED MYOCARDIAL INFARCTION) (H): Status: ACTIVE | Noted: 2017-01-04

## 2021-02-03 PROBLEM — Z20.822 PERSON UNDER INVESTIGATION FOR COVID-19: Status: ACTIVE | Noted: 2020-01-01

## 2021-02-03 PROBLEM — I87.2 VENOUS STASIS DERMATITIS: Status: ACTIVE | Noted: 2018-06-09

## 2021-02-03 PROBLEM — L03.119 CELLULITIS OF LOWER EXTREMITY: Status: ACTIVE | Noted: 2020-01-01

## 2021-02-03 PROBLEM — Z20.822 COVID-19 RULED OUT: Status: ACTIVE | Noted: 2020-01-01

## 2021-02-03 NOTE — PROGRESS NOTES
[place] [facility]  Chief Complaint   Patient presents with     longterm Regulatory     [place] Medical Record Number:  [id number]  Place of Service where encounter took place:  THE [facility] AT [facility] ([facility]) [[id number]]    HPI:    Bella Saucedo  is 75 year old (1945), who is being seen today for a federally mandated E/M visit.      Patient continues to severe dyspnea. Spends her day in bed due to limited reserve. No fever or chills. Coughing up clear phlegm. No increase of edema. No abd pain. No urinary concerns. Moods at baseline.     ALLERGIES:Nitroglycerin  PAST MEDICAL HISTORY:   has no past medical history on file.  PAST SURGICAL HISTORY:   has no past surgical history on file.  FAMILY HISTORY: family history is not on file.  SOCIAL HISTORY:  reports that she quit smoking about 3 months ago. Her smoking use included cigarettes. She smoked 1.00 pack per day. She does not have any smokeless tobacco history on file.    MEDICATIONS:  Current Outpatient Medications   Medication Sig Dispense Refill     Fluticasone-Umeclidin-Vilanterol (TRELEGY ELLIPTA) 100-62.5-25 MCG/INH oral inhaler Inhale 1 puff into the lungs daily       acetaminophen (TYLENOL) 325 MG tablet Take 325 mg by mouth every 4 hours as needed for pain        citalopram (CELEXA) 40 MG tablet Take 20 mg by mouth every other day       furosemide (LASIX) 20 MG tablet Take 10 mg by mouth daily       guaiFENesin (MUCINEX) 600 MG 12 hr tablet Take 1,200 mg by mouth 2 times daily       ipratropium - albuterol 0.5 mg/2.5 mg/3 mL (DUONEB) 0.5-2.5 (3) MG/3ML neb solution Take 1 vial by nebulization 3 times daily as needed for shortness of breath / dyspnea or wheezing       LORazepam (ATIVAN) 0.5 MG tablet Take 1 tablet (0.5 mg) by mouth every 6 hours as needed for anxiety 40 tablet 1     magnesium 250 MG tablet Take 1 tablet by mouth 2 times daily        polyethylene glycol (MIRALAX) 17 g packet Take 1 packet by mouth daily        polyethylene glycol (MIRALAX) 17 g packet Take 17 g by mouth daily. May also take 17 g daily as needed for constipation.       predniSONE (DELTASONE) 10 MG tablet Take 10 mg by mouth daily       senna-docusate (SENOKOT-S/PERICOLACE) 8.6-50 MG tablet Take 2 tablets by mouth 2 times daily       Urea 40 % CREA Apply topically daily as needed for dry skin       Vitamin D, Cholecalciferol, 25 MCG (1000 UT) CAPS Take 4,000 Int'l Units by mouth daily           Case Management:  I have reviewed the care plan and MDS and do agree with the plan. Patient's desire to return to the community is not present. Information reviewed:  Medications, vital signs, orders, and nursing notes.    ROS:  4 point ROS including Respiratory, CV, GI and , other than that noted in the HPI,  is negative    Vitals:  /60   Pulse 86   Temp 96.9  F (36.1  C)   Resp 22   Wt 58.3 kg (128 lb 8 oz)   SpO2 99%   BMI 22.06 kg/m    Body mass index is 22.06 kg/m .  Exam:  GENERAL APPEARANCE:  Alert, frail appearing  RESP:  diminished breath sounds t/o with scattered exp wheezing, crackles RLL, pursed lip breathing  CV:  regular rate and rhythm, no murmur, rub, or gallop, no edema  ABDOMEN:  normal bowel sounds, soft, nontender, no hepatosplenomegaly or other masses  SKIN:  nails thick, yellow, brittle  PSYCH:  oriented X 3, affect and mood normal    Lab/Diagnostic data:   Labs done in SNF are in [facility]. Please refer to them using [facility]/Care Everywhere.    ASSESSMENT/PLAN  Chronic respiratory failure with hypoxia and hypercapnia (H)  COPD, severe (H)  Hospice care patient  - currently on advair and anoro ellipta (ICS/LABA & LABA/LAMA). No need for 2 inhalers with LABA, will stop both and start Trelegy  - due to complete doxy (7 days)   - continue prednisone  - continue duo nebs and alb prn  - continue oxygen  - on hospice, goal is comfort.     Chronic heart failure with preserved ejection fraction (H)  - lasix dose reduced 3 weeks ago,  monitor for increased edema and wt trending up.   Wt Readings from Last 5 Encounters:   02/03/21 58.3 kg (128 lb 8 oz)   02/01/21 58.3 kg (128 lb 8 oz)   01/20/21 58.3 kg (128 lb 8 oz)   01/13/21 56.7 kg (125 lb)   01/11/21 56.7 kg (125 lb)       Type 2 diabetes mellitus with other circulatory complication, with long-term current use of insulin (H)  - A1C 6.0, BG reviewed: , will stop Basaglar    Onychomycosis  Gait instability  bilateral Toenails (10 total)  trimmed by me today with difficulty using plier  nail clippers as patient unable to do by herself secondary to nails too thick to use tools given weak hand strength. Additionally poor vision gives higher risk of cutting skin - is diabetic and open wound on foot high risk - also is unable to reach toes secondary to DJD and limited range of motion.     - nails soaked for 45 minutes. All nails thick, long and brittle. Procedure took 32 minutes due to severity of nails.     - DEBRIDEMENT OF NAILS, 6 OR MORE    Orders written by provider at facility  - nails trimmed  - discontinue basaglar  - discontinue advair and anoro ellipta, start Trelegy every day  - discontinue potassium  - discontinue asa      Electronically signed by:  KASHMIR Gonzalez CNP

## 2021-02-03 NOTE — LETTER
2/3/2021        RE: Bella Saucedo  [address]  [facility] [address]        [place] [facility]  Chief Complaint   Patient presents with     [facility] Regulatory     [place] Medical Record Number:  [id number]  [facility] [[id number]]    HPI:    Bella Saucedo  is 75 year old (1945), who is being seen today for a federally mandated E/M visit.      Patient continues to severe dyspnea. Spends her day in bed due to limited reserve. No fever or chills. Coughing up clear phlegm. No increase of edema. No abd pain. No urinary concerns. Moods at baseline.     ALLERGIES:Nitroglycerin  PAST MEDICAL HISTORY:   has no past medical history on file.  PAST SURGICAL HISTORY:   has no past surgical history on file.  FAMILY HISTORY: family history is not on file.  SOCIAL HISTORY:  reports that she quit smoking about 3 months ago. Her smoking use included cigarettes. She smoked 1.00 pack per day. She does not have any smokeless tobacco history on file.    MEDICATIONS:  Current Outpatient Medications   Medication Sig Dispense Refill     Fluticasone-Umeclidin-Vilanterol (TRELEGY ELLIPTA) 100-62.5-25 MCG/INH oral inhaler Inhale 1 puff into the lungs daily       acetaminophen (TYLENOL) 325 MG tablet Take 325 mg by mouth every 4 hours as needed for pain        citalopram (CELEXA) 40 MG tablet Take 20 mg by mouth every other day       furosemide (LASIX) 20 MG tablet Take 10 mg by mouth daily       guaiFENesin (MUCINEX) 600 MG 12 hr tablet Take 1,200 mg by mouth 2 times daily       ipratropium - albuterol 0.5 mg/2.5 mg/3 mL (DUONEB) 0.5-2.5 (3) MG/3ML neb solution Take 1 vial by nebulization 3 times daily as needed for shortness of breath / dyspnea or wheezing       LORazepam (ATIVAN) 0.5 MG tablet Take 1 tablet (0.5 mg) by mouth every 6 hours as needed for anxiety 40 tablet 1     magnesium 250 MG tablet Take 1 tablet by mouth 2  times daily        polyethylene glycol (MIRALAX) 17 g packet Take 1 packet by mouth daily       polyethylene glycol (MIRALAX) 17 g packet Take 17 g by mouth daily. May also take 17 g daily as needed for constipation.       predniSONE (DELTASONE) 10 MG tablet Take 10 mg by mouth daily       senna-docusate (SENOKOT-S/PERICOLACE) 8.6-50 MG tablet Take 2 tablets by mouth 2 times daily       Urea 40 % CREA Apply topically daily as needed for dry skin       Vitamin D, Cholecalciferol, 25 MCG (1000 UT) CAPS Take 4,000 Int'l Units by mouth daily           Case Management:  I have reviewed the care plan and MDS and do agree with the plan. Patient's desire to return to the community is not present. Information reviewed:  Medications, vital signs, orders, and nursing notes.    ROS:  4 point ROS including Respiratory, CV, GI and , other than that noted in the HPI,  is negative    Vitals:  /60   Pulse 86   Temp 96.9  F (36.1  C)   Resp 22   Wt 58.3 kg (128 lb 8 oz)   SpO2 99%   BMI 22.06 kg/m    Body mass index is 22.06 kg/m .  Exam:  GENERAL APPEARANCE:  Alert, frail appearing  RESP:  diminished breath sounds t/o with scattered exp wheezing, crackles RLL, pursed lip breathing  CV:  regular rate and rhythm, no murmur, rub, or gallop, no edema  ABDOMEN:  normal bowel sounds, soft, nontender, no hepatosplenomegaly or other masses  SKIN:  nails thick, yellow, brittle  PSYCH:  oriented X 3, affect and mood normal    Lab/Diagnostic data:   Labs done in SNF are in [place] [facility]. Please refer to them using EPIC/Care Everywhere.    ASSESSMENT/PLAN  Chronic respiratory failure with hypoxia and hypercapnia (H)  COPD, severe (H)  Hospice care patient  - currently on advair and anoro ellipta (ICS/LABA & LABA/LAMA). No need for 2 inhalers with LABA, will stop both and start Trelegy  - due to complete doxy (7 days)   - continue prednisone  - continue duo nebs and alb prn  - continue oxygen  - on hospice, goal is comfort.      Chronic heart failure with preserved ejection fraction (H)  - lasix dose reduced 3 weeks ago, monitor for increased edema and wt trending up.   Wt Readings from Last 5 Encounters:   02/03/21 58.3 kg (128 lb 8 oz)   02/01/21 58.3 kg (128 lb 8 oz)   01/20/21 58.3 kg (128 lb 8 oz)   01/13/21 56.7 kg (125 lb)   01/11/21 56.7 kg (125 lb)       Type 2 diabetes mellitus with other circulatory complication, with long-term current use of insulin (H)  - A1C 6.0, BG reviewed: , will stop Basaglar    Onychomycosis  Gait instability  bilateral Toenails (10 total)  trimmed by me today with difficulty using plier  nail clippers as patient unable to do by herself secondary to nails too thick to use tools given weak hand strength. Additionally poor vision gives higher risk of cutting skin - is diabetic and open wound on foot high risk - also is unable to reach toes secondary to DJD and limited range of motion.     - nails soaked for 45 minutes. All nails thick, long and brittle. Procedure took 32 minutes due to severity of nails.     - DEBRIDEMENT OF NAILS, 6 OR MORE    Orders written by provider at facility  - nails trimmed  - discontinue basaglar  - discontinue advair and anoro ellipta, start Trelegy every day  - discontinue potassium  - discontinue asa      Electronically signed by:  KASHMIR Gonzalez CNP                Sincerely,        KASHMIR Gonzalez CNP

## 2021-02-10 NOTE — PROGRESS NOTES
"[place] [facility]    Chief Complaint   Patient presents with     Nursing Home Acute     HPI:    Bella Saucedo is a 75 year old  (1945), who is being seen today for an episodic care visit at: THE [facility] AT [facility] [[id number]]    Asked to see patient today for nausea. Patient recently started on augmentin by on call provider for RLL infiltrate. Reviewed previous xray and infiltrate is resolving.   Met with patient in her room. She states she has severe nausea for the last day. Relates this to the antibiotics. No worsening of breathing, no fever or chills. No dysuria. Reports abd feels \"tight\" and no stool in colostomy bag x 2 days.       PMH/PSH reviewed in EPIC today.  REVIEW OF SYSTEMS:  4 point ROS including Respiratory, CV, GI and , other than that noted in the HPI,  is negative    EXAM:  BP (!) 160/75   Pulse 109   Temp 97.5  F (36.4  C)   Resp 20   Wt 58.3 kg (128 lb 8 oz)   SpO2 95%   BMI 22.06 kg/m    GENERAL APPEARANCE:  Alert, in moderate distress  RESP:  respiratory effort and palpation of chest normal, auscultation of lungs diminished t/o, breathing at baseline  CV:  Palpation and auscultation of heart done , rate and rhythm reg, systolic murmur, no peripheral edema  ABDOMEN:  Distended, firms, rare bowel sounds  SKIN:  Inspection and Palpation of skin and subcutaneous tissue at baseline  NEURO: 2-12 in normal limits and at patient's baseline  PSYCH:  insight and judgement, memory intact , affect and mood pleasant    Labs done in SNF are in [facility]. Please refer to them using [facility]/Care Everywhere.    Assessment/Plan:     Chronic respiratory failure with hypoxia and hypercapnia (H)  Chronic heart failure with preserved ejection fraction (H)  Nausea and vomiting, intractability of vomiting not specified, unspecified vomiting type  Hospice care patient   - patient with new onset of n/v. Started on Augmentin by on call provider. Will stop Augmentin as " they may be contributing to sx's and Xray is improving. Starting z pack.   - Patient with abd distention and no BM x 2 days. Will order MOM x 1 and flat and upright xray of abdomen  - has prn zofran available, Rn to update hospice with plan  - no recent wt's will have RN with recent wt.     Orders:  - weight patient today and update me with results  - discontinue augmentin  - start z pack (5 days)  - flat and upright xray of abdomen  - monitory abd for increased distention and update with concerns    Electronically signed by:  KASHMIR Gonzalez CNP

## 2021-02-10 NOTE — LETTER
"    2/10/2021        RE: Bella Saucedo  C/o Willie Saucedo  [address]  [facility] [address]        [place] [facility]    Chief Complaint   Patient presents with     Nursing Home Acute     HPI:    Bella Saucedo is a 75 year old  (1945), who is being [facility] today [facility] [[id number][facility]    Asked to see patient today for nausea. Patient recently started on augmentin by on call provider for RLL infiltrate. Reviewed previous xray and infiltrate is resolving.   Met with patient in her room. She states she has severe nausea for the last day. Relates this to the antibiotics. No worsening of breathing, no fever or chills. No dysuria. Reports abd feels \"tight\" and no stool in colostomy bag x 2 days.       PMH/PSH reviewed in EPIC today.  REVIEW OF SYSTEMS:  4 point ROS including Respiratory, CV, GI and , other than that noted in the HPI,  is negative    EXAM:  BP (!) 160/75   Pulse 109   Temp 97.5  F (36.4  C)   Resp 20   Wt 58.3 kg (128 lb 8 oz)   SpO2 95%   BMI 22.06 kg/m    GENERAL APPEARANCE:  Alert, in moderate distress  RESP:  respiratory effort and palpation of chest normal, auscultation of lungs diminished t/o, breathing at baseline  CV:  Palpation and auscultation of heart done , rate and rhythm reg, systolic murmur, no peripheral edema  ABDOMEN:  Distended, firms, rare bowel sounds  SKIN:  Inspection and Palpation of skin and subcutaneous tissue at baseline  NEURO: 2-12 in normal limits and at patient's baseline  PSYCH:  insight and judgement, memory intact , affect and mood pleasant    Labs done in SNF are in [place] EPIC. Please refer to them using [facility] Everywhere.    Assessment/Plan:     Chronic respiratory failure with hypoxia and hypercapnia (H)  Chronic heart failure with preserved ejection fraction (H)  Nausea and vomiting, intractability of vomiting not specified, unspecified vomiting type  Hospice care " patient   - patient with new onset of n/v. Started on Augmentin by on call provider. Will stop Augmentin as they may be contributing to sx's and Xray is improving. Starting z pack.   - Patient with abd distention and no BM x 2 days. Will order MOM x 1 and flat and upright xray of abdomen  - has prn zofran available, Rn to update hospice with plan  - no recent wt's will have RN with recent wt.     Orders:  - weight patient today and update me with results  - discontinue augmentin  - start z pack (5 days)  - flat and upright xray of abdomen  - monitory abd for increased distention and update with concerns    Electronically signed by:  KASHMIR Gonzalez CNP          Sincerely,        KASHMIR Gonzalez CNP

## 2021-02-11 NOTE — TELEPHONE ENCOUNTER
[place] [facility] TELEPHONE ENCOUNTER    Chief Complaint   Patient presents with     X-ray Results       Bella Saucedo is a 75 year old  (1945),Nurse called today to report: x-ray results showing bowel gas pattern may represent an ileus versus an early distal obstruction. She is a hospice patient. VSS, no nausea or vomiting today. She is currently sleeping and no output today in colostomy bag.    ASSESSMENT/PLAN      Goal of care is comfort focus and following with [facility].     Nurse reporting they have prn Zofran for comfort    Follow up with hospice tomorrow for bowel/diet adjustment- hospice provider notified       Electronically signed by:   KASHMIR Forte CNP

## 2021-02-11 NOTE — LETTER
2/11/2021        RE: Bella Saucedo  C/o Willie Saucedo  [address]  [facility] [id number]        [place] [facility]    Chief Complaint   Patient presents with     Nursing Home Acute     HPI:    Bella Saucedo is a 75 year old  (1945), who is being seen today for an episodic care visit at: THE [facility] [[id number]]    Seeing patient for a f/u. Patient continues to have nausea, abd distention, and no stool in colostomy   Patient states no changes in her breathing. No fever or chills.   Reviewed recent abd xray: ileus vs early distal obstruction  No recent UOP, orders to place a grant to ensure bladder distention not contributing.   Advised RN to give Mg Citrate now and keep patient NPO.   Patient later threw up mg citrate.       PMH/PSH reviewed in [facility] today.  REVIEW OF SYSTEMS:  4 point ROS including Respiratory, CV, GI and , other than that noted in the HPI,  is negative    EXAM:  /57   Pulse 115   Temp 97.6  F (36.4  C)   Resp 26   Wt 60.9 kg (134 lb 3.2 oz)   SpO2 92%   BMI 23.04 kg/m    GENERAL APPEARANCE:  Alert, in moderate distress  RESP:  respiratory effort and palpation of chest normal, auscultation of lungs diminished/clear, breathing at baseline  CV:  Palpation and auscultation of heart done , rate and rhythm reg murmur, systolic peripheral edema  ABDOMEN:  normal bowel sounds, soft, nontender, no hepatosplenomegaly or other masses  SKIN: pale/dry  NEURO: 2-12 in normal limits and at patient's baseline  PSYCH:  insight and judgement, memory intact , affect and mood anxious      Most Recent 3 CBC's:  Recent Labs   Lab Test 02/11/21  1737 02/04/21  0945 01/14/21  0630   WBC 11.6* 6.1 11.4*   HGB 13.8 11.9 12.2   MCV 95 99 99    329 258     Most Recent 3 BMP's:  Recent Labs   Lab Test 02/11/21  1737 02/04/21  0945 01/21/21  0705    143 142   POTASSIUM 4.0 4.2 4.4   CHLORIDE 96 106 106   CO2 40* 36* 35*   BUN 36* 19 24    CR 0.85 0.50* 0.54   ANIONGAP 3 1* 1*   MARILU 9.6 8.7 8.9   * 204* 102*     Most Recent 2 LFT's:  Recent Labs   Lab Test 02/11/21  1737 02/04/21  0945   AST 24 10   ALT 41 21   ALKPHOS 112 97   BILITOTAL 0.3 0.3     Most Recent 3 BNP's:  Recent Labs   Lab Test 02/04/21  0945   NTBNP 102     Most Recent TSH and T4:  Recent Labs   Lab Test 12/24/20  1245   TSH 1.24       Assessment/Plan:     Abdominal pain, generalized  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

## 2021-02-11 NOTE — ED TRIAGE NOTES
[facility] in [facility] tested for covid  Pt was on hospice for COPD  2-3L home O2  No BM for 3 days  Vomiting fecal matter  NG placed per EMS with 100cc output  25mcg fentanyl   1mg versed  4mg zofran

## 2021-02-11 NOTE — ED PROVIDER NOTES
History     Chief Complaint   Patient presents with     Abdominal Pain     HPI  Bella Saucedo is a 75 year old female, past medical history is significant for malnutrition, depression, bigeminy, cognitive impairment, pneumonia, hypertension, type 2 diabetes, hyponatremia, NSTEMI, colon cancer, COPD, hypomagnesemia, CHF, hypoxia, nicotine dependence, cerebral artery aneurysm, with concerns of possible bowel obstruction.  This patient is currently on hospice for end-stage COPD.  I reviewed the POLST document accompanying the patient signed and dated by the patient 1/21/2021 which documents do not attempt resuscitation, comfort focused treatment, use of IV/IM antibiotics.  My initial interview with the patient was quite brief, she is drowsy, she has an NG tube in her right nostril which was placed apparently by EMS prior to arrival which is draining bilious appearing material green, nonblack or bloody no coffee-ground.  The patient roused with verbal stimuli opening her eyes.  She denied any pain or discomfort.  She did not answer questions further.  As noted I reviewed the POLST document of also reviewed the most recent chart entries alluding to incomplete notes from geriatrics and an outside x-ray report of abdominal x-ray performed yesterday abdomen 2 views pattern may represent an ileus versus an early distal obstruction.  Follow-up to document resolution recommended.  I note from the nursing home visit record that the patient has not had any output gas or otherwise in her ostomy bag at least since yesterday.  The patient's emergency contact and responsible party is listed Samia Hernandez for her daughter at [phone].    Allergies:  Allergies   Allergen Reactions     Nitroglycerin Anxiety and Other (See Comments)     Mild headache, severe anxiety  Mild headache, severe anxiety       Problem List:    Patient Active Problem List    Diagnosis Date Noted     Malnutrition, unspecified type (H) 01/12/2021      Priority: Medium     COVID-19 ruled out 12/15/2020     Priority: Medium     Person under investigation for COVID-19 12/14/2020     Priority: Medium     Cellulitis of lower extremity 12/14/2020     Priority: Medium     Altered bowel elimination due to intestinal ostomy (H) 12/10/2020     Priority: Medium     Respiratory failure (H) 10/17/2020     Priority: Medium     Depression, major, in remission (H) 05/13/2019     Priority: Medium     Chronic prescription benzodiazepine use 06/09/2018     Priority: Medium     Bigeminy 06/09/2018     Priority: Medium     Venous stasis dermatitis 06/09/2018     Priority: Medium     Mild cognitive impairment 03/08/2017     Priority: Medium     Pneumonia 01/11/2017     Priority: Medium     Benign essential hypertension 01/11/2017     Priority: Medium     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     Priority: Medium     Type II diabetes mellitus (H) 01/05/2017     Priority: Medium     Elevated troponin I level 01/04/2017     Priority: Medium     Hyponatremia 01/04/2017     Priority: Medium     Acute non-ST elevation myocardial infarction (NSTEMI) (H) 01/04/2017     Priority: Medium     Shortness of breath 01/04/2017     Priority: Medium     Weakness 01/04/2017     Priority: Medium     NSTEMI (non-ST elevated myocardial infarction) (H) 01/04/2017     Priority: Medium     Tremor 10/18/2016     Priority: Medium     Dizziness 10/13/2016     Priority: Medium     Occasional tremors 10/13/2016     Priority: Medium     Abnormal weight loss 06/30/2016     Priority: Medium     Malignant neoplasm of sigmoid colon (H) 05/05/2016     Priority: Medium     Malignant tumor of colon (H) 03/15/2016     Priority: Medium     Overview:   Status post left hemicolectomy with a left lower quadrant colostomy    Overview:   Status post left hemicolectomy with a left lower quadrant colostomy       Chronic obstructive pulmonary disease with acute exacerbation (H) 03/15/2016     Priority: Medium      Hypomagnesemia 03/15/2016     Priority: Medium     CHF (congestive heart failure) (H) 03/14/2016     Priority: Medium     Hypoxia 03/11/2016     Priority: Medium     Bandemia 03/10/2016     Priority: Medium     Lung nodule 03/10/2016     Priority: Medium     Nicotine dependence 03/10/2016     Priority: Medium     Ventricular ectopy 03/10/2016     Priority: Medium     Perforated sigmoid colon (H) 03/09/2016     Priority: Medium     Systemic infection (H) 03/09/2016     Priority: Medium     Borderline diabetes mellitus 09/11/2015     Priority: Medium     COPD, severe (H) 04/02/2014     Priority: Medium     Overview:   PFT 3/2014:  Severely obstructive spirometry with significant improvement after   bronchodilators with an increased total lung capacity and a moderately   reduced diffusion capacity.   Patient's resting room air saturation is 97% percent with a pulse of 65.        Severe chronic obstructive pulmonary disease (H) 04/02/2014     Priority: Medium     Overview:   PFT 3/2014:  Severely obstructive spirometry with significant improvement after   bronchodilators with an increased total lung capacity and a moderately   reduced diffusion capacity.   Patient's resting room air saturation is 97% percent with a pulse of 65.        Advance care planning 04/25/2011     Priority: Medium     Overview:   Basic referral made 4/2011       Intracranial subarachnoid hemorrhage (H) 03/28/2011     Priority: Medium     Bleeding in brain due to brain aneurysm (H) 11/30/2010     Priority: Medium     HTN (hypertension) 10/27/2010     Priority: Medium     Middle cerebral artery aneurysm 10/22/2010     Priority: Medium     Overview:   Unruptured right middle cerebral artery aneurysm treated with coil embolization 5/2/2011 GDC coils, MRI compatible to 3 EVI    Overview:   Unruptured right middle cerebral artery aneurysm treated with coil embolization 5/2/2011 GDC coils, MRI compatible to 3 EVI       Cerebral arterial aneurysm  10/22/2010     Priority: Medium     Tobacco use disorder 10/15/2010     Priority: Medium        Past Medical History:    No past medical history on file.    Past Surgical History:    No past surgical history on file.    Family History:    No family history on file.    Social History:  Marital Status:   2  Social History     Tobacco Use     Smoking status: Former Smoker     Packs/day: 1.00     Types: Cigarettes     Quit date: 10/17/2020     Years since quittin.3   Substance Use Topics     Alcohol use: Not on file     Drug use: Not on file        Medications:         acetaminophen (TYLENOL) 325 MG tablet       citalopram (CELEXA) 40 MG tablet       Fluticasone-Umeclidin-Vilanterol (TRELEGY ELLIPTA) 100-62.5-25 MCG/INH oral inhaler       furosemide (LASIX) 20 MG tablet       guaiFENesin (MUCINEX) 600 MG 12 hr tablet       ipratropium - albuterol 0.5 mg/2.5 mg/3 mL (DUONEB) 0.5-2.5 (3) MG/3ML neb solution       LORazepam (ATIVAN) 0.5 MG tablet       magnesium 250 MG tablet       polyethylene glycol (MIRALAX) 17 g packet       polyethylene glycol (MIRALAX) 17 g packet       predniSONE (DELTASONE) 10 MG tablet       senna-docusate (SENOKOT-S/PERICOLACE) 8.6-50 MG tablet       Urea 40 % CREA       Vitamin D, Cholecalciferol, 25 MCG (1000 UT) CAPS          Review of Systems   Unable to perform ROS: Mental status change   All other systems reviewed and are negative.      Physical Exam   BP: 130/64  Pulse: 114  Temp: 98.6  F (37  C)  Weight: 59.9 kg (132 lb)  SpO2: 94 %      Physical Exam  Vitals signs and nursing note reviewed.   Constitutional:       General: She is not in acute distress.     Appearance: She is obese. She is ill-appearing.   HENT:      Head: Normocephalic and atraumatic.      Mouth/Throat:      Mouth: Mucous membranes are moist.      Pharynx: Oropharynx is clear.   Eyes:      Extraocular Movements: Extraocular movements intact.      Pupils: Pupils are equal, round, and reactive to light.    Cardiovascular:      Rate and Rhythm: Regular rhythm. Tachycardia present.      Heart sounds: Normal heart sounds.   Pulmonary:      Effort: Pulmonary effort is normal.      Breath sounds: Normal breath sounds.   Abdominal:      Comments: The abdomen is distended and tympanic, high-pitched infrequent bowel sounds the patient does not react to palpation.  The ostomy bag in the left lower quadrant ostomy has no gas or material in it.   Skin:     General: Skin is warm and dry.      Capillary Refill: Capillary refill takes less than 2 seconds.   Neurological:      General: No focal deficit present.      Mental Status: She is alert.         ED Course        Procedures  5:07 PM  Shortly after assessing the patient and reviewing accompanying documentation in chart I spoke with Jazmyne PARKS for hospice.  This patient is supposed to be comfort care and apparently was transported at the daughter's request and they were only informed after the patient left.  I explained the concern with the patient's evaluation here today, I think she likely has a significant small bowel obstruction and possibly even schema got at this point.  The patient has severe COPD at baseline as well as multiple other comorbidities that would make her very poor anesthesia and operative risk.  I do not think that anesthesia would consider doing surgery for this patient here.  Additionally she would likely require SICU care for a significant period of time if she survived the surgery.  I will review this patient here with my general surgeon as well as with anesthesia.  5:09 PM  I called and spoke with the patient's daughter Samia who has medical power of .  We had a lengthy discussion regarding hospice as well as evaluating be concerns that I explained in lay terms here today with the patient having bowel obstruction and altered mental status, suspect sepsis, we discussed evaluating the patient further with lab diagnostics and imaging if they wish  to proceed with possible surgery.  I emphasized that surgery would likely not be able to be performed at our facility given the patient's significant perioperative risk as well as anticipated SICU requirement postoperatively.  Samia reiterated her request that the patient have evaluation with lab diagnostics and imaging pain medications and antibiotics IV or orally as indicated.  As noted above I plan to review this patient with anesthesia and my general surgeon here.  Additionally I note from talking with Samia that the patient started with vomiting and probably some abdominal discomfort likely 4 days prior to arrival so this is been in progress for some time prior to x-ray being obtained yesterday and the patient being sent here today.  8:25 PM  I reviewed this patient with both anesthesia and general surgery.  I conveyed my concerns with respect to this patient's comorbidities especially her end-stage severe COPD for which she is supposed to be on hospice.  I am concerned for her perioperative mortality as well as her likely requirement for significant time in SICU.  Anesthesia felt that she was not a good candidate for general anesthesia at our facility and agreed.  I subsequently spoke with Samia the patient's daughter once again and reviewed all of the information now available to me in the form of lab diagnostics and imaging.  Samia has spoken with other members of the family and they all agree that their mother would want surgical intervention if indicated and they agree that she is no longer on hospice and would like to pursue admission and if she does not resolve medically then surgical option.  We discussed hospitals for disposition, they would prefer [facility] first and the [facility] is a second choice.  Both general surgery and hospitalist are paged.  8:38 PM  I spoke with Dr. Witt for general surgery at [facility].  He felt that transferring the patient down to [facility]  for admission to the hospitalist service would be very reasonable and they would be happy to consult.  9:37 PM  I spoke with Dr. Roland for the hospitalist service at [facility] and they agreed to accept the patient.  9:41 PM  Called the patient's daughter back and let her know our plan for [facility] for transport.  If she has any further questions she can call back at any time before the end of my shift and will be happy to speak with her.                     EKG Interpretation:      Interpreted by Dale Pagan MD  Time reviewed: Time obtained 1734 time interpreted same 115 bpm sinus tachycardia nonspecific ST-T wave changes.  Normal axis, normal intervals.  Impression sinus tachycardia.          Critical Care time:  none               Results for orders placed or performed during the hospital encounter of 02/11/21 (from the past 24 hour(s))   CBC with platelets differential   Result Value Ref Range    WBC 11.6 (H) 4.0 - 11.0 10e9/L    RBC Count 4.47 3.8 - 5.2 10e12/L    Hemoglobin 13.8 11.7 - 15.7 g/dL    Hematocrit 42.6 35.0 - 47.0 %    MCV 95 78 - 100 fl    MCH 30.9 26.5 - 33.0 pg    MCHC 32.4 31.5 - 36.5 g/dL    RDW 12.6 10.0 - 15.0 %    Platelet Count 308 150 - 450 10e9/L    Diff Method Automated Method     % Neutrophils 73.9 %    % Lymphocytes 9.3 %    % Monocytes 16.2 %    % Eosinophils 0.0 %    % Basophils 0.2 %    % Immature Granulocytes 0.4 %    Nucleated RBCs 0 0 /100    Absolute Neutrophil 8.6 (H) 1.6 - 8.3 10e9/L    Absolute Lymphocytes 1.1 0.8 - 5.3 10e9/L    Absolute Monocytes 1.9 (H) 0.0 - 1.3 10e9/L    Absolute Eosinophils 0.0 0.0 - 0.7 10e9/L    Absolute Basophils 0.0 0.0 - 0.2 10e9/L    Abs Immature Granulocytes 0.1 0 - 0.4 10e9/L    Absolute Nucleated RBC 0.0    INR   Result Value Ref Range    INR 1.14 0.86 - 1.14   CRP inflammation   Result Value Ref Range    CRP Inflammation 10.8 (H) 0.0 - 8.0 mg/L   Comprehensive metabolic panel   Result Value Ref Range    Sodium 139  133 - 144 mmol/L    Potassium 4.0 3.4 - 5.3 mmol/L    Chloride 96 94 - 109 mmol/L    Carbon Dioxide 40 (H) 20 - 32 mmol/L    Anion Gap 3 3 - 14 mmol/L    Glucose 171 (H) 70 - 99 mg/dL    Urea Nitrogen 36 (H) 7 - 30 mg/dL    Creatinine 0.85 0.52 - 1.04 mg/dL    GFR Estimate 67 >60 mL/min/[1.73_m2]    GFR Estimate If Black 77 >60 mL/min/[1.73_m2]    Calcium 9.6 8.5 - 10.1 mg/dL    Bilirubin Total 0.3 0.2 - 1.3 mg/dL    Albumin 3.6 3.4 - 5.0 g/dL    Protein Total 7.4 6.8 - 8.8 g/dL    Alkaline Phosphatase 112 40 - 150 U/L    ALT 41 0 - 50 U/L    AST 24 0 - 45 U/L   Lipase   Result Value Ref Range    Lipase 36 (L) 73 - 393 U/L   Lactic acid whole blood   Result Value Ref Range    Lactic Acid 1.3 0.7 - 2.0 mmol/L   UA with Microscopic reflex to Culture    Specimen: Catheterized Urine   Result Value Ref Range    Color Urine Clarice     Appearance Urine Slightly Cloudy     Glucose Urine Negative NEG^Negative mg/dL    Bilirubin Urine Negative NEG^Negative    Ketones Urine 5 (A) NEG^Negative mg/dL    Specific Gravity Urine 1.030 1.003 - 1.035    Blood Urine Moderate (A) NEG^Negative    pH Urine 5.0 5.0 - 7.0 pH    Protein Albumin Urine 30 (A) NEG^Negative mg/dL    Urobilinogen mg/dL 0.0 0.0 - 2.0 mg/dL    Nitrite Urine Negative NEG^Negative    Leukocyte Esterase Urine Negative NEG^Negative    Source Catheterized Urine     WBC Urine 31 (H) 0 - 5 /HPF    RBC Urine 94 (H) 0 - 2 /HPF    Bacteria Urine Few (A) NEG^Negative /HPF    Squamous Epithelial /HPF Urine 4 (H) 0 - 1 /HPF    Transitional Epi 2 (H) 0 - 1 /HPF    Mucous Urine Present (A) NEG^Negative /LPF    Hyaline Casts 36 (H) 0 - 2 /LPF   CT Abdomen Pelvis w Contrast    Narrative    EXAM: CT ABDOMEN PELVIS W CONTRAST  LOCATION: [facility]  DATE/TIME: 2/11/2021 6:46 PM    INDICATION: Abdominal distention.  COMPARISON: 06/17/2018.  TECHNIQUE: CT scan of the abdomen and pelvis was performed following injection of IV contrast. Multiplanar reformats were obtained.  Dose reduction techniques were used.  CONTRAST: 64 ml Isovue 370    FINDINGS:   LOWER CHEST: Marked emphysematous changes. Linear atelectasis both lung bases. No pleural fluid. Marked coronary artery calcification. Calcified granuloma left lower lobe.    HEPATOBILIARY: Normal.    PANCREAS: Normal.    SPLEEN: Normal.    ADRENAL GLANDS: Normal.    KIDNEYS/BLADDER: Symmetric contrast enhancement both kidneys. No urinary collecting system dilatation. Bladder decompressed by catheter.    BOWEL: Evidence for mechanical small bowel obstruction with marked dilatation of multiple loops of small bowel throughout the abdomen and pelvis extending up to the level of an area of transition point in the right upper quadrant where there is severe   narrowing of decompressed distal small bowel loops (series 5, images  and series 3, images 49-67). No evidence for bowel wall thickening. No perforation. Postoperative changes of left lower quadrant colostomy and oversewn rectal stump.    LYMPH NODES: No lymphadenopathy.    VASCULATURE: Normal caliber abdominal aorta. Marked atherosclerotic vascular calcification.    PELVIC ORGANS: Unremarkable. No significant free fluid in the pelvis.    MUSCULOSKELETAL: Minimal degenerative changes in the spine.      Impression    IMPRESSION:   1.  Evidence for mechanical small bowel obstruction with marked dilatation of numerous small bowel loops throughout the abdomen and pelvis extending up to the right upper quadrant in the distal small bowel where there is transition point to completely   decompressed loops likely related to adhesions. No evidence of bowel wall thickening or perforation.    2.  Postoperative changes of left lower quadrant colostomy.    3.  Marked emphysematous changes in the lower lungs.       Medications   lactated ringers BOLUS 1,000 mL (0 mLs Intravenous Stopped 2/11/21 2101)     Followed by   lactated ringers infusion ( Intravenous New Bag 2/11/21 2104)   HYDROmorphone  (PF) (DILAUDID) injection 0.2 mg (0.2 mg Intravenous Given 2/11/21 1921)   ondansetron (ZOFRAN) injection 4 mg (4 mg Intravenous Given 2/11/21 1921)   piperacillin-tazobactam (ZOSYN) intermittent infusion 4.5 g (4.5 g Intravenous New Bag 2/11/21 1801)   iopamidol (ISOVUE-370) solution 64 mL (64 mLs Intravenous Given 2/11/21 1847)   sodium chloride 0.9 % bag 500mL for CT scan flush use (56 mLs As instructed Given 2/11/21 1848)       Assessments & Plan (with Medical Decision Making)   Medically complex 75-year-old female on hospice at the time of her presentation who presents with concerns of distended abdomen, lack of gas and fecal material in her colostomy of several days duration.  History taking from the patient was extremely limited due to her change in mental status related to acute out illness as well as medications given prior to her arrival to treat pain.  Most of the history was obtained from chart, hospice nurse with whom I spoke as well as from the patient's daughter who has power of  who directed her initially to the emergency department.  My hospice account they were not aware that this patient was transported until she was already transported.  She had an NG tube placed during transport which was draining bilious type material as noted in the exam section on arrival.  The patient's abdomen is extremely concerning for acute acute potentially surgical abdomen with high-pitched and phonetic bowel sounds, marked distention and complete lack of gas or fecal material in her ostomy bag.  Lab diagnostics and imaging were obtained and are reviewed as above.  White count is minimally elevated 11.6 with normal hemoglobin and platelets, INR is within normal limits, CRP is mildly elevated at 10.8.  The patient's lactate is nonelevated.  Her urinalysis appears infected, her CT revealed evidence of's mechanical small bowel obstruction with marked dilatation of numerous small bowel loops throughout the abdomen  pelvis extending up to the right upper quadrant in the distal small bowel where there is a transition point to completely decompressed loops likely related to adhesions.  No evidence of bowel wall thickening or perforation.  Marked emphysematous changes on the visualized portions of the lungs.  Please see the extensive discussion above with respect to this patient and her ultimate disposition.  Her daughter agrees that she is off hospice and after thoroughly discussing with all family members wants her to receive full medical and surgical treatment as indicated however the patient will remain DNR/DNI.  She believes that this is what the patient would want.  Given the patient's significant comorbidities and end-stage COPD with resting home O2 requirement of 2-3 L I reviewed this patient as far as perioperative risk with anesthesia, they did not feel the patient was an appropriate admission at our facility.  I also discussed this patient with our general surgeon here Dr. Graves who also felt that she would be more appropriately served at a higher level of care facility.  To this end I discussed the patient with the on-call hospitalist and general surgeon at [facility] where they are in agreement to accept the patient under their care.  The patient is transferred by EMS.  Patient's daughter was kept informed of the course in emergency department as well as the ultimate disposition with which she agrees.      Disclaimer: This note consists of symbols derived from keyboarding, dictation and/or voice recognition software. As a result, there may be errors in the script that have gone undetected. Please consider this when interpreting information found in this chart.      I have reviewed the nursing notes.    I have reviewed the findings, diagnosis, plan and need for follow up with the patient.       New Prescriptions    No medications on file       Final diagnoses:   Small bowel obstruction (H)        2/11/2021   [facility] EMERGENCY DEPT     Dale Pagan MD  02/13/21 152

## 2021-02-11 NOTE — PROGRESS NOTES
[facility]    Chief Complaint   Patient presents with     Nursing Home Acute     HPI:    Bella Saucedo is a 75 year old  (1945), who is being seen today for an episodic care visit at: [facility] ([facility]) [[id number]]    Seeing patient for a f/u. Patient continues to have nausea, abd distention, and no stool in colostomy   Patient states no changes in her breathing. No fever or chills.   Reviewed recent abd xray: ileus vs early distal obstruction  No recent UOP, orders to place a grant to ensure bladder distention not contributing.   Advised RN to give Mg Citrate now and keep patient NPO.   Patient later threw up mg citrate.       PMH/PSH reviewed in EPIC today.  REVIEW OF SYSTEMS:  4 point ROS including Respiratory, CV, GI and , other than that noted in the HPI,  is negative    EXAM:  /57   Pulse 115   Temp 97.6  F (36.4  C)   Resp 26   Wt 60.9 kg (134 lb 3.2 oz)   SpO2 92%   BMI 23.04 kg/m    GENERAL APPEARANCE:  Alert, in moderate distress  RESP:  respiratory effort and palpation of chest normal, auscultation of lungs diminished/clear, breathing at baseline  CV:  Palpation and auscultation of heart done , rate and rhythm reg murmur, systolic peripheral edema  ABDOMEN:  normal bowel sounds, soft, nontender, no hepatosplenomegaly or other masses  SKIN: pale/dry  NEURO: 2-12 in normal limits and at patient's baseline  PSYCH:  insight and judgement, memory intact , affect and mood anxious      Most Recent 3 CBC's:  Recent Labs   Lab Test 02/11/21 1737 02/04/21  0945 01/14/21  0630   WBC 11.6* 6.1 11.4*   HGB 13.8 11.9 12.2   MCV 95 99 99    329 258     Most Recent 3 BMP's:  Recent Labs   Lab Test 02/11/21 1737 02/04/21  0945 01/21/21  0705    143 142   POTASSIUM 4.0 4.2 4.4   CHLORIDE 96 106 106   CO2 40* 36* 35*   BUN 36* 19 24   CR 0.85 0.50* 0.54   ANIONGAP 3 1* 1*   MARILU 9.6 8.7 8.9   * 204* 102*     Most Recent 2  LFT's:  Recent Labs   Lab Test 02/11/21  1737 02/04/21  0945   AST 24 10   ALT 41 21   ALKPHOS 112 97   BILITOTAL 0.3 0.3     Most Recent 3 BNP's:  Recent Labs   Lab Test 02/04/21  0945   NTBNP 102     Most Recent TSH and T4:  Recent Labs   Lab Test 12/24/20  1245   TSH 1.24       Assessment/Plan:     Abdominal pain, generalized  Colostomy in place (H)  Non-intractable vomiting with nausea, unspecified vomiting type  Hospice care patient  Nausea and vomiting, intractability of vomiting not specified, unspecified vomiting type   - ongoing abd pain with n/v, hx of colon cancer, on hospice  - sx's worsening  - RN called daughter and daughter wants patient to ER for eval    Orders:  - give mag citrate x 1  - NPO except sips clear liquids  - discharge lasix  - place grant and update NP with OP  - Update family and hospice with patient status    Electronically signed by:  KASHMIR Gonzalez CNP

## 2021-02-12 PROBLEM — K56.609 SBO (SMALL BOWEL OBSTRUCTION) (H): Status: ACTIVE | Noted: 2021-01-01

## 2021-02-12 NOTE — PLAN OF CARE
A/O x4. VSS ex tachycardia, on 3L nc, tele ST. Denies pain and N/V this shift, NPO. NG tube to low int suction with moderate light green/brown output. Cornejo catheter, patent, adequate dark yellow OP. Colostomy with NO output, BS active and audible. L PIV infusing NS @ 100ml/hr. Skin with scattered bruises, blanchable redness to sacrum/coccyx, WOC consult ordered. Ax2, NOOB this shift. Plan pending palliative, surgery, SW consults.

## 2021-02-12 NOTE — PHARMACY-ADMISSION MEDICATION HISTORY
Pharmacy Medication History  Admission medication history interview status for the 2/12/2021  admission is complete. See EPIC admission navigator for prior to admission medications     Medication history sources: MAR ([facility] )    Significant changes made to the medication list:  Discontinued: furosemide, urea   New: azith, morphine, lorazepam   Changed:         In the past week, patient estimated taking medication this percent of the time: greater than 90%    Additional medication history information:       Medication reconciliation completed by provider prior to medication history? No    Time spent in this activity: 25min     Prior to Admission medications    Medication Sig Last Dose Taking? Auth Provider   acetaminophen (TYLENOL) 325 MG tablet Take 650 mg by mouth every 4 hours as needed for pain  Past Week at Unknown time Yes Reported, Patient   albuterol (PROAIR HFA/PROVENTIL HFA/VENTOLIN HFA) 108 (90 Base) MCG/ACT inhaler Inhale 2 puffs into the lungs every 4 hours as needed for shortness of breath / dyspnea or wheezing Past Month at Unknown time Yes Unknown, Entered By History   alum & mag hydroxide-simethicone (MAALOX) 200-200-20 MG/5ML SUSP suspension Take 30 mLs by mouth every 6 hours as needed for indigestion Past Week at Unknown time Yes Unknown, Entered By History   azithromycin (ZITHROMAX) 250 MG tablet Take 250 mg by mouth daily X 4 days     2/11-2/14 2/11/2021 at Unknown time Yes Unknown, Entered By History   citalopram (CELEXA) 40 MG tablet Take 40 mg by mouth daily  2/11/2021 at Unknown time Yes Reported, Patient   Fluticasone-Umeclidin-Vilanterol (TRELEGY ELLIPTA) 100-62.5-25 MCG/INH oral inhaler Inhale 1 puff into the lungs daily 2/11/2021 at Unknown time Yes Reported, Patient   guaiFENesin (MUCINEX) 600 MG 12 hr tablet Take 1,200 mg by mouth 2 times daily 2/11/2021 at Unknown time Yes Reported, Patient   LORazepam (ATIVAN) 0.5 MG tablet Take 1 tablet (0.5 mg) by mouth every 6 hours  as needed for anxiety Past Week at Unknown time Yes Tonja Bocanegra, KASHMIR CNP   LORazepam (LORAZEPAM INTENSOL) 2 MG/ML (HIGH CONC) solution Take 1 mg by mouth every 2 hours as needed for agitation or anxiety Past Month at Unknown time Yes Unknown, Entered By History   magnesium 250 MG tablet Take 1 tablet by mouth 2 times daily  2/11/2021 at Unknown time Yes Reported, Patient   Menthol, Topical Analgesic, (BIOFREEZE) 4 % GEL Externally apply topically 2 times daily L Shoulder  Yes Unknown, Entered By History   Menthol, Topical Analgesic, (BIOFREEZE) 4 % GEL Externally apply topically 2 times daily as needed Past Month at Unknown time Yes Unknown, Entered By History   morphine sulfate HIGH CONCENTRATE (ROXANOL) 20 mg/mL (HIGH CONC) solution Take 5 mg by mouth every hour as needed for shortness of breath / dyspnea or breakthrough pain Past Month at Unknown time Yes Unknown, Entered By History   polyethylene glycol (MIRALAX) 17 g packet Take 1 packet by mouth daily 2/11/2021 at Unknown time Yes Reported, Patient   predniSONE (DELTASONE) 10 MG tablet Take 10 mg by mouth daily 2/11/2021 at Unknown time Yes Reported, Patient   senna-docusate (SENOKOT-S/PERICOLACE) 8.6-50 MG tablet Take 2 tablets by mouth 2 times daily 2/11/2021 at Unknown time Yes Tiny Martin, KASHMIR CNP   Vitamin D, Cholecalciferol, 25 MCG (1000 UT) CAPS Take 4,000 Int'l Units by mouth daily 2/11/2021 at Unknown time Yes Reported, Patient   ipratropium - albuterol 0.5 mg/2.5 mg/3 mL (DUONEB) 0.5-2.5 (3) MG/3ML neb solution Take 1 vial by nebulization 3 times daily as needed for shortness of breath / dyspnea or wheezing   Reported, Patient       The information provided in this note is only as accurate as the sources available at the time of update(s)   Teodora Snow PharmD

## 2021-02-12 NOTE — H&P
[facility]    History and Physical - Hospitalist Service       Date of Admission:  2/12/2021    Assessment & Plan   Bella Saucedo is a 75 year old female with complex past medical history including end-stage COPD for which she is enrolled in hospice, she is now admitted on 2/12/2021 with small bowel obstruction.     Mechanical Small Bowel Obstruction   Pt presented to the ED from her nursing home because she had no stool output or gas in her ostomy bag since at least Wednesday 2/10. Her abdomen is quite distended. CT scan was obtained and showed evidence for mechanical small bowel obstruction with marked dilatation of numerous small bowel loops throughout the abdomen and pelvis extending up to the right upper quadrant in the distal small bowel where there is transition point to completely   decompressed loops likely related to adhesions. No evidence of bowel wall thickening or perforation. The patient is not currently in any discomfort.   - General surgery consulted   - NG tube placed by EMS, continue to low intermittent suction   - NPO   - Maintenance IVF   - Pain control PRN     Goals of Care   Prior to this hospitalization, the patient was enrolled in hospice for end stage COPD. The patient was transported to the ED at her daughter's request. She presented with a POLST signed and dated by the patient 1/21/2021 which documents do not attempt resuscitation, comfort focused treatment, use of IV/IM antibiotics. The ED provider from the outside hospital spoke with the patient's daughter and medical JOSHUA Gracia at length about goals of care. She requested full evaluation/work-up including labs and imaging. He discussed with Samia the possibility that Najma may need surgical intervention and given her significant underlying medical co-morbidities would be a poor surgical candidate with very high jaz-operative mortality. He furthermore discussed that if she were to proceed with surgery,  "she would likely require prolonged admission to the SICU and possible prolonged ventilatory support given her severe COPD. Despite this, and after discussion with other family members, Samia thinks that her mother would want surgical intervention if needed and she has terminated her enrollment in hospice.  - Per report from the ED provider the patient remains DNR/DNI despite terminating hospice enrollment and reversing decisions about hospitalizations and supportive treatments etc. but I was not able to reach Samia overnight to confirm this. Will remain DNR/DNI currently based on previous wishes, but this should be discussed in the morning.   - CC/SW consult   - Palliative consult     End-Stage COPD   Chronic hypoxic/hypercapnic respiratory failure   Pt had recent admission for acute on chronic hypoxemic/hypercapnic respiratory failure 2/2 CAP and COPD exacerbation requiring intubation. According to recent NH visits she has \"severe dyspnea\" at baseline. \"Spends most of her day in bed.\" She is on 2-3L of supplemental O2 at baseline.   - Continue supplemental O2   - Continue PTA Trelegy and Duonebs PRN   - Prednisone 10mg PTA, will order IV dexamethasone daily while NPO     Abnormal UA  Chronic Indwelling grant  Pt had pre-existing grant cather (unclear reason). She does have WBCs and few bacteria but no LE. No clinical evidence of infection   - Culture pending   - No antibiotics indicated at this time.     Coronary artery disease, Hx NSTEMI  Hypertension  Heart failure with preserved ejection fraction  - Hold PTA furosemide   - ASA was discontinued     Type 2 diabetes mellitus  - Insulin was discontinued     Hx stabe IIB colon cancer 2016 s/p left hemicolectomy and colostomy  - WOC consult       Other Noted History per chart review  Anxiety  GERD   Lung nodule  Rt MCA aneurysm s/p coil embolization 2011  PCOM aneurysm rupture s/p coil embolization 2010  Subarachnoid hemorrhage     Diet: NPO for Medical/Clinical " Reasons Except for: Ice Chips    DVT Prophylaxis: Pneumatic Compression Devices  Cornejo Catheter: not present  Code Status: No CPR- Do NOT Intubate           Disposition Plan   Expected discharge: TBD, recommended to prior living arrangement once adequate pain management/ tolerating PO medications and safe disposition plan/ TCU bed available.  Entered: Mishel Roland MD 02/12/2021, 12:20 AM     The patient's care was discussed with the Bedside Nurse and Patient.    Mishel Roland MD  [facility]  Contact information available via [facility] Paging/Directory      ______________________________________________________________________    Chief Complaint   Abdominal distension  History is obtained from the patient, chart review     History of Present Illness   Bella Saucedo is a 75 year old female who presents from her nursing home with abdominal distension and no ostomy output. The patient was sent to the ED from her nursing home with concerns for small bowel obstruction. She has not had any gas or ostomy output in at least 2-4 day. Currently the patient is sleepy, but denies any abdominal pain or nausea.     On presentation to the ED she was slightly tachycardic but otherwise stable. She is satting normally on her baseline amount of oxygen. The outside ED provider had multiple conversations with the hospice nurse and the patient's daughter Samia regarding goals of care (see above). Ultimately it was decided to undergo full diagnostic work-up and treatment.     Basic labs were fairly unremarkable. WBC count is mildly elevated to 11.6. Lactate was notably normal at 1.3.     CT abdomen/pelvis was obtained which showed evidence of mechanical small bowel obstruction with marked dilatation of multiple loops of small bowel throughout the abdomen. There is a transition point in the RUQ with severe narrowing of decompressed distal small bowel loops. No evidence of perforation. She is also noted  to have marked emphysematous changes and coronary artery calcification.    The ED provider discussed with general surgery who did not recommend urgent surgical intervention.     Review of Systems    The 10 point Review of Systems is negative other than noted in the HPI or here.     Past Medical History    I have reviewed this patient's medical history and updated it with pertinent information if needed.   No past medical history on file.    Past Surgical History   I have reviewed this patient's surgical history and updated it with pertinent information if needed.  No past surgical history on file.    Social History   I have reviewed this patient's social history and updated it with pertinent information if needed.  Social History     Tobacco Use     Smoking status: Former Smoker     Packs/day: 1.00     Types: Cigarettes     Quit date: 10/17/2020     Years since quittin.3   Substance Use Topics     Alcohol use: Not on file     Drug use: Not on file       Family History   I have reviewed this patient's family history and updated it with pertinent information if needed.  Family History   Problem Relation Age of Onset     Cancer Mother      Cancer Sister      Diabetes Brother        Prior to Admission Medications   Prior to Admission Medications   Prescriptions Last Dose Informant Patient Reported? Taking?   Fluticasone-Umeclidin-Vilanterol (TRELEGY ELLIPTA) 100-62.5-25 MCG/INH oral inhaler   Yes No   Sig: Inhale 1 puff into the lungs daily   LORazepam (ATIVAN) 0.5 MG tablet   Yes No   Sig: Take 1 tablet (0.5 mg) by mouth every 6 hours as needed for anxiety   Urea 40 % CREA   No No   Sig: Apply topically daily as needed for dry skin   Vitamin D, Cholecalciferol, 25 MCG (1000 UT) CAPS   Yes No   Sig: Take 4,000 Int'l Units by mouth daily   acetaminophen (TYLENOL) 325 MG tablet  Daughter Yes No   Sig: Take 325 mg by mouth every 4 hours as needed for pain    citalopram (CELEXA) 40 MG tablet  Daughter Yes No   Sig: Take  20 mg by mouth every other day   furosemide (LASIX) 20 MG tablet   Yes No   Sig: Take 10 mg by mouth daily   guaiFENesin (MUCINEX) 600 MG 12 hr tablet   Yes No   Sig: Take 1,200 mg by mouth 2 times daily   ipratropium - albuterol 0.5 mg/2.5 mg/3 mL (DUONEB) 0.5-2.5 (3) MG/3ML neb solution   Yes No   Sig: Take 1 vial by nebulization 3 times daily as needed for shortness of breath / dyspnea or wheezing   magnesium 250 MG tablet  Daughter Yes No   Sig: Take 1 tablet by mouth 2 times daily    polyethylene glycol (MIRALAX) 17 g packet   Yes No   Sig: Take 17 g by mouth daily. May also take 17 g daily as needed for constipation.   polyethylene glycol (MIRALAX) 17 g packet   Yes No   Sig: Take 1 packet by mouth daily   predniSONE (DELTASONE) 10 MG tablet   Yes No   Sig: Take 10 mg by mouth daily   senna-docusate (SENOKOT-S/PERICOLACE) 8.6-50 MG tablet   Yes No   Sig: Take 2 tablets by mouth 2 times daily      Facility-Administered Medications: None     Allergies   Allergies   Allergen Reactions     Nitroglycerin Anxiety and Other (See Comments)     Mild headache, severe anxiety  Mild headache, severe anxiety       Physical Exam   Vital Signs: Temp: 98.6  F (37  C) Temp src: Axillary BP: 137/67 Pulse: 113   Resp: 22 SpO2: 96 % O2 Device: Nasal cannula Oxygen Delivery: 3 LPM  Weight: 0 lbs 0 oz    General Appearance: sleepy, but wakes to voice.   Respiratory: No increased work of breathing, no wheezing or crackles   Cardiovascular: Tachycardia, regular, no murmurs, no lower extremity edema   GI: abdomen is distended, no significant tenderness to palpation, there are hypoactive bowel sounds. Ostomy is empty.   Genitourinary: urinary catheter with yellow urine   Skin: scattered bruises   Musculoskeletal: no abnormalities   Neurologic: CN II-XII grossly intact, moves all extremities   Psychiatric: sleepy    Data   Data reviewed today: I reviewed all medications, new labs and imaging results over the last 24 hours.     Recent  Labs   Lab 02/11/21  1737   WBC 11.6*   HGB 13.8   MCV 95      INR 1.14      POTASSIUM 4.0   CHLORIDE 96   CO2 40*   BUN 36*   CR 0.85   ANIONGAP 3   MARILU 9.6   *   ALBUMIN 3.6   PROTTOTAL 7.4   BILITOTAL 0.3   ALKPHOS 112   ALT 41   AST 24   LIPASE 36*     Recent Results (from the past 24 hour(s))   CT Abdomen Pelvis w Contrast    Narrative    EXAM: CT ABDOMEN PELVIS W CONTRAST  LOCATION: [facility]  DATE/TIME: 2/11/2021 6:46 PM    INDICATION: Abdominal distention.  COMPARISON: 06/17/2018.  TECHNIQUE: CT scan of the abdomen and pelvis was performed following injection of IV contrast. Multiplanar reformats were obtained. Dose reduction techniques were used.  CONTRAST: 64 ml Isovue 370    FINDINGS:   LOWER CHEST: Marked emphysematous changes. Linear atelectasis both lung bases. No pleural fluid. Marked coronary artery calcification. Calcified granuloma left lower lobe.    HEPATOBILIARY: Normal.    PANCREAS: Normal.    SPLEEN: Normal.    ADRENAL GLANDS: Normal.    KIDNEYS/BLADDER: Symmetric contrast enhancement both kidneys. No urinary collecting system dilatation. Bladder decompressed by catheter.    BOWEL: Evidence for mechanical small bowel obstruction with marked dilatation of multiple loops of small bowel throughout the abdomen and pelvis extending up to the level of an area of transition point in the right upper quadrant where there is severe   narrowing of decompressed distal small bowel loops (series 5, images  and series 3, images 49-67). No evidence for bowel wall thickening. No perforation. Postoperative changes of left lower quadrant colostomy and oversewn rectal stump.    LYMPH NODES: No lymphadenopathy.    VASCULATURE: Normal caliber abdominal aorta. Marked atherosclerotic vascular calcification.    PELVIC ORGANS: Unremarkable. No significant free fluid in the pelvis.    MUSCULOSKELETAL: Minimal degenerative changes in the spine.      Impression    IMPRESSION:   1.   Evidence for mechanical small bowel obstruction with marked dilatation of numerous small bowel loops throughout the abdomen and pelvis extending up to the right upper quadrant in the distal small bowel where there is transition point to completely   decompressed loops likely related to adhesions. No evidence of bowel wall thickening or perforation.    2.  Postoperative changes of left lower quadrant colostomy.    3.  Marked emphysematous changes in the lower lungs.

## 2021-02-12 NOTE — PROGRESS NOTES
[facility] Progress Note  [facility]    Brief conversation with pt, per palliative care team consult.  Pt seemed a little sleepy, and said that she isn't feeling well today.  She identified no need for  team or support at this time.  I shared words of comfort and support.    I and other chaplains are available if needs arise.                                                                                                                                                 Heather Castro M.A.  Staff   Phone [phone]

## 2021-02-12 NOTE — ED NOTES
Pt repositioned with pillows, given a few ice chips as her mouth is very dry. NG continues to drain greenish brown liquid.

## 2021-02-12 NOTE — PROGRESS NOTES
Progress Note    Karly Saucedo is a 74yo F with PMh of end stage COPD, enrolled in hospice, who was admitted earlier today for a SBO, now no longer in hospice.    Patient reports she is hungry and wants to eat and drink, I discussed with her that she will certainly feel worse if she does. She continues to have a NG tube on low intermittent suction.    /52 (BP Location: Left arm)   Pulse 114   Temp 98.5  F (36.9  C) (Axillary)   Resp 22   SpO2 (!) 83%   Female appears chronically ill  Tachycardic, no murmur  Tachypneic, has chronic appearing pursed-lip breathing, lungs CTAB with decreased airflow bilaterlaly  Abdomen is slightly distended and tender throughout  No LE edema    A/P  1. Goals of care. Palliative has been consulted. They plan to see the patient later today or tomorrow to help guide the family moving forward; per Surgery, surgical intervention may result in a prolonged recovery.    2. End stage COPD  Chronic hypoxic repsiratory failure  She has chornic dyspnea, and is on 2-3L at home. Here, she endorses SOB at rest above her baseline. She reports being diagonsed with PNA four days ago aftera CXR and has been on azithromycin. CXR here does not show any pneumonia. She is on 3L O2 satting 89-90% which is probably near her baesline. I do not think she will benefit from additional antibiotics currently so I will hold them. But, a COPD exacerbation is certainly possible as previous providers have noted wheezing. Will treat with steroids and nebulizers.  - Start Duonebs QID  - Methylpred 125mg IV today    3. Please refer H&P by Dr. Roland and Dr. Fall's consult note for management of mechanical small bowel obstruction.    Jamal Peoples MD  Hospitalist

## 2021-02-12 NOTE — ED NOTES
Pre-existing grant catheter is draining small amount clear dark yellow urine. Colostomy bag has no drainage nor gas in it. Abdomen is tight and tympanic. Pt denies pain except for when she coughs. NG continues to drain greenish brown fluid. O2 sats 89-93% on 3L.

## 2021-02-12 NOTE — CONSULTS
General Surgery [facility] Consultation/H&P    Bella Saucedo MRN#: [id number]   Age: 75 year old YOB: 1945     Date of Admission:          2/12/2021  Reason for consult/H&P: Possible bowel obstruction   Surgeon:      Federico Fall MD                  Chief Complaint:   No stool in her stoma bag for two days         History of Present Illness:   This patient is a 75 year old  female who presented to the [facility] ER with abdominal distention. She usually has stool in her stoma bag, but has not seen any for two days. She lives at a care center. . Denies fever, chills, nausea, vomiting, change in BM or urination.   She had an end left colostomy in the past. History is obtained from the patient and chart.  She was on hospice due to pulmonary disease. She is short of breath during my exam, on nasal oxygen with sats in the 80's. She was told she might have pneumonia at her primary clinic earlier this week and was started on some antibiotic.          Past Medical History:    has a past medical history of CAD (coronary artery disease), Cerebral aneurysm, nonruptured, Colon cancer (H), COPD (chronic obstructive pulmonary disease) (H), and Subarachnoid hemorrhage (H).          Past Surgical History:     Past Surgical History:   Procedure Laterality Date     HEMICOLECTOMY, RT/LT              Medications:     Prior to Admission medications    Medication Sig Start Date End Date Taking? Authorizing Provider   acetaminophen (TYLENOL) 325 MG tablet Take 650 mg by mouth every 4 hours as needed for pain    Yes Reported, Patient   albuterol (PROAIR HFA/PROVENTIL HFA/VENTOLIN HFA) 108 (90 Base) MCG/ACT inhaler Inhale 2 puffs into the lungs every 4 hours as needed for shortness of breath / dyspnea or wheezing   Yes Unknown, Entered By History   alum & mag hydroxide-simethicone (MAALOX) 200-200-20 MG/5ML SUSP suspension Take 30 mLs by mouth every 6 hours as needed for indigestion   Yes Unknown, Entered By  History   azithromycin (ZITHROMAX) 250 MG tablet Take 250 mg by mouth daily X 4 days     2/11-2/14   Yes Unknown, Entered By History   citalopram (CELEXA) 40 MG tablet Take 40 mg by mouth daily    Yes Reported, Patient   Fluticasone-Umeclidin-Vilanterol (TRELEGY ELLIPTA) 100-62.5-25 MCG/INH oral inhaler Inhale 1 puff into the lungs daily   Yes Reported, Patient   guaiFENesin (MUCINEX) 600 MG 12 hr tablet Take 1,200 mg by mouth 2 times daily   Yes Reported, Patient   LORazepam (ATIVAN) 0.5 MG tablet Take 1 tablet (0.5 mg) by mouth every 6 hours as needed for anxiety 2/2/21  Yes Tonja Bocanegra APRN CNP   LORazepam (LORAZEPAM INTENSOL) 2 MG/ML (HIGH CONC) solution Take 1 mg by mouth every 2 hours as needed for agitation or anxiety   Yes Unknown, Entered By History   magnesium 250 MG tablet Take 1 tablet by mouth 2 times daily    Yes Reported, Patient   Menthol, Topical Analgesic, (BIOFREEZE) 4 % GEL Externally apply topically 2 times daily L Shoulder   Yes Unknown, Entered By History   Menthol, Topical Analgesic, (BIOFREEZE) 4 % GEL Externally apply topically 2 times daily as needed   Yes Unknown, Entered By History   morphine sulfate HIGH CONCENTRATE (ROXANOL) 20 mg/mL (HIGH CONC) solution Take 5 mg by mouth every hour as needed for shortness of breath / dyspnea or breakthrough pain   Yes Unknown, Entered By History   polyethylene glycol (MIRALAX) 17 g packet Take 1 packet by mouth daily   Yes Reported, Patient   predniSONE (DELTASONE) 10 MG tablet Take 10 mg by mouth daily   Yes Reported, Patient   senna-docusate (SENOKOT-S/PERICOLACE) 8.6-50 MG tablet Take 2 tablets by mouth 2 times daily 11/13/20  Yes Tiny Martin APRN CNP   Vitamin D, Cholecalciferol, 25 MCG (1000 UT) CAPS Take 4,000 Int'l Units by mouth daily   Yes Reported, Patient   ipratropium - albuterol 0.5 mg/2.5 mg/3 mL (DUONEB) 0.5-2.5 (3) MG/3ML neb solution Take 1 vial by nebulization 3 times daily as needed for shortness of breath /  dyspnea or wheezing    Reported, Patient            Allergies:     Allergies   Allergen Reactions     Nitroglycerin Anxiety and Other (See Comments)     Mild headache, severe anxiety  Mild headache, severe anxiety            Social History:     Social History     Tobacco Use     Smoking status: Former Smoker     Packs/day: 1.00     Types: Cigarettes     Quit date: 10/17/2020     Years since quittin.3   Substance Use Topics     Alcohol use: Not on file             Family History:    This patient has no significant relevant family history.  Family history is reviewed in detail.          Review of Systems:   Complete ROS is negative other than noted in the HPI.  C: NEGATIVE for fever, chills, change in weight  R: NEGATIVE for significant cough   CV: NEGATIVE for chest pain, palpitations or peripheral edema  GI:  NEGATIVE for dysuria, heartburn, or change in bowel habits  H: NEGATIVE for bleeding problems         Physical Exam:   Blood pressure 123/52, pulse 114, temperature 98.5  F (36.9  C), temperature source Axillary, resp. rate 22, SpO2 (!) 83 %.  I/O last 3 completed shifts:  In: 0   Out: 450 [Urine:250; Emesis/NG output:200]    General - This is a thin female . She is working to breathe.   HEENT - Normocephalic. Atraumatic. Moist mucous membranes. Pupils equal.  No scleral icterus. Nose normal.  Neck - Supple without masses. No cervical adenopathy or thyromegaly  Lungs - Breathing labored  Chest - Not tender. CVA's nontender  Heart - Regular rate & rhythm   Abdomen - firm, nontender, somewhat distended.  no organomegaly. Stoma on left. Old low midline scar without definite hernia  Extremities - Moves all extremities. Warm without edema. Pulses hard to find  Neurologic - Nonfocal. Alert and oriented          Data:   Labs:  Recent Labs   Lab Test 21  1737 21  0945 21  0630   WBC 11.6* 6.1 11.4*   HGB 13.8 11.9 12.2   HCT 42.6 38.2 39.6    329 258     Recent Labs   Lab Test  02/11/21 1737 02/04/21  0945 01/21/21  0705   POTASSIUM 4.0 4.2 4.4   CHLORIDE 96 106 106   CO2 40* 36* 35*   BUN 36* 19 24   CR 0.85 0.50* 0.54     Recent Labs   Lab Test 02/11/21 1737 02/04/21  0945 12/31/20  0134   BILITOTAL 0.3 0.3 0.4   ALT 41 21 22   AST 24 10 17   ALKPHOS 112 97 95   LIPASE 36*  --   --      Recent Labs   Lab Test 02/11/21 1737   INR 1.14     Recent Labs   Lab Test 02/11/21 1737 02/04/21  0945 01/21/21  0705 01/14/21  0630 12/24/20  1245 12/24/20  1245 10/29/20  0822 10/29/20  0822 10/28/20  0708 10/26/20  0855 10/17/20  0609 10/17/20  0609   MARILU 9.6 8.7 8.9 9.0   < > 9.1   < >  --   --  8.5   < > 8.2*   PHOS  --   --   --   --   --   --   --  4.1 3.5 3.1   < > 2.1*   MAG  --   --   --  2.1  --  2.2  --   --   --   --   --  2.9*    < > = values in this interval not displayed.     Recent Labs   Lab Test 02/11/21  1810 02/11/21 1737 02/04/21 0945 01/21/21  0705 12/31/20  0134 12/31/20  0134   ANIONGAP  --  3 1* 1*   < > <1*   PROTEIN 30*  --   --   --   --   --    ALBUMIN  --  3.6 2.9*  --   --  3.1*    < > = values in this interval not displayed.       CT scan of the abdomen: images reviewed in detail.  I think there is a little fluid around her liver.  She has remarkably dilated small bowel, with gas and fluid. Some decompressed small bowel.    CXR: image reviewed: atelectasis left greater than right, but no major infiltrate    Ultrasound of the abdomen: not done    Lactate 1.3           Assessment:   Very dilated small bowel. Very bad lungs.   Her bowel seems to have a transition in the RUQ away from her stoma. Acute onset makes tumor less likely. Not septic or perforated. Not much pain.          Plan:    Since most benign obstructions resolve with a day or two and she is not septic, I would work on a conservative course for a day or three. If she can move around, even just sit up, that would be better than staying in bed. I think we have a 50/50 chance with her of conservative  treatment working. If she needs an operation, her post op ventilatory course will be challenging, perhaps requiring a tracheostomy or worse.   Discussed with daughter, who will be coming down to [place] this afternoon to review the choices with the patient.      I have discussed the history, physical, and plan with the patient.   Federico Fall M.D.

## 2021-02-12 NOTE — ED NOTES
Unit was changed at [facility], pt going to [address] at . Report given to Tanya PARKS. Belongings bag with pt shirt sent along in marked belongings bag. Pt does not have any false teeth with her. Pt transported to [facility] via [facility].

## 2021-02-12 NOTE — CONSULTS
"[facility]  WO Nurse Inpatient Ostomy & Wound Assessment     Assessment of established end Colostomy   & assessment of coccyx/buttocks and heels    Surgery date:  3-9-16  Surgeon:  Dr. Del Caldwell ([facility])  Procedure:  Sigmoidectomy with diverting colostomy  Related diagnosis:  Sigmoid rupture, colon CA    WOC Assessment & Physical Exam:        Current status: resting in bed, high respiratory rate.  No output in ostomy pouch.       Date of last photo: 2-12-21        Stoma size: approx 1 1/4\", rounded    Stoma appearance: pale pink, viable    Peristomal complication(s): area not visualized; pt denies issues    Abdominal assessment: distended but soft    NG still in place? Yes     Output: none     Pain: mild      Current pouching system: [place] 2pc 44mm flat barrier with opaque pouch, from prior to admission; remains intact    Pouch last changed:  unclear    Reason for pouch change today: pouch not changed today      Learning and comprehension: pt seems to know basics but now lives in care facility where they manage her ostomy cares      Buttocks/coccyx: intact, mild blanchable erythema.  No PI noted.  Pt needs heavy assist x 2 for repositioning.  Left medial heel: deep pink but blanchable and intact.  Pt c/o pain with palpation.  Not yet a PI but will need to monitor in case is developing deep tissue injury.  Present on admission.  2-12-21        Right lateral foot and ankle: bony prominences are deep pink but intact and blanchable.  Right heel pink, blanchable, intact, with mild peeling dry skin. Pt unable to move her legs much on her own.  Prevalon boots were ordered and applied today 2/12.   2-12-21        WO Plan:         Pouching product plan: [place] 2pc; there is a 57mm flat barrier (# 000174) and a clear lock n roll pouch (#230099) in room in case pouch needs to be changed by Nursing over the weekend      Frequency of pouch changes: 2-3x week and prn      Pt " support system on discharge: hospice care at facility      Skin care: follow PIP measures    Pressure Injury Prevention (PIP) Plan:  -If patient is declining pressure injury prevention interventions: Explore reason why and address patient's concerns  -Mattress: Follow bed algorithm, reassess daily and order specialty mattress, if indicated.  -HOB: Maintain at or below 30 degrees, unless contraindicated  -Repositioning in bed: Every 1-2 hours , Left/right positioning; avoid supine and Raise foot of bed prior to raising head of bed, to reduce patient sliding down (shear)  -Heels: Keep elevated off mattress, Pillows under calves and Heel lift boots  -Protective Dressing: Sacral Mepilex for prevention (#640021),  especially for the agitated patient   -Chair positioning: pillow or Chair cushion (#130553)  and Assist patient to reposition hourly   -Moisture Management: Perineal cleansing /protection: Follow Incontinence Protocol  -Under Devices: Inspect skin under all medical devices during skin inspection , Ensure tubes are stabilized without tension and Ensure patient is not lying on medical devices or equipment when repositioned      WOC follow-up plan: 1-2x week and prn      Objective Data:       Patient history according to medical record: Bella Saucedo is a 75 year old female with complex past medical history including end-stage COPD for which she is enrolled in hospice, she is now admitted on 2/12/2021 with small bowel obstruction.       Current Diet/Nutrition:   Orders Placed This Encounter      NPO for Medical/Clinical Reasons Except for: Ice Chips       Output:  I/O last 3 completed shifts:  In: 0   Out: 450 [Urine:250; Emesis/NG output:200]      Labs:   Recent Labs   Lab 02/12/21  0944 02/11/21  1737   ALBUMIN  --  3.6   HGB  --  13.8   INR  --  1.14   WBC  --  11.6*   A1C 6.3*  --    CRP  --  10.8*         Ayden Risk Assessment:   Sensory Perception: 3-->slightly limited  Moisture: 4-->rarely  moist  Activity: 3-->walks occasionally  Mobility: 3-->slightly limited  Nutrition: 2-->probably inadequate  Friction and Shear: 3-->no apparent problem  Ayden Score: 18      WOC Interventions:       Patient's chart evaluated    Focus of today's visit: assessment of skin, stoma, pouching system, supply needs    Pouch left intact    Skin assessed, pt repositioned, heel boots ordered    Orders written    Supplies: at bedside    Discussed plan of care with: Patient and Nurse    Melinda Williamson RN, CWOCN

## 2021-02-12 NOTE — ED NOTES
Pt again denies pain or nausea, has occasional hiccups. NG tube is now secured, is at 60 cm at the nare.

## 2021-02-12 NOTE — CONSULTS
[facility]  Palliative Care Consultation Note    Patient: Bella Saucedo  Date of Admission:  2/12/2021    Requesting Clinician / Team: Dr. Roland/Hospitalist   Reason for consult: Goals of care    Recommendations:    Pt and daughter are in agreement with bowel rest with NG tube for decompression to see if we can help Bella's SBO resolve with conservative management     Dtr and I did acknowledge that if SBO does not resolve, this may lead to her dying process sooner than hoped or expected. Counseled dtr further on risks of surgery (vent dependence, death) should pt's SBO not resolve. I recommended against surgery and assured dtr that if we do not do surgery, we will be able to keep her mother comfortable at end of life with use of medications     Dtr intends to reenroll in hospice ([facility] hospice at the [facility]") upon hospital discharge     I will check in again mid-next week     These recommendations have been discussed with Dr. Peoples.    KASHMIR Mcallister [facility]  Contact information available via [facility] Paging/Directory      Thank you for the opportunity to participate in the care of this patient and family. Our team: will continue to follow.     During regular M-F work hours (0211-2583) -- if you are not sure who specifically to contact -- please contact us on [facility] Smart Web.     After regular work hours and on weekends/holidays, you can call our answering service at [phone].     Attestation:  Total time on the floor involved in the patient's care: 70 minutes  Total time spent in counseling/care coordination: >50%    Assessments:  Bella Saucedo is a 75 year old female with PMH significant for end stage COPD on chronic 2-3L O2 currently enrolled in [facility] hospice, chronic indwelling grant, CAD, hx NSTEMI, HTN, HFpEF, DM2, and hx stable IIb colon cancer dx 2016 s/p left hemicolectomy with colostomy who presents with abd distention, pain,  decreased ostomy output, and N/V. She is found to have a SBO. NGT placed for decompression. Pt was evaluated by surgery today who recommended conservative medical management for the next few days.     Today, the patient was seen for:  Goals of care    Visited Bella this afternoon. She is seen resting in bed. She has somewhat purse-lipped breathing and O2 sats 87-89% on 3L via NC. Pt is aware that she is at [facility]. She thinks she is in the hospital for her breathing. She feels like she is not being told anything. She and I briefly acknowledge that she has a SBO, of which she states she is aware. She has NGT and we are resting her bowel. She states that she is hungry. She states that her breathing is uncomfortable. This happens when she is anxious. She was ok with me discussing with her daughter Samia.    Called Samia via phone. She is unable to get to the hospital because her power steering went out on her vehicle, waiting for this to be fixed. She spoke with Dr. Fall this AM, but feels confused by the word trach.     Samia reports that pt was hospitalized at [facility] around the holidays. She was on the vent for 3 days at that point. She went to a facility for rehabilitation. She stalled on her progress and was enrolled in hospice. They feel a bit forced into the program because medicare stopped paying for rehabilitation. They had 2 visits from [facility] before coming here.    Pt had N/V for the last week, and then no output from her ostomy. Dtr is frustrated by this.     We spend time talking about pt's COPD, which is a chronic, progressive, terminal condition with time. Her breathing is tenuous even at rest, and her breathing gets more labored when she feels anxious.     We discuss her current SBO and medical management (NGT for decompression, bowel rest) with hope that it will resolve in the coming days. Should it not resolve, or there be a complication (perforation), we discussed the risks of surgery (not  being able to wean from the vent, being faced with compassionate extubation at end of life vs trach). At this point, given her extensive lung disease, I would not recommend proceeding with surgery when/if the SBO does not resolve.    Instead, I focused on what care would look like without surgery. We talked about comfort measures with use of medications. We acknowledge that time to live will be shorter than hoped or expected.     Ultimately, Samia wants to speak more with her mother about her options. She is generally on board with conservative management at this time. They are also fully planning on reenrolling in hospice upon discharge.      Prognosis, Goals, & Planning:      Functional Status just prior to hospitalization: 3 (Capable of only limited self-care; needs help with ADLs; in bed/chair >50% of waking hours)      Prognosis, Goals, and/or Advance Care Planning were addressed today: Yes      Patient's decision making preferences: unable to assess          Patient has decision-making capacity today for complex decisions: Partial (needs assistance with complex decisions)            I have concerns about the patient/family's health literacy today: No           Patient has a completed Health Care Directive: Yes, and on file.      Code status: No CPR / No Intubation    Coping, Meaning, & Spirituality:   Mood, coping, and/or meaning in the context of serious illness were addressed today: Yes    Social:     [facility]    [place] family / caregivers: , dtr Samia    History of Present Illness:  History gathered today from: patient, family/loved ones, medical chart, medical team members, unit team members, health care directive/s    Bella Saucedo is a 75 year old female with PMH significant for end stage COPD on chronic 2-3L O2 currently enrolled in [facility] hospice, chronic indwelling grant, CAD, hx NSTEMI, HTN, HFpEF, DM2, and hx stable IIb colon cancer dx 2016 s/p left hemicolectomy with  colostomy who presents with abd distention, pain, decreased ostomy output, and N/V. She is found to have a SBO. NGT placed for decompression. Pt was evaluated by surgery today who recommended conservative medical management for the next few days.     Key Palliative Symptom Data:  # Pain severity the last 12 hours: low  # Dyspnea severity the last 12 hours: severe  # Nausea severity the last 12 hours: low  # Anxiety severity the last 12 hours: moderate    Patient is on opioids: assessed and bowels ok/no needed changes to plan of care today.    ROS:  Comprehensive ROS is reviewed and is negative except as here & per HPI: N/A     Past Medical History:  Past Medical History:   Diagnosis Date     CAD (coronary artery disease)      Cerebral aneurysm, nonruptured      Colon cancer (H)     s/p colectomy and colostomy     COPD (chronic obstructive pulmonary disease) (H)      Subarachnoid hemorrhage (H)         Past Surgical History:  Past Surgical History:   Procedure Laterality Date     HEMICOLECTOMY, RT/LT           Family History:  Family History   Problem Relation Age of Onset     Cancer Mother      Cancer Sister      Diabetes Brother          Allergies:  Allergies   Allergen Reactions     Nitroglycerin Anxiety and Other (See Comments)     Mild headache, severe anxiety  Mild headache, severe anxiety        Medications:  I have reviewed this patient's medication profile and medications from this hospitalization.   Noted scheduled meds are:  Solumedrol 125mg IV daily   IVF    Noted PRN meds are:  Dilaudid 0.2mg IV Q3hrs PRN pain     Physical Exam:  Vital Signs: Temp: 98.5  F (36.9  C) Temp src: Axillary BP: 123/52 Pulse: 114   Resp: 22 SpO2: (!) 83 % O2 Device: Nasal cannula Oxygen Delivery: 3 LPM  Weight: 0 lbs 0 oz  CONSTITUTIONAL: Chronically ill woman seen lying in bed in mild to moderate distress. She is A&Ox3. Calm and cooperative.  HEENT: NCAT  RESPIRATORY: Shallow and labored respiratory effort on 3L via NC,  somewhat purse-lipped breathing   NEUROLOGIC: Appropriately responsive during interview  PSYCH: Affect engaged     Data reviewed:  Recent imaging reviewed, my comments on pertinents:   Results for orders placed or performed during the hospital encounter of 02/12/21   XR Chest Port 1 View    Impression    IMPRESSION: Feeding tube seen coursing below the diaphragm with  nonvisualization of the distal tip. Some atelectasis at the lung  bases. No pleural effusion. No focal consolidation. No pneumothorax.  The cardiac mediastinal silhouette is within normal limits.    LAURA WONG MD       Recent lab data reviewed, my comments on pertinents:   Na 139  K 4  Creat 0.85  WBC 11.6  Hgb 13.8  Plt 308  Albumin 3.6

## 2021-02-13 NOTE — PLAN OF CARE
Alert and oriented x 4. VSS on 4 L. Abdominal discomfort present, but denies pain intervention. Baseline 2-3 L. Goal is to keep oxygen above 89%. Tele ST. NPO except ice. NG to low int suction with brown OP. Q2hr turn. Has not been OOB yet, but willing to sit at edge of bed today and pivot if able. Chronic grant changed and in place. No OP from colostomy. Has not passed gas yet. Heels reddened with peeling callouses. Rooke boots in place. Discharge pending.

## 2021-02-13 NOTE — PLAN OF CARE
"7A-7P nurse shift note  Seen by surgeon - no plan for immediate surgery for her SBO at this time. Abdomen distended soft with RUQ tenderness/pain controlled with IV  Dilaudid given x2. No flatus. States last BM was about 5 days ago. Has NG tube to low int suction with moderate light green/brown output. Has Colostomy but with NO output/no flatus  Respiratory status worse this am with falling O2 sats, tachycardia and increased SOB  Resp became more labored,(her accessory muscle in use, pursed lip breathing is baseline PTA), Has bilateral expir wheezes and fine basilar rales.  Is  SOB at rest and O2 sats down to low to mid 80's.   Writer paged RT at 0900 who arrived at bedside by approx 1030 to give neb RX.  Daughter told writer that Pt has recent \"diagnosed with  Pneumonia\" via a CXR done  2-8-21 at her  LTC facility and was started then on an oral antibiotic - Pt is currently NOT on any antibiotics- Hospitalist aware. Solumedrol dose was added/given in addition to of this am's Decadron, Oxygen was increased to 4LPM   CXR done at bedside - and reportedly no pneumonia noted-thus no need for abx at this time per MD, A/O x4. VSS ex tachycardia, telemetry sinus tach  w occasional PVC. RT was unable to obtain ABG's and notified MD who order  VBG - results are pending   NPO. . Cornejo catheter, patent. L PIV infusing NS @ 100ml/hr., WOC consulted. Sacrum and coccyx area look good -no redness. Both heels however were red - Writer applied sween cream and placed bilateral rook boots that WOC ordered.Pt derick tolerates being repo'd from supine to right side - c/o more pain and more SOB when positioned to left side   Disposition: Lives (is long term care) at [facility] in [place]. Discharge is pending pending status improvement  "

## 2021-02-13 NOTE — PROGRESS NOTES
"GENERAL SURGERY PROGRESS NOTE    Bella is doing alright overall this morning.  Feels that her breathing is better this morning after a difficult episode last night.  She denies abdominal pain, but is unable to tell me the last time she had ostomy output or took in anything by mouth.  The NG has put out 600ml yesterday, and is currently thin mostly clear liquid (she was eating a cup of ice chips when I arrived) and she denies any recent nausea (though nurses report that she had nausea this morning).  No stool or flatus per nursing.  She does remember her discussions with the surgical and palliative teams yesterday and is agreeable to continue the plan.      /52 (BP Location: Left arm)   Pulse 109   Temp 98.9  F (37.2  C) (Axillary)   Resp 20   Ht 1.6 m (5' 3\")   Wt 62.5 kg (137 lb 11.2 oz)   SpO2 92%   BMI 24.39 kg/m    General:  Resting in bed, awake and alert.  No acute distress.  Resp:  Accessory muscle use and cheek puffing on NC (reported as baseline)  Abdomen:  Soft, nontender, nondistended.  Stoma bag is clean/new.     76 yo F with end-stage COPD (previously on hospice and planning to return to this) who presents with SBO with transition point in RUQ.  No sepsis or perforation.    - trial of nonop management with NG to suction  - ok to clamp as needed for transport etc.    - gastrografin enema did not show passage of contrast beyond the hepatic flexure (same area of concern of CT).  Stool output after contrast enema may reflect local action of contrast and should be interpreted cautiously.  - discussion with her daughter from surgery and palliative perspective around poor operative outcomes (vent dependence, death) if attempting surgical intervention.  Daughter reports that they would consider surgical management but not tracheostomy if vent dependent.    - appreciate hospitalist and palliative teams    Shahida Wong MD  "

## 2021-02-13 NOTE — PROVIDER NOTIFICATION
"MD Notification    Notified Person: MD    Notified Person Name: Eliecer Emily VELARDE     Notification Date/Time: 2/12/21 at 1932    Notification Interaction: AMCOM     Purpose of Notification: \"FYI, VBG CO2 66. Previous hosp. wanted to know in order to place oxygen parameters as pt is End stage COPD'er. Can you place oxygen orders per pt care order?\"    Orders Received: orders to titrate oxgen to maintain sats above 89%    Comments:      "

## 2021-02-13 NOTE — PROVIDER NOTIFICATION
MD Notification    Notified Person: MD    Notified Person Name: Nyasia    Notification Date/Time: 2:53 PM    Notification Interaction:text paged     Purpose of Notification:   Great news! At 1433 Pt had excellent results from today's Gastrografin enema through the colostomy resulting in a  total of 650cc liquid light brown stool measured from colostomy pouch. Patient was thrilled! Abdomen less distended and softer      Orders Received:    Comments:

## 2021-02-13 NOTE — PROGRESS NOTES
[facility]    Hospitalist Progress Note    Date of Service (when I saw the patient): 02/13/2021    Assessment & Plan   Bella Saucedo is a 75 year old female who was admitted on 2/12/2021.  Assessment & Plan     Bella Saucedo is a 75 year old female with complex past medical history including end-stage COPD for which she is enrolled in hospice, she is now admitted on 2/12/2021 with small bowel obstruction.      Mechanical Small Bowel Obstruction   Pt presented to the ED from her nursing home because she had no stool output or gas in her ostomy bag since at least Wednesday 2/10. Her abdomen is quite distended. CT scan was obtained and showed evidence for mechanical small bowel obstruction with marked dilatation of numerous small bowel loops throughout the abdomen and pelvis extending up to the right upper quadrant in the distal small bowel where there is transition point to completely   decompressed loops likely related to adhesions. No evidence of bowel wall thickening or perforation. The patient is not currently in any discomfort.   - General surgery consulted   - NG tube placed by EMS, continue to low intermittent suction   - NPO   - Maintenance IVF   - Pain control PRN   No out put through the ostomy currently   Pt with positive BS on exam today  Very poor surgical candidate with her severe COPD per surgery team recommended conservative management   Xray gastrografiin enema ordered today      Goals of Care    pt with end stage COPD, enrolled in hospice, who was admitted for a SBO, now no longer in hospice.  palliative care consulted and met with daughter and pt continue conservative management for now if no improvement will possibly reenroll with hospice on discharge     End-Stage COPD   Acute copd exacerbation   Acute on chronic hypoxic/hypercapnic respiratory failure   Pt had recent admission for acute on chronic hypoxemic/hypercapnic respiratory failure 2/2 CAP and COPD  "exacerbation requiring intubation. According to recent NH visits she has \"severe dyspnea\" at baseline. \"Spends most of her day in bed.\" She is on 2-3L of supplemental O2 at baseline.   - Continue supplemental O2   - Continue PTA Trelegy and Duonebs PRN   - Prednisone 10mg PTA  On iv solumedrol 125mcg daily switch to 60mg Q 8hours   Pt with worsening sob overnight RRT was called thought to be anxiety with severe underlying COPD contributing improved with IV ativan one dose   PRn IV ativan ordered today      Abnormal UA  Chronic Indwelling grant  Pt had pre-existing grant cather (unclear reason). She does have WBCs and few bacteria but no LE. No clinical evidence of infection   - Culture pending   - No antibiotics indicated at this time.      Coronary artery disease, Hx NSTEMI  Hypertension  Heart failure with preserved ejection fraction  - Hold PTA furosemide   - ASA was discontinued      Type 2 diabetes mellitus  - Insulin was discontinued      Hx stabe IIB colon cancer 2016 s/p left hemicolectomy and colostomy  - WOC consult      Other Noted History per chart review  Anxiety  GERD   Lung nodule  Rt MCA aneurysm s/p coil embolization 2011  PCOM aneurysm rupture s/p coil embolization 2010  Subarachnoid hemorrhage        Diet: NPO for Medical/Clinical Reasons Except for: Ice Chips    DVT Prophylaxis: Pneumatic Compression Devices  Grant Catheter: not present  Code Status: No CPR- Do NOT Intubate               Disposition Plan     Expected discharge: TBD in the next 2-3days if small bowel obstruction resolves and her respiratory status improves    Discussed with bedside RN, patient and her daughter Samia and general surgery with Dr. Jennings today  Greater than 35 minutes were spent in taking care of her today half of which was spent in reviewing the chart and counseling the patient in collaboration of care    Lorena Murrell MD  [phone] (P)      Interval History   Patient resting comfortably in bed.  Complains of " mild shortness of breath which is worse than her baseline usual baseline.  Nausea RRT was called earlier this morning for worsening shortness of breath and tachypnea.  Shortness of breath improved with 1 dose of IV Ativan given for bedside RN.  Patient denies any abdominal pain or nausea currently NG tube in place with low intermittent suction.     -Data reviewed today: I reviewed all new labs and imaging results over the last 24 hours. I personally reviewed no images or EKG's today.    Physical Exam   Temp: 98.9  F (37.2  C) Temp src: Axillary BP: 134/52 Pulse: 109   Resp: 20 SpO2: 92 % O2 Device: Nasal cannula Oxygen Delivery: 4 LPM  Vitals:    02/12/21 2014   Weight: 62.5 kg (137 lb 11.2 oz)     Vital Signs with Ranges  Temp:  [96.7  F (35.9  C)-99.2  F (37.3  C)] 98.9  F (37.2  C)  Pulse:  [] 109  Resp:  [16-36] 20  BP: (123-168)/(50-93) 134/52  SpO2:  [75 %-97 %] 92 %  I/O last 3 completed shifts:  In: 3290.33 [P.O.:210; I.V.:3080.33]  Out: 1400 [Urine:500; Emesis/NG output:900]    Constitutional: Awake, alert, cooperative, mildly dyspneic on exam today  Respiratory: Mild respiratory distress with tachypnea, occasional wheezing present  Cardiovascular: Regular rate and rhythm, normal S1 and S2, and no murmur noted  GI: Bowel sounds positive, nontender, no distention, ostomy with no output  Skin/Integumen: No rashes, no cyanosis, no edema  Other:     Medications     sodium chloride 100 mL/hr at 02/13/21 0610       fluticasone-vilanterol  1 puff Inhalation Daily    And     umeclidinium  1 puff Inhalation Daily     insulin aspart  1-7 Units Subcutaneous TID AC     insulin aspart  1-5 Units Subcutaneous At Bedtime     methylPREDNISolone  62.5 mg Intravenous Q8H       Data   Recent Labs   Lab 02/13/21  0635 02/11/21  1737   WBC 11.4* 11.6*   HGB 12.3 13.8   MCV 98 95    308   INR  --  1.14    139   POTASSIUM 4.0 4.0   CHLORIDE 101 96   CO2 36* 40*   BUN 27 36*   CR 0.56 0.85   ANIONGAP 2* 3   MARILU  8.3* 9.6   * 171*   ALBUMIN  --  3.6   PROTTOTAL  --  7.4   BILITOTAL  --  0.3   ALKPHOS  --  112   ALT  --  41   AST  --  24   LIPASE  --  36*       No results found for this or any previous visit (from the past 24 hour(s)).

## 2021-02-13 NOTE — PLAN OF CARE
Shift Note:  7A-7P nurse shift   Pt has abdominal discomfort prior to Gastrografin enema procedure which was controlled with IV Dilaudid. BS present this am.    At 1433 Pt had excellent results from today's Gastrografin enema through the colostomy resulting in a  total of 650cc liquid light brown stool measured from colostomy pouch. Patient was thrilled! Abdomen less distended and softer. Has NG tube to low int suction with moderate light green/brown output.   Pt's chronic end stage COPD continues to cause resp issues. At 0930 had resp distress with O2 sat dropped down to 78% while on 4 liter/min. Pt repositioned and O2 increased temporarily to 6LPM by charge nurse, which increased O2 sat to 92%. Writer able to titrateO2 down to 4LPM at 1242 to keep sat at >90%  At baseline pt has labored, shallow, respirations, tachypnea in mid to upper 20's (also has  accessory muscle use and pursed lip breathing which is baseline PTA), Has bilateral expir wheezes and fine basilar rales.  Is A/O x4. telemetry sinus tach. Kept on bedrest this shift due to SOB at rest and KELLY   NPO with exception of ice chips.  Cornejo catheter, patent. L PIV infusing NS @ 100ml/hr.,  Both heels less red  since Rooke boots implemented yesterday by [facility] nurse.Pt tolerates being repo'd from supine to right side - c/o more pain and more SOB when positioned to left side   Disposition: Lives (is long term care) at [facility] in [place]. Discharge is pending pending status improvement

## 2021-02-13 NOTE — CODE/RAPID RESPONSE
[facility]    House [facility] RRT Note  2/13/2021   Time Called: 0532    RRT called for: Respiratory distress    Assessment & Plan     Acute on chronic hypoxic/hypercapnic respiratory distress 2/2 end-stage COPD.  - Upon arrival, pt lying in bed at 45 degree angle, awake, alert, in mild-moderate respiratory distress.  Pt reports no pain; however, notes anxiety 2/2 COPD.  Pt states she would normally trial a nebulizer when at home for current respiratory distress; however, recently received a nebulizer treatment without improvement in respiratory distress.  Pt's O2 sats 91% on 4L O2 via NC.  Pt using neck and abdominal accessory muscles, opens mouth with each breath (baseline pursed lip breathing), diminished air movement noted throughout lung fields.  Pt's abdomen distended, no obvious bowel sounds noted, NGT remains in place.  Note hospitalist and palliative notes state pt with severe dyspnea at baseline, nearly bed bound due to advanced COPD.  Pt reports feeling hot, requests for a cold wash cloth on her forehead.       INTERVENTIONS:  - After discussion with pt, pt requests something for anxiety.  Noted pt has PO ativan 0.5 mg Q6H PRN PTA.  Prior to ordering/administering IV ativan as pt currently NPO, attempted to contact pt's daughter, Samia, to ensure OK with administration; however, no answer.  Will judiciously proceed with low dose IV ativan.  - Lactic acid per sepsis protocol fired; will obtain VBG, BNP and AM labs with such  - Noted BNP negative recently, CXR yesterday with no concerns for PNA or HF; will defer repeat CXR at this time  - If lactic acid elevated, may need to consider additional abdominal work up to ensure abdominal process not contributing to respiratory distress     At the end of the RRT pt resting in bed, reports feeling slightly improved.    Discussed with and defer further cares to nursing and hospitalist    Interval History     Bella Saucedo is a 75 year old  female who was admitted on 2/12/2021 for SBO.    Medical history significant for: End-stage COPD, chronic hypoxic/hypercapnic respiratory failure, chronic grant, CAD, HTN, HFpEF, DMII, colon cancer s/p L hemicolectomy/colostomy, anxiety, GERD, R MCA s/p coil embolization, PCOM aneurysm rupture s/p coil embolization, SAH     Code Status: No CPR- Do NOT Intubate    Allergies   Allergies   Allergen Reactions     Nitroglycerin Anxiety and Other (See Comments)     Mild headache, severe anxiety  Mild headache, severe anxiety       Physical Exam   Vital Signs with Ranges:  Temp:  [96.7  F (35.9  C)-99.2  F (37.3  C)] 97  F (36.1  C)  Pulse:  [104-124] 113  Resp:  [3-36] 3  BP: (123-168)/(50-93) 168/93  SpO2:  [75 %-96 %] 92 %  I/O last 3 completed shifts:  In: 2337.33 [P.O.:210; I.V.:2127.33]  Out: 1025 [Urine:525; Emesis/NG output:500]    Constitutional: Pt lying in bed, awake, alert, in mild-moderate respiratory distress  Neck: No upper airway wheezes or stridor noted  Pulmonary: In mild-moderate respiratory distress, diminished air movement throughout, no obvious crackles or wheezes noted, tachypneic, use of neck/abdominal accessory muscles  Cardiovascular: Regular rate and rhythm, normal S1S2, no murmur, rub or gallop noted  GI: Round, mildly distended, no bowel sounds noted, colostomy noted LLQ, no obvious guarding or rebound tenderness noted  Skin/Integumen: Warm, dry, pink  Neuro: Awake, alert, clear speech, no focal neuro deficits noted  Psych:  Mildly anxious  Extremities: No peripheral edema    Data       IMAGING: (X-ray/CT/MRI)   Recent Results (from the past 24 hour(s))   XR Chest Port 1 View    Narrative    XR CHEST PORT 1 VW 2/12/2021 11:29 AM    HISTORY: evaluate pna    COMPARISON: CT chest dated processes 31/20      Impression    IMPRESSION: Feeding tube seen coursing below the diaphragm with  nonvisualization of the distal tip. Some atelectasis at the lung  bases. No pleural effusion. No focal  consolidation. No pneumothorax.  The cardiac mediastinal silhouette is within normal limits.    LAURA WONG MD       CBC with Diff:  Recent Labs   Lab Test 02/11/21  1737   WBC 11.6*   HGB 13.8   MCV 95      INR 1.14        Lactic Acid:    Lab Results   Component Value Date    LACT 1.3 02/11/2021           Comprehensive Metabolic Panel:  Recent Labs   Lab 02/11/21  1737      POTASSIUM 4.0   CHLORIDE 96   CO2 40*   ANIONGAP 3   *   BUN 36*   CR 0.85   GFRESTIMATED 67   GFRESTBLACK 77   MARILU 9.6   PROTTOTAL 7.4   ALBUMIN 3.6   BILITOTAL 0.3   ALKPHOS 112   AST 24   ALT 41       Recent Labs   Lab 02/12/21  1814   PHV 7.41   PO2V 61*   PCO2V 66*   HCO3V 42*       Time Spent on this Encounter   I spent 20 minutes on the unit/floor managing the care of Bella Saucedo. Over 50% of my time was spent counseling the patient and/or coordinating care regarding services listed in this note.    KASHMIR Zhang [facility]

## 2021-02-13 NOTE — PROGRESS NOTES
House NP note    I was asked by night shift [place] NP to follow on VBG, basis for comparison is abg.  Of note, VBG was done after pt received lorazepam but does reflect chronic resp acidosis and is probably a bit worse than her baseline (extrapolating vbg to approximate abg equivalent) likely 2/2 either medication and/or SBO/limiting lung expansion/decr FRC   -I would not recommend using any type of NIPPV given bowel obstruction  -can use a fan for comfort  -can consider HFNC  -can also use opioids prn for air hunger noting potential for worsening bowel obstruction.  -I will discuss w/ daytime rounding hospitalist physician.     No charge    Bonita Dasilva CNP  Hospitalist - [place] [facility]  [phone]     Text Page

## 2021-02-14 NOTE — PROVIDER NOTIFICATION
MD Notification    Notified Person: MD    Notified Person Name: Nyasia    Notification Date/Time: 1650    Notification Interaction: Paged    Purpose of Notification: Bps consistently elevated, most recently 164/74, would you like IV PRN?    Orders Received:    Comments:

## 2021-02-14 NOTE — PROGRESS NOTES
[facility]    Hospitalist Progress Note    Date of Service (when I saw the patient): 02/14/2021    Assessment & Plan   Bella Saucedo is a 75 year old female who was admitted on 2/12/2021.  Assessment & Plan     Bella Saucedo is a 75 year old female with complex past medical history including end-stage COPD for which she is enrolled in hospice, she is now admitted on 2/12/2021 with small bowel obstruction.      Mechanical Small Bowel Obstruction   Pt presented to the ED from her nursing home because she had no stool output or gas in her ostomy bag since at least Wednesday 2/10. Her abdomen is quite distended. CT scan was obtained and showed evidence for mechanical small bowel obstruction with marked dilatation of numerous small bowel loops throughout the abdomen and pelvis extending up to the right upper quadrant in the distal small bowel where there is transition point to completely   decompressed loops likely related to adhesions. No evidence of bowel wall thickening or perforation. The patient is not currently in any discomfort.   - General surgery consulted   - NG tube placed by EMS, continue to low intermittent suction   - NPO   - Maintenance IVF   - Pain control PRN   Pt with positive BS on exam today  Very poor surgical candidate with her severe COPD per surgery team recommended conservative management   Xray gastrografiin enema given yesterday through her colostomy.  Having some stoma output since the enema given  Possible NG clamping trial tomorrow for general surgery if continues to have stoma output     Goals of Care    pt with end stage COPD, enrolled in hospice, who was admitted for a SBO, now no longer in hospice.  palliative care consulted and met with daughter and pt continue conservative management for now if no improvement will possibly reenroll with hospice on discharge     End-Stage COPD   Acute copd exacerbation   Acute on chronic hypoxic/hypercapnic respiratory  "failure   Pt had recent admission for acute on chronic hypoxemic/hypercapnic respiratory failure 2/2 CAP and COPD exacerbation requiring intubation. According to recent NH visits she has \"severe dyspnea\" at baseline. \"Spends most of her day in bed.\" She is on 2-3L of supplemental O2 at baseline.   - Continue supplemental O2   - Continue PTA Trelegy and Duonebs PRN   - Prednisone 10mg PTA  On iv solumedrol 125mcg daily switch to 60mg Q 8hours   Pt with worsening sob overnight RRT was called thought to be anxiety with severe underlying COPD contributing improved with IV ativan one dose   PRn IV ativan ordered today   Patient tells me that her shortness of breath is little improved today     Abnormal UA  Chronic Indwelling grant  Pt had pre-existing grant cather (unclear reason). She does have WBCs and few bacteria but no LE. No clinical evidence of infection   - Culture pending   - No antibiotics indicated at this time.      Coronary artery disease, Hx NSTEMI  Hypertension  Heart failure with preserved ejection fraction  - Hold PTA furosemide   - ASA was discontinued      Type 2 diabetes mellitus  - Insulin was discontinued      Hx stabe IIB colon cancer 2016 s/p left hemicolectomy and colostomy  - WOC consult      Other Noted History per chart review  Anxiety  GERD   Lung nodule  Rt MCA aneurysm s/p coil embolization 2011  PCOM aneurysm rupture s/p coil embolization 2010  Subarachnoid hemorrhage        Diet: NPO for Medical/Clinical Reasons Except for: Ice Chips    DVT Prophylaxis: Pneumatic Compression Devices  Grant Catheter: not present  Code Status: No CPR- Do NOT Intubate               Disposition Plan     Expected discharge: TBD in the next 2-3days if small bowel obstruction resolves and her respiratory status improves    Discussed with bedside RN, patient today     greater than 35 minutes were spent in taking care of her today half of which was spent in reviewing the chart and counseling the patient in " collaboration of care    Lorena Murrell MD  [phone] (P)      Interval History      Patient resting comfortably in bed.  Wants to start eating.  Having some stoma output since yesterday.  Abdominal pain and distention improved as per the patient.  Shortness of breath also slightly improved since yesterday    -Data reviewed today: I reviewed all new labs and imaging results over the last 24 hours. I personally reviewed no images or EKG's today.    Physical Exam   Temp: 98.1  F (36.7  C) Temp src: Axillary BP: (!) 169/70 Pulse: 113   Resp: 20 SpO2: 92 % O2 Device: Nasal cannula Oxygen Delivery: 3 LPM  Vitals:    02/12/21 2014   Weight: 62.5 kg (137 lb 11.2 oz)     Vital Signs with Ranges  Temp:  [96  F (35.6  C)-98.2  F (36.8  C)] 98.1  F (36.7  C)  Pulse:  [] 113  Resp:  [16-30] 20  BP: (135-169)/(60-70) 169/70  SpO2:  [85 %-96 %] 92 %  I/O last 3 completed shifts:  In: 2640 [P.O.:280; I.V.:2360]  Out: 2775 [Urine:825; Emesis/NG output:450; Stool:1500]    Constitutional: Awake, alert, cooperative, mildly dyspneic on exam today  Respiratory: Pursed lip breathing which is chronic for patient.  Diminished breath sounds with occasional wheezing  Cardiovascular: Regular rate and rhythm, normal S1 and S2, and no murmur noted  GI: Bowel sounds positive, nontender, no distention, ostomy with liquid output present  Skin/Integumen: No rashes, no cyanosis, no edema  Other:     Medications     sodium chloride 100 mL/hr at 02/14/21 0925       fluticasone-vilanterol  1 puff Inhalation Daily    And     umeclidinium  1 puff Inhalation Daily     insulin aspart  1-7 Units Subcutaneous TID AC     insulin aspart  1-5 Units Subcutaneous At Bedtime     methylPREDNISolone  62.5 mg Intravenous Q8H     pantoprazole (PROTONIX) IV  40 mg Intravenous Daily       Data   Recent Labs   Lab 02/14/21  0810 02/13/21  0635 02/11/21  1737   WBC 10.7 11.4* 11.6*   HGB 12.1 12.3 13.8   MCV 99 98 95    290 308   INR  --   --  1.14     139 139   POTASSIUM 4.0 4.0 4.0   CHLORIDE 106 101 96   CO2 34* 36* 40*   BUN 24 27 36*   CR 0.54 0.56 0.85   ANIONGAP 4 2* 3   MARILU 8.1* 8.3* 9.6   * 128* 171*   ALBUMIN  --   --  3.6   PROTTOTAL  --   --  7.4   BILITOTAL  --   --  0.3   ALKPHOS  --   --  112   ALT  --   --  41   AST  --   --  24   LIPASE  --   --  36*       Recent Results (from the past 24 hour(s))   XR Colon Water Soluble    Narrative    COLON WATER SOLUBLE THERAPEUTIC February 13, 2021 1:06 PM     HISTORY: Small bowel obstruction. Gastrografin enema through the  colostomy.    COMPARISON: None.    FLUOROSCOPY TIME: .4 minutes.   SPOT IMAGES OR CINE RUNS: Four.    FINDINGS: The rectal tube was inserted to the left lower quadrant  ostomy. Gastrografin was instilled, contrast made it to the hepatic  flexure. Given significant pain, the patient was unable to do any  rotational images. Spot images were obtained which shows stool in the  colon. Contrast did not make it to the small bowel.       Impression    IMPRESSION: Gastrografin enema through the patient's left lower  quadrant ostomy shows filling of the colon to the level of the hepatic  flexure. Contrast did not make it to the small bowel.    MARIA LUZ BORJAS, DO

## 2021-02-14 NOTE — PROGRESS NOTES
"GENERAL SURGERY PROGRESS NOTE    Bella is doing alright overall this morning.  She denies abdominal pain, nausea, etc.  Nurses report fluctuations in her respiratory status, no abdominal pain, no recent nausea.    BP (!) 148/60 (BP Location: Right arm)   Pulse 95   Temp 98.2  F (36.8  C) (Axillary)   Resp 26   Ht 1.6 m (5' 3\")   Wt 62.5 kg (137 lb 11.2 oz)   SpO2 94%   BMI 24.39 kg/m    General:  Resting in bed, awake and alert.  No acute distress.  Resp:  Accessory muscle use and pursed lip breathing on 3L NC (reported as baseline)  Abdomen:  Soft, nontender, nondistended.  No change since yesterday's exam.  Stoma pink and productive.  NG out 800 yesterday / 250 since midnight, dark.      76 yo F with end-stage COPD (previously on hospice and planning to return to this) who presents with SBO with transition point in RUQ.  Currently having stoma output which may be due to gastrografin enema yesterday, continue to monitor.  NG putting out moderate amounts.  - trial of nonop management with NG to suction.  Proceeding cautiously given that she would poorly tolerate surgery, and medical management is ideal in this situation.  Can consider clamping trial tomorrow if still having stoma output.  - Daughter reports that they would consider surgical management but not tracheostomy if vent dependent.    - ok to clamp as needed for transport etc.    - ok for dvt ppx if desired  - appreciate hospitalist and palliative teams    Shahida Wong MD  "

## 2021-02-14 NOTE — PLAN OF CARE
A/Ox4, anxious at times. VSS on 3L NC. LS diminished w/ expiratory wheeze. KELLY, pursed lip breathing. SOB at rest at times. Given ativan x1 for anxiety and SOB. Tele: NSR, tachycardic at times. Denies pain. T/R q2hrs, pt preferring to be either supine or on right side. Refusing rooke boots at this time but floating heels w/ pillow. Cornejo intact w/ adequate output. Abdomen distended but improving per pt. Colostomy intact w/ 550cc output - stool is a light brown liquid. NG to LIS w/ brown output. L PIV infusing NS at 100 mL/hr. Discharge pending.

## 2021-02-14 NOTE — PROGRESS NOTES
Moved patient from 831-1 to 829. Pt on 6 liters oxygen, and on low intermittent suction for GTube. No concerns. Bed alarm on.

## 2021-02-14 NOTE — PLAN OF CARE
7A-3P nursed shift   SBO/obstipation issue improved after gastrograffin enema given yesterday. Had flatus and small amounts loose diarrhea stool from colostomy this am  At baseline pt has labored, shallow, respirations, tachypnea in mid to upper 20's (also has  accessory muscle use and pursed lip breathing which is baseline PTA), Has bilateral expir wheezes and fine basilar rales. Continues to cause a couple resp issues of hypoxia/anxiety with movement/repos due to hx chronic end stage COPD. O2 sats mostly >89% and has been tachy all shift 110's/min -MD aware'   O2 sats however will drop a and pt becomes more dyspneic with any repositioning   Is A/O x4. Telemetry sinus tach. Kept on bedrest this shift due to SOB at rest and KELLY   NPO with exception of ice chips.  Cornejo catheter, patent. L PIV infusing NS rate titrated down to 75ml/hr., Both heels less red  but refuses to wear her since Rooke boots implemented by [facility] nurse.  Pt tolerates being repo'd from supine to right side - c/o more pain and more SOB when positioned to left side   Disposition: Lives (is long term care) at [facility] in [place]. Discharge is pending pending status improvement

## 2021-02-15 NOTE — PROVIDER NOTIFICATION
MD Notification    Notified Person: MD    Notified Person Name: Nyasia    Notification Date/Time: 1800    Notification Interaction: Paged    Purpose of Notification: Pt very anxious, requiring more oxygen to meet needs, more PRNs available for anxiety? Thanks    Orders Received: Change PRN Ativan to every 6 hrs    Comments:

## 2021-02-15 NOTE — PLAN OF CARE
Shift Note:HTN (didn't meet parameters), afebrile and no pain, but VERY dyspneic! Oxymask at 4L, nebs prn, ativan prn and also now dilaudid prn dyapnea. PT recommends pt not OOB d/t BP and resp status. Colostomy bag changed today by WOC, now easier to visualize output. Cornejo for retention and resp/mobility. IV lasix given this afternoon and fluids stopped. Will discharge back to [facility] and resumption of hospice

## 2021-02-15 NOTE — PLAN OF CARE
Physical Therapy Discharge Summary    Reason for therapy discharge:    Pt not appropriate for theapy at this time. Vitals abnormal and unable to tolerate/participate in thearpy.  Plan apparent to resume hospice per chart    Progress towards therapy goal(s). See goals on Care Plan in [facility] electronic health record for goal details.  not currently appropriate for therapy    Therapy recommendation(s):    No further therapy is recommended.  reconsult if change in status and not resuming hospice

## 2021-02-15 NOTE — PROGRESS NOTES
[facility]    Hospitalist Progress Note    Date of Service (when I saw the patient): 02/15/2021    Assessment & Plan   Bella Saucedo is a 75 year old female who was admitted on 2/12/2021.  Assessment & Plan     Bella Saucedo is a 75 year old female with complex past medical history including end-stage COPD for which she is enrolled in hospice, she is now admitted on 2/12/2021 with small bowel obstruction.      Mechanical Small Bowel Obstruction   Pt presented to the ED from her nursing home because she had no stool output or gas in her ostomy bag since at least Wednesday 2/10. Her abdomen is quite distended. CT scan was obtained and showed evidence for mechanical small bowel obstruction with marked dilatation of numerous small bowel loops throughout the abdomen and pelvis extending up to the right upper quadrant in the distal small bowel where there is transition point to completely   decompressed loops likely related to adhesions. No evidence of bowel wall thickening or perforation. The patient is not currently in any discomfort.   - General surgery consulted   - NG tube placed by EMS, continue to low intermittent suction   - NPO   - Maintenance IVF   - Pain control PRN   Pt with positive BS on exam today  Very poor surgical candidate with her severe COPD per surgery team recommended conservative management   Xray gastrografiin enema given yesterday through her colostomy.  Having some stoma output since the enema given  Possible NG clamping trial today per  general surgery if continues to have stoma output    Patient really wants to eat today and discussed with daughter over the phone and she requested us to do NG clamping trial today   So performed NG clamping trial  today      Goals of Care    pt with end stage COPD, enrolled in hospice, who was admitted for a SBO, now no longer in hospice.  palliative care consulted and met with daughter and pt continue conservative management for  "now if no improvement will possibly reenroll with hospice on discharge     End-Stage COPD   Acute copd exacerbation   Acute on chronic hypoxic/hypercapnic respiratory failure   Pt had recent admission for acute on chronic hypoxemic/hypercapnic respiratory failure 2/2 CAP and COPD exacerbation requiring intubation. According to recent NH visits she has \"severe dyspnea\" at baseline. \"Spends most of her day in bed.\" She is on 2-3L of supplemental O2 at baseline.   - Continue supplemental O2   - Continue PTA Trelegy and Duonebs PRN   - Prednisone 10mg PTA  On iv solumedrol 125mcg daily switch to 60mg Q 8hours   Pt with worsening sob overnight RRT was called thought to be anxiety with severe underlying COPD contributing improved with IV ativan one dose   PRn IV ativan ordered today   SOB same today      Abnormal UA  Chronic Indwelling grant  Pt had pre-existing grant cather (unclear reason). She does have WBCs and few bacteria but no LE. No clinical evidence of infection   - Culture negative    - No antibiotics indicated at this time.      Coronary artery disease, Hx NSTEMI  Hypertension  Heart failure with preserved ejection fraction  - Hold PTA furosemide   - ASA was discontinued      Type 2 diabetes mellitus  - Insulin was discontinued      Hx stabe IIB colon cancer 2016 s/p left hemicolectomy and colostomy  - WOC consult      Other Noted History per chart review  Anxiety  GERD   Lung nodule  Rt MCA aneurysm s/p coil embolization 2011  PCOM aneurysm rupture s/p coil embolization 2010  Subarachnoid hemorrhage        Diet: NPO for Medical/Clinical Reasons Except for: Ice Chips    DVT Prophylaxis: Pneumatic Compression Devices  Grant Catheter: not present  Code Status: No CPR- Do NOT Intubate               Disposition Plan     Expected discharge: TBD in the next 2-3days if small bowel obstruction resolves and her respiratory status improves    Discussed with bedside RN, patient  And her daughter today     greater than 35 " minutes were spent in taking care of her today half of which was spent in reviewing the chart and counseling the patient in collaboration of medina Murrell MD  [phone] (P)      Interval History      Patient resting comfortably in bed.  Wants to start eating.  Having some stoma output since yesterday.  Abdominal pain and distention improved as per the patient.  Shortness of breath same as yesterday pt has chronic SOB for her severe COPD     -Data reviewed today: I reviewed all new labs and imaging results over the last 24 hours. I personally reviewed no images or EKG's today.    Physical Exam   Temp: 98.4  F (36.9  C) Temp src: Oral BP: (!) 150/58 Pulse: 121   Resp: 20 SpO2: 90 % O2 Device: Oxymask Oxygen Delivery: 4 LPM  Vitals:    02/12/21 2014   Weight: 62.5 kg (137 lb 11.2 oz)     Vital Signs with Ranges  Temp:  [95.3  F (35.2  C)-98.4  F (36.9  C)] 98.4  F (36.9  C)  Pulse:  [100-121] 121  Resp:  [18-24] 20  BP: (131-164)/(48-74) 150/58  SpO2:  [90 %-95 %] 90 %  I/O last 3 completed shifts:  In: 1800 [P.O.:400; I.V.:1400]  Out: 950 [Urine:750; Emesis/NG output:200]    Constitutional: Awake, alert, cooperative, mildly dyspneic on exam today  Respiratory: Pursed lip breathing which is chronic for patient.  Diminished breath sounds with occasional wheezing  Cardiovascular: Regular rate and rhythm, normal S1 and S2, and no murmur noted  GI: Bowel sounds positive, nontender, no distention, ostomy with liquid output present  Skin/Integumen: No rashes, no cyanosis, no edema  Other:     Medications     sodium chloride 75 mL/hr at 02/14/21 2103       fluticasone-vilanterol  1 puff Inhalation Daily    And     umeclidinium  1 puff Inhalation Daily     insulin aspart  1-7 Units Subcutaneous TID AC     insulin aspart  1-5 Units Subcutaneous At Bedtime     methylPREDNISolone  62.5 mg Intravenous Q8H     pantoprazole (PROTONIX) IV  40 mg Intravenous Daily       Data   Recent Labs   Lab 02/15/21  0709 02/14/21  0810  02/13/21  0635 02/11/21  1737   WBC  --  10.7 11.4* 11.6*   HGB  --  12.1 12.3 13.8   MCV  --  99 98 95   PLT  --  323 290 308   INR  --   --   --  1.14    144 139 139   POTASSIUM 4.0 4.0 4.0 4.0   CHLORIDE 106 106 101 96   CO2 30 34* 36* 40*   BUN 23 24 27 36*   CR 0.53 0.54 0.56 0.85   ANIONGAP 7 4 2* 3   MARILU 8.4* 8.1* 8.3* 9.6   * 140* 128* 171*   ALBUMIN  --   --   --  3.6   PROTTOTAL  --   --   --  7.4   BILITOTAL  --   --   --  0.3   ALKPHOS  --   --   --  112   ALT  --   --   --  41   AST  --   --   --  24   LIPASE  --   --   --  36*       No results found for this or any previous visit (from the past 24 hour(s)).

## 2021-02-15 NOTE — PROGRESS NOTES
"[facility]  WO Nurse Inpatient Ostomy & Wound Assessment     Assessment of established end Colostomy   & assessment of coccyx/buttocks and heels    Surgery date:  3-9-16  Surgeon:  Dr. Del Caldwell ([facility])  Procedure:  Sigmoidectomy with diverting colostomy  Related diagnosis:  Sigmoid rupture, colon CA    WOC Assessment & Physical Exam:        Current status: resting in bed, high respiratory rate.  Moderate light tan/brown output in closed pouch, unclear how long pouch has been in place      Date of last photo: 2-15-21        Stoma size: approx 1 1/4\", rounded    Stoma appearance: pink, viable    Peristomal complication(s): mild blanchable erythema, skin intact    Abdominal assessment: distended but soft    NG still in place? Yes     Output: small tan runny, ?from gastrograffin enema    Pain: minimal      Current pouching system: Pavel 2pc 44mm flat barrier with opaque pouch, from prior to admission; was intact but overdue for change; staff had just changed out the closed pouch not the barrier    Pouch last changed:  unclear    Reason for pouch change today: assessment and due for change      Learning and comprehension: pt seems to know basics but now lives in care facility where they manage her ostomy cares      Buttocks/coccyx:(assessed 2-12) intact, mild blanchable erythema.  No PI noted.  Pt needs heavy assist x 2 for repositioning.  Left medial heel: deep pink but blanchable and intact.  Pt c/o pain with palpation.  Present on admission. Pt has been refusing Prevalon boots.  Tissue remains blanchable.   2-12-21        Right lateral foot and ankle: bony prominences are deep pink but intact and blanchable.  Right heel pink, blanchable, intact, with mild peeling dry skin. Pt unable to move her legs much on her own.  Prevalon boots were ordered and applied today 2/12.   2-12-21        WO Plan:         Pouching product plan: Pavel 2pc; 57mm flat barrier (# [id number]) " and a clear lock n roll pouch (#[id number]) while in hospital.  Pt can resume her 44mm flat barrier with closed pouches on discharge.       Frequency of pouch changes: 2-3x week and prn      Pt support system on discharge: hospice care at facility      Skin care: follow PIP measures    Pressure Injury Prevention (PIP) Plan:  -If patient is declining pressure injury prevention interventions: Explore reason why and address patient's concerns  -Mattress: Follow bed algorithm, reassess daily and order specialty mattress, if indicated.  -HOB: Maintain at or below 30 degrees, unless contraindicated  -Repositioning in bed: Every 1-2 hours , Left/right positioning; avoid supine and Raise foot of bed prior to raising head of bed, to reduce patient sliding down (shear)  -Heels: Keep elevated off mattress, Pillows under calves and Heel lift boots  -Protective Dressing: Sacral Mepilex for prevention (#957488),  especially for the agitated patient   -Chair positioning: pillow or Chair cushion (#[id number])  and Assist patient to reposition hourly   -Moisture Management: Perineal cleansing /protection: Follow Incontinence Protocol  -Under Devices: Inspect skin under all medical devices during skin inspection , Ensure tubes are stabilized without tension and Ensure patient is not lying on medical devices or equipment when repositioned      WOC follow-up plan: 1-2x week and prn      Objective Data:       Patient history according to medical record: Bella Saucedo is a 75 year old female with complex past medical history including end-stage COPD for which she is enrolled in hospice, she is now admitted on 2/12/2021 with small bowel obstruction.       Current Diet/Nutrition:   Orders Placed This Encounter      Clear Liquid Diet       Output:  I/O last 3 completed shifts:  In: 880 [P.O.:280; I.V.:600]  Out: 850 [Urine:850]      Labs:   Recent Labs   Lab 02/14/21  0810 02/12/21  0944 02/12/21  0944 02/11/21  1737   ALBUMIN  --   --   --  3.6    HGB 12.1   < >  --  13.8   INR  --   --   --  1.14   WBC 10.7   < >  --  11.6*   A1C  --   --  6.3*  --    CRP  --   --   --  10.8*    < > = values in this interval not displayed.         Ayden Risk Assessment:   Sensory Perception: 3-->slightly limited  Moisture: 4-->rarely moist  Activity: 2-->chairfast  Mobility: 3-->slightly limited  Nutrition: 2-->probably inadequate  Friction and Shear: 2-->potential problem  Ayden Score: 16      WOC Interventions:       Patient's chart evaluated    Focus of today's visit: assessment of skin, stoma, pouching system, supply needs    Pouch left intact    Skin assessed, pt repositioned, heel boots ordered    Orders written    Supplies: at bedside    Discussed plan of care with: Patient and Nurse    Melinda Williamson RN, CWOCN

## 2021-02-15 NOTE — PROGRESS NOTES
02/15/21 0906   Quick Adds   Type of Visit Initial PT Evaluation   Living Environment   People in home facility resident   Current Living Arrangements assisted living   Home Accessibility no concerns   Self-Care   Usual Activity Tolerance fair   Current Activity Tolerance poor   Equipment Currently Used at Home walker, rolling;wheelchair, manual   Activity/Exercise/Self-Care Comment reports she is able to walk to dining room at [facility], requires help for toileting/dressing/showering/medication/meals, but that she can walk on her own with 4WW, also has manual WC but doesn't use it too often per her repot   Disability/Function   Hearing Difficulty or Deaf no   Wear Glasses or Blind no   Difficulty Communicating no   Difficulty Eating/Swallowing no   Walking or Climbing Stairs Difficulty yes   Walking or Climbing Stairs ambulation difficulty, requires equipment;stair climbing difficulty, dependent   Mobility Management 4WW   Dressing/Bathing Difficulty yes   Dressing/Bathing bathing difficulty, assistance 1 person;dressing difficulty, assistance 1 person   Dressing/Bathing Management [facility] staff   Toileting issues yes   Doing Errands Independently Difficulty (such as shopping) no   Fall history within last six months no   Change in Functional Status Since Onset of Current Illness/Injury yes   General Information   Onset of Illness/Injury or Date of Surgery 02/12/21   Referring Physician Dale Pagan MD   Patient/Family Therapy Goals Statement (PT) go back to prior living   Pertinent History of Current Problem (include personal factors and/or comorbidities that impact the POC) Bella Saucedo is a 75 year old female with end-stage COPD (previously on hospice and planning to return to this) who presents with SBO   Existing Precautions/Restrictions fall   Cognition   Orientation Status (Cognition) oriented to;person   Affect/Mental Status (Cognition) low arousal/lethargic   Follows Commands (Cognition)  unable/difficult to assess   Pain Assessment   Patient Currently in Pain No   Range of Motion (ROM)   ROM Comment assessment limited, appears likely to have mild deficits but likely functional   Strength   Strength Comments unable to assess, functionally weak   Bed Mobility   Comment (Bed Mobility) maxA-dependent for bed mobility   Transfers   Transfer Safety Comments unable to assess, could not complete bed mobility or transfer to EOB   Gait/Stairs (Locomotion)   [place] Level (Gait) unable to assess;dependent (less than 25% patient effort)   Balance   Balance Comments unable to assess, pt unable to sit up today   Sensory Examination   Sensory Perception WNL   Clinical Impression   Criteria for Skilled Therapeutic Intervention yes, treatment indicated   PT Diagnosis (PT) impaired mobility   Influenced by the following impairments abnormal vitals, weakness, SOB   Functional limitations due to impairments decreased IND and safety with mobility   Clinical Presentation Unstable/Unpredictable   Clinical Presentation Rationale pt with abnormal vitals    Clinical Decision Making (Complexity) moderate complexity   Therapy Frequency (PT) One time eval and treatment only   Predicted Duration of Therapy Intervention (days/wks) 1day   Planned Therapy Interventions (PT) bed mobility training   Clinical Impression Comments Pt unable to tolerate thearpy at this time, with abnormal vitals. she was previously on hospice wih aparent plans to resume hospice.  Not appropriate for thearpy at this time, if change in status please consider re-ordering PT services.   PT Discharge Planning    PT Rationale for DC Rec anticipate return to detention/hospice   PT Brief overview of current status  pt dependent/maxA for mobility, unable to get up from supine today d/t weakness and abnormal vitals   Total Evaluation Time   Total Evaluation Time (Minutes) 20

## 2021-02-15 NOTE — PLAN OF CARE
A&Ox4, mentation fluctuates with SOB, anxious and confused at times. VSS on 4-6 L, HTN managed with PRN hydralazine x1. Pt has labored, shallow, tachypnea in mid to upper 20 rpms with accessory muscle use and pursed lip breathing at baseline. LS dim, with exp wheezes and fine crackles. O2 sats mostly >89%. Tachycardic all evening, tele sinus tach. A2/lift, T&R q2 hr, pt desats with repos and anxiety. PRN Ativan q6 hr. NPO ex ice chips, NG to LIS with brown output. BG checks - 132, 135. Cornejo patent. Colostomy with medium output, pt refusing emptying/changing bag this evening. L PIV infusing NS @ 75ml/hr. Both heels blanchable redness, refusing Rooke boots. Continue to monitor, [facility].

## 2021-02-15 NOTE — PROGRESS NOTES
Care Management Follow Up    Length of Stay (days): 3    Expected Discharge Date: 02/17/21([facility])     Concerns to be Addressed:       Patient plan of care discussed at interdisciplinary rounds: Yes    Anticipated Discharge Disposition:       Anticipated Discharge Services:    Anticipated Discharge DME:      Patient/family educated on Medicare website which has current facility and service quality ratings:    Education Provided on the Discharge Plan:    Patient/Family in Agreement with the Plan:      Referrals Placed by CM/KERRI:    Private pay costs discussed: Not applicable    Additional Information:  KERRI spoke with [place] [facility]ia[facility] who confirmed that patient has a bed hold at their facility and would be able to accept patient when medically ready for discharge.  Updated that patient would likely be able to return on Wednesday.       TERRI Hancock

## 2021-02-15 NOTE — PROGRESS NOTES
[facility]    General Surgery  Daily Progress Note       Assessment and Plan:   Bella Saucedo is a 75 year old female with end-stage COPD (previously on hospice and planning to return to this) who presents with SBO with transition point in RUQ.      PLAN:  - Stoma output slowed, previous output likely due to gastrografin enema 2/13 which did not reach small bowel. Continue to monitor.  - NG with only 200 mL, discussed irrigation of tube with RN if no output this morning. Denies abdominal pain but distended abdomen. Continue NG to LIS. If no change in output after irrigation, may need to consider XR to verify placement vs replacement of NG.  - Awaiting palliative discussion with patient and daughter. Per previous discussions daughter reports that they would consider surgical management but not tracheostomy if vent dependent.          Interval History:   Bella Saucedo is seen this morning on surgical rounds. Her only complaint this morning is that she is hungry. She only had 200 mL of NG output yesterday evening. She denies abdominal pain. Ostomy slowed overnight, and 100 mL of liquid stool in appliance this morning. She is tachycardic this morning and on 3 Lpm of supplemental oxygen. BP's running high, 150/58 this morning. White count wnl yesterday 10.7.         Physical Exam:   Temp: 98.4  F (36.9  C) Temp src: Oral BP: (!) 150/58 Pulse: 121   Resp: 20 SpO2: 92 % O2 Device: Nasal cannula Oxygen Delivery: 3 LPM    I/O last 3 completed shifts:  In: 1800 [P.O.:400; I.V.:1400]  Out: 950 [Urine:750; Emesis/NG output:200]    GENERAL: VS reviewed, alert, oriented, no acute distress  LUNGS: On 3L of oxygen via nasal cannula and use of accessory muscles for breathing   ABDOMEN:  distended but soft, nontender throughout, rare bowel sounds. 100 mL liquid stool in ostomy appliance, cannot visualize stoma as appliance is not clear  EXTREMITIES: No gross deformities  NEUROLOGICAL: Grossly non-focal,  mood & affect appropriate    Data   Recent Labs   Lab 02/15/21  0709 02/14/21  0810 02/13/21  0635 02/11/21  1737   WBC  --  10.7 11.4* 11.6*   HGB  --  12.1 12.3 13.8   MCV  --  99 98 95   PLT  --  323 290 308   INR  --   --   --  1.14    144 139 139   POTASSIUM 4.0 4.0 4.0 4.0   CHLORIDE 106 106 101 96   CO2 30 34* 36* 40*   BUN 23 24 27 36*   CR 0.53 0.54 0.56 0.85   ANIONGAP 7 4 2* 3   MARILU 8.4* 8.1* 8.3* 9.6   * 140* 128* 171*   ALBUMIN  --   --   --  3.6   PROTTOTAL  --   --   --  7.4   BILITOTAL  --   --   --  0.3   ALKPHOS  --   --   --  112   ALT  --   --   --  41   AST  --   --   --  24       Marina Mcfarlane PA-C

## 2021-02-15 NOTE — PROVIDER NOTIFICATION
MD Notification    Notified Person: MD    Notified Person Name: Dr Murrell    Notification Date/Time: 2/15/2021 3074    Notification Interaction: paged and called back    Purpose of Notification: NG has been clamped and patient is trying to pull it out, Dr Murrell at the same time called RN to ask how the clamping trial had gone.    Orders Received: OK to start clears and remove NG tube    Comments:

## 2021-02-15 NOTE — PLAN OF CARE
A/Ox4, anxious. Denies pain. Tachycardic, heart rate in 110's majority of shift. Tele: ST w/ PACs. Tachypneic, oxygen saturations over 90% but occasionally desaturates. On 3L NC overnight. KELLY. SOB at rest. Accessory muscle use w/ pursed lip breathing. PRN ativan given for anxiety, SOB. Up A2, not OOB this shift. Refusing T/R at times, declining repositioning to L side and frequently rolls onto R side. Blanchable redness to heels, refusing rooke boots. NPO ex ice chips, denies nausea. NG to LIS w/ zero output. Colostomy intact, no output recorded overnight as pt refusing bag emptying overnight. BS hypoactive. Small amount of stool in pouch.  Cornejo intact w/ adequate output. L PIV infusing NS at 75 mL/hr. Discharge pending.

## 2021-02-15 NOTE — CONSULTS
Care Management Follow Up    Length of Stay (days): 3    Expected Discharge Date: 02/17/21([facility])     Concerns to be Addressed:  Discharge Planning.      Patient plan of care discussed at interdisciplinary rounds: Yes    Anticipated Discharge Disposition:       Anticipated Discharge Services:    Anticipated Discharge DME:      Patient/family educated on Medicare website which has current facility and service quality ratings:    Education Provided on the Discharge Plan:    Patient/Family in Agreement with the Plan:      Referrals Placed by CM/SW:    Private pay costs discussed: Not applicable    Additional Information:  Patient has bedhold at [facility] and will be able to re-enroll with [facility].       TERRI Hancock

## 2021-02-15 NOTE — PROGRESS NOTES
02/15/21 0906   Quick Adds   Type of Visit Initial PT Evaluation   Living Environment   People in home facility resident   Current Living Arrangements assisted living   Home Accessibility no concerns   Self-Care   Usual Activity Tolerance fair   Current Activity Tolerance poor   Equipment Currently Used at Home walker, rolling;wheelchair, manual   Activity/Exercise/Self-Care Comment reports she is able to walk to dining room at [facility], requires help for toileting/dressing/showering/medication/meals, but that she can walk on her own with 4WW, also has manual WC but doesn't use it too often per her repot   Disability/Function   Hearing Difficulty or Deaf no   Wear Glasses or Blind no   Difficulty Communicating no   Difficulty Eating/Swallowing no   Walking or Climbing Stairs Difficulty yes   Walking or Climbing Stairs ambulation difficulty, requires equipment;stair climbing difficulty, dependent   Mobility Management 4WW   Dressing/Bathing Difficulty yes   Dressing/Bathing bathing difficulty, assistance 1 person;dressing difficulty, assistance 1 person   Dressing/Bathing Management [facility] staff   Toileting issues yes   Doing Errands Independently Difficulty (such as shopping) no   Fall history within last six months no   Change in Functional Status Since Onset of Current Illness/Injury yes   General Information   Onset of Illness/Injury or Date of Surgery 02/12/21   Referring Physician Dale Pagan MD   Patient/Family Therapy Goals Statement (PT) go back to prior living   Pertinent History of Current Problem (include personal factors and/or comorbidities that impact the POC) Bella Saucedo is a 75 year old female with end-stage COPD (previously on hospice and planning to return to this) who presents with SBO   Existing Precautions/Restrictions fall   Cognition   Orientation Status (Cognition) oriented to;person   Affect/Mental Status (Cognition) low arousal/lethargic   Follows Commands (Cognition)  unable/difficult to assess   Pain Assessment   Patient Currently in Pain No   Range of Motion (ROM)   ROM Comment assessment limited, appears likely to have mild deficits but likely functional   Strength   Strength Comments unable to assess, functionally weak   Bed Mobility   Comment (Bed Mobility) maxA-dependent for bed mobility   Transfers   Transfer Safety Comments unable to assess, could not complete bed mobility or transfer to EOB   Gait/Stairs (Locomotion)   [place] Level (Gait) unable to assess;dependent (less than 25% patient effort)   Balance   Balance Comments unable to assess, pt unable to sit up today   Sensory Examination   Sensory Perception WNL   Clinical Impression   Criteria for Skilled Therapeutic Intervention evaluation only   Clinical Presentation Unstable/Unpredictable   Clinical Presentation Rationale pt with abnormal vitals    Clinical Decision Making (Complexity) moderate complexity   Therapy Frequency (PT) Evaluation only   Clinical Impression Comments Pt unable to tolerate thearpy at this time, with abnormal vitals. she was previously on hospice wih aparent plans to resume hospice.  Not appropriate for thearpy at this time, if change in status please consider re-ordering PT services.   PT Discharge Planning    PT Rationale for DC Rec anticipate return to LI/hospice   PT Brief overview of current status  pt dependent/maxA for mobility, unable to get up from supine today d/t weakness and abnormal vitals   Total Evaluation Time   Total Evaluation Time (Minutes) 20

## 2021-02-16 NOTE — PROGRESS NOTES
Sepsis Evaluation Progress Note    I was called to see Bella Saucedo due to abnormal vital signs triggering the Sepsis SIRS screening alert. She is not known to have an infection.     Physical Exam   Vital Signs:  Temp: 97.2  F (36.2  C) Temp src: Oral BP: 134/65 Pulse: 108   Resp: 20 SpO2: 94 % O2 Device: Nasal cannula Oxygen Delivery: 3 LPM      Clinical status unchanged per nursing staff. Tolerating NG tube dislodgment without worsening pain or nausea.    Data   Lactic Acid   Date Value Ref Range Status   02/13/2021 1.0 0.7 - 2.0 mmol/L Final   02/11/2021 1.3 0.7 - 2.0 mmol/L Final     Lactate for Sepsis Protocol   Date Value Ref Range Status   02/16/2021 2.3 (H) 0.7 - 2.0 mmol/L Final     Comment:     Significant value called to and read back by  MARSHALL CLIFFORD ON 88 AT 0053 HG     02/14/2021 0.9 0.7 - 2.0 mmol/L Final       Assessment & Plan   NO EVIDENCE OF SEPSIS at this time.  Vital sign, physical exam, and lab findings are likely due to SBO, dehydration.    Disposition: The patient will remain on the current unit. We will continue to monitor this patient closely..  Sherman Fernando MD    Sepsis Criteria   Sepsis: 2+ SIRS criteria due to infection  Severe Sepsis: Sepsis AND 1+ new sign of acute organ dysfunction (Note: lactate >2 or acute encephalopathy each qualify as organ dysfunction)  Septic Shock: Sepsis AND hypotension despite volume resuscitation with 30 ml/kg crystalloid or lactate >=4  Note: HYPOTENSION is defined as 2 BP readings measured 3 hrs apart that have a SBP <90, MAP <65, or decrease >40 mmHg, occurring 6 hrs before or after t-zero

## 2021-02-16 NOTE — PROVIDER NOTIFICATION
MD Notification    Notified Person: MD    Notified Person Name:  Kassie    Notification Date/Time: 16 Feb 2021 @ 0100    Notification Interaction: web-paged    Purpose of Notification: Patient had a lactic that triggered tonight; it came back at 2.3    Orders Received: continue to monitor; recheck lactic in AM    Comments:

## 2021-02-16 NOTE — PROGRESS NOTES
Unable to reach daughter to discuss operative options. Will save time for exploration 2/17. I hope we can reach a consensus.

## 2021-02-16 NOTE — PROGRESS NOTES
[facility]    General Surgery  Daily Progress Note       Assessment and Plan:   Bella Saucedo is a 75 year old female with end-stage COPD (previously on hospice and planning to return to this) who presents with SBO with transition point in RUQ.      PLAN:  - NG removed yesterday by hospitalist team since patient trying to self-remove.  - Abdomen firm and distended, no stoma output in 24 hours. Previous stoma output likely due to gastrografin enema 2/13 which did not reach small bowel.   - 2 view abdominal XR ordered today. Clear liquids stopped, made NPO.  - Awaiting palliative discussion with patient and daughter. Per previous discussions daughter reports that they would consider surgical management but not tracheostomy if vent dependent.          Interval History:   Bella Saucedo is seen this morning on surgical rounds. She was on clear liquids after NG removed yesterday afternoon. She did not have much for NG output but also no stool recorded. She denies abdominal pain. No fevers, continues to be tachycardic. Now on 4L of oxygen to maintain saturations. Lactate 2.3 at midnight, now wnl. White count wnl.          Physical Exam:   Temp: 95.1  F (35.1  C) Temp src: Oral BP: (!) 150/51 Pulse: 110   Resp: (!) 0 SpO2: 96 % O2 Device: Nasal cannula Oxygen Delivery: 4 LPM    I/O last 3 completed shifts:  In: 200 [P.O.:200]  Out: 2750 [Urine:2750]    Ostomy: 0 ml stool/24 hours    GENERAL: VS reviewed, alert, oriented, no acute distress  LUNGS: On 4L of oxygen via nasal cannula and use of accessory muscles for breathing   ABDOMEN: firm and distended, denies abdominal pain with light and deep palpation but does wince with exam, scant stool in ostomy appliance.   EXTREMITIES: No gross deformities  NEUROLOGICAL: Grossly non-focal, mood & affect appropriate    Data   Recent Labs   Lab 02/16/21  0726 02/15/21  0709 02/14/21  0810 02/13/21  0635 02/11/21  1737   WBC 10.3  --  10.7 11.4* 11.6*   HGB  12.5  --  12.1 12.3 13.8   MCV 95  --  99 98 95     --  323 290 308   INR  --   --   --   --  1.14   NA  --  143 144 139 139   POTASSIUM  --  4.0 4.0 4.0 4.0   CHLORIDE  --  106 106 101 96   CO2  --  30 34* 36* 40*   BUN  --  23 24 27 36*   CR  --  0.53 0.54 0.56 0.85   ANIONGAP  --  7 4 2* 3   MARILU  --  8.4* 8.1* 8.3* 9.6   GLC  --  152* 140* 128* 171*   ALBUMIN  --   --   --   --  3.6   PROTTOTAL  --   --   --   --  7.4   BILITOTAL  --   --   --   --  0.3   ALKPHOS  --   --   --   --  112   ALT  --   --   --   --  41   AST  --   --   --   --  24       Marina Mcfarlane PA-C

## 2021-02-16 NOTE — PROGRESS NOTES
[facility]    Hospitalist Progress Note    Date of Service (when I saw the patient): 02/16/2021    Assessment & Plan   Bella Saucedo is a 75 year old female who was admitted on 2/12/2021.  Assessment & Plan     Bella Saucedo is a 75 year old female with complex past medical history including end-stage COPD for which she is enrolled in hospice, she is now admitted on 2/12/2021 with small bowel obstruction.      Mechanical Small Bowel Obstruction   Pt presented to the ED from her nursing home because she had no stool output or gas in her ostomy bag since at least Wednesday 2/10. Her abdomen is quite distended. CT scan was obtained and showed evidence for mechanical small bowel obstruction with marked dilatation of numerous small bowel loops throughout the abdomen and pelvis extending up to the right upper quadrant in the distal small bowel where there is transition point to completely   decompressed loops likely related to adhesions. No evidence of bowel wall thickening or perforation. The patient is not currently in any discomfort.   - General surgery consulted   - NG tube placed by EMS, continue to low intermittent suction   - NPO   - Maintenance IVF   - Pain control PRN   Pt was started on clear liquid diet yesterday after a NG clamping trial   Her abdomen more distended with no out put from her stoma today   General surgery following possible surgical exploration after explaining the high risks of surgery with her severe COPD    Switched IV solumedrol to BID today   Goals of Care    pt with end stage COPD, enrolled in hospice, who was admitted for a SBO, now no longer in hospice.  palliative care consulted and met with daughter and pt continue conservative management for now . Family to decide about surgery after discussion with General surgery   End-Stage COPD   Acute copd exacerbation   Acute on chronic hypoxic/hypercapnic respiratory failure   Pt had recent admission for acute on  "chronic hypoxemic/hypercapnic respiratory failure 2/2 CAP and COPD exacerbation requiring intubation. According to recent NH visits she has \"severe dyspnea\" at baseline. \"Spends most of her day in bed.\" She is on 2-3L of supplemental O2 at baseline.   - Continue supplemental O2   - Continue PTA Trelegy and Duonebs PRN   - Prednisone 10mg PTA  On iv solumedrol 125mcg daily switch to 60mg Q 8hours   Pt with worsening sob overnight RRT was called thought to be anxiety with severe underlying COPD contributing improved with IV ativan one dose   PRn IV ativan ordered today   SOB same today      Abnormal UA  Chronic Indwelling grant  Pt had pre-existing grant cather (unclear reason). She does have WBCs and few bacteria but no LE. No clinical evidence of infection   - Culture negative    - No antibiotics indicated at this time.      Coronary artery disease, Hx NSTEMI  Hypertension  Heart failure with preserved ejection fraction  - Hold PTA furosemide   - ASA was discontinued      Type 2 diabetes mellitus  - Insulin was discontinued      Hx stabe IIB colon cancer 2016 s/p left hemicolectomy and colostomy  - WOC consult      Other Noted History per chart review  Anxiety  GERD   Lung nodule  Rt MCA aneurysm s/p coil embolization 2011  PCOM aneurysm rupture s/p coil embolization 2010  Subarachnoid hemorrhage        Diet: NPO for Medical/Clinical Reasons Except for: Ice Chips    DVT Prophylaxis: Pneumatic Compression Devices  Grant Catheter: not present  Code Status: No CPR- Do NOT Intubate               Disposition Plan     Expected discharge: TBD in the next 2-3days if small bowel obstruction improves  and her respiratory status improves    Discussed with bedside RN, patient  And her daughter today     greater than 35 minutes were spent in taking care of her today half of which was spent in reviewing the chart and counseling the patient in collaboration of care    Lorena Murrell MD  [phone] (P)      Interval History  "     Patient resting comfortably in bed.  Wants to start eating.  Having some stoma output since yesterday.  Abdominal pain and distention improved as per the patient.  Shortness of breath same as yesterday pt has chronic SOB for her severe COPD     -Data reviewed today: I reviewed all new labs and imaging results over the last 24 hours. I personally reviewed no images or EKG's today.    Physical Exam   Temp: 95.1  F (35.1  C) Temp src: Oral BP: (!) 150/51 Pulse: 110   Resp: 22 SpO2: 96 % O2 Device: Nasal cannula Oxygen Delivery: 3 LPM  Vitals:    02/12/21 2014   Weight: 62.5 kg (137 lb 11.2 oz)     Vital Signs with Ranges  Temp:  [95.1  F (35.1  C)-97.2  F (36.2  C)] 95.1  F (35.1  C)  Pulse:  [108-120] 110  Resp:  [20-22] 22  BP: (124-150)/(51-71) 150/51  SpO2:  [85 %-99 %] 96 %  I/O last 3 completed shifts:  In: 200 [P.O.:200]  Out: 2750 [Urine:2750]    Constitutional: Awake, alert, cooperative, mildly dyspneic on exam today  Respiratory: Pursed lip breathing which is chronic for patient.  Diminished breath sounds with occasional wheezing  Cardiovascular: Regular rate and rhythm, normal S1 and S2, and no murmur noted  GI: Bowel sounds diminished , nontender, moderate  distention, ostomy with no  output since yesterday   Skin/Integumen: No rashes, no cyanosis, no edema  Other:     Medications       fluticasone-vilanterol  1 puff Inhalation Daily    And     umeclidinium  1 puff Inhalation Daily     guaiFENesin  1,200 mg Oral BID     insulin aspart  1-7 Units Subcutaneous TID AC     insulin aspart  1-5 Units Subcutaneous At Bedtime     [START ON 2/17/2021] pantoprazole  40 mg Oral QAM AC     polyethylene glycol  17 g Oral Daily     predniSONE  60 mg Oral Daily       Data   Recent Labs   Lab 02/16/21  0726 02/15/21  0709 02/14/21  0810 02/13/21  0635 02/11/21  1737   WBC 10.3  --  10.7 11.4* 11.6*   HGB 12.5  --  12.1 12.3 13.8   MCV 95  --  99 98 95     --  323 290 308   INR  --   --   --   --  1.14   NA  --   143 144 139 139   POTASSIUM  --  4.0 4.0 4.0 4.0   CHLORIDE  --  106 106 101 96   CO2  --  30 34* 36* 40*   BUN  --  23 24 27 36*   CR  --  0.53 0.54 0.56 0.85   ANIONGAP  --  7 4 2* 3   MARILU  --  8.4* 8.1* 8.3* 9.6   GLC  --  152* 140* 128* 171*   ALBUMIN  --   --   --   --  3.6   PROTTOTAL  --   --   --   --  7.4   BILITOTAL  --   --   --   --  0.3   ALKPHOS  --   --   --   --  112   ALT  --   --   --   --  41   AST  --   --   --   --  24   LIPASE  --   --   --   --  36*       Recent Results (from the past 24 hour(s))   XR Abdomen 1 View    Narrative    XR ABDOMEN 1 VW   2/16/2021 10:26 AM     HISTORY: bowel obstruction    COMPARISON: CT abdomen and pelvis 2/11/2021    FINDINGS:  Multiple dilated small bowel loops throughout the abdomen  and pelvis consistent with small bowel obstruction. Degree of small  bowel dilatation is similar to prior CT. Limited evaluation for free  air due to supine technique. Enteric tube is no longer present.     RONNIE ORTEGA MD

## 2021-02-16 NOTE — PROGRESS NOTES
Palliative chart check. Reviewed weekend events with Dr. Murrell and also discussed case with bedside RN Tracy.  left for unit KERRI Ohara. No identifiable needs for our team today. I will check in again with Dr. Murrell and team again tomorrow. Thanks.    KASHMIR Mcallister [facility]  Contact information available via [facility] Paging/Directory

## 2021-02-16 NOTE — PLAN OF CARE
A&O x 3, disoriented to situation. Tachycardic and hypertensive at times, otherwise VSS on 2-3L O2 via NC. Assist of 2, pt very KELLY and anxious. Clear liquid diet, tolerating well. PIV saline locked. Denies nausea or pain. Cornejo with good output. BS hypoactive, new ostomy bag, very small amount of stool. Continue plan of care.

## 2021-02-16 NOTE — TELEPHONE ENCOUNTER
Patient's daughter, Samia calling stating she missed a call from Dr. Fall.  Samia can be reached at: [phone]  Ok to leave VM

## 2021-02-16 NOTE — PROGRESS NOTES
She is not really improving from her bowel standpoint. Abd film shows very dilated small bowel. Her abdomen feels quite full. Sleepy today.  She has not resolved her bowel problem. I think we should consider exploration, with full disclosure of risks. I will try to talk with family.

## 2021-02-16 NOTE — PLAN OF CARE
A&Ox3; unable to state situation.  VS: slight HTN (did not meet PRN parameters), tachycardic, tachypnic, KELLY; 3L NC,  to monitor saturations.  PRN nebulizer given x 1 per patient request.  Denies pain.  PRN Ativan given x 2 this shift for anxiety/restlessness.  TELE: ST.  Declined Q2 turns/repositioning throughout the shift.   Cornejo in-place with adequate output; ostomy with minimal output, + gas in bag.  Tolerating clear liquid diet without problems.  BG checks: sliding scale insulin given as ordered.  Discharge pending back to [facility].

## 2021-02-17 NOTE — ANESTHESIA PREPROCEDURE EVALUATION
Anesthesia Pre-Procedure Evaluation    Patient: Bella Saucedo   MRN: [id number] : 1945        Preoperative Diagnosis: SBO (small bowel obstruction) (H) [K56.609]   Procedure : Procedure(s):  Diagnostic laparoscopy, possible open,  POSSIBLE BOWEL RESECTION     Past Medical History:   Diagnosis Date     CAD (coronary artery disease)      Cerebral aneurysm, nonruptured      Colon cancer (H)     s/p colectomy and colostomy     COPD (chronic obstructive pulmonary disease) (H)      Subarachnoid hemorrhage (H)       Past Surgical History:   Procedure Laterality Date     HEMICOLECTOMY, RT/LT        Allergies   Allergen Reactions     Nitroglycerin Anxiety and Other (See Comments)     Mild headache, severe anxiety  Mild headache, severe anxiety      Social History     Tobacco Use     Smoking status: Former Smoker     Packs/day: 1.00     Types: Cigarettes     Quit date: 10/17/2020     Years since quittin.3   Substance Use Topics     Alcohol use: Not on file      Wt Readings from Last 1 Encounters:   21 62.5 kg (137 lb 11.2 oz)        Anesthesia Evaluation            ROS/MED HX  ENT/Pulmonary:     (+) tobacco use, Past use, severe,  COPD,     Neurologic: Comment: Rt MCA aneurysm s/p coil embolization   PCOM aneurysm rupture s/p coil embolization   Subarachnoid hemorrhage       (+) migraines,     Cardiovascular: Comment: Coronary artery disease, Hx NSTEMI  Hypertension  Heart failure with preserved ejection fraction    (+) Dyslipidemia hypertension--CAD ---CHF     METS/Exercise Tolerance:     Hematologic:       Musculoskeletal:       GI/Hepatic: Comment: Mechanical Small Bowel Obstruction     (+) GERD,     Renal/Genitourinary:       Endo:     (+) type II DM,     Psychiatric/Substance Use:     (+) psychiatric history anxiety     Infectious Disease:       Malignancy: Comment: Hx stage IIB colon cancer 2016 s/p left hemicolectomy and colostomy  (+) Malignancy, History of GI.    Other:                OUTSIDE LABS:  CBC:   Lab Results   Component Value Date    WBC 10.3 02/16/2021    WBC 10.7 02/14/2021    HGB 12.5 02/16/2021    HGB 12.1 02/14/2021    HCT 39.5 02/16/2021    HCT 40.0 02/14/2021     02/16/2021     02/14/2021     BMP:   Lab Results   Component Value Date     02/15/2021     02/14/2021    POTASSIUM 4.0 02/15/2021    POTASSIUM 4.0 02/14/2021    CHLORIDE 106 02/15/2021    CHLORIDE 106 02/14/2021    CO2 30 02/15/2021    CO2 34 (H) 02/14/2021    BUN 23 02/15/2021    BUN 24 02/14/2021    CR 0.53 02/15/2021    CR 0.54 02/14/2021     (H) 02/15/2021     (H) 02/14/2021     COAGS:   Lab Results   Component Value Date    INR 1.14 02/11/2021     POC:   Lab Results   Component Value Date     (H) 02/17/2021     HEPATIC:   Lab Results   Component Value Date    ALBUMIN 3.6 02/11/2021    PROTTOTAL 7.4 02/11/2021    ALT 41 02/11/2021    AST 24 02/11/2021    ALKPHOS 112 02/11/2021    BILITOTAL 0.3 02/11/2021     OTHER:   Lab Results   Component Value Date    PH 7.39 10/17/2020    LACT 1.1 02/16/2021    A1C 6.3 (H) 02/12/2021    MARILU 8.4 (L) 02/15/2021    PHOS 4.1 10/29/2020    MAG 2.1 01/14/2021    LIPASE 36 (L) 02/11/2021    TSH 1.24 12/24/2020    CRP 10.8 (H) 02/11/2021       Anesthesia Plan    ASA Status:  4      Anesthesia Type: General.     - Airway: ETT   Induction: Intravenous.   Maintenance: Balanced.        Consents    Anesthesia Plan(s) and associated risks, benefits, and realistic alternatives discussed. Questions answered and patient/representative(s) expressed understanding.     - Discussed with:  Patient      - Extended Intubation/Ventilatory Support Discussed: Extended Intubation.      - Patient is DNR/DNI Status: Yes             Suspend during perioperative period? DNR/DNI SUSPEND.             Agree to: chest compression/defibrillation, chemical resuscitation.   Use of blood products discussed: Yes.     - Discussed with: Patient.     - Consented: consented  to blood products     Postoperative Care    Pain management: IV analgesics, Multi-modal analgesia.   PONV prophylaxis: Ondansetron (or other 5HT-3)     Comments:                Jeff Ferguson DO

## 2021-02-17 NOTE — PROGRESS NOTES
Visited with patient. Discussed options with daughter. Patient has chosen to proceed with exploration. Options and risks reviewed. She appears to understand and wishes to proceed.

## 2021-02-17 NOTE — CODE/RAPID RESPONSE
Sepsis Evaluation Progress Note    I was called to see Bella Saucedo due to abnormal vital signs triggering the Sepsis SIRS screening alert. She is not known to have an infection.     This is a 75-year-old woman with PMH of 3 L O2 dependent COPD (recently on hospice), CAD, HFpEF, DM 2, colon cancer status post left hemicolectomy and colostomy, prior MCA and PCOM aneurysm clipping and subarachnoid hemorrhage who was admitted on 2/12/2021 with no BM times several days found to have a mechanical small bowel obstruction which prompted hospice disenrollment for treatment.  She failed conservative treatment.  She was treated concurrently for acute exacerbation of COPD.  Patient ultimately went and had exploratory laparotomy with small bowel resection and lysis of adhesions on 2/17/2021.  She was started on NIPPV in the PACU.  Upon return to the nursing unit heart rate is in the 130s which prompted lactic acid drawn and resulted at 4.3.  For that reason RRT was called.    On my arrival patient is wearing NIPPV.  Heart rate is 130s at times touching 190s, /78, SPO2 95-96% on 14/7 bilevel NIPPV, FiO2 35%, set rate of 12, respiratory rate of 25, tidal volume 581, Ve 14.8.  She does not appear overtly distressed but was recently medicated with 0.5 lorazepam for anxiety.  She does wake and is able to follow commands to wiggle toes wiggle thumbs bilaterally.  She has very poor air entry in bilateral lower lobes, hypoactive bowel sounds.  There is no mottling.  No central access    Physical Exam   Vital Signs:  Temp: 99.1  F (37.3  C) Temp src: Axillary BP: 116/81 Pulse: 137   Resp: 22 SpO2: 94 % O2 Device: BiPAP/CPAP Oxygen Delivery: 6 LPM     General: chronically ill appearing  Mental Status: altered level of consciousness based on recent anesthesia exposure, recent sedation w/ lorazepam.     Remainder of physical exam is significant for   Neuro: +follows commands wiggle toes and show 2 fingers bilat, face symmetric,  tongue midline, speech fluent but minimal  HEENT: Dry oral mucosa, mouth movements and sensory breath  Neck: supple  Heart: S1S2  Lungs: CTAB upper and lower lobes, poor air entry, minimal wheezing in the left lower lobe  Abd: Hypo-active bowel sounds, soft, nontender, slightly disteded, midline incision with shadowing, left lower quadrant colostomy with beefy red stoma  Ext: no edema      Data   Lactic Acid   Date Value Ref Range Status   02/16/2021 1.1 0.7 - 2.0 mmol/L Final   02/13/2021 1.0 0.7 - 2.0 mmol/L Final     Lactate for Sepsis Protocol   Date Value Ref Range Status   02/17/2021 4.3 (HH) 0.7 - 2.0 mmol/L Final     Comment:     Critical Value called to and read back by  MORIAH DOZIER ON 33 1733 AR     02/16/2021 2.3 (H) 0.7 - 2.0 mmol/L Final     Comment:     Significant value called to and read back by  MARSHALL CLIFFORD ON 88 AT 0053 HG         Assessment & Plan   NO EVIDENCE OF SEPSIS at this time.  Vital sign, physical exam, and lab findings are likely due to hypovolemia.    Lactic acidosis  -Most likely etiology is hypovolemia, she is -3.6 L since admission coupled with 3-hour surgery on 2/17/2021 with likely large insensible losses receiving 1 L LR and 250 mL of 5% albumin intraoperatively requiring vasopressors throughout the procedure  -Also considered acute on chronic respiratory failure, work of breathing, hypoxia  -Considered sepsis but recent WBC on 2/16/2020 was normal, she only has 1 qSOFA criteria and 2 SIRS criteria.  -- considered medications, she received 2 doses of albuterol last was approximately 2 PM    Interventions  -Stat EKG, reviewed at 6:03 PM, sinus tachycardia with multiple ectopic beats normal axis no ischemic changes  --stat ABG, results reviewed, acceptable, strongly recommend NOT using NIPPV in this patient who is recovering from bowel surgery, preceded by bowel obstruction, with prolonged ileus anticipated.  She would be very high risk for aspiration.  -Discontinue NIPPV, use  high flow nasal cannula instead  -Recheck ABG at 7:15 PM  -1 L LR IV fluid bolus  -Stat UA  -Stat glucose 202 mg/dL  -Add on BMP, mag, Phos, optimize electrolyte deficits to minimize ileus  -CBC, Recheck lactic acid  at 7:15 PM  -Start electrolyte replacement protocols  -Goal SPO2 88 to 92%  -Optimize analgesia with opioids which have an air hunger relief property as well as with acetaminophen    Disposition: The patient will remain on the current unit. We will continue to monitor this patient closely..    Time Spent on this Encounter   I spent 45 minutes (535-620pm) of critical care time on the unit/floor managing the care of Bella Saucedo. Upon evaluation, this patient had a high probability of imminent or life-threatening deterioration due to lactic acidosis, which required my direct attention, intervention, and personal management. 100% of my time was spent at the bedside counseling the patient and/or coordinating care regarding services listed in this note.    Bonita Dasilva CNP  [facility] [place] [facility]  [phone]     Text Page

## 2021-02-17 NOTE — PROGRESS NOTES
[facility]    General Surgery  Short Progress Note    Patient underwent exploratory laparotomy with extensive lysis of adhesions. Anticipate prolonged ileus, keep NPO and NG to LIS. Abdominal incision stapled. Continue scheduled zosyn. Other orders per primary team. Surgery to continue to follow.    Marina Mcfarlane PA-C

## 2021-02-17 NOTE — PLAN OF CARE
"Oriented but forgetful. VSS on 3 L O2 this afternoon ex tachy at times. Breathing improved today. Denied pain. Tele ST, looked irregular this terra, EKG ordered, pending result. Ostomy with no output but had some gas this terra. Turned/repositioned. BG in 200s- back to NPO for surgery. Cornejo with adequate UOP. Surgery and daughter have not been able to connect but they are both planning for surgery tomorrow. Pt apprehensive about surgery plan but ultimately wants \"to be able to eat and continue to talk to people\". Plan pending.  "

## 2021-02-17 NOTE — PROGRESS NOTES
[id number]: A/Ox4. VSS on 2-3L O2, satting low 90s. Tele: SD with occ PACs, tachy at times. Turn/repo in bed. Ax2/lift. PIV SL. Denies pain. KELLY and SOB while in bed. Cornejo patent. Colostomy with no output. NPO for surgery. Daughter at bedside prior to procedure. Pt sent down to Pre-op around 1045.

## 2021-02-17 NOTE — PLAN OF CARE
"Oriented but forgetful. VSS on 3 L O2 this afternoon ex tachy at times. Breathing improved today. Tele ST. Denied pain. Abdomen firm. Ostomy with no output but had some gas this terra. Turned/repositioned. BG in 200s- back to NPO for surgery. Cornejo with adequate UOP. Surgery and daughter have not been able to connect but they are both planning for surgery tomorrow. Pt apprehensive about surgery plan but ultimately wants \"to be able to eat and continue to talk to people\". Plan pending.  "

## 2021-02-17 NOTE — PLAN OF CARE
A&Ox3 (not able to state specific situation).  VSS except occasionally tachycardic and gets very KELLY; 2-3L NC to maintain saturations.  Denies pain.  TELE: SD with occasional PVCs.  NPO except for meds for planned surgery today; patient aware.  PIV SL.  Ostomy with minimal gas, no stool noted.  Denies nausea.  Cornejo in-place with good output; occasionally leaking.  BG checks; no sliding scale insulin needed this shift.  Q2 hr turn/repositioning as patient allowed; frequently refuses.  Patient expressed concerns about not wanting surgery, does not seem to grasp risks versus benefits of surgery.  Discharge pending progress/treatment plan.

## 2021-02-17 NOTE — PROGRESS NOTES
Vinod's test performed prior to radial ABG draw. Collateral circulation confirmed.  Site: Right radial  FiO2: 35%  Device- BiPAP 14/7        Cj Elias, RT  2/17/2021

## 2021-02-17 NOTE — PROGRESS NOTES
[facility]    Hospitalist Progress Note    Date of Service (when I saw the patient): 02/17/2021    Assessment & Plan   Bella Saucedo is a 75 year old female who was admitted on 2/12/2021.  Assessment & Plan     Bella Saucedo is a 75 year old female with complex past medical history including end-stage COPD for which she is enrolled in hospice, she is now admitted on 2/12/2021 with small bowel obstruction.      Mechanical Small Bowel Obstruction going for EXP laparotomy and bowel resection on 2/17/21  Pt presented to the ED from her nursing home because she had no stool output or gas in her ostomy bag since at least Wednesday 2/10. Her abdomen is quite distended. CT scan was obtained and showed evidence for mechanical small bowel obstruction with marked dilatation of numerous small bowel loops throughout the abdomen and pelvis extending up to the right upper quadrant in the distal small bowel where there is transition point to completely   decompressed loops likely related to adhesions. No evidence of bowel wall thickening or perforation. The patient is not currently in any discomfort.   - General surgery consulted   - NG tube placed by EMS, continue to low intermittent suction   - NPO   - Maintenance IVF   - Pain control PRN   Pt failed po trial after NG clamping   Her abdomen more distended with no out put from her stoma and Xray abdomen showed persistent dilation of small bowel loops    General surgery following and going down for  surgical exploration after explaining the high risks of surgery with her severe COPD      Goals of Care    pt with end stage COPD, enrolled in hospice, who was admitted for a SBO, now no longer in hospice.  palliative care consulted and met with daughter   End-Stage COPD   Acute copd exacerbation   Acute on chronic hypoxic/hypercapnic respiratory failure   Pt had recent admission for acute on chronic hypoxemic/hypercapnic respiratory failure 2/2 CAP and  "COPD exacerbation requiring intubation. According to recent NH visits she has \"severe dyspnea\" at baseline. \"Spends most of her day in bed.\" She is on 2-3L of supplemental O2 at baseline.   - Continue supplemental O2   - Continue PTA Trelegy and Duonebs PRN   - Prednisone 10mg PTA  On iv solumedrol 125mcg daily switch to 60mg Q 12hours now  Pt with worsening sob overnight RRT was called thought to be anxiety with severe underlying COPD contributing improved with IV ativan one dose   PRn IV ativan ordered   SOB stable  today      Abnormal UA  Chronic Indwelling grant  Pt had pre-existing grant cather (unclear reason). She does have WBCs and few bacteria but no LE. No clinical evidence of infection   - Culture negative    - No antibiotics indicated at this time.      Coronary artery disease, Hx NSTEMI  Hypertension  Heart failure with preserved ejection fraction  - Hold PTA furosemide   - ASA was discontinued      Type 2 diabetes mellitus  - Insulin was discontinued      Hx stabe IIB colon cancer 2016 s/p left hemicolectomy and colostomy  - WOC consult      Other Noted History per chart review  Anxiety  GERD   Lung nodule  Rt MCA aneurysm s/p coil embolization 2011  PCOM aneurysm rupture s/p coil embolization 2010  Subarachnoid hemorrhage        Diet: NPO for Medical/Clinical Reasons Except for: Ice Chips    DVT Prophylaxis: Pneumatic Compression Devices  Grant Catheter: not present  Code Status: No CPR- Do NOT Intubate               Disposition Plan     Expected discharge: after improvement from surgery   Discussed with bedside RN, patient today     greater than 35 minutes were spent in taking care of her today half of which was spent in reviewing the chart and counseling the patient in collaboration of care    Lorena Murrell MD  [phone] (P)      Interval History      Patient resting comfortably in bed.  Wants to start eating.  Having no stoma output since yesterday.  Abdominal pain and distention is the same.  " Shortness of breath same as yesterday pt has chronic SOB for her severe COPD     -Data reviewed today: I reviewed all new labs and imaging results over the last 24 hours. I personally reviewed no images or EKG's today.    Physical Exam   Temp: 98.6  F (37  C) Temp src: Temporal BP: (!) 158/66 Pulse: 59   Resp: 20 SpO2: 90 % O2 Device: Nasal cannula Oxygen Delivery: 3 LPM  Vitals:    02/12/21 2014   Weight: 62.5 kg (137 lb 11.2 oz)     Vital Signs with Ranges  Temp:  [95.7  F (35.4  C)-98.6  F (37  C)] 98.6  F (37  C)  Pulse:  [] 59  Resp:  [20-22] 20  BP: (134-158)/(53-70) 158/66  SpO2:  [90 %-95 %] 90 %  I/O last 3 completed shifts:  In: 240 [P.O.:240]  Out: 3475 [Urine:3475]    Constitutional: Awake, alert, cooperative, pursed lip breathing noted   Respiratory: Pursed lip breathing which is chronic for patient.  Diminished breath sounds with occasional wheezing  Cardiovascular: Regular rate and rhythm, normal S1 and S2, and no murmur noted  GI: Bowel sounds diminished , nontender, moderate  distention, ostomy with no  output since yesterday   Skin/Integumen: No rashes, no cyanosis, no edema  Other:     Medications     lactated ringers         ceFAZolin  1 g Intravenous See Admin Instructions     [Auto Hold] fluticasone-vilanterol  1 puff Inhalation Daily    And     [Auto Hold] umeclidinium  1 puff Inhalation Daily     [Auto Hold] guaiFENesin  1,200 mg Oral BID     [Auto Hold] insulin aspart  1-7 Units Subcutaneous TID AC     [Auto Hold] insulin aspart  1-5 Units Subcutaneous At Bedtime     [Auto Hold] methylPREDNISolone  62.5 mg Intravenous Q12H     [Auto Hold] pantoprazole (PROTONIX) IV  40 mg Intravenous Daily with breakfast     [Auto Hold] polyethylene glycol  17 g Oral Daily     [Auto Hold] senna-docusate  2 tablet Oral BID       Data   Recent Labs   Lab 02/17/21  1123 02/16/21  0726 02/15/21  0709 02/14/21  0810 02/13/21  0635 02/11/21  1737   WBC  --  10.3  --  10.7 11.4* 11.6*   HGB  --  12.5  --   12.1 12.3 13.8   MCV  --  95  --  99 98 95   PLT  --  309  --  323 290 308   INR  --   --   --   --   --  1.14   NA  --   --  143 144 139 139   POTASSIUM 3.4  --  4.0 4.0 4.0 4.0   CHLORIDE  --   --  106 106 101 96   CO2  --   --  30 34* 36* 40*   BUN  --   --  23 24 27 36*   CR  --   --  0.53 0.54 0.56 0.85   ANIONGAP  --   --  7 4 2* 3   MARILU  --   --  8.4* 8.1* 8.3* 9.6   *  --  152* 140* 128* 171*   ALBUMIN  --   --   --   --   --  3.6   PROTTOTAL  --   --   --   --   --  7.4   BILITOTAL  --   --   --   --   --  0.3   ALKPHOS  --   --   --   --   --  112   ALT  --   --   --   --   --  41   AST  --   --   --   --   --  24   LIPASE  --   --   --   --   --  36*       No results found for this or any previous visit (from the past 24 hour(s)).

## 2021-02-17 NOTE — BRIEF OP NOTE
[facility]    Brief Operative Note    Pre-operative diagnosis: SBO (small bowel obstruction) (H) [K56.609]  Post-operative diagnosis Same as pre-operative diagnosis    Procedure: Procedure(s):  DIAGNOSTIC LAPAROSCOPY, LAPAROTOMY. EXTENSIVE LYSIS OF ADHESIONS.  Surgeon: Surgeon(s) and Role:     * Federico Fall MD - Primary     * Marina Mcfarlane PA-C - Assisting  Anesthesia: General   Estimated blood loss: 10 mL  Drains: None  Specimens: * No specimens in log *  Findings:   Dense adhesions throughout abdomen. Transition point of distended and dilated small bowel identified in right mid-abdomen due to internal hernia and omental adhesions. Adhesions were freed up in this area. Adhesions also freed surrounding colostomy. NG tube placement verified intraoperatively. 2 serosal repairs of small bowel performed.  Complications: None.  Implants: * No implants in log *

## 2021-02-17 NOTE — ANESTHESIA PROCEDURE NOTES
Airway   Date/Time: 2/17/2021 11:45 AM   Patient location during procedure: OR  Staff -   CRNA: Kourtney Harrell APRN CRNA  Performed By: CRNA    Consent for Airway   Urgency: elective    Indications and Patient Condition  Indications for airway management: jaz-procedural  Induction type:intravenousMask difficulty assessment: 1 - vent by mask    Final Airway Details  Final airway type: endotracheal airway  Successful airway:Single subglottic suction  Endotracheal Airway Details   ETT size (mm): 7.5  Successful intubation technique: direct laryngoscopy  Grade View of Cords: 1  Adjucts: stylet  Measured from: lips  Secured at (cm): 22  Secured with: pink tape  Bite block used: None    Post intubation assessment   Placement verified by: capnometry, equal breath sounds and chest rise   Number of attempts at approach: 1  Number of other approaches attempted: 0  Secured with:pink tape  Ease of procedure: easy  Dentition: Intact and UnchangedAdditional Comments  Patient with pursed lip breathing and visibly short of breath at rest. Induction and intubation with patient in high fowlers position.

## 2021-02-17 NOTE — PROGRESS NOTES
Palliative chart check. Pt going to surgery for exp lap. Will continue to follow peripherally at this point. Please do not hesitate to reach out to our team if new needs arise. Thanks.     KASHMIR Mcallister [facility]  Contact information available via [facility] Paging/Directory

## 2021-02-17 NOTE — ANESTHESIA CARE TRANSFER NOTE
Patient: Bella Saucedo    Procedure(s):  DIAGNOSTIC LAPAROSCOPY, LAPAROTOMY. EXTENSIVE LYSIS OF ADHESIONS.    Diagnosis: SBO (small bowel obstruction) (H) [K56.609]  Diagnosis Additional Information: No value filed.    Anesthesia Type:   General     Note:    Oropharynx: nasal gatric tube in place  Level of Consciousness: drowsy  Oxygen Supplementation: face mask  Level of Supplemental Oxygen (L/min / FiO2): 6  Independent Airway: airway patency satisfactory and stable  Dentition: dentition unchanged  Vital Signs Stable: post-procedure vital signs reviewed and stable  Report to RN Given: handoff report given  Patient transferred to: PACU    Handoff Report: Identifed the Patient, Identified the Reponsible Provider, Reviewed the pertinent medical history, Discussed the surgical course, Reviewed Intra-OP anesthesia mangement and issues during anesthesia, Set expectations for post-procedure period and Allowed opportunity for questions and acknowledgement of understanding      Vitals: (Last set prior to Anesthesia Care Transfer)  CRNA VITALS  2/17/2021 1404 - 2/17/2021 1440      2/17/2021             Pulse:  88    SpO2:  99 %        Electronically Signed By: KASHMIR Concepcion CRNA  February 17, 2021  2:40 PM

## 2021-02-18 NOTE — PROVIDER NOTIFICATION
Paged Marina Mcfarlane- Orders placed to irrigate NG, can flush the NG but unable to pull back any fluid and when hooked up to suction nothing comes out.  Please advise.      No new orders placed, no need to keep flushing NG will monitor and providers will readdress tomorrow.

## 2021-02-18 NOTE — PROVIDER NOTIFICATION
Paged Dr. Goodrich. Pt is having persistent tachycardia, -140s, Hydralazine given at 1200 for /87.      Re-paged Dr. Goodrich - pt stated that she is afraid and is having a hard time breathing.  Provider came to assess pt and orders were placed to start IV fluids and to increase frequency of ativan.  Provider stated to give ativan now.

## 2021-02-18 NOTE — PROGRESS NOTES
House Follow Up Note  2/18/2021  0715    Notified by nursing HR remains ~120 (SR w/ freq PACs), BP stable 140s/70s. Pt remains on BiPAP and notes subjective dyspnea with short/shallow breathing. Pt has known underlying severe COPD with complicated hospital course (please see HADLEY Dasilva and ALEX Wells's notes).   --would favor conservative approach to manage tachycardia   --supplemental potassium  --add on Mag (will supplement if <2)   --suspect some tachycardia is driven by her dyspnea/air trapping in the setting of aggressive fluid resuscitation and severe COPD  --ativan 0.5mg IV x 1    Will follow heart rate trend, and can try more metoprolol (2.5mg IV improved rates overnight) if trends up despite the interventions listed above.       KASHMIR Penaloza [facility]  Hospitalist Service  House Officer  Pager: [phone] (7a - 6p)

## 2021-02-18 NOTE — PROGRESS NOTES
[facility]    General Surgery  Daily Progress Note       Assessment and Plan:   Bella Saucedo is a 75 year old female with end-stage COPD admitted for small bowel obstruction, now 1 day s/p exploratory laparotomy with extensive lysis of adhesions    PLAN:  - Order placed to advance NG tube and verify placement with abdominal XR. Continue NG to LIS, recording output. Expect prolonged ileus due to extensive FABIO, will likely clamp NG tube once stool output.  - IV analgesia, IV antinausea meds prn, and IV protonix.  - Monitor white count and for fevers. Continue IV zosyn (on Day 2)  - IVF and DVT prophylaxis per primary team.   - Will change abdominal dressing tomorrow (POD2). Reinforce as needed for drainage.    **ADDENDUM**  - NG still without much output after advanced, XR confirms placement. Discussed with Dr. Fall, will irrigate NG with normal saline and continue to monitor.        Interval History:   Bella Saucedo is seen this morning on surgical rounds. She offers very little history this morning due to BiPAP. She nods head to having abdominal pain. She denies nausea. Notified by nursing that NG tube pulled out 10 cm. Abdominal XR ordered for this morning after advancing. White count improved 34.8->27.3. No fevers. She does have tachycardia- per hospitalist suspect some tachycardia is driven by her dyspnea/air trapping in the setting of aggressive fluid resuscitation and severe COPD. BP elevated at times.         Physical Exam:   Temp: 98.2  F (36.8  C) Temp src: Oral BP: (!) 142/64 Pulse: 105   Resp: 28 SpO2: 94 % O2 Device: High Flow Nasal Cannula (HFNC) Oxygen Delivery: 40 LPM    I/O last 3 completed shifts:  In: 1550 [I.V.:1300]  Out: 860 [Urine:850; Blood:10]    N ml since surgery. Minimal dark bilious output in cannister    GENERAL: VS reviewed, alert, oriented, no acute distress  LUNGS: On BiPAP  ABDOMEN:  Soft, minimally distended, few bowel sounds, appropriately tender,  pink stoma, no gas or stool in ostomy appliance  INCISION: Serosanguinous drainage on dressing, no surrounding erythema  EXTREMITIES: Moving all extremities, no gross deformities, well perfused, no lower extremity edema or tenderness  NEUROLOGICAL: Grossly non-focal, mood & affect appropriate    Data   Recent Labs   Lab 02/18/21  0417 02/17/21  2244 02/17/21  1925 02/17/21  1722 02/17/21  1123 02/16/21  0726 02/15/21  0709 02/11/21  1737 02/11/21  1737   WBC 27.3*  --  34.8*  --   --  10.3  --    < > 11.6*   HGB 12.4  --  12.6  --   --  12.5  --    < > 13.8   MCV 97  --  97  --   --  95  --    < > 95     --  281  --   --  309  --    < > 308   INR  --   --   --   --   --   --   --   --  1.14     --   --  141  --   --  143   < > 139   POTASSIUM 3.8 3.3*  --  3.3* 3.4  --  4.0   < > 4.0   CHLORIDE 101  --   --  100  --   --  106   < > 96   CO2 40*  --   --  39*  --   --  30   < > 40*   BUN 21  --   --  19  --   --  23   < > 36*   CR 0.65  --   --  0.60  --   --  0.53   < > 0.85   ANIONGAP <1*  --   --  2*  --   --  7   < > 3   MARILU 8.2*  --   --  8.4*  --   --  8.4*   < > 9.6   *  --   --  245* 173*  --  152*   < > 171*   ALBUMIN 2.6*  --   --  3.1*  --   --   --   --  3.6   PROTTOTAL 4.9*  --   --  5.8*  --   --   --   --  7.4   BILITOTAL 0.6  --   --  0.5  --   --   --   --  0.3   ALKPHOS 50  --   --  58  --   --   --   --  112   ALT 26  --   --  35  --   --   --   --  41   AST 18  --   --  23  --   --   --   --  24    < > = values in this interval not displayed.       Marina Mcfarlane PA-C

## 2021-02-18 NOTE — PLAN OF CARE
Oriented to self only. Waxes and wanes. Vitals: Tachy, hypertensive at times on 40L high flow nasal cannula. Tele Sinus tach. Bolus x2 and low dose metoprolol given with results. Bowel sounds faint. NG tube to LIS. Abdominal incision with shadowing. Lung sounds diminished, abdominal muscle use, denies feeling labored. Cornejo catheter in place with good urine output. Potassium replaced. Blood sugars did not need to be corrected. Repositioned upon patients request. Dilaudid given for pain. Continue plan of care

## 2021-02-18 NOTE — PROGRESS NOTES
She looks good. Alert. We discussed procedure she had yesterday. Hope to get up in chair today. NG re-positioned. Labs look good overall. Ice chips only PO

## 2021-02-18 NOTE — OP NOTE
General Surgery Operative Note    PREOPERATIVE DIAGNOSIS:  SBO (small bowel obstruction) (H) [K56.609]    POSTOPERATIVE DIAGNOSIS:  Same     Procedure(s):  DIAGNOSTIC LAPAROSCOPY, LAPAROTOMY. EXTENSIVE LYSIS OF ADHESIONS. (D-1 very dense, numerous adhesions)    ANESTHESIA:  General.      SURGEON:  Federico Fall MD    ASSISTANT:  Marina Mcfarlane PA-C. The physician assistant was medically necessary for their expertise in camera management, suctioning, and retraction    INDICATIONS:  The patient has a relentless bowel obstruction. Conservative measures have failed. The risks and options were reviewed with her and her family. She appeared to understand and wished to proceed with operation.     PROCEDURE:  The patient was taken to the operating suite.  The operative area was prepped and draped in a sterile fashion.  Surgeon initiated timeout was acknowledged.  The abdomen was insufflated via an optiview technique in the left upper quadrant. Two 5mm trocars were placed on the right side. I followed small bowel until it became clear it was stuck in a nest of adhesions that could not be lysed laparoscopically. I made an upper midline incision. We followed the cecum and terminal ileum going backwards on the small bowel until we encountered the obstructing thick nest of adhesions in the LUQ. These were carefully lysed with sharp dissection and cautery. We then followed the small bowel proximally. We lysed many adhesions in the upper abdomen. The omentum was stuck in the low midline scar, creating an internal hernia. I extended the incision inferiorly. The omentum was divided between clamps and tied with 2-0 vicryl ties. We then followed the small bowel proximally and found another internal hernia with small bowel running behind the descending colon proximal to the colostomy. This bowel had many many adhesions lysed carefully and the internal hernia reduced. After several hours of dissection we found the ligament of treitz.  We went distally and found a small area of jejunum stuck in the pelvis with friendly non-obstructing adhesions. I did not lyse all of these. We then ran the bowel, repairing three serosal tears with interrupted 3-0 vicryl. The bowel was no longer obstructed. We irrigated. I placed the bowel into the abdomen and laid the omentum in front. Count was correct. The fascia was closed with running 1PDS using 3 sutures. Wound was irrigated and closed with staples.     The patient was  awakened and taken to the PACU in stable condition.  At the conclusion of the case, all counts were correct.        INTRAOPERATIVE FINDINGS:  Many adhesions requiring over two hours of adhesolysis. Several internal hernias, one in the RUQ causing an obstruction.    Federico Fall MD

## 2021-02-18 NOTE — PROGRESS NOTES
BRIEF HOUSE OFFICER NOTE:    Lactic Acidosis s/p laparoscopy, laparotomy with extensive lysis of adhesions  Paroxsymal atrial fibrillation with RVR/sinus tachycardia with rapid rates  I was asked to follow up on lab studies by my colleague, Bonita Dasilva.   Patient's repeat LA increased to 5.4 after 1L LR bolus. Patient was initiated on IV zosyn by Dr. Murrell at 1852 for possible intra-abdominal infection also patients UA is positive.   The patient is alert and mentation appears baseline upon my arrival. NGT and ostomy continue to have no output. Patient does appear to have increased work of breathing and air hunger which she reports is at her baseline. She does have some abdominal pain with palpation otherwise denies any pain. She did just receiving dilaudid 0.2mg IV at the time of my assessment. HR's continue to be high 120-130's with occasional increases to 160-170's. It does appear she is fluctuating on telemetry monitoring between SR with frequent PACs and atrial fibrillation with RVR. She does not have any mottling noted. She does feel warm and dry to touch. Urine output is very concentrated and ~125ml since 1500. The anesthesia record indicates 1L LR provided intraoperatively and urine output recorded was 500ml. Based on I/O documentation the patient was net negative 3.6L entering OR thus I do believe we continue to be volume depleted at this time with uncontrolled heart rates contributing to the clinical picture.     INTERVENTIONS:  -LR 500ml bolus x1 now over 1 hour  -Metoprolol 2.5mg IV x1 now  -Repeat LA at 2300    Physical Exam  Constitutional:       Comments: Chronically ill appearing   HENT:      Head: Normocephalic and atraumatic.      Mouth/Throat:      Mouth: Mucous membranes are dry.   Eyes:      Extraocular Movements: Extraocular movements intact.   Neck:      Musculoskeletal: Normal range of motion and neck supple.   Cardiovascular:      Rate and Rhythm: Tachycardia present. Rhythm irregular.    Pulmonary:      Effort: Respiratory distress present.      Comments: Diminished bibasilar  Abdominal:      Comments: Absent bowel sounds, NGT to LIS, midline abdominal incision covered by dressing, lap sites with bandaide, slight abdominal distention, tender to palpation   Skin:     General: Skin is warm and dry.   Neurological:      General: No focal deficit present.      Mental Status: She is alert.   Psychiatric:         Mood and Affect: Mood normal.       Patient's HRs improved to 's SR with occasional PACs following the single dose of metoprolol. Continue to closely monitor.    ADDENDUM 2310:  LA 3.4. Improving following additional volume and HR control.    INTERVENTIONS:  -LR 500ml bolus x1 now  -LA in AM    Additional 30 minutes of critical care time was provided on the floor managing the care of Bella Saucedo secondary to lactic acidosis and cardiac arrhythmia which required my direct attention, intervention, and personal management. 100% of my time was spent bedside counseling the patient and/or coordinating care regarding services listed in the note in addition to that provided by Bonita Dasilva CNP.     KASHMIR [place] CNP  Text Page

## 2021-02-18 NOTE — PROVIDER NOTIFICATION
[place] NP paged: Was told to update/repage if patient became tachy. One time dose of metoprolol given overnight. Should we be having you follow? Or hospitalist?

## 2021-02-18 NOTE — PROGRESS NOTES
"[facility]  WO Nurse Inpatient Ostomy & Wound Assessment     Assessment of Colostomy and bilateral heels    Surgery date:  2/17/21  Surgeon:   Federico Fall MD - Primary and  Marina Mcfarlane PA-C - Assisting  Procedure:  Exploratory laparotomy with extensive lysis of adhesions  Related diagnosis: Small bowel obstruction      Ostomy surgery date:  3-9-16  Surgeon:  Dr. Del Caldwell ([facility])  Procedure:  Sigmoidectomy with diverting colostomy  Related diagnosis:  Sigmoid rupture, colon CA      WO Assessment & Physical Exam:      Current status: Resting in bed    Date of last photo: 2-18-21        Stoma size: approx 1 1/4\", rounded; stoma was not touched in surgery 2/17    Stoma appearance: pink, viable    Peristomal complication(s): mild blanchable erythema, skin intact    Abdominal assessment: distended and firm    New surgical incision: moderate amount of bloody and serosang drainage, especially distally.  Approximated with staples, mild blanchable erythema/ecchymosis mid incision.     NG still in place? Yes     Output: No stool or flatus (post op day 1)    Pain: Moderate      Current pouching system: Pavel 2pc 57mm flat barrier with ring and lock n roll pouch, changed today     Pouch last changed:  2/18/21    Reason for pouch change today: Assessment, and pouch stained with drainage from incision      Learning and comprehension: Facility that Pt resides at completes ostomy cares & bag changes.       Bilateral heels heel: remain pink but blanchable and intact. No c/o pain with palpation.  Present on admission.  Continues to refuse Prevalon boots.         [facility] Plan:         Pouching product plan: Pavel 2pc; 57mm flat barrier (# [id number]) and a clear lock n roll pouch (#[id number]) while in hospital.  Pt can resume her 44mm flat barrier with closed pouches on discharge.       Frequency of pouch changes: 2-3x week and prn      Pt support system on discharge: Palliative " care at facility      Skin care: follow PIP measures    Pressure Injury Prevention (PIP) Plan:  -If patient is declining pressure injury prevention interventions: Explore reason why and address patient's concerns  -Mattress: Follow bed algorithm, reassess daily and order specialty mattress, if indicated.  -HOB: Maintain at or below 30 degrees, unless contraindicated  -Repositioning in bed: Every 1-2 hours , Left/right positioning; avoid supine and Raise foot of bed prior to raising head of bed, to reduce patient sliding down (shear)  -Heels: Keep elevated off mattress, Pillows under calves and Heel lift boots  -Protective Dressing: Sacral Mepilex for prevention (#[id number]),  especially for the agitated patient   -Chair positioning: pillow or Chair cushion (#[id number])  and Assist patient to reposition hourly   -Moisture Management: Perineal cleansing /protection: Follow Incontinence Protocol  -Under Devices: Inspect skin under all medical devices during skin inspection , Ensure tubes are stabilized without tension and Ensure patient is not lying on medical devices or equipment when repositioned      WOC follow-up plan: 1-2x week and prn      Objective Data:       Patient history according to medical record: Bella Saucedo is a 75 year old female with complex past medical history including end-stage COPD, admitted on 2/12/2021 with small bowel obstruction and under went EXP laparotomy and bowel resection on 2/17/21      Current Diet/Nutrition:   Orders Placed This Encounter      NPO for Medical/Clinical Reasons Except for: Meds, Ice Chips       Output:  I/O last 3 completed shifts:  In: 1550 [I.V.:1300]  Out: 860 [Urine:850; Blood:10]      Labs:   Recent Labs   Lab 02/18/21  0417 02/12/21  0944 02/12/21  0944 02/11/21  1737   ALBUMIN 2.6*   < >  --  3.6   HGB 12.4   < >  --  13.8   INR  --   --   --  1.14   WBC 27.3*   < >  --  11.6*   A1C  --   --  6.3*  --    CRP  --   --   --  10.8*    < > = values in this interval  not displayed.         Ayden Risk Assessment:   Sensory Perception: 3-->slightly limited  Moisture: 4-->rarely moist  Activity: 1-->bedfast  Mobility: 2-->very limited  Nutrition: 2-->probably inadequate  Friction and Shear: 2-->potential problem  Ayden Score: 14      WO Interventions:       Patient's chart evaluated    Focus of today's visit: assessment of skin, stoma, pouching system    Pouch Changed today Thursday 2/18/21    Skin assessed, pt repositioned, heels floated    Orders reviewed    Supplies: at bedside    Discussed plan of care with: Patient and Nurse    Jessenia Carmona RN WOCN-Student    Shared visit completed with [facility] student. Agree with above documentation and assessment.    VANESA RichmondN

## 2021-02-18 NOTE — PROGRESS NOTES
"CLINICAL NUTRITION SERVICES  -  ASSESSMENT NOTE      Future/Additional Recommendations:     Nutrition supplements prn when diet advanced    Note plan for hospice at discharge - defer nutrition support recommendation to MD     Malnutrition:   % Weight Loss:  None noted  % Intake:  </= 50% for >/= 5 days (severe malnutrition)  Subcutaneous Fat Loss:  deferred  Muscle Loss:  deferred  Fluid Retention:  None noted    Malnutrition Diagnosis: Unable to determine due to deferred NFPA        REASON FOR ASSESSMENT  Bella Saucedo is a 75 year old female assessed by Registered Dietitian for LOS      NUTRITION HISTORY  Chart reviewed    Note pt with end-stage COPD - was enrolled in hospice PTA, pt terminated enrollment for admission/possible surgery  Per Palliative - Dtr intends to reenroll in hospice ([facility] hospice at [facility]) upon hospital discharge     CURRENT NUTRITION ORDERS  Diet Order:     NPO    Pt has been without adequate nutrition since admit  Per Surg, \"Anticipate prolonged ileus, keep NPO and NG to LIS\"     Pt currently receiving cares - trying to get pt to rest/sleep      NUTRITION FOCUSED PHYSICAL ASSESSMENT FOR DIAGNOSING MALNUTRITION)    No:  Deferred - pt with care giver                Observed:      Obtained from Chart/Interdisciplinary Team:  2/17: exploratory laparotomy with extensive lysis of adhesions     ANTHROPOMETRICS  Height: 5' 3\"  Weight:(2/12) 62.5 kg / 137 lbs 11.2 oz  Body mass index is 24.39 kg/m .  Weight Status:  Normal BMI  IBW: 52.3 kg  % IBW: 120%  Weight History:   Wt Readings from Last 10 Encounters:   02/12/21 62.5 kg (137 lb 11.2 oz)   02/11/21 59.9 kg (132 lb)   02/11/21 60.9 kg (134 lb 3.2 oz)   02/10/21 58.3 kg (128 lb 8 oz)   02/03/21 58.3 kg (128 lb 8 oz)   02/01/21 58.3 kg (128 lb 8 oz)   01/20/21 58.3 kg (128 lb 8 oz)   01/13/21 56.7 kg (125 lb)   01/11/21 56.7 kg (125 lb)   12/31/20 56.7 kg (125 lb)         LABS  Labs reviewed    MEDICATIONS  Medications " reviewed      ASSESSED NUTRITION NEEDS PER APPROVED PRACTICE GUIDELINES:    Dosing Weight 55 kg (adjusted wt for overwt)  Estimated Energy Needs: 2311-5463 kcals (25-30 Kcal/Kg)  Justification: post-op  Estimated Protein Needs: 65-85 grams protein (1.2-1.5 g pro/Kg)  Justification: post-op  Estimated Fluid Needs: 1 mL/kcal    MALNUTRITION:  % Weight Loss:  None noted  % Intake:  </= 50% for >/= 5 days (severe malnutrition)  Subcutaneous Fat Loss:  deferred  Muscle Loss:  deferred  Fluid Retention:  None noted    Malnutrition Diagnosis: Unable to determine due to deferred NFPA      NUTRITION DIAGNOSIS:  Inadequate protein-energy intake related to NPO diet order as evidenced by pt not meeting est needs      NUTRITION INTERVENTIONS  Recommendations / Nutrition Prescription  NPO (diet per MD)  Nutrition supplements prn when diet advanced    Note plan for hospice at discharge - defer nutrition support recommendation to MD  .      Implementation  Nutrition education: No education needs assessed at this time  .      Nutrition Goals  Pt to tolerate po intake when diet advanced  .      MONITORING AND EVALUATION:  Progress towards goals will be monitored and evaluated per protocol and Practice Guidelines

## 2021-02-18 NOTE — ANESTHESIA POSTPROCEDURE EVALUATION
Patient: Bella Saucedo    Procedure(s):  DIAGNOSTIC LAPAROSCOPY, LAPAROTOMY. EXTENSIVE LYSIS OF ADHESIONS.    Diagnosis:SBO (small bowel obstruction) (H) [K56.609]  Diagnosis Additional Information: No value filed.    Anesthesia Type:  General    Note:  Disposition: Inpatient   Postop Pain Control: Uneventful            Sign Out: Well controlled pain   PONV: No   Neuro/Psych: Uneventful            Sign Out: Acceptable/Baseline neuro status   Airway/Respiratory: Uneventful            Sign Out: Acceptable/Baseline resp. status; O2 supplementation               Oxygen: Face mask   CV/Hemodynamics: Uneventful            Sign Out: Acceptable CV status   Other NRE: NONE   DID A NON-ROUTINE EVENT OCCUR? No         Last vitals:  Vitals:    02/17/21 2052 02/17/21 2100 02/17/21 2206   BP:  119/77 134/80   Pulse:  108 107   Resp:  13 23   Temp:      SpO2: 94% 100% 100%       Last vitals prior to Anesthesia Care Transfer:  CRNA VITALS  2/17/2021 1404 - 2/17/2021 1504      2/17/2021             Pulse:  88    SpO2:  99 %          Electronically Signed By: Jeff Ferguson DO  February 17, 2021  10:06 PM

## 2021-02-18 NOTE — PROVIDER NOTIFICATION
Provider on floor and is aware that NG tube came out.  Patient care order placed to advance NG 3 cm.  Writer told providerr that NG came out 10 cm and the landmark at time of insertion was 60.      Chanda called back and said to advance NG 10 cm and will then obtaind X-ray to confirm placement after.        Dr. Fall at bedside- abdominal dressing has moderate to large amount of drainage on it.  Provider said to change dressing now and change it as needed.  Will continue to monitor.

## 2021-02-18 NOTE — PLAN OF CARE
Arrived to floor around 1700. IMC status. Disoriented to time, place & situation. Intermittently follows directions. VSS ex Tachycardia 120-150's & BIPAP support. Tele: Sinus Tach w/ premature atrial complexes.  Lung sounds diminished w/ expiratory wheezes.  Bowel sounds hypoactive, Colostomy stoma pink & protruding, - flatus, -BM. NG to LIS w/ minimal brown output. Midline incision small amount of dried drainage. Chronic grant in place. Assist x2 turn & repo. NPO ex chips. Denies pain. Ativan given x1 for anxiety.     RRT called for lactic 4.3 (see house NP's note). Pt switched to HFNC, bolus given and labs collected. K+ replacement initiated.

## 2021-02-19 NOTE — PROGRESS NOTES
Clinical Nutrition Brief Note    Received Provider Order - Pharmacy/Nutrition to start & manage TPN d/t persistent ileus   RD already following patient, refer to assessment note completed yesterday 2/18 for more details    New Findings:  Ongoing goals of care discussions in place   Per Surgery, patient has wished to proceed with TPN post-op  PICC ordered but not yet placed  NG to LIS   Pt is day 8 inadequate nutrition and is at risk for refeeding    ASSESSED NUTRITION NEEDS PER APPROVED PRACTICE GUIDELINES:  Dosing Weight 55 kg (adjusted wt for overwt)  Estimated Energy Needs: 3819-3878 kcals (25-30 Kcal/Kg)  Justification: post-op  Estimated Protein Needs: 65-85 grams protein (1.2-1.5 g pro/Kg)  Justification: post-op  Estimated Fluid Needs: 1 mL/kcal    Labs reviewed: K 3.9 (NL), Mg 2.1 (NL), Phos 2.1 (L, acceptable)   Recent Labs   Lab 02/19/21  1148 02/19/21  0748 02/19/21  0650 02/18/21  2203 02/18/21  1800 02/18/21  1208 02/18/21  0719 02/18/21  0417 02/17/21  1722 02/17/21  1722 02/17/21  1123 02/17/21  1123 02/15/21  0709 02/15/21  0709 02/14/21  0810 02/14/21  0810   GLC  --   --  187*  --   --   --   --  230*  --  245*  --  173*  --  152*  --  140*   * 164*  --  157* 183* 213* 194*  --    < >  --    < >  --    < >  --    < >  --     < > = values in this interval not displayed.     Medications reviewed: Lactated ringers at 100 mL/hr       Plan:  Step 1: Clinimix D15 AA5 at 30 mL/hr + 250 mL 20% lipids 5x/week = 720 mL, 877 kcal, 36 gm protein, 108 gm dextrose and 41% fat (ratio for fat kcals will change/improve as TPN advances to goal rate in 24 hrs). GIR 1.4 mg/kg/min  - Decrease LR IVF to 70 mL/hr for total fluids 100 mL/hr (or per MD)    After 24 hrs of tolerance and Mg/K WNL, Phos >/=1.9, advance to Step 2 below ~  Step 2: Clinimix D15 AA5 at 65 mL/hr + 250 mL 20% lipids 5x/week = 1560 mL, 1464 kcal (26 kcal/kg), 78 gm protein (1.4 gm/kg), 224 gm dextrose and 24% fat. GIR 2.8 mg/kg/min  -  Decrease LR IVF to 35 mL/hr for total fluids 100 mL/hr (or per MD)        Will continue to follow per protocol    Mayi Salas RD, LD  Clinical Dietitian

## 2021-02-19 NOTE — PROGRESS NOTES
[facility]    Hospitalist Progress Note    Date of Service (when I saw the patient): 02/19/2021    Assessment & Plan   Bella Saucedo is a 75 year old female who was admitted on 2/12/2021.  Assessment & Plan     Bella Saucedo is a 75 year old female with complex past medical history including end-stage COPD for which she is enrolled in hospice, she is now admitted on 2/12/2021 with small bowel obstruction.      Mechanical Small Bowel Obstruction going for EXP laparotomy and bowel resection on 2/17/21  Pt presented to the ED from her nursing home because she had no stool output or gas in her ostomy bag since at least Wednesday 2/10. Her abdomen is quite distended. CT scan was obtained and showed evidence for mechanical small bowel obstruction with marked dilatation of numerous small bowel loops throughout the abdomen and pelvis extending up to the right upper quadrant in the distal small bowel where there is transition point to completely   decompressed loops likely related to adhesions. No evidence of bowel wall thickening or perforation. The patient is not currently in any discomfort.   - General surgery following -> s/p exploratory laparotomy with extensive lysis of adhesions  - NG tube placed by EMS, continue to low intermittent suction   - Ice chips only PO  - IV Zosyn  - TPN started, placed PICC today  - Maintenance IVF   - Pain control PRN   - Pt failed po trial after NG clamping   - Her abdomen more distended with no out put from her stoma and Xray abdomen showed persistent dilation of small bowel loops    General surgery following     Goals of Care    pt with end stage COPD, enrolled in hospice, who was admitted for a SBO, now no longer in hospice.  palliative care consulted and met with daughter   End-Stage COPD   Acute copd exacerbation   Acute on chronic hypoxic/hypercapnic respiratory failure   Pt had recent admission for acute on chronic hypoxemic/hypercapnic respiratory  "failure 2/2 CAP and COPD exacerbation requiring intubation. According to recent NH visits she has \"severe dyspnea\" at baseline. \"Spends most of her day in bed.\" She is on 2-3L of supplemental O2 at baseline.   - Continue supplemental O2   - Continue PTA Trelegy and Duonebs PRN   - Prednisone 10mg PTA  On iv solumedrol 125mcg daily switch to 60mg Q 12hours now  Pt with worsening sob this AM and last night and RRT was called thought to be anxiety with severe underlying COPD contributing improved with IV ativan one dose   PRN IV ativan ordered, frequency increased.      Abnormal UA  Chronic Indwelling grant  Pt had pre-existing grant cather (unclear reason). She does have WBCs and few bacteria but no LE. No clinical evidence of infection   - Culture negative       Coronary artery disease, Hx NSTEMI  Hypertension  Heart failure with preserved ejection fraction  - Hold PTA furosemide   - ASA was discontinued      Type 2 diabetes mellitus  - Insulin was discontinued      Hx stabe IIB colon cancer 2016 s/p left hemicolectomy and colostomy  - WOC consult      Other Noted History per chart review  Anxiety  GERD   Lung nodule  Rt MCA aneurysm s/p coil embolization 2011  PCOM aneurysm rupture s/p coil embolization 2010  Subarachnoid hemorrhage        Diet: NPO for Medical/Clinical Reasons Except for: Ice Chips    DVT Prophylaxis: Pneumatic Compression Devices  Grant Catheter: not present  Code Status: No CPR- Do NOT Intubate            Disposition Plan     Expected discharge: after improvement from surgery   Discussed with bedside RN, patient today     Spent greater than 35 minutes were spent in taking care of her today half of which was spent in reviewing the chart and counseling the patient in collaboration of care    Boom Goodrich MD    Interval History      Daughter at bedside, wants restorative cares  No fevers recorded, no CP  O2 needs stable today on HFNC  No evidence of aspiration,   No nausea/vomiting  Pain stable " otherwise, she has no complaints    -Data reviewed today: I reviewed all new labs and imaging results over the last 24 hours. I personally reviewed no images or EKG's today.    Physical Exam   Temp: 97.5  F (36.4  C) Temp src: Oral BP: (!) 147/98 Pulse: 98   Resp: 12 SpO2: 97 % O2 Device: High Flow Nasal Cannula (HFNC) Oxygen Delivery: 40 LPM  Vitals:    02/12/21 2014   Weight: 62.5 kg (137 lb 11.2 oz)     Vital Signs with Ranges  Temp:  [97.5  F (36.4  C)-98.3  F (36.8  C)] 97.5  F (36.4  C)  Pulse:  [] 98  Resp:  [9-31] 12  BP: (111-161)/(58-98) 147/98  FiO2 (%):  [40 %-55 %] 53 %  SpO2:  [90 %-99 %] 97 %  I/O last 3 completed shifts:  In: 445 [P.O.:10; I.V.:435]  Out: 545 [Urine:425; Emesis/NG output:120]    Constitutional: Awake, alert, cooperative, pursed lip breathing noted   Respiratory: Pursed lip breathing which is chronic for patient.  Diminished breath sounds with occasional wheezing  Cardiovascular: Regular rate and rhythm, normal S1 and S2, and no murmur noted  GI: Bowel sounds diminished , nontender, moderate  distention, ostomy with no  output since yesterday   Skin/Integumen: No rashes, no cyanosis, no edema  Other:     Medications     lactated ringers 70 mL/hr at 02/19/21 1607     parenteral nutrition - Clinimix E 30 mL/hr at 02/19/21 1558       fluticasone-vilanterol  1 puff Inhalation Daily    And     umeclidinium  1 puff Inhalation Daily     guaiFENesin  1,200 mg Oral BID     insulin aspart  1-12 Units Subcutaneous Q4H     lipids  250 mL Intravenous Once per day on Mon Tue Wed Thu Fri     methylPREDNISolone  62.5 mg Intravenous Q12H     pantoprazole (PROTONIX) IV  40 mg Intravenous Daily with breakfast     piperacillin-tazobactam  3.375 g Intravenous Q6H     polyethylene glycol  17 g Oral Daily     [START ON 2/20/2021] potassium & sodium phosphates  1 packet Oral TID     senna-docusate  2 tablet Oral BID       Data   Recent Labs   Lab 02/19/21  0650 02/18/21  0417 02/17/21  2527  02/17/21  1925 02/17/21  1722   WBC 20.5* 27.3*  --  34.8*  --    HGB 11.4* 12.4  --  12.6  --    MCV 97 97  --  97  --     243  --  281  --     141  --   --  141   POTASSIUM 3.9 3.8 3.3*  --  3.3*   CHLORIDE 103 101  --   --  100   CO2 38* 40*  --   --  39*   BUN 18 21  --   --  19   CR 0.55 0.65  --   --  0.60   ANIONGAP 1* <1*  --   --  2*   MARILU 8.5 8.2*  --   --  8.4*   * 230*  --   --  245*   ALBUMIN  --  2.6*  --   --  3.1*   PROTTOTAL  --  4.9*  --   --  5.8*   BILITOTAL  --  0.6  --   --  0.5   ALKPHOS  --  50  --   --  58   ALT  --  26  --   --  35   AST  --  18  --   --  23       No results found for this or any previous visit (from the past 24 hour(s)).

## 2021-02-19 NOTE — PROGRESS NOTES
Received a page from Keya RN reporting that Surgery is requesting a full goals of care discussion. In talking with Keya, it is not entirely clear to me what the question is. Pt had surgery and is now attempting post-op recovery, although tenuous, which is not surprising given her ES COPD. Plan is to place a PICC with TPN. I am not clear what the issue is with this, and this certainly is the appropriate step to take in regard to post-op recovery. There may be concern as to who the decision maker is. Surgery is appropriately assessing pt to be making her own medical decisions, and I know dtr to be supportive of this (based on my discussion with them last week).     I spoke with Marina LOGAN. We both agree that despite the patient previously being on hospice, the goals of care are presently restorative. PICC placement and TPN are in alignment with best supportive care within post-op recovery.     I express worry for pt's respiratory status and ability to recover post-operatively. I am always happy to reevaluate GOC with pt and family should they desire a change in pathway, or should pt's condition further deteriorate to suggest she may not have a meaningful recovery from this surgery/hospitalization.    Surgery and RN aware that I continue to follow peripherally and available into next week if needs arise. Thanks.    KASHMIR Mcallister [facility]  Contact information available via [facility] Paging/Directory

## 2021-02-19 NOTE — PROGRESS NOTES
[facility]    Hospitalist Progress Note    Date of Service (when I saw the patient): 02/18/2021    Assessment & Plan   Bella Saucedo is a 75 year old female who was admitted on 2/12/2021.  Assessment & Plan     Bella Saucedo is a 75 year old female with complex past medical history including end-stage COPD for which she is enrolled in hospice, she is now admitted on 2/12/2021 with small bowel obstruction.      Mechanical Small Bowel Obstruction going for EXP laparotomy and bowel resection on 2/17/21  Pt presented to the ED from her nursing home because she had no stool output or gas in her ostomy bag since at least Wednesday 2/10. Her abdomen is quite distended. CT scan was obtained and showed evidence for mechanical small bowel obstruction with marked dilatation of numerous small bowel loops throughout the abdomen and pelvis extending up to the right upper quadrant in the distal small bowel where there is transition point to completely   decompressed loops likely related to adhesions. No evidence of bowel wall thickening or perforation. The patient is not currently in any discomfort.   - General surgery following  - On IV Zosyn  - NG tube placed by EMS, continue to low intermittent suction   - Ice chips only PO  - Maintenance IVF   - Pain control PRN   - Pt failed po trial after NG clamping   Her abdomen more distended with no out put from her stoma and Xray abdomen showed persistent dilation of small bowel loops    General surgery following and going down for  surgical exploration after explaining the high risks of surgery with her severe COPD      Goals of Care   Assessment: patient has end stage COPD, enrolled in hospice, who was admitted for a SBO, now no longer in hospice.  palliative care consulted and met with daughter   End-Stage COPD   Acute copd exacerbation   Acute on chronic hypoxic/hypercapnic respiratory failure   Pt had recent admission for acute on chronic  "hypoxemic/hypercapnic respiratory failure 2/2 CAP and COPD exacerbation requiring intubation. According to recent NH visits she has \"severe dyspnea\" at baseline. \"Spends most of her day in bed.\" She is on 2-3L of supplemental O2 at baseline.   - Continue supplemental O2   - Continue PTA Trelegy and Duonebs PRN   - Prednisone 10mg PTA  On iv solumedrol 125mcg daily switch to 60mg Q 12hours now  Pt with worsening sob this AM and last night and RRT was called thought to be anxiety with severe underlying COPD contributing improved with IV ativan one dose   PRn IV ativan ordered, frequency increased.      Abnormal UA  Chronic Indwelling grant  Pt had pre-existing grant cather (unclear reason). She does have WBCs and few bacteria but no LE. No clinical evidence of infection   - Culture negative       Coronary artery disease, Hx NSTEMI  Hypertension  Heart failure with preserved ejection fraction  - Hold PTA furosemide   - ASA was discontinued      Type 2 diabetes mellitus  - Insulin was discontinued      Hx stabe IIB colon cancer 2016 s/p left hemicolectomy and colostomy  - WOC consult      Other Noted History per chart review  Anxiety  GERD   Lung nodule  Rt MCA aneurysm s/p coil embolization 2011  PCOM aneurysm rupture s/p coil embolization 2010  Subarachnoid hemorrhage        Diet: NPO for Medical/Clinical Reasons Except for: Ice Chips    DVT Prophylaxis: Pneumatic Compression Devices  Grant Catheter: not present  Code Status: No CPR- Do NOT Intubate            Disposition Plan     Expected discharge: after improvement from surgery   Discussed with bedside RN, patient today     greater than 35 minutes were spent in taking care of her today half of which was spent in reviewing the chart and counseling the patient in collaboration of care    Boom Goodrich MD  [phone] (P)      Interval History      Had worsening SOB in AM, requiring HFNC and increased in O2, now much more comfortable. She reports feeling significantly " anxious. No fevers recorded, no CP  No evidence of aspiration, low UOP  No nausea/vomiting  Pain stable     -Data reviewed today: I reviewed all new labs and imaging results over the last 24 hours. I personally reviewed no images or EKG's today.    Physical Exam   Temp: 98.4  F (36.9  C) Temp src: Axillary BP: (!) 143/65 Pulse: 129   Resp: 9 SpO2: 98 % O2 Device: High Flow Nasal Cannula (HFNC) Oxygen Delivery: 40 LPM  Vitals:    02/12/21 2014   Weight: 62.5 kg (137 lb 11.2 oz)     Vital Signs with Ranges  Temp:  [97.6  F (36.4  C)-98.7  F (37.1  C)] 98.4  F (36.9  C)  Pulse:  [101-144] 129  Resp:  [9-29] 9  BP: (108-167)/(48-87) 143/65  FiO2 (%):  [40 %-55 %] 40 %  SpO2:  [90 %-100 %] 98 %  I/O last 3 completed shifts:  In: 340 [P.O.:10; I.V.:300; NG/GT:30]  Out: 515 [Urine:465; Emesis/NG output:50]    Constitutional: Awake, alert, cooperative, pursed lip breathing noted   Respiratory: Pursed lip breathing which is chronic for patient.  Diminished breath sounds with occasional wheezing  Cardiovascular: Regular rate and rhythm, normal S1 and S2, and no murmur noted  GI: Bowel sounds diminished , nontender, moderate  distention, ostomy with no  output since yesterday   Skin/Integumen: No rashes, no cyanosis, no edema  Other:     Medications     lactated ringers 100 mL/hr at 02/18/21 1339       fluticasone-vilanterol  1 puff Inhalation Daily    And     umeclidinium  1 puff Inhalation Daily     guaiFENesin  1,200 mg Oral BID     insulin aspart  1-7 Units Subcutaneous TID AC     insulin aspart  1-5 Units Subcutaneous At Bedtime     methylPREDNISolone  62.5 mg Intravenous Q12H     pantoprazole (PROTONIX) IV  40 mg Intravenous Daily with breakfast     piperacillin-tazobactam  3.375 g Intravenous Q6H     polyethylene glycol  17 g Oral Daily     senna-docusate  2 tablet Oral BID       Data   Recent Labs   Lab 02/18/21  0417 02/17/21  2244 02/17/21  1925 02/17/21  1722 02/17/21  1123 02/16/21  0726 02/15/21  0709   WBC 27.3*   --  34.8*  --   --  10.3  --    HGB 12.4  --  12.6  --   --  12.5  --    MCV 97  --  97  --   --  95  --      --  281  --   --  309  --      --   --  141  --   --  143   POTASSIUM 3.8 3.3*  --  3.3* 3.4  --  4.0   CHLORIDE 101  --   --  100  --   --  106   CO2 40*  --   --  39*  --   --  30   BUN 21  --   --  19  --   --  23   CR 0.65  --   --  0.60  --   --  0.53   ANIONGAP <1*  --   --  2*  --   --  7   MARILU 8.2*  --   --  8.4*  --   --  8.4*   *  --   --  245* 173*  --  152*   ALBUMIN 2.6*  --   --  3.1*  --   --   --    PROTTOTAL 4.9*  --   --  5.8*  --   --   --    BILITOTAL 0.6  --   --  0.5  --   --   --    ALKPHOS 50  --   --  58  --   --   --    ALT 26  --   --  35  --   --   --    AST 18  --   --  23  --   --   --        Recent Results (from the past 24 hour(s))   XR Abdomen Port 1 View    Narrative    ABDOMEN ONE VIEW PORTABLE February 18, 2021 10:20 AM     HISTORY: NG tube verify placement.    COMPARISON: None.       Impression    IMPRESSION: NG tube tip in left upper quadrant in the expected  location of the stomach. Small amount of stool.  Nonobstructed bowel  gas pattern.    DELVIN CAIN MD

## 2021-02-19 NOTE — PROGRESS NOTES
Xcoverage: paged by RN for HR persistently in 140-150's- sinus tachy; BP is fine; Metoprolol 2.5 mg q 4h prn.    Angelica Romero MD

## 2021-02-19 NOTE — PROGRESS NOTES
[facility]    General Surgery  Daily Progress Note       Assessment and Plan:   Bella Saucedo is a 75 year old female with end-stage COPD admitted for small bowel obstruction, now 2 days s/p exploratory laparotomy with extensive lysis of adhesions     PLAN:  - Small NG output. NG tube placement verified yesterday and irrigated. Continue NG to LIS, recording output. Expect prolonged ileus due to extensive FABIO, will likely clamp NG tube once stool output.  - Nutrition saw patient yesterday. From a general surgery standpoint, TPN should be started due to ileus. Nutrition left decision to primary team due to hospice status prior to admission. Patient wants to proceed with TPN and understands that she will have PICC line placed. PICC order and nutrition/pharmacy to manage TPN order placed.  - IV analgesia, IV antinausea meds prn, and IV protonix.  - White count improving. Continue IV zosyn (on Day 3)  - IVF and DVT prophylaxis per primary team.         Interval History:   Bella Saucedo is seen this morning on surgical rounds. She continues to be tachycardic and hypertensive at times. No fevers. She continues on 40 LPM of oxygen this morning and does not offer much to history due to breathing. She has one word answers. I have a long discussion with her about TPN and PICC placement for nutrition. She is agreeable to this.          Physical Exam:   Temp: 98.3  F (36.8  C) Temp src: Axillary BP: (!) 142/72 Pulse: 105   Resp: (!) 31 SpO2: 96 % O2 Device: High Flow Nasal Cannula (HFNC) Oxygen Delivery: 40 LPM    I/O last 3 completed shifts:  In: 485 [P.O.:20; I.V.:435; NG/GT:30]  Out: 760 [Urine:590; Emesis/NG output:170]    N ml yesterday. Minimal dark bilious output in cannister     GENERAL: VS reviewed, alert and answers questions  LUNGS: On oxygen and increased respiratory effort, one word answers due to this  ABDOMEN:  Soft, minimally distended, few bowel sounds, appropriately tender,  pink stoma, no gas or stool in ostomy appliance  INCISION: Serosanguinous drainage on dressing, no surrounding erythema. Dressing replaced this morning. Incision CDI with staples in place.  EXTREMITIES: Moving all extremities  NEUROLOGICAL: Grossly non-focal, mood & affect appropriate    Data   Recent Labs   Lab 02/19/21  0650 02/18/21  0417 02/17/21  2244 02/17/21  1925 02/17/21  1722   WBC 20.5* 27.3*  --  34.8*  --    HGB 11.4* 12.4  --  12.6  --    MCV 97 97  --  97  --     243  --  281  --     141  --   --  141   POTASSIUM 3.9 3.8 3.3*  --  3.3*   CHLORIDE 103 101  --   --  100   CO2 38* 40*  --   --  39*   BUN 18 21  --   --  19   CR 0.55 0.65  --   --  0.60   ANIONGAP 1* <1*  --   --  2*   MARILU 8.5 8.2*  --   --  8.4*   * 230*  --   --  245*   ALBUMIN  --  2.6*  --   --  3.1*   PROTTOTAL  --  4.9*  --   --  5.8*   BILITOTAL  --  0.6  --   --  0.5   ALKPHOS  --  50  --   --  58   ALT  --  26  --   --  35   AST  --  18  --   --  23       Marina Mcfarlane PA-C

## 2021-02-19 NOTE — PROCEDURES
[facility]    Triple Lumen PICC Placement    Date/Time: 2/19/2021 3:34 PM  Performed by: Lynette Knapp RN  Authorized by: Mishel Roland MD   Indications: vascular access    UNIVERSAL PROTOCOL   Site Marked: Yes  Prior Images Obtained and Reviewed:  Yes  Required items: Required blood products, implants, devices and special equipment available    Patient identity confirmed:  Arm band  Patient was reevaluated immediately before administering moderate or deep sedation or anesthesia  Confirmation Checklist:  Patient's identity using two indicators  Time out: Immediately prior to the procedure a time out was called    Universal Protocol: the Joint Commission Universal Protocol was followed    Preparation: Patient was prepped and draped in usual sterile fashion           ANESTHESIA    Local Anesthetic: Lidocaine 1% without epinephrine  Anesthetic Total (mL):  3      SEDATION    Patient Sedated: No        Preparation: skin prepped with ChloraPrep  Type of line used: PICC  Catheter type: triple lumen  Lumen type: valved  Catheter size: 5 Fr  Brand: Bard  Lot number: [id number]  Placement method: MST  Number of attempts: 1  Successful placement: yes  Orientation: right  Location: brachial vein (medial)  Site rationale: Best vein  Arm circumference: adults 10 cm  Extremity circumference: 27  Visible catheter length: 8  Dressing and securement: blood cleaned with CHG, blood removed, occlusive dressing applied, securement device and chlorhexidine disc applied  Post procedure assessment: blood return through all ports and free fluid flow  PROCEDURE   Patient Tolerance:  Patient tolerated the procedure well with no immediate complications  Describe Procedure: PICC inserted ANJALI without difficulty.

## 2021-02-19 NOTE — PROGRESS NOTES
[facility]    General Surgery  Short Progress Note    Spoke with nursing staff about the need for PICC. Patient was alert and answered questions appropriately in discussion this morning. She still remains her decision-maker and is now NPO for 7 days. TPN would benefit in the post-operative period and if she does not want to continue at discharge, that is still her decision.    Please call palliative team for full discussion about goals of care.     **ADDENDUM**  Spoke with palliative Nurse Practitioner, Beckie Stevens. Their team is still following peripherally but she agrees that pursuing TPN in this patient is reasonable given that patient and daughter have decided to pursue restorative cares during her hospital stay and post-op recovery. If we have concerns with further deterioration next week that she may not have a meaningful recovery, their team is happy to assist with goals and discussions again. I greatly appreciate palliative assistance.    Marina Mcfarlane PA-C

## 2021-02-19 NOTE — PLAN OF CARE
"IMC Status continued. Vital Signs stable ex oxygen use, on high flow and has decreased to 40 L at 40%. Telemetry sinus tachycardia. Alert and Oriented to self and time, mentation waxes and wanes. Pt very anxious and stated that she is \"scared\" and wanted a .  came to bedside and sat with patient.   Lung sounds diminished throughout. Bowel sounds hypoactive, NG to LIS landmark at 60cm. Not passing flatus or stool through colostomy. Stoma is pink and protruding. NPO diet. Denies nausea. Low urine output, IV fluids initiated.   CMS intact ex edema +2-3. Abdominal incision has staple closure, dressing changed x1, clean dry and intact. Pain controlled with Dilaudid and Ativan.  Potassium replaced and recheck scheduled for the AM.  Turned and repositioned every 2 hours and as pt was able to tolerate.       "

## 2021-02-20 NOTE — PROVIDER NOTIFICATION
" Dr Kaminski Contacted about increased pain: \"305-1 BR, M- Patient complaining of increased pain; can we get an increased dose or frequency of IV dilaudid. Thanks\". Advised to continue monitoring patient and see if pain and anxiety medication would help reduce heart rate.   "

## 2021-02-20 NOTE — PROVIDER NOTIFICATION
AVSS on RA. Alert to self. Pain controlled with IV dilaudid. Incisions CDI with gauze dressing over midline. Ostomy WDL ex No output. LS dim throughout. Diet NPO; oral cares provided as needed. Patient remained in bed overnight due to ongoing lethargy. BS absent. Cornejo exchanged for larger catheter; Does not appear to be diverting urine anymore.

## 2021-02-20 NOTE — PROGRESS NOTES
Surgery    Patient had episodes of tachycardia and tachypnea. Tachycardia greatly improved today. She is resting but states she is short of breath while awake. Oxygen saturation has been stable. She denies any abdominal pain. No gas or stool in bag. No fevers    Abdomen-incision site clean and dry. Ostomy pink without gas or fluid. Minimal tenderness throughout    A/P  Continued expected ileus. Has episodes of tachycardia and tachypnea which hospitalist is managing. No surgical concerns at this time. Message left for daughter to provide update.    Ventura Lopez M.D.  [facility]  [phone]

## 2021-02-20 NOTE — PROGRESS NOTES
Call for sinus tachycardia with heart rate as high as 160s  Patient has been on as needed metoprolol at 2.5 mg IV every 4 hours as needed.  Multiple other issues including mechanical small bowel obstruction s/p recent laparotomy, end-stage COPD with acute COPD exacerbation and acute on chronic hypoxic and hypercapnic respiratory failure.  EKG from earlier in the morning showed sinus tachycardia with heart rate of 130.    Plan:  -Pain and anxiety could be making tachycardia worse  -Patient apparently just received Ativan and per RN he was going to get pain medication  -I advised that with above to intervention we could watch his heart rate for a little bit, if he settles down with Ativan and pain medication, I would like to avoid rate controlling medication to treat rapid heart rate.  -If no improvement with above interventions I advised to call us back.

## 2021-02-20 NOTE — PLAN OF CARE
[id number]. Alert to self and intermittently place and time. Pt is tachycardic throughout the shift and intermittent hyperpneic and hypertensive at times. On HFNC at 50% at 40 lpm. LS diminished. Tele Sinus tachycardia w/ PVCs. Bowels hypo, flatus-. No output through ostomy. Cornejo w/ marginal UOP. NG to LIS, very little output, marked at 60cm. Abdominal incision dressing CDI. Edematous in all extremities. Dilaudid for pain, ativan for anxiety, and metoprolol for tachycardia. Ax2. Turned q2h. NPO.Up w/ lift. PICC placed RUE, TPN infusing.

## 2021-02-20 NOTE — PROGRESS NOTES
"[facility]    Hospitalist Progress Note    Date of Service (when I saw the patient): 02/20/2021    Assessment & Plan   Bella Saucedo is a 75 year old female who was admitted on 2/12/2021.  Assessment & Plan     Bella Saucedo is a 75 year old female with complex past medical history including end-stage COPD for which she is enrolled in hospice, she is now admitted on 2/12/2021 with small bowel obstruction.      Mechanical Small Bowel Obstruction going for EXP laparotomy and bowel resection on 2/17/21  Pt presented to the ED from her nursing home because she had no stool output or gas in her ostomy bag since at least Wednesday 2/10. Her abdomen is quite distended. CT scan was obtained and showed evidence for mechanical small bowel obstruction with marked dilatation of numerous small bowel loops throughout the abdomen and pelvis extending up to the right upper quadrant in the distal small bowel where there is transition point to completely   decompressed loops likely related to adhesions. No evidence of bowel wall thickening or perforation. The patient is not currently in any discomfort.   - General surgery following -> s/p exploratory laparotomy with extensive lysis of adhesions  - NG tube placed by EMS, continue to low intermittent suction   - Ice chips only PO  - IV Zosyn for now  - TPN started, placed PICC 02/19/2020  - Maintenance IVF   - Pain control PRN    Goals of Care    pt with end stage COPD, enrolled in hospice, who was admitted for a SBO, now no longer in hospice.  palliative care consulted and met with daughter   End-Stage COPD   Acute copd exacerbation   Acute on chronic hypoxic/hypercapnic respiratory failure   Pt had recent admission for acute on chronic hypoxemic/hypercapnic respiratory failure 2/2 CAP and COPD exacerbation requiring intubation. According to recent NH visits she has \"severe dyspnea\" at baseline. \"Spends most of her day in bed.\" She is on 2-3L of " supplemental O2 at baseline.   - Continue supplemental O2   - Continue PTA Trelegy and Duonebs PRN   - Prednisone 10mg PTA  On iv solumedrol 125mcg daily switch to 40 mg every 12hours now  Pt with worsening sob this AM and last night and RRT was called thought to be anxiety with severe underlying COPD contributing improved with IV ativan one dose   PRN IV ativan ordered, frequency increased.      Abnormal UA  Chronic Indwelling grant  Pt had pre-existing grant cather (unclear reason). She does have WBCs and few bacteria but no LE. No clinical evidence of infection   - Culture negative       Coronary artery disease, Hx NSTEMI  Hypertension  Heart failure with preserved ejection fraction  - Hold PTA furosemide   - ASA was discontinued      Type 2 diabetes mellitus  - Insulin was discontinued      Hx stabe IIB colon cancer 2016 s/p left hemicolectomy and colostomy  - WOC consult      Other Noted History per chart review  Anxiety  GERD   Lung nodule  Rt MCA aneurysm s/p coil embolization 2011  PCOM aneurysm rupture s/p coil embolization 2010  Subarachnoid hemorrhage        Diet: NPO for Medical/Clinical Reasons Except for: Ice Chips    DVT Prophylaxis: Pneumatic Compression Devices  Grant Catheter: not present  Code Status: No CPR- Do NOT Intubate            Disposition Plan     Expected discharge: after improvement from surgery   Discussed with bedside RN, patient today     Spent greater than 35 minutes were spent in taking care of her today half of which was spent in reviewing the chart and counseling the patient in collaboration of care    Boom Goodrich MD    Interval History      No acute events overnight  No fevers recorded, no CP  denies any abdominal pain. No gas or stool in bag. No fevers  O2 needs stable today on HFNC, slowly declining O2 needs.  No evidence of aspiration,   No nausea/vomiting  Pain stable otherwise, she has no complaints    -Data reviewed today: I reviewed all new labs and imaging results over  the last 24 hours. I personally reviewed no images or EKG's today.    Physical Exam   Temp: 97.3  F (36.3  C) Temp src: Oral BP: (!) 146/77 Pulse: 154   Resp: 13 SpO2: 99 % O2 Device: High Flow Nasal Cannula (HFNC) Oxygen Delivery: 40 LPM  Vitals:    02/12/21 2014   Weight: 62.5 kg (137 lb 11.2 oz)     Vital Signs with Ranges  Temp:  [97.3  F (36.3  C)-98  F (36.7  C)] 97.3  F (36.3  C)  Pulse:  [] 154  Resp:  [8-34] 13  BP: (102-179)/(54-98) 146/77  FiO2 (%):  [50 %-53 %] 50 %  SpO2:  [94 %-100 %] 99 %  I/O last 3 completed shifts:  In: 230   Out: 1850 [Urine:1675; Emesis/NG output:175]    Constitutional: Awake, alert, cooperative, pursed lip breathing noted   Respiratory: Pursed lip breathing which is chronic for patient.  Diminished breath sounds with occasional wheezing  Cardiovascular: Regular rate and rhythm, normal S1 and S2, and no murmur noted  GI: Bowel sounds diminished , nontender, moderate  distention, ostomy with no  output since yesterday   Skin/Integumen: No rashes, no cyanosis, no edema  Other:     Medications     parenteral nutrition - Clinimix E       parenteral nutrition - Clinimix E 30 mL/hr at 02/19/21 1558       fluticasone-vilanterol  1 puff Inhalation Daily    And     umeclidinium  1 puff Inhalation Daily     guaiFENesin  1,200 mg Oral BID     insulin aspart  1-12 Units Subcutaneous Q4H     lipids  250 mL Intravenous Once per day on Mon Tue Wed Thu Fri     methylPREDNISolone  40 mg Intravenous Q12H     pantoprazole (PROTONIX) IV  40 mg Intravenous Daily with breakfast     piperacillin-tazobactam  3.375 g Intravenous Q6H     polyethylene glycol  17 g Oral Daily     potassium & sodium phosphates  1 packet Oral TID     senna-docusate  2 tablet Oral BID       Data   Recent Labs   Lab 02/20/21  0630 02/19/21  0650 02/18/21  0417 02/17/21  1722 02/17/21  1722   WBC 19.9* 20.5* 27.3*   < >  --    HGB 10.9* 11.4* 12.4   < >  --    MCV 97 97 97   < >  --     221 243   < >  --      142 141  --  141   POTASSIUM 3.4 3.9 3.8   < > 3.3*   CHLORIDE 99 103 101  --  100   CO2 42* 38* 40*  --  39*   BUN 14 18 21  --  19   CR 0.50* 0.55 0.65  --  0.60   ANIONGAP <1* 1* <1*  --  2*   MARILU 8.7 8.5 8.2*  --  8.4*   * 187* 230*  --  245*   ALBUMIN  --   --  2.6*  --  3.1*   PROTTOTAL  --   --  4.9*  --  5.8*   BILITOTAL  --   --  0.6  --  0.5   ALKPHOS  --   --  50  --  58   ALT  --   --  26  --  35   AST  --   --  18  --  23    < > = values in this interval not displayed.       No results found for this or any previous visit (from the past 24 hour(s)).

## 2021-02-21 NOTE — PLAN OF CARE
A/o x4. AVSS on high flow. Pain managed w/ dilaudid and ice on abdomen. Anxiety managed w/ ativan. Cornejo intact, adequate output. NPO ex ice chips. TPN @ 65. NGT to LIS, no output. Turn/repo.

## 2021-02-21 NOTE — PROGRESS NOTES
"General Surgery Progress Note    Admission Date: 2/12/2021  Today's Date: 2/21/2021         Assessment:      Bella Saucedo is a 75 year old female with end-stage COPD admitted for small bowel obstruction, now 4 days s/p exploratory laparotomy with extensive lysis of adhesions    Continues on TPN. No bowel function yet, expected prolonged ileus. NG in place with minimal output.         Plan:   - Continue NG to LIS, await gas/stool from stoma before clamping  - Continue TPN  - IV analgesia, IV anti-emetics available prn. Continue IV protonix  - White count improved today, continue to monitor. Continue Zosyn  - Medical management including IV fluids, DVT ppx, COPD, tachycardia per primary hospitalist team  - Weekly COVID test ordered per protocol, not urgent        Interval History:   Afebrile, heart rate up to 135 overnight. Currently on 30L high flow nasal cannula - O2 Sat >96%. IV dilaudid controlling abdominal pain, seems to be helpful for anxiety and breathing as well when pain controlled.   Patient asks me today when she is having surgery. She does not speak much due to shortness of breath, doesn't provide answers to most questions. I explained to her the surgery that she had recently, and that we have no plans for further surgery at this time. Discussed current plan with her.          Physical Exam:   BP (!) 141/117   Pulse 112   Temp 97.7  F (36.5  C)   Resp 26   Ht 1.6 m (5' 3\")   Wt 65.2 kg (143 lb 11.8 oz)   SpO2 96%   BMI 25.46 kg/m    I/O last 3 completed shifts:  In: 770 [P.O.:120]  Out: 3625 [Urine:3625]  General: high flow nasal cannula in place, patient mostly nonverbal due to shortness of breath. Alert and awake, appears slightly uncomfortable due to breathing  Abdomen: soft, minimal tenderness throughout, not significantly distended. Ostomy pink, healthy, protruding. No gas or fluid in bag.  Incision: clean, dry, and intact with staples. No erythema or drainage    LABS:  Recent Labs   Lab " Test 02/21/21  0515 02/20/21  0630 02/19/21  0650   WBC 11.7* 19.9* 20.5*   HGB 10.9* 10.9* 11.4*   MCV 98 97 97    219 221      Recent Labs   Lab Test 02/21/21 0515 02/20/21  0630 02/19/21  0650   POTASSIUM 3.3* 3.4 3.9   CHLORIDE 102 99 103   CO2 42* 42* 38*   BUN 17 14 18   CR 0.41* 0.50* 0.55   ANIONGAP <1* <1* 1*      Recent Labs   Lab Test 02/21/21  0515 02/20/21  0630 02/18/21  0417   ALBUMIN 2.3* 2.5* 2.6*   BILITOTAL 0.8 0.5 0.6   ALT 20 28 26   AST 8 11 18   ALKPHOS 51 53 50     -------------------------------    Lucero Strong PA-C  [facility]  [phone]

## 2021-02-21 NOTE — PROVIDER NOTIFICATION
Gordy contacted about Increased anxiety and reported shortness of breath. Received orders for 1x dose of IV ativan.

## 2021-02-21 NOTE — PROGRESS NOTES
"[facility]    Hospitalist Progress Note    Date of Service (when I saw the patient): 02/21/2021    Assessment & Plan      Bella Saucedo is a 75 year old female who was admitted on 2/12/2021.  Assessment & Plan     Bella Saucedo is a 75 year old female with complex past medical history including end-stage COPD for which she is enrolled in hospice, she is now admitted on 2/12/2021 with small bowel obstruction.      Mechanical Small Bowel Obstruction going for EXP laparotomy and bowel resection on 2/17/21  Pt presented to the ED from her nursing home because she had no stool output or gas in her ostomy bag since at least Wednesday 2/10. Her abdomen is quite distended. CT scan was obtained and showed evidence for mechanical small bowel obstruction with marked dilatation of numerous small bowel loops throughout the abdomen and pelvis extending up to the right upper quadrant in the distal small bowel where there is transition point to completely  decompressed loops likely related to adhesions. No evidence of bowel wall thickening or perforation. The patient is not currently in any discomfort.   - General surgery following -> s/p exploratory laparotomy with extensive lysis of adhesions  - NG tube placed by EMS, continue to low intermittent suction   - Ice chips only PO  - IV Zosyn for now -> WBC down trending  - TPN, placed PICC 02/19/2020  - Maintenance IVF can be stopped  - Pain control PRN    Goals of Care    pt with end stage COPD, enrolled in hospice, who was admitted for a SBO, now no longer in hospice.  palliative care consulted and met with daughter   End-Stage COPD   Acute copd exacerbation   Acute on chronic hypoxic/hypercapnic respiratory failure   Pt had recent admission for acute on chronic hypoxemic/hypercapnic respiratory failure 2/2 CAP and COPD exacerbation requiring intubation. According to recent NH visits she has \"severe dyspnea\" at baseline. \"Spends most of her day in " "bed.\" She is on 2-3L of supplemental O2 at baseline.   - Continue supplemental O2   - Continue PTA Trelegy and Duonebs PRN   - Prednisone 10mg PTA continued  On iv solumedrol 125mcg daily switch to 40 mg every 12hours now  Pt with worsening sob this AM and last night and RRT was called thought to be anxiety with severe underlying COPD contributing improved with IV ativan one dose   PRN IV ativan ordered, frequency increased.     Tachycardia  Assessment: EKG shows sinus tachy. likelty 2/2 to pain /anxiety / acute illness, low suspicion for PE. HRs ranging from 80s-110s  Plan:  - Follow for now     Abnormal UA  Chronic Indwelling grant  Pt had pre-existing grant cather (unclear reason). She does have WBCs and few bacteria but no LE. No clinical evidence of infection   - Culture negative       Coronary artery disease, Hx NSTEMI  Hypertension  Heart failure with preserved ejection fraction  - Hold PTA furosemide   - ASA was discontinued      Type 2 diabetes mellitus  - Insulin was discontinued      Hx stabe IIB colon cancer 2016 s/p left hemicolectomy and colostomy  - WOC consulted     Other Noted History per chart review  Anxiety  GERD   Lung nodule  Rt MCA aneurysm s/p coil embolization 2011  PCOM aneurysm rupture s/p coil embolization 2010  Subarachnoid hemorrhage        Diet: NPO for Medical/Clinical Reasons Except for: Ice Chips    DVT Prophylaxis: Pneumatic Compression Devices  Grant Catheter: not present  Code Status: No CPR- Do NOT Intubate            Disposition Plan     Expected discharge: after improvement from surgery   Discussed with bedside RN, patient today     Spent greater than 35 minutes were spent in taking care of her today half of which was spent in reviewing the chart and counseling the patient in collaboration of care    Boom Goodrich MD    Interval History      No acute events overnight  No fevers recorded, no CP  She denies any abdominal pain. No gas or stool in bag. No fevers  O2 needs stable " today on HFNC, slowly declining O2 needs.  No evidence of aspiration,   No nausea/vomiting  Pain stable otherwise, she has no complaints    -Data reviewed today: I reviewed all new labs and imaging results over the last 24 hours. I personally reviewed no images or EKG's today.    Physical Exam   Temp: 97.4  F (36.3  C) Temp src: Oral BP: 125/86 Pulse: 105   Resp: 15 SpO2: 95 % O2 Device: High Flow Nasal Cannula (HFNC) Oxygen Delivery: 30 LPM  Vitals:    02/12/21 2014 02/21/21 0500   Weight: 62.5 kg (137 lb 11.2 oz) 65.2 kg (143 lb 11.8 oz)     Vital Signs with Ranges  Temp:  [97.4  F (36.3  C)-98.2  F (36.8  C)] 97.4  F (36.3  C)  Pulse:  [] 105  Resp:  [8-33] 15  BP: (122-169)/() 125/86  FiO2 (%):  [35 %-40 %] 35 %  SpO2:  [90 %-99 %] 95 %  I/O last 3 completed shifts:  In: 770 [P.O.:120]  Out: 3725 [Urine:3725]    Constitutional: Awake, alert, cooperative, pursed lip breathing noted   Respiratory: Pursed lip breathing which is chronic for patient.  Diminished breath sounds with occasional wheezing  Cardiovascular: Regular rate and rhythm, normal S1 and S2, and no murmur noted  GI: Bowel sounds diminished , nontender, moderate  distention, ostomy with no  output since yesterday   Skin/Integumen: No rashes, no cyanosis, no edema  Other:     Medications     parenteral nutrition - Clinimix E       parenteral nutrition - Clinimix E 65 mL/hr at 02/21/21 1138       fluticasone-vilanterol  1 puff Inhalation Daily    And     umeclidinium  1 puff Inhalation Daily     guaiFENesin  1,200 mg Oral BID     insulin aspart  1-12 Units Subcutaneous Q4H     lipids  250 mL Intravenous Once per day on Mon Tue Wed Thu Fri     methylPREDNISolone  40 mg Intravenous Q12H     pantoprazole (PROTONIX) IV  40 mg Intravenous Daily with breakfast     piperacillin-tazobactam  3.375 g Intravenous Q6H     polyethylene glycol  17 g Oral Daily     potassium chloride  10 mEq Intravenous Q1H     senna-docusate  2 tablet Oral BID       Data    Recent Labs   Lab 02/21/21  0515 02/20/21  0630 02/19/21  0650   WBC 11.7* 19.9* 20.5*   HGB 10.9* 10.9* 11.4*   MCV 98 97 97    219 221    139 142   POTASSIUM 3.3* 3.4 3.9   CHLORIDE 102 99 103   CO2 42* 42* 38*   BUN 17 14 18   CR 0.41* 0.50* 0.55   ANIONGAP <1* <1* 1*   MARILU 8.4* 8.7 8.5   * 258* 187*   ALBUMIN 2.3* 2.5*  --    PROTTOTAL 4.8* 5.3*  --    BILITOTAL 0.8 0.5  --    ALKPHOS 51 53  --    ALT 20 28  --    AST 8 11  --        No results found for this or any previous visit (from the past 24 hour(s)).

## 2021-02-22 NOTE — PLAN OF CARE
AVSS On HFNC. Pain controlled with IV dilaudid and anxiety controlled with ativan.  Midline incision MELIA with staples; ostomy intact with no output. LS dim throughout. Diet NPO with Ice chips. Up with assist of 1. BS Inactive.

## 2021-02-22 NOTE — PROGRESS NOTES
CLINICAL NUTRITION SERVICES - REASSESSMENT NOTE      Recommendations Ordered by Registered Dietitian (RD): Recommend increase Insulin in TPN for improved blood sugar management (Pharm to address)   Malnutrition: (2/18)  % Weight Loss:  None noted  % Intake:  </= 50% for >/= 5 days (severe malnutrition)  Subcutaneous Fat Loss:  deferred  Muscle Loss:  deferred  Fluid Retention:  None noted     Malnutrition Diagnosis: Unable to determine due to deferred NFPA       EVALUATION OF PROGRESS TOWARD GOALS   Diet:  NPO     Nutrition Support:  Patient started on TPN 2/19, advanced to goal on 2/20, and continues as follows ~    Nutrition Support Parenteral:  Type of Access: PICC  Parenteral Frequency:  Continuous  Parenteral Regimen: D15 AA5 at 65 mL/hr +  Lipid 250 mL 20% 5x per week   Total Parenteral Provisions: 1464 kcal (26 kcal/kg), 78 g protein (1.4 g/kg), 234 g CHO, 24% fat       Intake/Tolerance:    K and Phos normal  Mg 2.4 (H)   , 281, 273, 298, 293, 268 - On Solumedrol, High sliding scale insulin, Insulin in TPN 36 units per 2 Liter bag   I/O 780/3750, wt 62.5 kg (stable)  No BM yet - Receiving Miralax and Senokot       ASSESSED NUTRITION NEEDS:  Dosing Weight 55 kg (adjusted wt for overwt)  Estimated Energy Needs: 3218-4326 kcals (25-30 Kcal/Kg)  Justification: post-op  Estimated Protein Needs: 65-85 grams protein (1.2-1.5 g pro/Kg)  Justification: post-op    Previous Goals (2/18):   Pt to tolerate po intake when diet advanced  Evaluation: Unable to evaluate as patient remains NPO, now on TPN     Previous Nutrition Diagnosis (2/18):   Inadequate protein-energy intake related to NPO diet order as evidenced by pt not meeting est needs  Evaluation: Resolved       CURRENT NUTRITION DIAGNOSIS  Altered nutrition-related lab value (high glucoses) related to stress, surgery, and steroids as evidenced by BGM > 200     INTERVENTIONS  Recommendations / Nutrition Prescription  Continue current TPN as above   Recommend  increase Insulin in TPN for improved blood sugar management     Implementation  Collaboration and Referral of Nutrition care:  Discussed with Pharmacist - recommend increase Insulin in TPN     Goals  TPN/Lipid will continue to meet % needs  BGM < 180    MONITORING AND EVALUATION:  Progress towards goals will be monitored and evaluated per protocol and Practice Guidelines    Cynthia Mackey RD, LD, CNSC   Clinical Dietitian - [facility]

## 2021-02-22 NOTE — PLAN OF CARE
"IMC continued.  Tele sinus tachycardia low 100's, occasionally 120's rare/limited 140 when calling out \"help\" (not sustained so no PRN given).  Ativan 0.5mg IV given twice on day shift with some relief of anxiety.  Tylenol given rectally once for minor complaints, no IV dilaudid given.  HiFlo O2 continues per RT.  Oriented to self, disoriented to situation/place/time.  Assisted pt in calling daughter Samia per pt's request.   States \"I want to eat,\" no bowel sounds, no output in ostomy, NG tube dressing replaced, 100mL output this shift via LIS.  Edema 2-3+, arm weeping improving.  Abdominal incision staples intact.  Not yet medically ready for discharge planning.  "

## 2021-02-22 NOTE — PROGRESS NOTES
"[facility]    General Surgery  Daily Progress Note       Assessment and Plan:   Bella Saucedo is a 75 year old female with end-stage COPD admitted for small bowel obstruction, now 5 days s/p exploratory laparotomy with extensive lysis of adhesions     PLAN:  - Still awaiting reliable return of bowel function. NG output continues to be low and abdomen nondistended this morning. Continue NG to LIS, recording output. Will clamp once gas or stool in appliance. Prolonged ileus not unexpected due to extensive FABIO.  - Continue TPN. Albumin improving, up to 2.6 this morning.  - IV analgesia, IV antinausea meds prn, and IV protonix.  - White count improving. Can discontinue IV zosyn after today from a surgical standpoint (completing 5 days).  - IVF and DVT prophylaxis per primary team.         Interval History:   Bella Saucedo is seen this morning on surgical rounds. She remains on 30 LPM high flow oxygen and answers questions in short sentences or one word answers due to shortness of breath. She says she is \"not well\" this morning but this is due to her breathing. She does have some belly pain but this is improving. Bella is eager to get NG tube out. She continues to be tachycardic and hypertensive at times. No fevers.          Physical Exam:   Temp: 97.5  F (36.4  C) Temp src: Axillary BP: (!) 159/95 Pulse: 112   Resp: 10 SpO2: 94 % O2 Device: None (Room air) Oxygen Delivery: 30 LPM    I/O last 3 completed shifts:  In: 780   Out: 3750 [Urine:3750]    N ml in cannister since last night, dark bilious     GENERAL: VS reviewed, alert and answers questions  LUNGS: On high flow oxygen and increased respiratory effort, short answers due to this  ABDOMEN: nondistended, appropriately tender throughout, pink stoma, no gas or stool in ostomy appliance  INCISION: Incision CDI with staples in place, no surrounding erythema or induration.  EXTREMITIES: Moving all extremities  NEUROLOGICAL: Grossly " non-focal, mood & affect appropriate    Data   Recent Labs   Lab 02/22/21  0445 02/22/21  0115 02/21/21  0515 02/20/21  0630 02/19/21  0650   WBC  --   --  11.7* 19.9* 20.5*   HGB  --   --  10.9* 10.9* 11.4*   MCV  --   --  98 97 97   PLT  --   --  220 219 221     --  144 139 142   POTASSIUM 4.0 3.9 3.3* 3.4 3.9   CHLORIDE 101  --  102 99 103   CO2 38*  --  42* 42* 38*   BUN 18  --  17 14 18   CR 0.41*  --  0.41* 0.50* 0.55   ANIONGAP 1*  --  <1* <1* 1*   MARILU 8.8  --  8.4* 8.7 8.5   *  --  261* 258* 187*   ALBUMIN 2.6*  --  2.3* 2.5*  --    PROTTOTAL 5.7*  --  4.8* 5.3*  --    BILITOTAL 0.7  --  0.8 0.5  --    ALKPHOS 67  --  51 53  --    ALT 26  --  20 28  --    AST 13  --  8 11  --        Marina Mcfarlane PA-C

## 2021-02-22 NOTE — PROGRESS NOTES
Pt on HFNC this shift, tolerating 37% at 30 Lpm. Sp02 94% at this time.. will continue to follow and wean as tolerated.Bree Murray, RT

## 2021-02-22 NOTE — PROGRESS NOTES
Patient remains on HFNC. Currents settings 40 LPM FiO2 40% with SpO2 in the mid 90's. PRN neb was given this afternoon due to pt having an increased WOB. BS diminished. Mepilex and opti foam in place. Skin intact.  Will cont to monitor.  2/22/2021  Geno Lindo, RT

## 2021-02-22 NOTE — PROGRESS NOTES
"[facility]    Hospitalist Progress Note    Date of Service (when I saw the patient): 02/22/2021    Assessment & Plan      Bella Saucedo is a 75 year old female who was admitted on 2/12/2021.  Assessment & Plan     Bella Saucedo is a 75 year old female with complex past medical history including end-stage COPD for which she is enrolled in hospice, she is now admitted on 2/12/2021 with small bowel obstruction.      Mechanical Small Bowel Obstruction going for EXP laparotomy and bowel resection on 2/17/21  Pt presented to the ED from her nursing home because she had no stool output or gas in her ostomy bag since at least Wednesday 2/10. Her abdomen is quite distended. CT scan was obtained and showed evidence for mechanical small bowel obstruction with marked dilatation of numerous small bowel loops throughout the abdomen and pelvis extending up to the right upper quadrant in the distal small bowel where there is transition point to completely  decompressed loops likely related to adhesions. No evidence of bowel wall thickening or perforation. The patient is not currently in any discomfort.   - General surgery following -> s/p exploratory laparotomy with extensive lysis of adhesions  - NG tube placed by EMS, continue to low intermittent suction   - Ice chips only PO  - IV Zosyn for now -> WBC down trending  - TPN, placed PICC 02/19/2020  - Maintenance IVF can be stopped  - Pain control PRN    Goals of Care    pt with end stage COPD, enrolled in hospice, who was admitted for a SBO, now no longer in hospice.  palliative care consulted and met with daughter   End-Stage COPD   Acute copd exacerbation   Acute on chronic hypoxic/hypercapnic respiratory failure   Pt had recent admission for acute on chronic hypoxemic/hypercapnic respiratory failure 2/2 CAP and COPD exacerbation requiring intubation. According to recent NH visits she has \"severe dyspnea\" at baseline. \"Spends most of her day in " "bed.\" She is on 2-3L of supplemental O2 at baseline.  - Goal is to discharge back to [facility] with [facility] hosp[facility]  - Continue supplemental O2   - Continue PTA Trelegy and Duonebs PRN   - Prednisone 10mg PTA continued  On iv solumedrol 125mcg daily and now weaned to 20 mg daily   PRN IV ativan ordered for anxiety.     Tachycardia  Assessment: EKG shows sinus tachy. likelty 2/2 to pain /anxiety / acute illness, low suspicion for PE. HRs ranging from 80s-110s  Plan:  - Follow for now     Abnormal UA  Chronic Indwelling grant  Pt had pre-existing grant cather (unclear reason). She does have WBCs and few bacteria but no LE. No clinical evidence of infection   - Culture negative       Coronary artery disease, Hx NSTEMI  Hypertension  Heart failure with preserved ejection fraction  - Hold PTA furosemide   - ASA was discontinued      Type 2 diabetes mellitus  - Insulin was discontinued      Hx stabe IIB colon cancer 2016 s/p left hemicolectomy and colostomy  - WOC consulted     Other Noted History per chart review  Anxiety  GERD   Lung nodule  Rt MCA aneurysm s/p coil embolization 2011  PCOM aneurysm rupture s/p coil embolization 2010  Subarachnoid hemorrhage        Diet: NPO for Medical/Clinical Reasons Except for: Ice Chips    DVT Prophylaxis: Pneumatic Compression Devices  Grant Catheter: not present  Code Status: No CPR- Do NOT Intubate            Disposition Plan     Expected discharge: after improvement from surgery   Discussed with bedside RN, patient today     Spent greater than 35 minutes were spent in taking care of her today half of which was spent in reviewing the chart and counseling the patient in collaboration of care    Boom Goodrich MD    Interval History      No acute events overnight  No fevers recorded, no CP  At this time she has no abdominal pain.   O2 needs stable on HFNC, slowly declining O2 needs.  She is more alert today, responds with one word answers  No nausea/vomiting  Pain " stable otherwise, she has no complaints of significant pain.     -Data reviewed today: I reviewed all new labs and imaging results over the last 24 hours. I personally reviewed no images or EKG's today.    Physical Exam   Temp: 98.5  F (36.9  C) Temp src: Axillary BP: (!) 159/83 Pulse: 115   Resp: 28 SpO2: 93 % O2 Device: High Flow Nasal Cannula (HFNC) Oxygen Delivery: 30 LPM  Vitals:    02/12/21 2014 02/21/21 0500 02/22/21 0600   Weight: 62.5 kg (137 lb 11.2 oz) 65.2 kg (143 lb 11.8 oz) 62.5 kg (137 lb 12.6 oz)     Vital Signs with Ranges  Temp:  [97.5  F (36.4  C)-98.5  F (36.9  C)] 98.5  F (36.9  C)  Pulse:  [] 115  Resp:  [8-30] 28  BP: (133-162)/() 159/83  FiO2 (%):  [35 %-40 %] 40 %  SpO2:  [87 %-99 %] 93 %  I/O last 3 completed shifts:  In: 1040   Out: 4150 [Urine:4050; Emesis/NG output:100]    Constitutional: Awake, alert, cooperative, pursed lip breathing noted   Respiratory: Pursed lip breathing which is chronic for patient.  Diminished breath sounds with occasional wheezing  Cardiovascular: Regular rate and rhythm, normal S1 and S2, and no murmur noted  GI: Bowel sounds diminished , nontender, moderate  distention, ostomy with no  output since yesterday   Skin/Integumen: No rashes, no cyanosis, no edema  Other:     Medications     parenteral nutrition - Clinimix E 65 mL/hr at 02/21/21 2115       fluticasone-vilanterol  1 puff Inhalation Daily    And     umeclidinium  1 puff Inhalation Daily     guaiFENesin  1,200 mg Oral BID     insulin aspart  1-12 Units Subcutaneous Q4H     lipids  250 mL Intravenous Once per day on Mon Tue Wed Thu Fri     methylPREDNISolone  40 mg Intravenous Q12H     pantoprazole (PROTONIX) IV  40 mg Intravenous Daily with breakfast     piperacillin-tazobactam  3.375 g Intravenous Q6H     polyethylene glycol  17 g Oral Daily     senna-docusate  2 tablet Oral BID       Data   Recent Labs   Lab 02/22/21  0445 02/22/21  0115 02/21/21  0515 02/20/21  0630 02/19/21  0650   WBC   --   --  11.7* 19.9* 20.5*   HGB  --   --  10.9* 10.9* 11.4*   MCV  --   --  98 97 97   PLT  --   --  220 219 221     --  144 139 142   POTASSIUM 4.0 3.9 3.3* 3.4 3.9   CHLORIDE 101  --  102 99 103   CO2 38*  --  42* 42* 38*   BUN 18  --  17 14 18   CR 0.41*  --  0.41* 0.50* 0.55   ANIONGAP 1*  --  <1* <1* 1*   MARILU 8.8  --  8.4* 8.7 8.5   *  --  261* 258* 187*   ALBUMIN 2.6*  --  2.3* 2.5*  --    PROTTOTAL 5.7*  --  4.8* 5.3*  --    BILITOTAL 0.7  --  0.8 0.5  --    ALKPHOS 67  --  51 53  --    ALT 26  --  20 28  --    AST 13  --  8 11  --        No results found for this or any previous visit (from the past 24 hour(s)).

## 2021-02-22 NOTE — PROGRESS NOTES
"SP[facility]ITUAL [facility] Progress Note  [facility]    Visited due to \"yes\" in chart for SH visit. I re-introduced myself to pt and she opened her eyes and nodded yes to my visit.    I held her hand as she reached out for mine and offered prayer after she also nodded yes to that question. She then called out \"I need to get to [place]!\". I told her she was currently in the hospital she shook her head no. I offered more words of comfort and of our care for her.    SHS remains available.      Kathy Brown  Chaplain Resident    "

## 2021-02-23 NOTE — PROVIDER NOTIFICATION
Paged Dr. Avila regarding advancement of NGT. Wondering if she would like repeat of abdominal xray.     Order received to advance NGT and confirm placement through aspiration.     Advanced NGT 4cm, currently at 64cm. Could not progress NGT further due to patient refusal for further advancement. Aspiration of gastric fluid successful. NGT unclamped, suction to LIS.

## 2021-02-23 NOTE — PROGRESS NOTES
[facility]    Hospitalist Progress Note    Date of Service (when I saw the patient): 02/23/2021    Assessment & Plan      Bella Saucedo is a 75 year old female who was admitted on 2/12/2021.     Bella Saucedo is a 75 year old female with complex past medical history including end-stage COPD for which she is enrolled in hospice, she is now admitted on 2/12/2021 with small bowel obstruction.      Mechanical Small Bowel Obstruction s/p  EXP laparotomy with extensive lysis of adhesions on 2/17/21  Acute blood loss anemia, expected postop  Pt presented to the ED from her nursing home because she had no stool output or gas in her ostomy bag since at least Wednesday 2/10. Her abdomen is quite distended. CT scan was obtained and showed evidence for mechanical small bowel obstruction with marked dilatation of numerous small bowel loops throughout the abdomen and pelvis extending up to the right upper quadrant in the distal small bowel where there is transition point to completely  decompressed loops likely related to adhesions. No evidence of bowel wall thickening or perforation. The patient is not currently in any discomfort.   - General surgery following -> s/p exploratory laparotomy with extensive lysis of adhesions  - NG tube placed by EMS, continue to low intermittent suction   - Ice chips only PO  - IV Zosyn for now -> WBC down trending.  Completed IV Zosyn course on 2/23/2021.  - TPN, placed PICC 02/19/2020  - Maintenance IVF stopped  - Pain control PRN [IV Dilaudid, IV Ativan available as needed for pain and anxiety]  -Hb postop was 10.9.  Continue to monitor.    Acute metabolic encephalopathy,?  Hospital delirium  Patient is anxious and confused at times.  Suspect secondary to prolonged hospitalization, surgery, narcotics and anesthesia.  Unclear what her baseline was.  Do see mention of confusion during prior hospitalization in the chart.  -Minimize narcotics as able.  -Frequent  "reorientation.  -Delirium precautions.    Goals of Care    pt with end stage COPD, enrolled in hospice, who was admitted for a SBO, now no longer in hospice.  palliative care consulted and met with daughter   End-Stage COPD   Acute copd exacerbation   Acute on chronic hypoxic/hypercapnic respiratory failure   Pt had recent admission for acute on chronic hypoxemic/hypercapnic respiratory failure 2/2 CAP and COPD exacerbation requiring intubation. According to recent NH visits she has \"severe dyspnea\" at baseline. \"Spends most of her day in bed.\" She is on 2-3L of supplemental O2 at baseline.  - Goal is to discharge back to [facility] with [facility] hospice  - Continue supplemental O2   - Continue PTA Trelegy and Duonebs PRN   - Prednisone 10mg PTA continued  On iv solumedrol 125mcg daily and now weaned to 20 mg daily   PRN IV ativan ordered for anxiety.  -Currently on 35 L high flow nasal cannula oxygen, 40% FiO2.  Titrate as able.    Tachycardia  Assessment: EKG shows sinus tachy. likelty 2/2 to pain /anxiety / acute illness, low suspicion for PE. HRs ranging from 80s-110s  Plan:  - Follow for now     Abnormal UA  Chronic Indwelling grant  Pt had pre-existing grant cather (unclear reason). She does have WBCs and few bacteria but no LE. No clinical evidence of infection   - Culture negative       Coronary artery disease, Hx NSTEMI  Hypertension  Heart failure with preserved ejection fraction  -Does not appear to have been on Lasix PTA.  - ASA was discontinued   -Check BNP.No recent ECHO on file.     Type 2 diabetes mellitus, last A1c 6.3 on 2/12/2021.  -Currently on HD SSI every 4 hours while on TPN.     Hx stabe IIB colon cancer 2016 s/p left hemicolectomy and colostomy  - WOC consulted     Other Noted History per chart review  Anxiety  GERD   Lung nodule  Rt MCA aneurysm s/p coil embolization 2011  PCOM aneurysm rupture s/p coil embolization 2010  Subarachnoid hemorrhage        Diet: NPO for " Medical/Clinical Reasons Except for: Ice Chips    DVT Prophylaxis: Pneumatic Compression Devices  Cornejo Catheter: not present  Code Status: No CPR- Do NOT Intubate            Disposition Plan     Expected discharge:  Pending return of bowel function, when cleared from surgical standpoint.  Discussed with bedside RN, patient today     I attempted to call her daughter, Samia several times today.  Was unable to reach her.  I have left a voicemail and I will talk with her tomorrow.  Would like to further clarify goals of care.    Peyton Avila MD    Interval History      No acute events overnight  She is alert only to self.  Denies any pain.  Apparently keeps frequently asking for ice chips and calling out to the nurses.  No meaningful ROS possible given her confusion.    -Data reviewed today: I reviewed all new labs and imaging results over the last 24 hours. I personally reviewed no images or EKG's today.    Physical Exam   Temp: 98.8  F (37.1  C) Temp src: Oral BP: 139/66 Pulse: 103   Resp: 10 SpO2: 96 % O2 Device: High Flow Nasal Cannula (HFNC) Oxygen Delivery: 35 LPM  Vitals:    02/21/21 0500 02/22/21 0600 02/23/21 0554   Weight: 65.2 kg (143 lb 11.8 oz) 62.5 kg (137 lb 12.6 oz) 62.9 kg (138 lb 10.7 oz)     Vital Signs with Ranges  Temp:  [97.4  F (36.3  C)-98.8  F (37.1  C)] 98.8  F (37.1  C)  Pulse:  [] 103  Resp:  [10-28] 10  BP: (113-171)/() 139/66  FiO2 (%):  [40 %] 40 %  SpO2:  [93 %-96 %] 96 %  I/O last 3 completed shifts:  In: 1715.6   Out: 2425 [Urine:2150; Emesis/NG output:275]    Constitutional: Awake, alert, cooperative, pursed lip breathing noted   Respiratory: Nonlabored breathing.  Diminished breath sounds with occasional wheezing  Cardiovascular: Regular rate and rhythm, irregular at times, normal S1 and S2, and no murmur noted  GI: Bowel sounds diminished , nontender, moderate  distention, ostomy with no  output noted  Skin/Integumen: No rashes, no cyanosis, no significant  edema  Neuro: Alert, oriented to self only, moving all extremities, no facial asymmetry    Medications     parenteral nutrition - Clinimix E 65 mL/hr at 02/22/21 2200       sennosides  10 mL Oral BID    And     docusate  100 mg Oral BID     fluticasone-vilanterol  1 puff Inhalation Daily    And     umeclidinium  1 puff Inhalation Daily     guaiFENesin  1,200 mg Oral BID     insulin aspart  1-12 Units Subcutaneous Q4H     lipids  250 mL Intravenous Once per day on Mon Tue Wed Thu Fri     methylPREDNISolone  20 mg Intravenous Daily     pantoprazole (PROTONIX) IV  40 mg Intravenous Daily with breakfast     polyethylene glycol  17 g Oral Daily     sodium chloride (PF)  10 mL Intracatheter Q7 Days       Data   Recent Labs   Lab 02/22/21  0445 02/22/21  0115 02/21/21  0515 02/20/21  0630 02/19/21  0650   WBC  --   --  11.7* 19.9* 20.5*   HGB  --   --  10.9* 10.9* 11.4*   MCV  --   --  98 97 97   PLT  --   --  220 219 221     --  144 139 142   POTASSIUM 4.0 3.9 3.3* 3.4 3.9   CHLORIDE 101  --  102 99 103   CO2 38*  --  42* 42* 38*   BUN 18  --  17 14 18   CR 0.41*  --  0.41* 0.50* 0.55   ANIONGAP 1*  --  <1* <1* 1*   MARILU 8.8  --  8.4* 8.7 8.5   *  --  261* 258* 187*   ALBUMIN 2.6*  --  2.3* 2.5*  --    PROTTOTAL 5.7*  --  4.8* 5.3*  --    BILITOTAL 0.7  --  0.8 0.5  --    ALKPHOS 67  --  51 53  --    ALT 26  --  20 28  --    AST 13  --  8 11  --        No results found for this or any previous visit (from the past 24 hour(s)).

## 2021-02-23 NOTE — PROGRESS NOTES
[facility]    General Surgery  Daily Progress Note       Assessment and Plan:   Bella Saucedo is a 75 year old female with end-stage COPD admitted for small bowel obstruction, now 6 days s/p exploratory laparotomy with extensive lysis of adhesions     PLAN:  - Still awaiting reliable return of bowel function. May need to consider gastrografin through NG tube if no reliable ROBF in the next couple days. Will discuss with Dr. Fall.  - NG output continues to be low and abdomen nondistended this morning. Continue NG to LIS, recording output. Will clamp once gas or stool in appliance. Prolonged ileus not unexpected due to extensive FABIO.  - Continue TPN. Albumin improving.  - IV analgesia, IV antinausea meds prn, and IV protonix.  - White count improving. Can discontinue IV zosyn from a surgical standpoint (completed 5 days).  - DVT prophylaxis per primary team.         Interval History:   Bella Saucedo is seen this morning on surgical rounds. She complained of abdominal pain of 7 last night and has been calling out for ice chips. She calls out while I'm in the room wanting ice chips and complaining of her breathing. She is on 35 LPM of high flow oxygen. Bella continues to be tachycardic, no fevers.          Physical Exam:   Temp: 98.8  F (37.1  C) Temp src: Oral BP: 139/66 Pulse: 103   Resp: 10 SpO2: 96 % O2 Device: High Flow Nasal Cannula (HFNC) Oxygen Delivery: 35 LPM    I/O last 3 completed shifts:  In: 1715.6   Out: 2425 [Urine:2150; Emesis/NG output:275]    N ml/24 hours dark bilious     GENERAL: VS reviewed, alert and answers questions  LUNGS: On high flow oxygen and increased respiratory effort, short answers due to this  ABDOMEN: nondistended, appropriately tender throughout, pink stoma, no gas or stool in ostomy appliance  INCISION: Incision CDI with staples in place, no surrounding erythema or induration.  EXTREMITIES: Moving all extremities  NEUROLOGICAL: Grossly  non-focal, mood & affect appropriate    Data   Recent Labs   Lab 02/22/21  0445 02/22/21  0115 02/21/21  0515 02/20/21  0630 02/19/21  0650   WBC  --   --  11.7* 19.9* 20.5*   HGB  --   --  10.9* 10.9* 11.4*   MCV  --   --  98 97 97   PLT  --   --  220 219 221     --  144 139 142   POTASSIUM 4.0 3.9 3.3* 3.4 3.9   CHLORIDE 101  --  102 99 103   CO2 38*  --  42* 42* 38*   BUN 18  --  17 14 18   CR 0.41*  --  0.41* 0.50* 0.55   ANIONGAP 1*  --  <1* <1* 1*   MARILU 8.8  --  8.4* 8.7 8.5   *  --  261* 258* 187*   ALBUMIN 2.6*  --  2.3* 2.5*  --    PROTTOTAL 5.7*  --  4.8* 5.3*  --    BILITOTAL 0.7  --  0.8 0.5  --    ALKPHOS 67  --  51 53  --    ALT 26  --  20 28  --    AST 13  --  8 11  --        Marina Mcfarlane PA-C

## 2021-02-23 NOTE — PLAN OF CARE
"Alert to self and place. Answers questions appropriately. C/O abd pain rating of \"7\" and given IVP Dilaudid x2 and Ativan x2 for anxiety with good effect. Calls out intermittently for help usually wanting ice chips. Right arm with improved edema and less weeping of serous fluid. Tele=SR-ST at times. Repositioned q2h side to side. 02 remains at 40% 35 L HFNC, adjusted per RT. BP improved, no need for prn meds.  "

## 2021-02-23 NOTE — PROGRESS NOTES
Palliative Care Social Work Note -- Brief    Visisted with Bella and spoke with bedside RN, Dewayne.  Bella seemed more lethargic today, this has been observed by RN as well.  SHe seemed to wake up briefly when I spoke to her and shook her head when I asked how she was feeling.      Dewayne shared that Dr. Avila is trying to reach family to continue goals of care conversations.  See in Dr. Avila's note that perhaps a conversation will happen tomorrow as it has been difficult to reach daughter today.    Palliative available to help with support to family as needed.     ABI Downey, [facility]   Palliative Care Clinical   Ph: [phone]

## 2021-02-23 NOTE — PROGRESS NOTES
Afebrile, pulse OK. Breathing fairly well. Wound OK. Just a little gas in her stoma bag.  NG output minimal. NG clamped right now.   Will give a little gastrograffin per NG. The more she sits up the better.

## 2021-02-23 NOTE — PROGRESS NOTES
"[facility]  WO Nurse Inpatient Ostomy & Wound Assessment     Follow up Colostomy and bilateral heels    Surgery date:  2/17/21  Surgeon:   Federico Fall MD - Primary and  Marina Mcfarlane PA-C - Assisting  Procedure:  Exploratory laparotomy with extensive lysis of adhesions  Related diagnosis: Small bowel obstruction      Ostomy surgery date:  3-9-16  Surgeon:  Dr. Del Caldwell ([facility])  Procedure:  Sigmoidectomy with diverting colostomy  Related diagnosis:  Sigmoid rupture, colon CA      WOC Assessment & Physical Exam:      Current status: POD 6 prolonged ileus. NG to suction. Agitated confused calling out \"help, I'm hot\"    Date of last photo: 2-18-21        Stoma size:  1 1/4\"; stoma was not touched in surgery 2/17    Stoma appearance: pink, viable    Peristomal complication(s): intact    Abdominal assessment: distended and firm, incision approximated with staples    NG still in place? Yes     Output: No stool, Minimal flatus (post op day 6 prolonged ileus)    Pain: flinches with pouch change      Current pouching system: [place] 2pc 57mm flat barrier with ring and lock n roll pouch    Pouch last changed:  2/23/21    Reason for pouch change today: Routine      Learning and comprehension: Facility that Pt resides at completes ostomy cares & pouch changes.       Bilateral heels heel:  pink  blanchable and intact. No c/o pain with palpation.  Present on admission.  Wearing Prevalon boots.     Coccyx/Sacrum intact, protected with multilayer silicone foam      WOC Plan:         Pouching product plan: [place] 2pc; 57mm flat barrier (# [id number]) and a clear lock n roll pouch (#[id number]) while in hospital.  Pt can resume her 44mm flat barrier with closed pouches on discharge.       Frequency of pouch changes: 2-3x week and prn      Pt support system on discharge: Palliative care at facility      Skin care: follow PIP measures    Pressure Injury Prevention (PIP) Plan:  -If " patient is declining pressure injury prevention interventions: Explore reason why and address patient's concerns  -Mattress: Follow bed algorithm, reassess daily and order specialty mattress, if indicated.  -HOB: Maintain at or below 30 degrees, unless contraindicated  -Repositioning in bed: Every 1-2 hours , Left/right positioning; avoid supine and Raise foot of bed prior to raising head of bed, to reduce patient sliding down (shear)  -Heels: Keep elevated off mattress, Pillows under calves and Heel lift boots  -Protective Dressing: Sacral Mepilex for prevention (#[id number]),  especially for the agitated patient   -Chair positioning: pillow or Chair cushion (#[id number])  and Assist patient to reposition hourly   -Moisture Management: Perineal cleansing /protection: Follow Incontinence Protocol  -Under Devices: Inspect skin under all medical devices during skin inspection , Ensure tubes are stabilized without tension and Ensure patient is not lying on medical devices or equipment when repositioned      WOC follow-up plan: 1-2x week and prn      Objective Data:     Data  Allergies: Nitroglycerin   Recent Labs   Lab Test 02/22/21  0445 02/21/21  0515 02/12/21  0944 02/12/21  0944 02/11/21  1737   ALBUMIN 2.6* 2.3*   < >  --  3.6   HGB  --  10.9*   < >  --  13.8   INR  --   --   --   --  1.14   WBC  --  11.7*   < >  --  11.6*   A1C  --   --   --  6.3*  --    CRP  --   --   --   --  10.8*    < > = values in this interval not displayed.     Recent Labs   Lab 02/23/21  1204 02/23/21  0820 02/23/21  0430 02/23/21  0405 02/22/21  2356 02/22/21  1952 02/22/21  0445 02/22/21  0445 02/21/21  0515 02/21/21  0515 02/20/21  0630 02/20/21  0630 02/19/21  0650 02/19/21  0650 02/18/21  0417 02/18/21  0417 02/17/21  1722 02/17/21  1722   GLC  --   --   --   --   --   --   --  293*  --  261*  --  258*  --  187*  --  230*  --  245*   * 153* 178* 161* 215* 264*   < >  --    < >  --    < >  --    < >  --    < >  --    < >  --     < > =  values in this interval not displayed.     Temperature: Temp (24hrs), Av.2  F (36.8  C), Min:97.4  F (36.3  C), Max:98.8  F (37.1  C)      Output by Drain (mL) 21 0700 - 21 1459 21 1500 - 21 2259 21 2300 - 21 0659 21 0700 - 21 1459 21 1500 - 21 2259 21 2300 - 21 0659 21 0700 - 21 1459 21 1500 - 21 1525   Requested LDAs do not have output data documented.       Intake/Output Summary (Last 24 hours) at 2021 1525  Last data filed at 2021 1450  Gross per 24 hour   Intake 1515.6 ml   Output 1875 ml   Net -359.4 ml     Orders Placed This Encounter      NPO for Medical/Clinical Reasons Except for: Meds, Ice Chips    Body mass index is 24.56 kg/m .  Containment of urine/stool: Incontinence Protocol   Medical Devices:Cornejo Catheter: in place, indication: Retention, Retention(chronic at baseline), Retention    Catheter securement Yes    Pressure Injury Risk Assessment (Ayden Scale):  Sensory Perception: 2--> limited    Moisture: 4-->rarely moist   Activity: 1-->bedfast     Mobility: 2-->very limited   Nutrition: 2-->probably inadequate   Friction and Shear: 2-->potential problem  Ayden Score: 13    Current support surface: Standard  Atmos Air mattress  Current off-loading measures bed: repositioning with use of Pillows  Current off-loading measures chair: bedrest, chair cushion ordered   Current off-loading heels: floating off bed with use of Heel off-loading boot(s)    WOC Interventions:       Patient's chart evaluated    Focus of today's visit: assessment of skin, stoma, pouching system    Pouch Changed today 21    Skin assessed, pt repositioned, heels floated with offloading boots    Orders reviewed    Supplies: at bedside    Discussed plan of care with: Nurses    Radha Escoto MS RN CWOCN

## 2021-02-24 NOTE — PLAN OF CARE
AVSS on RA. Pain and anxiety controlled with IV ativan and dilaudid. Midline abd incision WDL with staples. LS clear and equal. Diet NPO; oral meds admin through g tube. NG to LIS with unchanged landmarks; very little output. TPN continued overnight with lipids. Up with assist of 1. BS hypo active; some gas but  no solid or liquid output from ostomy.

## 2021-02-24 NOTE — PROGRESS NOTES
[facility]    General Surgery  Daily Progress Note       Assessment and Plan:   Bella Saucdeo is a 75 year old female with end-stage COPD admitted for small bowel obstruction, now 7 days s/p exploratory laparotomy with extensive lysis of adhesions. Prolonged ileus not unexpected due to extensive FABIO.     PLAN:  - 20 mL of gastrografin given through NG tube yesterday and gas in appliance this morning. Low NG tube output and placement verified yesterday. Senokot and colace given through NG this morning. Can keep clamped. If no stool later today, could do another dose of gastrografin for therapeutic purposes.   - Okay for oral medications through NG tube.  - Continue TPN. Albumin improving.  - Up in chair encouraged this morning.  - IV analgesia, IV antinausea meds prn, and IV protonix.  - White count up a bit this morning, will continue to monitor as well as vitals. Completed 6 days of Zosyn, discontinued yesterday.  - DVT prophylaxis per primary team.         Interval History:   Bella Saucedo is seen this morning on surgical rounds. She is sleeping on and off through exam and questions. Per nursing staff she was agreeable to move over to chair this morning but wanted to rest first. NG tube placement was verified yesterday and placed back to suction, still minimal output. She continues to be tachycardic, no fevers. White cound up to 14.5, last checked .         Physical Exam:   Temp: 97.8  F (36.6  C) Temp src: Axillary BP: 96/61 Pulse: 101   Resp: 10 SpO2: 95 % O2 Device: High Flow Nasal Cannula (HFNC) Oxygen Delivery: 35 LPM    I/O last 3 completed shifts:  In: 410 [P.O.:20; NG/GT:120]  Out: 1350 [Urine:1325; Emesis/NG output:25]    N ml/24 hours dark bilious     GENERAL: VS reviewed, sleeping most of exam  LUNGS: On high flow oxygen and increased respiratory effort, short answers due to this  ABDOMEN: nondistended, appropriately tender throughout, pink stoma- digitalized this  morning, some gas in appliance, no stool  INCISION: Incision CDI with staples in place, no surrounding erythema or induration.  EXTREMITIES: Moving all extremities  NEUROLOGICAL: Grossly non-focal, mood & affect appropriate    Data   Recent Labs   Lab 02/24/21  0518 02/22/21  0445 02/22/21  0115 02/21/21  0515 02/20/21  0630   WBC 14.5*  --   --  11.7* 19.9*   HGB 11.3*  --   --  10.9* 10.9*   MCV 97  --   --  98 97     --   --  220 219    140  --  144 139   POTASSIUM 3.8 4.0 3.9 3.3* 3.4   CHLORIDE 104 101  --  102 99   CO2 38* 38*  --  42* 42*   BUN 29 18  --  17 14   CR 0.51* 0.41*  --  0.41* 0.50*   ANIONGAP <1* 1*  --  <1* <1*   MARILU 8.3* 8.8  --  8.4* 8.7   * 293*  --  261* 258*   ALBUMIN  --  2.6*  --  2.3* 2.5*   PROTTOTAL  --  5.7*  --  4.8* 5.3*   BILITOTAL  --  0.7  --  0.8 0.5   ALKPHOS  --  67  --  51 53   ALT  --  26  --  20 28   AST  --  13  --  8 11       Marina Mcfarlane PA-C

## 2021-02-24 NOTE — PLAN OF CARE
A/o x4. AVSS on high flow. Pain managed w/ dilaudid and ice on abdomen. Anxiety managed w/ ativan. Cornejo intact, adequate output. NPO ex ice chips. TPN @ 65. NGT to LIS, no output, re-advanced earlier today. Turn/repo.

## 2021-02-24 NOTE — PROGRESS NOTES
Afebrile, pulse OK. Breathing OK. Some gas in stoma bag.   NG output nil over several days. Will remove NG. This should help breathing.  Plan to sit in chair.  WBC up slightly. Hgb the same.

## 2021-02-24 NOTE — PROGRESS NOTES
[facility]    Hospitalist Progress Note    Date of Service (when I saw the patient): 02/24/2021    Assessment & Plan      Bella Saucedo is a 75 year old female who was admitted on 2/12/2021.     Bella Saucedo is a 75 year old female with complex past medical history including end-stage COPD for which she is enrolled in hospice, she is now admitted on 2/12/2021 with small bowel obstruction.      Mechanical Small Bowel Obstruction s/p  EXP laparotomy with extensive lysis of adhesions on 2/17/21  Acute blood loss anemia, expected postop  Pt presented to the ED from her nursing home because she had no stool output or gas in her ostomy bag since at least Wednesday 2/10. Her abdomen is quite distended. CT scan was obtained and showed evidence for mechanical small bowel obstruction with marked dilatation of numerous small bowel loops throughout the abdomen and pelvis extending up to the right upper quadrant in the distal small bowel where there is transition point to completely  decompressed loops likely related to adhesions. No evidence of bowel wall thickening or perforation. The patient is not currently in any discomfort.   Patient was unenrolled from hospice and admitted and underwent surgery.  - General surgery following -> s/p exploratory laparotomy with extensive lysis of adhesions  - NG tube placed by EMS, continue to low intermittent suction.  NG tube discontinued on 2/24 as having minimal output over last few days.  - Ice chips only PO  - IV Zosyn for now -> WBC down trending.  Completed IV Zosyn course on 2/23/2021.  WBC slightly up on 2/24.  Continue to monitor.  - TPN, placed PICC 02/19/2020  - Maintenance IVF stopped  - Pain control PRN [IV Dilaudid, IV Ativan available as needed for pain and anxiety].  Discussed with family.  They want Dilaudid dose decreased as this might be causing excess sedation for patient.  -Hb postop was 10.9.  Continue to monitor.    Acute metabolic  "encephalopathy,?  Hospital delirium  Patient is anxious and confused at times.  Suspect secondary to prolonged hospitalization, surgery, narcotics and anesthesia.  Unclear what her baseline was.  Do see mention of confusion during prior hospitalization in the chart.  -Minimize narcotics as able.  Decrease dose of Dilaudid to 0.2 mg as needed.  -Frequent reorientation.  -Delirium precautions.    Goals of Care    pt with end stage COPD, enrolled in hospice, who was admitted for a SBO, now no longer in hospice.  palliative care consulted and met with daughter   End-Stage COPD   Acute copd exacerbation   Acute on chronic hypoxic/hypercapnic respiratory failure   Pt had recent admission for acute on chronic hypoxemic/hypercapnic respiratory failure 2/2 CAP and COPD exacerbation requiring intubation. According to recent NH visits she has \"severe dyspnea\" at baseline. \"Spends most of her day in bed.\" She is on 2-3L of supplemental O2 at baseline.  - Goal is to discharge back to [facility].  Will need to clarify this with family.  Patient's daughter who has been primary decision maker so far is currently hospitalized and undergoing surgery herself.  Hence did discuss with patient's granddaughter, Eugenia regarding goals of care.  At this time main issue seems to be high oxygen requirements, lethargy and decreased mentation.  If goal is to discharge back with hospice, then would likely not be concerned about the high oxygen requirements and will place on oxygen for comfort only.  However this is yet to be clarified.  - Continue PTA Trelegy and Duonebs PRN   - Prednisone 10mg PTA continued  On iv solumedrol 125mcg daily and now weaned to 20 mg daily   PRN IV ativan ordered for anxiety.  -Currently on 35 L high flow nasal cannula oxygen, 40% FiO2.  Titrate as able.    Tachycardia  Assessment: EKG shows sinus tachy. likelty 2/2 to pain /anxiety / acute illness, low suspicion for PE. HRs " ranging from 80s-110s  Plan:  - Follow for now     Abnormal UA  Chronic Indwelling grant  Pt had pre-existing grant cather (unclear reason). She does have WBCs and few bacteria but no LE. No clinical evidence of infection   - Culture negative       Coronary artery disease, Hx NSTEMI  Hypertension  Heart failure with preserved ejection fraction  -Does not appear to have been on Lasix PTA.  - ASA was discontinued   -Check BNP [464 on 2/24].No recent ECHO on file.     Type 2 diabetes mellitus, last A1c 6.3 on 2/12/2021.  -Currently on HD SSI every 4 hours while on TPN.     Hx stabe IIB colon cancer 2016 s/p left hemicolectomy and colostomy  - WOC consulted     Other Noted History per chart review  Anxiety  GERD   Lung nodule  Rt MCA aneurysm s/p coil embolization 2011  PCOM aneurysm rupture s/p coil embolization 2010  Subarachnoid hemorrhage        Diet: NPO for Medical/Clinical Reasons Except for: Ice Chips    DVT Prophylaxis: Pneumatic Compression Devices  Grant Catheter: not present  Code Status: No CPR- Do NOT Intubate            Disposition Plan     Expected discharge:  Pending return of bowel function, when cleared from surgical standpoint as well as oxygen requirements coming down.  Needs to be off high flow nasal cannula oxygen..  Discussed with bedside RN, patient today     Daughter Samia is currently admitted in the hospital herself undergoing surgery.  Granddaughter Eugenia is currently the decision maker.  Did discuss overall poor prognosis given patient's severe COPD at baseline and high oxygen requirements following the surgery.  Need to clarify further if plan is to reenroll with hospice at discharge versus continue with restorative cares.  Granddaughter stated that she wanted Dilaudid dose to be decreased and hoping that patient can be more alert and able to take in orally.    Peyton Avila MD    Interval History      No acute events overnight  Patient was quite lethargic this morning.  When she  briefly aroused, she moaned in response to any of my questions.  Appeared uncomfortable.  No meaningful review of systems possible given patient's mental status.  Apparently had some gas noted in the ostomy bag.  Also noted passage of Gastrografin dye into the bag.  NG tube removed this morning.    -Data reviewed today: I reviewed all new labs and imaging results over the last 24 hours. I personally reviewed no images or EKG's today.    Physical Exam   Temp: 97.8  F (36.6  C) Temp src: Axillary BP: 128/67 Pulse: 93   Resp: 13 SpO2: 96 % O2 Device: High Flow Nasal Cannula (HFNC) Oxygen Delivery: 35 LPM  Vitals:    02/21/21 0500 02/22/21 0600 02/23/21 0554   Weight: 65.2 kg (143 lb 11.8 oz) 62.5 kg (137 lb 12.6 oz) 62.9 kg (138 lb 10.7 oz)     Vital Signs with Ranges  Temp:  [97.8  F (36.6  C)-98.5  F (36.9  C)] 97.8  F (36.6  C)  Pulse:  [] 93  Resp:  [9-20] 13  BP: ()/(51-86) 128/67  FiO2 (%):  [40 %] 40 %  SpO2:  [93 %-97 %] 96 %  I/O last 3 completed shifts:  In: 350 [P.O.:20; NG/GT:60]  Out: 1350 [Urine:1325; Emesis/NG output:25]    Constitutional: Lethargic, somnolent, appears uncomfortable when awakened  Respiratory: Nonlabored breathing.  Diminished breath sounds with occasional wheezing  Cardiovascular: Regular rate and rhythm, irregular at times, normal S1 and S2, and no murmur noted  GI: Bowel sounds diminished , nontender, moderate  distention, ostomy with no  output noted  Skin/Integumen: No rashes, no cyanosis, significant 2+ pitting edema of bilateral upper extremities noted.  1+ bilateral lower extremity edema noted.  Neuro: Lethargic and somnolent, unable to assess    Medications     parenteral nutrition - Clinimix E 65 mL/hr at 02/24/21 0225       sennosides  10 mL Oral BID    And     docusate  100 mg Oral BID     fluticasone-vilanterol  1 puff Inhalation Daily    And     umeclidinium  1 puff Inhalation Daily     guaiFENesin  1,200 mg Oral BID     insulin aspart  1-12 Units Subcutaneous  Q4H     lipids  250 mL Intravenous Once per day on Mon Tue Wed Thu Fri     methylPREDNISolone  20 mg Intravenous Daily     pantoprazole (PROTONIX) IV  40 mg Intravenous Daily with breakfast     polyethylene glycol  17 g Oral Daily     sodium chloride (PF)  10 mL Intracatheter Q7 Days       Data   Recent Labs   Lab 02/24/21  0518 02/22/21  0445 02/22/21  0115 02/21/21  0515 02/20/21  0630   WBC 14.5*  --   --  11.7* 19.9*   HGB 11.3*  --   --  10.9* 10.9*   MCV 97  --   --  98 97     --   --  220 219    140  --  144 139   POTASSIUM 3.8 4.0 3.9 3.3* 3.4   CHLORIDE 104 101  --  102 99   CO2 38* 38*  --  42* 42*   BUN 29 18  --  17 14   CR 0.51* 0.41*  --  0.41* 0.50*   ANIONGAP <1* 1*  --  <1* <1*   MARILU 8.3* 8.8  --  8.4* 8.7   * 293*  --  261* 258*   ALBUMIN  --  2.6*  --  2.3* 2.5*   PROTTOTAL  --  5.7*  --  4.8* 5.3*   BILITOTAL  --  0.7  --  0.8 0.5   ALKPHOS  --  67  --  51 53   ALT  --  26  --  20 28   AST  --  13  --  8 11       No results found for this or any previous visit (from the past 24 hour(s)).

## 2021-02-25 NOTE — PLAN OF CARE
Pt disoriented to place, time, situation. Alert to self. AVSS on NC. Pain managed w/ dilaudid and ice on abdomen. Anxiety managed w/ ativan. Cornejo intact, adequate output. NPO ex ice chips. TPN @ 65. NGT pulled this AM. Turn/repo. Up in Chair today for approx, 4-5 hours, Tolerated well.

## 2021-02-25 NOTE — PROVIDER NOTIFICATION
Paged Dr. Avila regarding further need for IMC orders. Awaiting recommendations.     Orders Received: MD discontinued IMC and tele orders.

## 2021-02-25 NOTE — PROGRESS NOTES
[facility]    Hospitalist Progress Note    Date of Service (when I saw the patient): 02/25/2021    Assessment & Plan      Bella Saucedo is a 75 year old female who was admitted on 2/12/2021.     Bella Saucedo is a 75 year old female with complex past medical history including end-stage COPD for which she is enrolled in hospice, she is now admitted on 2/12/2021 with small bowel obstruction.      Mechanical Small Bowel Obstruction s/p  EXP laparotomy with extensive lysis of adhesions on 2/17/21  Acute blood loss anemia, expected postop  Pt presented to the ED from her nursing home because she had no stool output or gas in her ostomy bag since at least Wednesday 2/10. Her abdomen is quite distended. CT scan was obtained and showed evidence for mechanical small bowel obstruction with marked dilatation of numerous small bowel loops throughout the abdomen and pelvis extending up to the right upper quadrant in the distal small bowel where there is transition point to completely  decompressed loops likely related to adhesions. No evidence of bowel wall thickening or perforation. The patient is not currently in any discomfort.   Patient was unenrolled from hospice and admitted and underwent surgery.  - General surgery following -> s/p exploratory laparotomy with extensive lysis of adhesions  - NG tube placed by EMS, continue to low intermittent suction.  NG tube discontinued on 2/24 as having minimal output over last few days.  - IV Zosyn for now -> WBC down trending.  Completed IV Zosyn course on 2/23/2021.    - TPN, placed PICC 02/19/2020  - Maintenance IVF stopped  - Pain control PRN [IV Dilaudid, IV Ativan available as needed for pain and anxiety]  -Hb postop stable between 10 and 11.  -Started on clear liquid diet on 2/25 per surgery.  Goal is to slowly advance over next few days.    Acute metabolic encephalopathy,?  Hospital delirium  Patient is anxious and confused at times.  Suspect  "secondary to prolonged hospitalization, surgery, narcotics and anesthesia.  Unclear what her baseline was.  Do see mention of confusion during prior hospitalization in the chart.  -Overall goal appears to be comfort.  Expect confusion/sedation while trying to achieve patient comfort in postop setting.  -Frequent reorientation.  -Delirium precautions.    Goals of Care    pt with end stage COPD, enrolled in hospice, who was admitted for a SBO, now no longer in hospice.  palliative care consulted and met with daughter   End-Stage COPD   Acute copd exacerbation   Acute on chronic hypoxic/hypercapnic respiratory failure   Pt had recent admission for acute on chronic hypoxemic/hypercapnic respiratory failure 2/2 CAP and COPD exacerbation requiring intubation. According to recent NH visits she has \"severe dyspnea\" at baseline. \"Spends most of her day in bed.\" She is on 2-3L of supplemental O2 at baseline.  - Goal is to discharge back to [facility] [place] [facility].  Clarified goals with patient's granddaughter, Eugenia.  She states that their main goal is for patient to be able to eat.  She will be reenrolled in hospice at discharge.  - Continue PTA Trelegy and Duonebs PRN   - Prednisone 10mg PTA continued  On iv solumedrol 125mcg daily and now weaned to 20 mg daily   PRN IV ativan ordered for anxiety.  -Currently on Oxymizer 7 to 8 L.  Wean as able.  As she is discharging with hospice over the weekend, oxygen at discharge would be for comfort only.    Tachycardia  Assessment: EKG shows sinus tachy. likelty 2/2 to pain /anxiety / acute illness, low suspicion for PE. HRs ranging from 80s-110s  Plan:  - Follow for now     Abnormal UA  Chronic Indwelling grant  Pt had pre-existing grant cather (unclear reason). She does have WBCs and few bacteria but no LE. No clinical evidence of infection   - Culture negative       Coronary artery disease, Hx NSTEMI  Hypertension  Heart failure with preserved ejection " fraction  -Does not appear to have been on Lasix PTA.  - ASA was discontinued   -Check BNP [464 on 2/24].No recent ECHO on file.     Type 2 diabetes mellitus, last A1c 6.3 on 2/12/2021.  -Currently on HD SSI every 4 hours while on TPN.     Hx stabe IIB colon cancer 2016 s/p left hemicolectomy and colostomy  - WOC consulted     Other Noted History per chart review  Anxiety  GERD   Lung nodule  Rt MCA aneurysm s/p coil embolization 2011  PCOM aneurysm rupture s/p coil embolization 2010  Subarachnoid hemorrhage        Diet:  Clear liquid diet  DVT Prophylaxis: Pneumatic Compression Devices  Cornejo Catheter: not present  Code Status: No CPR- Do NOT Intubate            Disposition Plan     Expected discharge:  Anticipate discharge back to facility with hospice on 2/28 [Sunday].  Discussed with bedside RN, patient today       Peyton Avila MD    Interval History      No acute events overnight  Patient was a bit more alert this morning but still mainly moaning and groaning with response.  Did point to her abdomen when asked if she had pain.  No other meaningful ROS possible given her mentation.    -Data reviewed today: I reviewed all new labs and imaging results over the last 24 hours. I personally reviewed no images or EKG's today.    Physical Exam   Temp: 98  F (36.7  C) Temp src: Axillary BP: 99/44 Pulse: 78   Resp: 14 SpO2: 98 % O2 Device: Nasal cannula Oxygen Delivery: 7 LPM  Vitals:    02/21/21 0500 02/22/21 0600 02/23/21 0554   Weight: 65.2 kg (143 lb 11.8 oz) 62.5 kg (137 lb 12.6 oz) 62.9 kg (138 lb 10.7 oz)     Vital Signs with Ranges  Temp:  [98  F (36.7  C)-98.3  F (36.8  C)] 98  F (36.7  C)  Pulse:  [66-98] 78  Resp:  [8-30] 14  BP: ()/(43-80) 99/44  FiO2 (%):  [40 %-45 %] 45 %  SpO2:  [92 %-100 %] 98 %  I/O last 3 completed shifts:  In: 520   Out: 1620 [Urine:1620]    Constitutional: Somnolent but easily arousable, appears uncomfortable when awakened  Respiratory: Pursed lip breathing, breathing appears  labored but apparently this is her baseline.  Poor air movement bilaterally.  Cardiovascular: Regular rate and rhythm, irregular at times, normal S1 and S2, and no murmur noted  GI: Bowel sounds diminished , nontender, moderate  distention, ostomy with no output noted  Skin/Integumen: No rashes, no cyanosis, significant 2+ pitting edema of bilateral upper extremities noted.  1+ bilateral lower extremity edema noted.  Neuro: somnolent, does arouse with stimulation, unable to assess, no obvious facial asymmetry, disoriented x3    Medications     parenteral nutrition - Clinimix E 65 mL/hr at 02/25/21 0943       sennosides  10 mL Oral BID    And     docusate  100 mg Oral BID     fluticasone-vilanterol  1 puff Inhalation Daily    And     umeclidinium  1 puff Inhalation Daily     guaiFENesin  1,200 mg Oral BID     insulin aspart  1-12 Units Subcutaneous Q4H     lipids  250 mL Intravenous Once per day on Mon Tue Wed Thu Fri     methylPREDNISolone  20 mg Intravenous Daily     pantoprazole (PROTONIX) IV  40 mg Intravenous Daily with breakfast     polyethylene glycol  17 g Oral Daily     sodium chloride (PF)  10 mL Intracatheter Q7 Days       Data   Recent Labs   Lab 02/25/21  0600 02/24/21  0518 02/22/21  0445 02/21/21  0515 02/21/21  0515   WBC 13.8* 14.5*  --   --  11.7*   HGB 10.6* 11.3*  --   --  10.9*   MCV 99 97  --   --  98    258  --   --  220    141 140  --  144   POTASSIUM 3.9 3.8 4.0   < > 3.3*   CHLORIDE 105 104 101  --  102   CO2 39* 38* 38*  --  42*   BUN 30 29 18  --  17   CR 0.41* 0.51* 0.41*  --  0.41*   ANIONGAP <1* <1* 1*  --  <1*   MARILU 8.0* 8.3* 8.8  --  8.4*   * 143* 293*  --  261*   ALBUMIN 2.1*  --  2.6*  --  2.3*   PROTTOTAL 4.6*  --  5.7*  --  4.8*   BILITOTAL 0.3  --  0.7  --  0.8   ALKPHOS 69  --  67  --  51   ALT 79*  --  26  --  20   AST 16  --  13  --  8    < > = values in this interval not displayed.       No results found for this or any previous visit (from the past 24  hour(s)).

## 2021-02-25 NOTE — PROGRESS NOTES
Care Management Follow Up    Length of Stay (days): 13    Expected Discharge Date: 02/28/21     Concerns to be Addressed: discharge planning  Pt to return to [facility] for hospice services at discharge  Patient plan of care discussed at interdisciplinary rounds: Yes    Anticipated Discharge Disposition: Long Term Care, Hospice  Disposition Comments: Plan for pt to discharge back to [facility] on hospice service with [facility].  Anticipated Discharge Services: Other (see comment)(Hospice)  Anticipated Discharge DME: None    Patient/family educated on Medicare website which has current facility and service quality ratings: no  Education Provided on the Discharge Plan:    Patient/Family in Agreement with the Plan: yes    Referrals Placed by CM/KERRI: Internal Clinic Care Coordination, Transportation, Hospice  Private pay costs discussed: Not applicable    Additional Information:  KERRI left a VM for [facility] liaison, to see if facility can accept pt back on Sunday. KERRI placed call to Amanda with [facility] regarding pt's prescriptions. Hospitalist inquired if pt needed a new start for her medications or not since she was te-enrolling into hospice. KERRI received a call back from Amanda requesting pt's current med list be faxed to [phone]. Amanda states the fax needs to be to the attention of Jazmyne. Amanda can be reached at [phone] for further questions with [facility]. KERRI and RNCC went to speak with pt's granddaughter regarding pt's discharge plan. Pt's granddaughter, Eugenia is on board with a discharge on Sunday, but states her mother, Samia has the final say. Eugenia reports her mother is in the ICU here at [facility], but would discharge tomorrow.KERRI faxed pt's med list to Jazmyne.KERRI was also informed by Alethea,  at [facility], reporting that pt's bed hold up up 2/28. KERRI called Alethea at [phone] and left a VM inquiring if the facility does weekend  admissions.     Call received from Yissel reporting the facility feels Sunday may be too soon for pt to discharge. Facility would like to see pt discharge on Monday 3/1/21due to pain management concerns. Facility is ncerned that pt is still on IV meds and not oral. Yissel was also wondering if pt can re-enroll into hospice while on TPN. Yissel reports the facility has given the okay for pt to discharge on Monday, even though bed hold is up on Sunday.  KERRI paged hospitalist with the updates regarding the pain med concerns. KERRI Call from Amanda stating pt can come back to hospice while on TPN. Since the use of TPN is not related to the need for hospice, family would have to privately pay for the TPN.KERRI discussed this with Amanda and then Yissel. Yissel reports she inquired with the facility and a week the cost of TPN is $9,000 a week. If pt comes back on TPN [facility] is asking for a deposit of $10,000. Yissel reports pt is currently private pay at the facility. Also pt would not have to re-enroll into hospice. Pt has until 3/3/21 to be un-enrolled into hospice. Since pt is planned to discharge 3/1/21, pt would not need to re-enroll. KERRI would communicate these updates to pt's granddaughter since pt's daughter is not available. Call placed to pt's daughter, Samia. KERRI updated Samia on the discharge plan. Samia was concerned about the TPN and KERRI explained about the financial responsibility with the cost. Samia understood, but she is very upset with the care pt has received. Samia will think about if she wants pt to return with TPN or not at discharge.     TERRI Leroy

## 2021-02-25 NOTE — PLAN OF CARE
AVSS on oxy cannula. Alert to self; attendant at bedside. Pain controlled with IV dilaudid and anxiety controlled with IV ativan. Incisions MELIA with staples. Ostomy with scant clear liquid output. LS dim throughout. Diet NPO ex ice and meds. Up with assist of 1. BS hypoactive.

## 2021-02-25 NOTE — PROGRESS NOTES
Slow, steady improvement. More alert. Wound OK. Stoma with some gas and liquid output. Up in chair. Labs look good.   Work on a bit PO. Hope to get back to her hospice bed by 2/28

## 2021-02-25 NOTE — PROGRESS NOTES
[facility]    General Surgery  Daily Progress Note       Assessment and Plan:   Bella Saucedo is a 75 year old female with end-stage COPD admitted for small bowel obstruction, now 8 days s/p exploratory laparotomy with extensive lysis of adhesions. Prolonged ileus not unexpected due to extensive FABIO. NG removed yesterday.     PLAN:  - Clear liquid stool and scant stool when digitalizing stoma. Will allow sips of clear liquids, no full trays yet.  - Continue TPN until diet advanced further, can wean once full liquids. Albumin improving.  - Up in chair encouraged this morning and patient agreeable to this.  - Analgesia, antinausea meds prn, and protonix.  - White count up improved from yesterday, will continue to monitor as well as vitals. Completed 6 days of Zosyn, discontinued 2/23.  - DVT prophylaxis per primary team.   - Spoke with Social Workers about patient. She plans to return to hospice and needs to do this on Sunday 2/28 before her bed expires.        Interval History:   Bella Saucedo is seen this morning on surgical rounds. Bella states she is not doing well but states this is her breathing. She denies abdominal pain. She is now on 7 LPM of oxygen this morning. She was able to sit up in chair for 5 hours yesterday. NG was removed. Stoma with clear liquid stool and gas in it this morning. WBC count improved 14.5->13.8 and Hgb stable at 10.6. No fevers, still tachycardic at times.          Physical Exam:   Temp: 98  F (36.7  C) Temp src: Axillary BP: 99/44 Pulse: 78   Resp: 14 SpO2: 98 % O2 Device: Nasal cannula Oxygen Delivery: 7 LPM    I/O last 3 completed shifts:  In: 520   Out: 1620 [Urine:1620]    GENERAL: VS reviewed, no acute distress, answers questions appropriately  LUNGS: Increased respiratory effort, short answers due to this  ABDOMEN: nondistended, appropriately tender throughout, pink stoma- digitalized this morning, gas and clear liquid stool in appliance. Was  able to produce scant soft stool with digitalizing stoma.  INCISION: Incision CDI with staples in place, no surrounding erythema or induration.  EXTREMITIES: Moving all extremities    Data   Recent Labs   Lab 02/25/21  0600 02/24/21  0518 02/22/21  0445 02/21/21  0515 02/21/21 0515   WBC 13.8* 14.5*  --   --  11.7*   HGB 10.6* 11.3*  --   --  10.9*   MCV 99 97  --   --  98    258  --   --  220    141 140  --  144   POTASSIUM 3.9 3.8 4.0   < > 3.3*   CHLORIDE 105 104 101  --  102   CO2 39* 38* 38*  --  42*   BUN 30 29 18  --  17   CR 0.41* 0.51* 0.41*  --  0.41*   ANIONGAP <1* <1* 1*  --  <1*   MARILU 8.0* 8.3* 8.8  --  8.4*   * 143* 293*  --  261*   ALBUMIN 2.1*  --  2.6*  --  2.3*   PROTTOTAL 4.6*  --  5.7*  --  4.8*   BILITOTAL 0.3  --  0.7  --  0.8   ALKPHOS 69  --  67  --  51   ALT 79*  --  26  --  20   AST 16  --  13  --  8    < > = values in this interval not displayed.       Marina Mcfarlane PA-C

## 2021-02-25 NOTE — PLAN OF CARE
Pt alert to self. AVSS on 7LNC oxymizer. BS normoactive, +flatus (ostomy), +BM(scant, ostomy). Cornejo intact, adequate output. Up in chair x1, tolerated well. Started clear liquids today, tolerating well given encouraged small bites and slow pace. LS diminished. Anxiety managed w/ ativan. Pain managed w/dilaudid. Con't to encourage up in chair. Up w/ lift. Turn/repo.

## 2021-02-25 NOTE — PROGRESS NOTES
SPIRITUAL HEALTH SERVICES Progress Note  [facility]    Brief visit with pt's granddaughter, Eugenia, per palliative team consult.  (Per chart, pt's daughter Samia is currently in the hospital and thus unable to visit.)  Pt was sitting up in a chair, asking for water, when I arrived.  Pt's granddaughter said she was doing fine, and had no need for spiritual health support at the present time.  SH team will continue to be available and to follow for pt/family support.                                                                                                                                                 Heather Castro M.A.  Staff   Phone [phone]

## 2021-02-26 NOTE — PLAN OF CARE
[id number]: Patient alert to self and place (knows she is in the hospital). Anxious at times, calling out. Ativan given x 1. Roxanol given x1 for abdominal pain, effective. Turning patient in bed w/assist x2. VSS on 5 L O2 via oximyzer. Cornejo patent, 200 ml output, encouraging intake of P.O fluids. Pt feeling hungry today, diet advanced to full liquid, tolerating so far. BG corrected with sliding scale insulin. No stool out put this shift, BS active. Plan to wean off TPN tomorrow and continue to advance diet. Discharge plan pending.

## 2021-02-26 NOTE — PHARMACY-CONSULT NOTE
TPN formula evaluated based on today s labs results.    The following changes have been made:  Insulin:decreased  to 26 units/ 2  Liters  .    Pharmacy will continue to follow and adjust as appropriate.    Teodora PradhanD

## 2021-02-26 NOTE — PROGRESS NOTES
[facility]    Hospitalist Progress Note    Date of Service (when I saw the patient): 02/26/2021    Assessment & Plan      Bella Saucedo is a 75 year old female who was admitted on 2/12/2021.     Bella Saucedo is a 75 year old female with complex past medical history including end-stage COPD for which she is enrolled in hospice, she is now admitted on 2/12/2021 with small bowel obstruction.      Mechanical Small Bowel Obstruction s/p  EXP laparotomy with extensive lysis of adhesions on 2/17/21  Acute blood loss anemia, expected postop  Pt presented to the ED from her nursing home because she had no stool output or gas in her ostomy bag since at least Wednesday 2/10. Her abdomen is quite distended. CT scan was obtained and showed evidence for mechanical small bowel obstruction with marked dilatation of numerous small bowel loops throughout the abdomen and pelvis extending up to the right upper quadrant in the distal small bowel where there is transition point to completely  decompressed loops likely related to adhesions. No evidence of bowel wall thickening or perforation. The patient is not currently in any discomfort.   Patient was unenrolled from hospice and admitted and underwent surgery.  - General surgery following -> s/p exploratory laparotomy with extensive lysis of adhesions  - NG tube placed by EMS, continue to low intermittent suction.  NG tube discontinued on 2/24 as having minimal output over last few days.  - IV Zosyn for now -> WBC down trending.  Completed IV Zosyn course on 2/23/2021.    - TPN, placed PICC 02/19/2020  - Maintenance IVF stopped  - Pain control PRN [IV Dilaudid, IV Ativan available as needed for pain and anxiety].  Resumed as needed SL/p.o. Ativan and SL morphine for symptom management.  -Hb postop stable between 10 and 11.  -Started on clear liquid diet on 2/25 per surgery.  Advance to full liquid diet on 2/26.  -Plan to wean off TPN tomorrow so she may be  "ready for discharge on Sunday.    Acute metabolic encephalopathy,?  Hospital delirium  Patient is anxious and confused at times.  Suspect secondary to prolonged hospitalization, surgery, narcotics and anesthesia.  Unclear what her baseline was.  Do see mention of confusion during prior hospitalization in the chart.  -Overall goal appears to be comfort.  Expect confusion/sedation while trying to achieve patient comfort in postop setting.  -Frequent reorientation.  -Delirium precautions.    Goals of Care    pt with end stage COPD, enrolled in hospice, who was admitted for a SBO, now no longer in hospice.  palliative care consulted and met with daughter   End-Stage COPD   Acute copd exacerbation   Acute on chronic hypoxic/hypercapnic respiratory failure   Pt had recent admission for acute on chronic hypoxemic/hypercapnic respiratory failure 2/2 CAP and COPD exacerbation requiring intubation. According to recent NH visits she has \"severe dyspnea\" at baseline. \"Spends most of her day in bed.\" She is on 2-3L of supplemental O2 at baseline.  - Goal is to discharge back to [facility] [place] [facility].  Clarified goals with patient's granddaughter, Eugenia.  She states that their main goal is for patient to be able to eat.  She will be reenrolled in hospice at discharge.  - Continue PTA Trelegy and Duonebs PRN   - Prednisone 10mg PTA continued  On iv solumedrol 125mcg daily and now weaned to 20 mg daily.  Switch back to 10 mg p.o. prednisone on 2/27.  PRN P.O./SL/IV ativan ordered for anxiety.  -Currently on Oxymizer.  Wean as able.  As she is discharging with hospice over the weekend, oxygen at discharge would be for comfort only.    Tachycardia  Assessment: EKG shows sinus tachy. likelty 2/2 to pain /anxiety / acute illness, low suspicion for PE. HRs ranging from 80s-110s  Plan:  - Follow for now     Abnormal UA  Chronic Indwelling grant  Pt had pre-existing grant cather (unclear reason). She does have " WBCs and few bacteria but no LE. No clinical evidence of infection   - Culture negative       Coronary artery disease, Hx NSTEMI  Hypertension  Heart failure with preserved ejection fraction  -Does not appear to have been on Lasix PTA.  - ASA was discontinued   -Check BNP [464 on 2/24].No recent ECHO on file.     Type 2 diabetes mellitus, last A1c 6.3 on 2/12/2021.  -Currently on HD SSI every 4 hours while on TPN.     Hx stabe IIB colon cancer 2016 s/p left hemicolectomy and colostomy  - WOC consulted     Other Noted History per chart review  Anxiety  GERD   Lung nodule  Rt MCA aneurysm s/p coil embolization 2011  PCOM aneurysm rupture s/p coil embolization 2010  Subarachnoid hemorrhage        Diet:  Clear liquid diet  DVT Prophylaxis: Pneumatic Compression Devices  Cornejo Catheter: not present  Code Status: No CPR- Do NOT Intubate            Disposition Plan     Expected discharge:  Anticipate discharge back to facility with hospice on 2/28 [Sunday] or 3/1.  Discussed with bedside RN, patient today       Peyton Avila MD    Interval History      No acute events overnight  Patient was more alert this morning, made good eye contact.  Admitted to wanting to eat and having discomfort in her abdomen.  More interaction than previous days.  Did not appear in any distress.    -Data reviewed today: I reviewed all new labs and imaging results over the last 24 hours. I personally reviewed no images or EKG's today.    Physical Exam   Temp: 97.5  F (36.4  C) Temp src: Axillary BP: 131/55 Pulse: 85   Resp: 22 SpO2: 98 % O2 Device: Oxymizer cannula Oxygen Delivery: 5 LPM  Vitals:    02/22/21 0600 02/23/21 0554 02/26/21 0645   Weight: 62.5 kg (137 lb 12.6 oz) 62.9 kg (138 lb 10.7 oz) 62.3 kg (137 lb 5.6 oz)     Vital Signs with Ranges  Temp:  [96.2  F (35.7  C)-98  F (36.7  C)] 97.5  F (36.4  C)  Pulse:  [67-95] 85  Resp:  [22-24] 22  BP: (121-147)/(47-68) 131/55  SpO2:  [98 %-100 %] 98 %  I/O last 3 completed shifts:  In: 507.83  [P.O.:120]  Out: 2375 [Urine:2150; Stool:225]    Constitutional: Alert, confused, no distress  Respiratory: Pursed lip breathing, breathing appears labored but apparently this is her baseline.  Poor air movement bilaterally.  Cardiovascular: Regular rate and rhythm, irregular at times, normal S1 and S2, and no murmur noted  GI: Bowel sounds diminished , nontender, moderate  distention, ostomy noted  Skin/Integumen: No rashes, no cyanosis, significant 2+ pitting edema of bilateral upper extremities noted.  1+ bilateral lower extremity edema noted.  Neuro: Alert, oriented to self and person, moving all extremities, no obvious facial asymmetry    Medications     parenteral nutrition - Clinimix E 65 mL/hr at 02/26/21 1037       citalopram  40 mg Oral Daily     sennosides  10 mL Oral BID    And     docusate  100 mg Oral BID     fluticasone-vilanterol  1 puff Inhalation Daily    And     umeclidinium  1 puff Inhalation Daily     guaiFENesin  1,200 mg Oral BID     insulin aspart  1-12 Units Subcutaneous Q4H     lipids  250 mL Intravenous Once per day on Mon Tue Wed Thu Fri     methylPREDNISolone  20 mg Intravenous Daily     pantoprazole (PROTONIX) IV  40 mg Intravenous Daily with breakfast     polyethylene glycol  17 g Oral Daily     sodium chloride (PF)  10 mL Intracatheter Q7 Days       Data   Recent Labs   Lab 02/26/21  0620 02/25/21  0600 02/24/21  0518 02/22/21  0445 02/21/21  0515 02/21/21  0515   WBC  --  13.8* 14.5*  --   --  11.7*   HGB 10.1* 10.6* 11.3*  --   --  10.9*   MCV  --  99 97  --   --  98   PLT  --  251 258  --   --  220    143 141 140  --  144   POTASSIUM 3.9 3.9 3.8 4.0   < > 3.3*   CHLORIDE 106 105 104 101  --  102   CO2 40* 39* 38* 38*  --  42*   BUN 30 30 29 18  --  17   CR 0.40* 0.41* 0.51* 0.41*  --  0.41*   ANIONGAP <1* <1* <1* 1*  --  <1*   MARILU 8.1* 8.0* 8.3* 8.8  --  8.4*   * 167* 143* 293*  --  261*   ALBUMIN  --  2.1*  --  2.6*  --  2.3*   PROTTOTAL  --  4.6*  --  5.7*  --  4.8*    BILITOTAL  --  0.3  --  0.7  --  0.8   ALKPHOS  --  69  --  67  --  51   ALT  --  79*  --  26  --  20   AST  --  16  --  13  --  8    < > = values in this interval not displayed.       No results found for this or any previous visit (from the past 24 hour(s)).

## 2021-02-26 NOTE — PLAN OF CARE
A&O2  VSS except for slight increase on BP  Pain: 10/10 head ache, oral morphine given   Respiration: tachypneic but no signs and symptoms of SOB  Heart rate: regular  Urination: w/ FC to a urine bag, draining well and patent  Bowel movement:  passing gas, moderate amount of stoma output  Ambulation: dependent x2 using ceiling lift  Current Problem: pain and tachypnea  Management Provided: oral morphine as ordered, Wean to Supplemental O2 of 4 LPM  Patient response: Slept through the night, did not complain of any pain, tolerating 4 LPM O2 supplement via NC as evidenced by O2 sat at 100%

## 2021-02-26 NOTE — PROGRESS NOTES
[facility]  Palliative Care Social Work Note:    Patient Info:  Bella Saucedo is a 75 year old female with ES COPD, admitted with a small bowel obstruction which was treated surgically.  She was enrolled in hospice prior to her admission.  Palliative is following for goals of care support    Brief summary of visit: Sat with Bella this afternoon.  She appeared distressed, seemed to be resting, but then would open eyes and  Call out.  She indicated she was uncomfortable but had some difficulty specifying.  Did make sure her fan was blowing on her and  Her RN was called and helped with repositioning.        Date of Admission: 2/12/2021    Reason for consult: Patient and family support    Sources of information:     Recommendations & Plan:  Palliative will continue to be available for support with goals discussions as needed.      These recommendations have been discussed with .    Symptoms & Concerns Addressed Today:      Strengths Identified:        Relationships & Support:  Aspects of relationships and support assessed today:    Identified family members: Daughter is decision maker, but she is currently admitted here at [facility] to the ICU.  Granddaughter is serving as decision maker in her place.          Professional supports:     Family coping:     Bereavement Risk concerns:     Coping, Mental Health & Adjustment to Illness:   Anxiety appeared anxious and distressed during my visit.     Goals, Decision Making & Advance Care Planning:   Prognosis, Goals, and/or Advance Care Planning were assessed today: No  If yes, brief summary of discussion:   Preferred language:   Patient's decision making preferences: not assessed  I have concerns about the patient/family's health literacy today: Unable to assess today  Patient has a completed Health Care Directive: Yes, and on file.  Code status per chart review: No CPR / No Intubation    Key Palliative Symptom Data:  # Pain severity the last 12  hours: moderate      Clinical Social Work Interventions:   Introduction of Palliative clinical social work interventions      ABI Downey, [facility]   Palliative Care Clinical   Ph: [phone]

## 2021-02-26 NOTE — PROGRESS NOTES
[facility]    General Surgery  Daily Progress Note       Assessment and Plan:   Bella Saucedo is a 75 year old female with end-stage COPD admitted for small bowel obstruction, now 9 days s/p exploratory laparotomy with extensive lysis of adhesions. Prolonged ileus not unexpected due to extensive FABIO. NG removed 2/24.     PLAN:  - Clear liquid stool overnight.  Advance to full liquids.  - Continue TPN until diet advanced further, but likely start to wean tomorrow to be off prior to discharge to Hospice bed Sunday.  - Up in chair encouraged this morning and patient agreeable to this.  - Analgesia, antinausea meds prn, and protonix.  - Completed 6 days of Zosyn, discontinued 2/23.  - DVT prophylaxis per primary team.   - Plans to return to hospice and needs to do this on Sunday 2/28 before her bed expires.        Interval History:   Bella Saucedo is seen this morning on surgical rounds. Bella states she is not doing well but states this is her breathing. She denies abdominal pain. She is now on 5 LPM of oxygen via oxymizer this morning. Stoma with brown liquid stool and gas; appliance changed this morning. No fevers and rare tachycardic at times.          Physical Exam:   Temp: 96.2  F (35.7  C) Temp src: Oral BP: 121/49 Pulse: 90   Resp: 24 SpO2: 98 % O2 Device: Oxymizer cannula Oxygen Delivery: 5 LPM    I/O last 3 completed shifts:  In: 507.83 [P.O.:120]  Out: 2375 [Urine:2150; Stool:225]    GENERAL: VS reviewed, no acute distress, answers questions appropriately  LUNGS: Increased respiratory effort, short answers due to this  ABDOMEN: nondistended, appropriately tender throughout,  Stoma: not able to visualize with new appliance.  Bag just changed and empty.  225mL of stool overnight.  INCISION: Incision CDI with staples in place, no surrounding erythema or induration.  EXTREMITIES: Moving all extremities    Data   Recent Labs   Lab 02/26/21  0620 02/25/21  0600 02/24/21  0518  02/22/21  0445 02/21/21  0515 02/21/21 0515   WBC  --  13.8* 14.5*  --   --  11.7*   HGB 10.1* 10.6* 11.3*  --   --  10.9*   MCV  --  99 97  --   --  98   PLT  --  251 258  --   --  220    143 141 140  --  144   POTASSIUM 3.9 3.9 3.8 4.0   < > 3.3*   CHLORIDE 106 105 104 101  --  102   CO2 40* 39* 38* 38*  --  42*   BUN 30 30 29 18  --  17   CR 0.40* 0.41* 0.51* 0.41*  --  0.41*   ANIONGAP <1* <1* <1* 1*  --  <1*   MARILU 8.1* 8.0* 8.3* 8.8  --  8.4*   * 167* 143* 293*  --  261*   ALBUMIN  --  2.1*  --  2.6*  --  2.3*   PROTTOTAL  --  4.6*  --  5.7*  --  4.8*   BILITOTAL  --  0.3  --  0.7  --  0.8   ALKPHOS  --  69  --  67  --  51   ALT  --  79*  --  26  --  20   AST  --  16  --  13  --  8    < > = values in this interval not displayed.       Mo Blas PA-C

## 2021-02-26 NOTE — PROGRESS NOTES
SPIRITUAL HEALTH SERVICES Progress Note  [facility]    Visited pt for follow-up. I introduced myself and pt opened her eyes to my greeting.    Upon asking how she was doing today, pt shook her head side to side. She agreed that is was okay with her if I sat quietly with her. After several minutes, pt said she would welcome prayer and I offered prayer and words of our care and support of pt. Bella asked me to help her reposition and I told her I would let her nurse know of this request. I checked in with pt nurse to let her know.    SHS remains available.      Kathy Brown  Chaplain Resident

## 2021-02-26 NOTE — PLAN OF CARE
[id number]: A&Ox2, disoriented to time and situation. VSS on 5L oximyzer. T/R q2h and BG q4h.  and correction given. Tolerating full liquids and TPN. Denies pain. WOC and Palliative consult. Continue to monitor.

## 2021-02-26 NOTE — PROGRESS NOTES
CLINICAL NUTRITION SERVICES - REASSESSMENT NOTE      Recommendations Ordered by Registered Dietitian (RD): Gelatein PRN as desired (not sending routine trays)   Malnutrition: % Weight Loss:  None noted  % Intake:  </= 50% for >/= 5 days (severe malnutrition)(2/18)  Subcutaneous Fat Loss:  Orbital region mild depletion and Upper arm region moderate depletion  Muscle Loss:  Temporal region moderate depletion, Clavicle bone region mild depletion, Dorsal hand region mild depletion, Patellar region moderate depletion and Posterior calf region moderate depletion  Fluid Retention:  Moderate 2-3+    Malnutrition Diagnosis: Non-Severe malnutrition  In Context of:  Acute illness or injury  Chronic illness or disease       EVALUATION OF PROGRESS TOWARD GOALS   Diet:  Clear Liquid (sips only, no trays)    Nutrition Support:  Patient continues on goal TPN as follows ~    Nutrition Support Parenteral:  Type of Access: PICC  Parenteral Frequency:  Continuous  Parenteral Regimen: D15 AA5 at 65 mL/hr + Lipid 250 mL 20% 5x per week   Total Parenteral Provisions: 1464 kcal (26 kcal/kg), 78 g protein (1.4 g/kg), 234 g CHO, 24% fat       Intake/Tolerance:    K and Phos normal  Mg 2.5 (H)  , 112, 124, 110, 114, 102 - High sliding scale insulin, Insulin in TPN 36 units per day, also on Solumedrol (but has tapered since admit)  BM x 1 today   I/O 508/2375, wt 62.3 kg (stable), 2-3+ edema   Stool 225 mL (has been getting bowel meds)    Noted patient took 120 mL po yesterday on CL diet    She indicated to me today that she would be willing to try a Gelatein supplement       ASSESSED NUTRITION NEEDS:  Dosing Weight 55 kg (adjusted wt for overwt)  Estimated Energy Needs: 0164-6168 kcals (25-30 Kcal/Kg)  Justification: post-op  Estimated Protein Needs: 65-85 grams protein (1.2-1.5 g pro/Kg)  Justification: post-op      NEW FINDINGS:   Brief NFPA done this morning -- noted muscle and fat wasting (see below)    Previous Goals (2/22):    TPN/Lipid will continue to meet % needs - Met   BGM < 180 - Met     Previous Nutrition Diagnosis (2/22):   Altered nutrition-related lab value (high glucoses) related to stress, surgery, and steroids as evidenced by BGM > 200  Evaluation: Resolved       MALNUTRITION  % Weight Loss:  None noted  % Intake:  </= 50% for >/= 5 days (severe malnutrition)(2/18)  Subcutaneous Fat Loss:  Orbital region mild depletion and Upper arm region moderate depletion  Muscle Loss:  Temporal region moderate depletion, Clavicle bone region mild depletion, Dorsal hand region mild depletion, Patellar region moderate depletion and Posterior calf region moderate depletion  Fluid Retention:  Moderate 2-3+    Malnutrition Diagnosis: Non-Severe malnutrition  In Context of:  Acute illness or injury  Chronic illness or disease    CURRENT NUTRITION DIAGNOSIS  No nutrition diagnosis identified at this time as TPN meeting needs     INTERVENTIONS  Recommendations / Nutrition Prescription  Continue goal TPN as above   Gelatein PRN as desired (not sending routine trays)    Implementation  Medical Food Supplement:  Ordered sample Gelatein for patient to try  Collaboration and Referral of Nutrition care:  Discussed TPN with Pharmacist - decrease Insulin in TPN     Goals  TPN/Lipid will continue to meet % needs while minimal intake     MONITORING AND EVALUATION:  Progress towards goals will be monitored and evaluated per protocol and Practice Guidelines    Cynthia Mackey RD, LD, CNSC   Clinical Dietitian - [facility]

## 2021-02-27 NOTE — PROGRESS NOTES
[facility]    Hospitalist Progress Note    Date of Service (when I saw the patient): 02/27/2021    Assessment & Plan      Bella Saucedo is a 75 year old female who was admitted on 2/12/2021.     Bella Saucedo is a 75 year old female with complex past medical history including end-stage COPD for which she is enrolled in hospice, she is now admitted on 2/12/2021 with small bowel obstruction.      Mechanical Small Bowel Obstruction s/p  EXP laparotomy with extensive lysis of adhesions on 2/17/21  Acute blood loss anemia, expected postop  Pt presented to the ED from her nursing home because she had no stool output or gas in her ostomy bag since at least Wednesday 2/10. Her abdomen is quite distended. CT scan was obtained and showed evidence for mechanical small bowel obstruction with marked dilatation of numerous small bowel loops throughout the abdomen and pelvis extending up to the right upper quadrant in the distal small bowel where there is transition point to completely  decompressed loops likely related to adhesions. No evidence of bowel wall thickening or perforation. The patient is not currently in any discomfort.   Patient was unenrolled from hospice and admitted and underwent surgery.  Main goal per family is for patient to be able to eat.  - General surgery following -> s/p exploratory laparotomy with extensive lysis of adhesions  - NG tube placed by EMS, continue to low intermittent suction.  NG tube discontinued on 2/24 as having minimal output over last few days.  - IV Zosyn for now -> WBC down trending.  Completed IV Zosyn course on 2/23/2021.    - TPN, placed PICC 02/19/2020  - Maintenance IVF stopped  - Pain control PRN [IV Dilaudid, IV Ativan available as needed for pain and anxiety].  Resumed as needed SL/p.o. Ativan and SL morphine for symptom management.  -Hb postop stable between 10 and 11.  -Started on clear liquid diet on 2/25 per surgery.  Advance to full liquid  "diet on 2/26.  Started on low residue diet on 2/27.  TPN will be weaned off on 2/27.  -Should be ready to discharge back to facility with hospice on 2/28.    Acute metabolic encephalopathy,?  Hospital delirium  Patient is anxious and confused at times.  Suspect secondary to prolonged hospitalization, surgery, narcotics and anesthesia.  Unclear what her baseline was.  Do see mention of confusion during prior hospitalization in the chart.  -Overall goal appears to be comfort.  Expect confusion/sedation while trying to achieve patient comfort in postop setting.  -Frequent reorientation.  -Delirium precautions.    Goals of Care    pt with end stage COPD, enrolled in hospice, who was admitted for a SBO, now no longer in hospice.  palliative care consulted and met with daughter   End-Stage COPD   Acute copd exacerbation   Acute on chronic hypoxic/hypercapnic respiratory failure   Pt had recent admission for acute on chronic hypoxemic/hypercapnic respiratory failure 2/2 CAP and COPD exacerbation requiring intubation. According to recent NH visits she has \"severe dyspnea\" at baseline. \"Spends most of her day in bed.\" She is on 2-3L of supplemental O2 at baseline.  - Goal is to discharge back to [facility].  Clarified goals with patient's granddaughter, Eugenia.  She states that their main goal is for patient to be able to eat.  She will be reenrolled in hospice at discharge.  - Continue PTA Trelegy and Duonebs PRN   - Prednisone 10mg PTA continued  On iv solumedrol 125mcg daily and now weaned to 20 mg daily.  Switch back to 10 mg p.o. prednisone on 2/28.  PRN P.O./SL/IV ativan ordered for anxiety.  -Currently on Oxymizer.  Wean as able.  As she is discharging with hospice over the weekend, oxygen at discharge would be for comfort only.       Abnormal UA  Chronic Indwelling grant  Pt had pre-existing grant cather (unclear reason). She does have WBCs and few bacteria but no LE. No clinical " evidence of infection   - Culture negative       Coronary artery disease, Hx NSTEMI  Hypertension  Heart failure with preserved ejection fraction  -Does not appear to have been on Lasix PTA.  - ASA was discontinued   -Check BNP [464 on 2/24].No recent ECHO on file.     Type 2 diabetes mellitus, last A1c 6.3 on 2/12/2021.  -Currently on HD SSI every 4 hours while on TPN.  -Insulin to be discontinued after TPN is weaned off.  She will not be discharged on insulin.     Hx stabe IIB colon cancer 2016 s/p left hemicolectomy and colostomy  - WOC consulted     Other Noted History per chart review  Anxiety  GERD   Lung nodule  Rt MCA aneurysm s/p coil embolization 2011  PCOM aneurysm rupture s/p coil embolization 2010  Subarachnoid hemorrhage        Diet: Low residue diet  DVT Prophylaxis: Pneumatic Compression Devices  Cornejo Catheter: not present  Code Status: No CPR- Do NOT Intubate            Disposition Plan     Expected discharge:  Anticipate discharge back to facility with hospice on 2/28 [Sunday] or 3/1.  [Apparently bed hold at facility expires on 2/28.  If facility can accept her back on 2/28, anticipate she should be ready to discharge on this date]  Discussed with bedside RN, patient today       Peyton Avila MD    Interval History      No acute events overnight  Asked for ice chips.  Occasionally intermittently yells out.  Some meaningful interaction, answer some simple questions.    -Data reviewed today: I reviewed all new labs and imaging results over the last 24 hours. I personally reviewed no images or EKG's today.    Physical Exam   Temp: 98  F (36.7  C) Temp src: Oral BP: 131/53 Pulse: 72   Resp: 18 SpO2: 94 % O2 Device: Nasal cannula Oxygen Delivery: 2 LPM  Vitals:    02/23/21 0554 02/26/21 0645 02/27/21 0610   Weight: 62.9 kg (138 lb 10.7 oz) 62.3 kg (137 lb 5.6 oz) 63.9 kg (140 lb 14 oz)     Vital Signs with Ranges  Temp:  [97.3  F (36.3  C)-98.4  F (36.9  C)] 98  F (36.7  C)  Pulse:  [72-88]  72  Resp:  [18] 18  BP: (122-131)/(46-63) 131/53  SpO2:  [94 %-100 %] 94 %  I/O last 3 completed shifts:  In: 999.83 [P.O.:90]  Out: 1230 [Urine:1080; Stool:150]    Constitutional: Alert, confused, no distress  Respiratory: Pursed lip breathing, breathing appears labored but apparently this is her baseline.  Poor air movement bilaterally.  Cardiovascular: Regular rate and rhythm, irregular at times, normal S1 and S2, and no murmur noted  GI: Bowel sounds diminished , nontender, moderate  distention, ostomy noted  Skin/Integumen: No rashes, no cyanosis, significant 2+ pitting edema of bilateral upper extremities noted.  1+ bilateral lower extremity edema noted.  Neuro: Alert, oriented to self and person, moving all extremities, no obvious facial asymmetry    Medications     parenteral nutrition - Clinimix E 32.5 mL/hr at 02/27/21 1200       citalopram  40 mg Oral Daily     sennosides  10 mL Oral BID    And     docusate  100 mg Oral BID     fluticasone-vilanterol  1 puff Inhalation Daily    And     umeclidinium  1 puff Inhalation Daily     guaiFENesin  1,200 mg Oral BID     insulin aspart  1-12 Units Subcutaneous Q4H     lipids  250 mL Intravenous Once per day on Mon Tue Wed Thu Fri     methylPREDNISolone  20 mg Intravenous Daily     pantoprazole (PROTONIX) IV  40 mg Intravenous Daily with breakfast     polyethylene glycol  17 g Oral Daily     sodium chloride (PF)  10 mL Intracatheter Q7 Days       Data   Recent Labs   Lab 02/27/21  0520 02/26/21  0620 02/25/21  0600 02/24/21  0518 02/22/21  0445 02/21/21  0515 02/21/21  0515   WBC  --   --  13.8* 14.5*  --   --  11.7*   HGB  --  10.1* 10.6* 11.3*  --   --  10.9*   MCV  --   --  99 97  --   --  98   PLT  --   --  251 258  --   --  220   NA  --  144 143 141 140  --  144   POTASSIUM 3.9 3.9 3.9 3.8 4.0   < > 3.3*   CHLORIDE  --  106 105 104 101  --  102   CO2  --  40* 39* 38* 38*  --  42*   BUN  --  30 30 29 18  --  17   CR  --  0.40* 0.41* 0.51* 0.41*  --  0.41*    ANIONGAP  --  <1* <1* <1* 1*  --  <1*   MARILU  --  8.1* 8.0* 8.3* 8.8  --  8.4*   GLC  --  114* 167* 143* 293*  --  261*   ALBUMIN  --   --  2.1*  --  2.6*  --  2.3*   PROTTOTAL  --   --  4.6*  --  5.7*  --  4.8*   BILITOTAL  --   --  0.3  --  0.7  --  0.8   ALKPHOS  --   --  69  --  67  --  51   ALT  --   --  79*  --  26  --  20   AST  --   --  16  --  13  --  8    < > = values in this interval not displayed.       No results found for this or any previous visit (from the past 24 hour(s)).

## 2021-02-27 NOTE — PROGRESS NOTES
"[facility]  GENERAL SURGERY Progress Note    Admission Date: 2021         Assessment and Plan:   Bella Saucedo is a 75 year old female with end-stage COPD admitted for small bowel obstruction, s/p exploratory laparotomy with extensive lysis of adhesions, POD 10. Prolonged ileus not unexpected due to extensive FABIO. NG removed . Zosyn discontinued  (total 6 days)     PLAN:  - Low residue diet  - Wean TPN  - Tylenol for pain  - Up to chair  - Continue Protonix and bowel regimen  - DVT prophylaxis per primary team  - OK to return Hospice tomorrow if ok with medicine (bed expires )  - Med mngt per hospitalist, much appreciate your help  - Will need to follow up win office in a 7-10 days for staple removal             Interval History:   Denies abd pain, n/v, tolerating diet, states she is hungry, stool and air in bag, UO adequate, not ambulating, 4L oxymizer.  Meds reviewed.                      Physical Exam:   Blood pressure 129/46, pulse 82, temperature 98.3  F (36.8  C), temperature source Axillary, resp. rate 18, height 1.6 m (5' 3\"), weight 62.3 kg (137 lb 5.6 oz), SpO2 95 %.  Temperature Temp  Av.5  F (36.4  C)  Min: 96.2  F (35.7  C)  Max: 98.4  F (36.9  C)   I/O last 3 completed shifts:  In: 1387.66 [P.O.:90]  Out: 1525 [Urine:1150; Stool:375]  Abdomen: Nondistended, appropriately tender at incision(s), colostomy functioning, stoma not visualized due to bag.   Wound(s): Clean, dry, and intact. No erythema or drainage.    Extremities: No edema or calf tenderness. +SCDs          Data:     Recent Labs   Lab Test 21  0520 21  0620 21  0600 21  0518   NA  --  144 143 141   POTASSIUM 3.9 3.9 3.9 3.8   CHLORIDE  --  106 105 104   CO2  --  40* 39* 38*   BUN  --  30 30 29   CR  --  0.40* 0.41* 0.51*     Viridiana V. Ocasio, PA-C  [facility]  [phone]  "

## 2021-02-27 NOTE — PLAN OF CARE
[id number]: Disoriented to time and place, forgetful and confused. VSS on 5L oximyzer. Patient not in pain, attempting to controll anxiety with Ativan. TPN @ 65 and Lipids @ 20.8. BLE josette, edema to upper extremities. LS diminished. BS+, BM+, flatus+. Midline open to air. Tolerating full liquid diet. Denies N/V. BGs 315, 146 & 153. Cornejo patent and draining adequately. Colostomy with medium output. Turn and repo q2hrs and PRN. Patient frequently cries out for help. Does not often need anything, but does not like to be left alone.

## 2021-02-27 NOTE — PROGRESS NOTES
"[facility]  WO Nurse Inpatient Ostomy & Wound Assessment     Follow up Colostomy and bilateral heels/ bilateral groin     Surgery date:  2/17/21  Surgeon:   Federico Fall MD - Primary and  Marina Mcfarlane PA-C - Assisting  Procedure:  Exploratory laparotomy with extensive lysis of adhesions  Related diagnosis: Small bowel obstruction      Ostomy surgery date:  3-9-16  Surgeon:  Dr. Del Caldwell ([facility])  Procedure:  Sigmoidectomy with diverting colostomy  Related diagnosis:  Sigmoid rupture, colon CA      WO Assessment & Physical Exam:      Current status: POD 6 prolonged ileus. Agitated confused calling out \"help, I'm hot\"    Date of last photo: 2-26-21                    Stoma size:  1 1/4\"; stoma was not touched in surgery 2/17    Stoma appearance: pink, viable    Peristomal complication(s): intact, light rash with satellite lesions    Abdominal assessment: distended and firm, incision approximated with staples    Output: Today moderate mushy brown fecal output    Pain: flinches with pouch change      Current pouching system: [place] 2pc 57mm flat barrier with ring and patients closed pouch    Pouch last changed:  2/23/21; 2/26    Reason for pouch change today: Routine      Learning and comprehension: Facility that Pt resides at completes ostomy cares & pouch changes.       Bilateral heels heel:  pink  blanchable and intact. No c/o pain with palpation.  Present on admission.  Wearing Prevalon boots.     Coccyx/Sacrum intact, protected with multilayer silicone foam  Bilateral groin/labia: candida rash      WOC Plan:         Pouching product plan: [place] 2pc; 57mm flat barrier (# [id number]) and a clear lock n roll pouch (#[id number]) while in hospital.  Pt can resume her 44mm flat barrier with closed pouches on discharge.       Frequency of pouch changes: 2-3x week and prn      Pt support system on discharge: Palliative care at facility      Skin care: follow PIP " measures    Pressure Injury Prevention (PIP) Plan:  -If patient is declining pressure injury prevention interventions: Explore reason why and address patient's concerns  -Mattress: Follow bed algorithm, reassess daily and order specialty mattress, if indicated.  -HOB: Maintain at or below 30 degrees, unless contraindicated  -Repositioning in bed: Every 1-2 hours , Left/right positioning; avoid supine and Raise foot of bed prior to raising head of bed, to reduce patient sliding down (shear)  -Heels: Keep elevated off mattress, Pillows under calves and Heel lift boots  -Protective Dressing: Sacral Mepilex for prevention (#143865),  especially for the agitated patient   -Chair positioning: pillow or Chair cushion (#446060)  and Assist patient to reposition hourly   -Moisture Management: Perineal cleansing /protection: Follow Incontinence Protocol  -Under Devices: Inspect skin under all medical devices during skin inspection , Ensure tubes are stabilized without tension and Ensure patient is not lying on medical devices or equipment when repositioned      WOC follow-up plan: 1-2x week and prn      Objective Data:     D    Orders Placed This Encounter      Full Liquid Diet      Containment of urine/stool: Incontinence Protocol   Medical Devices:Cornejo Catheter: in place, indication: Retention, Retention(chronic at baseline), Retention    Catheter securement Yes    Pressure Injury Risk Assessment (Ayden Scale):  Sensory Perception: 2--> limited    Moisture: 4-->rarely moist   Activity: 1-->bedfast     Mobility: 2-->very limited   Nutrition: 2-->probably inadequate   Friction and Shear: 2-->potential problem  Adyen Score: 13    Current support surface: Standard  Atmos Air mattress  Current off-loading measures bed: repositioning with use of Pillows  Current off-loading measures chair: bedrest, chair cushion ordered   Current off-loading heels: floating off bed with use of Heel off-loading boot(s)    WOC Interventions:        Patient's chart evaluated    Focus of today's visit: assessment of skin, stoma, pouching system    Pouch Changed today Thursday 2/23/21; 2-26    Skin assessed, pt repositioned, heels floated with offloading boots    Orders reviewed    Supplies: at bedside    Discussed plan of care with: Nurses    Ghislaine Fagan RN CWOCN

## 2021-02-28 NOTE — PLAN OF CARE
VSS. Disoriented to place. Forgetful. O2 weaned to 1L. Total care, pt able to roll quite well in bed, arms, hip, groin & heels redness & bruises. Gen body edema. Tolerating low fiber diet. Good UOP via grant, colostomy with liquid stools. Denies pain. Coccyx skin intact, mapilex changed. Repo Q2. TPN tapering off. Small pressure/redness behind ears due to O2 tubing, foam cushion applied. Small dry scab R nostril from previous NG tube.

## 2021-02-28 NOTE — PROGRESS NOTES
"[facility]  GENERAL SURGERY Progress Note    Admission Date: 2021         Assessment and Plan:     Bella Saucedo is a 75 year old female with end-stage COPD admitted for small bowel obstruction, s/p exploratory laparotomy with extensive lysis of adhesions, POD 11. Prolonged ileus not unexpected due to extensive FABIO. NG removed . Zosyn discontinued  (total 6 days).     PLAN:  - Low residue diet  - Tylenol for pain  - Up to chair with assist  - Continue Protonix and bowel regimen  - Hospice when medically able, discussed with care corrdinator (bed available 3/1)  - Med mngt per hospitalist, much appreciate your help  - Ok to remove staples at facility in 1 week. Follow up in 2 weeks with Dr. Fall if able.  - Discharge instructions on chart, no narcotics prescribed                Interval History:   TPN was weaned, awakens easily, denies abd pain, mild nausea and colostomy outut per RN note, UO adequate, on 4L.                          Physical Exam:   Blood pressure 107/46, pulse 81, temperature 98.1  F (36.7  C), temperature source Oral, resp. rate 18, height 1.6 m (5' 3\"), weight 63.9 kg (140 lb 14 oz), SpO2 96 %.  Temperature Temp  Av  F (36.7  C)  Min: 97.4  F (36.3  C)  Max: 98.4  F (36.9  C)   I/O last 3 completed shifts:  In: 450 [P.O.:450]  Out: 1030 [Urine:830; Stool:200]  Abdomen: Nondistended, appropriately tender at incision(s), colostomy bag empty but RN reports output, stoma not visualized due to bag.   Wound(s): Clean, dry, and intact. No erythema or drainage.    Extremities: No edema or calf tenderness. +SCDs          Data:   No new labs/imaging.     Viridiana Ocasio PA-C  [facility]  [phone]  "

## 2021-02-28 NOTE — PLAN OF CARE
VSS on 2-3L overnight, ear protectors on NC. A/Ox 2-3 ex time/place. Blood sugars: Q4h. Incisions MELIA with staples. CMS intact. Ativan given x1 for anxiety. Up with 2 w/lift. Tolerating low fiber diet, int. Nausea at times, Zofran given x1 with relief. Output through colostomy. Chronic grant in place. LS diminished. Plan to discharge back to care facility today if open bed. Will continue to monitor.

## 2021-02-28 NOTE — PROGRESS NOTES
[facility]    Hospitalist Progress Note    Date of Service (when I saw the patient): 02/28/2021    Assessment & Plan      Bella Saucedo is a 75 year old female who was admitted on 2/12/2021.     Bella Saucedo is a 75 year old female with complex past medical history including end-stage COPD for which she is enrolled in hospice, she is now admitted on 2/12/2021 with small bowel obstruction.      Mechanical Small Bowel Obstruction s/p  EXP laparotomy with extensive lysis of adhesions on 2/17/21  Acute blood loss anemia, expected postop  Pt presented to the ED from her nursing home because she had no stool output or gas in her ostomy bag since at least Wednesday 2/10. Her abdomen is quite distended. CT scan was obtained and showed evidence for mechanical small bowel obstruction with marked dilatation of numerous small bowel loops throughout the abdomen and pelvis extending up to the right upper quadrant in the distal small bowel where there is transition point to completely  decompressed loops likely related to adhesions. No evidence of bowel wall thickening or perforation. The patient is not currently in any discomfort.   Patient was unenrolled from hospice and admitted and underwent surgery.  Main goal per family is for patient to be able to eat.  - General surgery following -> s/p exploratory laparotomy with extensive lysis of adhesions  - NG tube placed by EMS, continue to low intermittent suction.  NG tube discontinued on 2/24 as having minimal output over last few days.  - IV Zosyn for now -> WBC down trending.  Completed IV Zosyn course on 2/23/2021.    - TPN, placed PICC 02/19/2020  - Maintenance IVF stopped  - Pain control PRN [IV Dilaudid, IV Ativan available as needed for pain and anxiety].  Resumed as needed SL/p.o. Ativan and SL morphine for symptom management.  -Hb postop stable between 10 and 11.  -Started on clear liquid diet on 2/25 per surgery.  Advance to full liquid  "diet on 2/26.  Started on low residue diet on 2/27.  TPN will be weaned off on 2/27.  -Should be ready to discharge back to facility with hospice on 03/01    Acute metabolic encephalopathy,?  Hospital delirium  Patient is anxious and confused at times.  Suspect secondary to prolonged hospitalization, surgery, narcotics and anesthesia.  Unclear what her baseline was.  Do see mention of confusion during prior hospitalization in the chart.  -Overall goal appears to be comfort.  Expect confusion/sedation while trying to achieve patient comfort in postop setting.  -Frequent reorientation.  -Delirium precautions.    Goals of Care    pt with end stage COPD, enrolled in hospice, who was admitted for a SBO, now no longer in hospice.  palliative care consulted and met with daughter   End-Stage COPD   Acute copd exacerbation   Acute on chronic hypoxic/hypercapnic respiratory failure   Pt had recent admission for acute on chronic hypoxemic/hypercapnic respiratory failure 2/2 CAP and COPD exacerbation requiring intubation. According to recent NH visits she has \"severe dyspnea\" at baseline. \"Spends most of her day in bed.\" She is on 2-3L of supplemental O2 at baseline.  - Goal is to discharge back to [facility].  Clarified goals with patient's granddaughter, Eugenia.  She states that their main goal is for patient to be able to eat.  She will be reenrolled in hospice at discharge.  - Continue PTA Trelegy and Duonebs PRN   - Prednisone 10mg PTA continued  On iv solumedrol 125mcg daily and now weaned to 20 mg daily.  Switch back to 10 mg p.o. prednisone on 2/28.  PRN P.O./SL/IV ativan ordered for anxiety.  -Currently on Oxymizer.  Wean as able.        Abnormal UA  Chronic Indwelling grant  Pt had pre-existing grant cather (unclear reason). She does have WBCs and few bacteria but no LE. No clinical evidence of infection   - Culture negative       Coronary artery disease, Hx " NSTEMI  Hypertension  Heart failure with preserved ejection fraction  -Does not appear to have been on Lasix PTA.  - ASA was discontinued   -Check BNP [464 on 2/24].No recent ECHO on file.     Type 2 diabetes mellitus, last A1c 6.3 on 2/12/2021.  -Currently on HD SSI every 4 hours while on TPN.  -Insulin to be discontinued after TPN is weaned off.  She will not be discharged on insulin.     Hx stabe IIB colon cancer 2016 s/p left hemicolectomy and colostomy  - WOC consulted     Other Noted History per chart review  Anxiety  GERD   Lung nodule  Rt MCA aneurysm s/p coil embolization 2011  PCOM aneurysm rupture s/p coil embolization 2010  Subarachnoid hemorrhage        Diet: Low residue diet  DVT Prophylaxis: Pneumatic Compression Devices  Cornejo Catheter: not present  Code Status: No CPR- Do NOT Intubate            Disposition Plan     Expected discharge:  Anticipate discharge back to facility on 03/01 [Apparently bed hold at facility expires on 03/01  Discussed with bedside RN, patient and daughter today       Boom Goodrich MD    Interval History      No acute events overnight  Answer some simple questions.  No CP/SOB  No vomiting, no significant pain.     -Data reviewed today: I reviewed all new labs and imaging results over the last 24 hours. I personally reviewed no images or EKG's today.    Physical Exam   Temp: 98.1  F (36.7  C) Temp src: Oral BP: 107/46 Pulse: 81   Resp: 18 SpO2: 97 % O2 Device: Nasal cannula Oxygen Delivery: 2 LPM  Vitals:    02/23/21 0554 02/26/21 0645 02/27/21 0610   Weight: 62.9 kg (138 lb 10.7 oz) 62.3 kg (137 lb 5.6 oz) 63.9 kg (140 lb 14 oz)     Vital Signs with Ranges  Temp:  [97.4  F (36.3  C)-98.4  F (36.9  C)] 98.1  F (36.7  C)  Pulse:  [75-98] 81  Resp:  [18-20] 18  BP: (107-136)/(42-70) 107/46  SpO2:  [95 %-97 %] 97 %  I/O last 3 completed shifts:  In: 450 [P.O.:450]  Out: 1030 [Urine:830; Stool:200]    Constitutional: Alert, confused, no distress  Respiratory: Pursed lip breathing,  breathing appears labored but apparently this is her baseline.  Poor air movement bilaterally.  Cardiovascular: Regular rate and rhythm, irregular at times, normal S1 and S2, and no murmur noted  GI: Bowel sounds diminished , nontender, moderate  distention, ostomy noted  Skin/Integumen: No rashes, no cyanosis, significant 2+ pitting edema of bilateral upper extremities noted.  1+ bilateral lower extremity edema noted.  Neuro: Alert, oriented to self and person, moving all extremities, no obvious facial asymmetry    Medications       citalopram  40 mg Oral Daily     sennosides  10 mL Oral BID    And     docusate  100 mg Oral BID     fluticasone-vilanterol  1 puff Inhalation Daily    And     umeclidinium  1 puff Inhalation Daily     guaiFENesin  1,200 mg Oral BID     lipids  250 mL Intravenous Once per day on Mon Tue Wed Thu Fri     pantoprazole (PROTONIX) IV  40 mg Intravenous Daily with breakfast     polyethylene glycol  17 g Oral Daily     predniSONE  10 mg Oral Daily     sodium chloride (PF)  10 mL Intracatheter Q7 Days       Data   Recent Labs   Lab 02/27/21  0520 02/26/21  0620 02/25/21  0600 02/24/21  0518 02/22/21  0445   WBC  --   --  13.8* 14.5*  --    HGB  --  10.1* 10.6* 11.3*  --    MCV  --   --  99 97  --    PLT  --   --  251 258  --    NA  --  144 143 141 140   POTASSIUM 3.9 3.9 3.9 3.8 4.0   CHLORIDE  --  106 105 104 101   CO2  --  40* 39* 38* 38*   BUN  --  30 30 29 18   CR  --  0.40* 0.41* 0.51* 0.41*   ANIONGAP  --  <1* <1* <1* 1*   MARILU  --  8.1* 8.0* 8.3* 8.8   GLC  --  114* 167* 143* 293*   ALBUMIN  --   --  2.1*  --  2.6*   PROTTOTAL  --   --  4.6*  --  5.7*   BILITOTAL  --   --  0.3  --  0.7   ALKPHOS  --   --  69  --  67   ALT  --   --  79*  --  26   AST  --   --  16  --  13       No results found for this or any previous visit (from the past 24 hour(s)).

## 2021-02-28 NOTE — PLAN OF CARE
7-3: VSS, except 2L of O2. Alert to self, place. Denies pain. Low fiber diet, poor appetite. Turn/repo Q2. Coccyx intact. Incisions CDI. Ostomy with no output. Cornejo in place- chronic. Daughter at bedside during day. Plan to discharge back to facility with hospice care tomorrow. CTM

## 2021-03-01 NOTE — PLAN OF CARE
VSS, on 3L of oxygen. Pt c/o of pain prn tylenol given and prn ativan given. Pt tolerated low fiber diet. Turned and repositioned in the bed frequently per patients request. Chronic grant in place and ostomy.

## 2021-03-01 NOTE — PROGRESS NOTES
PAS-RR    D: Per DHS regulation, SW completed and submitted PAS-RR to [place] [employer] Direct Connect via the Senior LinkAge Line.  PAS-RR confirmation # is : [id number]    I: SW spoke with pt daughter and they are aware a PAS-RR has been submitted.  SW reviewed with pt daughter that they may be contacted for a follow up appointment within 10 days of hospital discharge if their SNF stay is < 30 days.  Contact information for Senior LinkAge Line was also provided.    A: Pt daughter verbalized understanding.    P: Further questions may be directed to [facility] LinkAge Line at #[phone], option #4 for PAS-RR staff.

## 2021-03-01 NOTE — PLAN OF CARE
A/O x2. Uses call light for help. Complained of SOB, O2 sat 98% at 3L. Shallow breathing noted and nebulization done and was effective. Denied pain, NV. Midline incision is dry and intact with staples MELIA. Plan for discontinue today to continue hospice care.

## 2021-03-01 NOTE — PROGRESS NOTES
Care Management Follow Up    Length of Stay (days): 17    Expected Discharge Date: 03/01/21(LTC)     Concerns to be Addressed: discharge planning  Pt to return to [facility] for hospice services at discharge  Patient plan of care discussed at interdisciplinary rounds: Yes    Anticipated Discharge Disposition: Long Term Care, Hospice  Disposition Comments: Plan for pt to discharge back to [facility] [place] on hospice service with [facility].  Anticipated Discharge Services: Other (see comment)(Hospice)  Anticipated Discharge DME: None    Patient/family educated on Medicare website which has current facility and service quality ratings: no  Education Provided on the Discharge Plan:    Patient/Family in Agreement with the Plan: yes    Referrals Placed by CM/SW: Internal Clinic Care Coordination, Transportation, Hospice  Private pay costs discussed: Not applicable    Additional Information:  KERRI spoke with MD regarding readiness to discharge to [facility] on hospice today.  KERRI left message for Yissel regarding availability of bed for pt at [facility].    Spoke to pt daughter Samia who is in agreement with stretcher transport and with the discharge plan.    Left message for DON at [facility] requesting update on whether pt can be admitted to their facility today.   [facility] stretcher at 1500 today.    Hard scripts and orders sent to [facility].        Mago Griffin, [facility] Care[facility]

## 2021-03-01 NOTE — PLAN OF CARE
A&Ox4. VSS ex 2L O2 NC. LS clear. BS active, passing flatus through ostomy. Denies pain. Cornejo patent. Reviewed all medications and discharge instructions with pt. All questions about medications and discharge instructions answered. PICC removed by IV team. All belongings sent with pt. Scripts sent in packet for facility. [facility] [place] [facility]. Packet sent with EMS.

## 2021-03-01 NOTE — PROGRESS NOTES
Noted TPN discontinued on 2/27 and diet advanced to low fiber.  Plan is for patient to discharge back to Hospice facility today.  Will be available if more aggressive nutrition intervention desired.  Cynthia Mackey RD, LD, CNSC   Clinical Dietitian - [facility]

## 2021-03-01 NOTE — PROGRESS NOTES
[facility]    General Surgery  Daily Progress Note       Assessment and Plan:   Bella Saucedo is a 75 year old female with end-stage COPD admitted for small bowel obstruction, s/p exploratory laparotomy with extensive lysis of adhesions, POD 12. Prolonged ileus not unexpected due to extensive FABIO. NG removed 2/24. Zosyn discontinued 2/23 (total 6 days).     PLAN:  - Ostomy output slowed but still having stool and gas, continue bowel regimen  - Continue low residue diet, recommend this for 2 weeks at discharge  - Pain controlled without narcotics, on tylenol  - Up to chair with assist and increase activity as tolerated   - Continue Protonix  - Hospice when medically able, discussed with care corrdinator (bed available 3/1)  - Med mngt per hospitalist, much appreciate your help     DISPOSITION:  - Okay for discharge to hospice from surgical perspective.     FOLLOW UP:  - Ok to remove staples at facility in 1 week. Follow up in 2 weeks with Dr. Fall if able.  - Discharge instructions on chart, no narcotics prescribed        Interval History:   Bella Saucedo is seen this morning on surgical rounds. She states she does not feel well this morning because of her breathing. She denies abdominal pain and is eager for discharge to hospice today. She denies nausea or vomiting with low residue diet started yesterday. Ostomy output slowed yesterday and now transitioned to soft brown stool. No output recorded by nursing staff yesterday, but there is scant stool output in appliance and gas.         Physical Exam:   Temp: 97.9  F (36.6  C) Temp src: Oral BP: 105/44 Pulse: 67   Resp: 22 SpO2: 95 % O2 Device: Nasal cannula Oxygen Delivery: 3 LPM    I/O last 3 completed shifts:  In: 550 [P.O.:550]  Out: 580 [Urine:580]    GENERAL: VS reviewed, alert, oriented, no acute distress  LUNGS: 3 LPM, some increased respiratory effort, no able to speak in full sentences  ABDOMEN:  Soft, nontender, non-distended  and good bowel sounds, ostomy appliance bag removed, scant soft brown stool in bag as well as gas, digitized stoma revealing more soft stool  INCISION: Clean, dry, and intact, staples in place. No surrounding erythema.  EXTREMITIES: Moving all extremities, no gross deformities  NEUROLOGICAL: Grossly non-focal, mood & affect appropriate    Data   Recent Labs   Lab 03/01/21  0600 02/27/21  0520 02/26/21  0620 02/25/21  0600 02/24/21  0518   WBC  --   --   --  13.8* 14.5*   HGB  --   --  10.1* 10.6* 11.3*   MCV  --   --   --  99 97   PLT  --   --   --  251 258     --  144 143 141   POTASSIUM 3.9 3.9 3.9 3.9 3.8   CHLORIDE 103  --  106 105 104   CO2 39*  --  40* 39* 38*   BUN 18  --  30 30 29   CR 0.49*  --  0.40* 0.41* 0.51*   ANIONGAP <1*  --  <1* <1* <1*   MARILU 8.6  --  8.1* 8.0* 8.3*   *  --  114* 167* 143*   ALBUMIN 2.3*  --   --  2.1*  --    PROTTOTAL 5.3*  --   --  4.6*  --    BILITOTAL 0.6  --   --  0.3  --    ALKPHOS 103  --   --  69  --    ALT 71*  --   --  79*  --    AST 15  --   --  16  --        Marina Mcfarlane PA-C

## 2021-03-01 NOTE — DISCHARGE SUMMARY
[facility]  Hospitalist Discharge Summary      Date of Admission:  2/12/2021  Date of Discharge:  3/1/2021  Discharging Provider: Eugene William,       Discharge Diagnoses   Mechanical Small Bowel Obstruction s/p  EXP laparotomy with extensive lysis of adhesions on 2/17/21  Acute post-op blood loss anemia  Acute metabolic encephalopathy with likely super impoased hospital delirium   End-Stage COPD   Acute copd exacerbation   Acute on chronic hypoxic/hypercapnic respiratory failure   Chronic Indwelling grant   Coronary artery disease, Hx NSTEMI  Hypertension  Heart failure with preserved ejection fraction  Type 2 diabetes mellitus  Hx stabe IIB colon cancer 2016 s/p left hemicolectomy and colostomy      Follow-ups Needed After Discharge   Follow-up Appointments     Follow Up and recommended labs and tests      Follow up with hospice as planned         Follow-up and recommended labs and tests       Follow up with Dr. Fall in 2 weeks if able. Call [phone] to schedule   an appointment or if you have any concerns. We are located at [address] [place] [address].           Unresulted Labs Ordered in the Past 30 Days of this Admission     No orders found from 1/13/2021 to 2/13/2021.        Discharge Disposition   Discharged to long-term care facility  Condition at discharge: Terminal - patient discharging on hospice     Hospital Course   Bella Saucedo is a 75 year old female with complex past medical history including end-stage COPD for which she is enrolled in hospice, she is now admitted on 2/12/2021 with small bowel obstruction.      Mechanical Small Bowel Obstruction s/p  EXP laparotomy with extensive lysis of adhesions on 2/17/21  Acute blood loss anemia, expected postop  Pt presented to the ED from her nursing home because she had no stool output or gas in her ostomy bag since at least Wednesday 2/10. Her abdomen is quite distended. CT scan was obtained  and showed evidence for mechanical small bowel obstruction with marked dilatation of numerous small bowel loops throughout the abdomen and pelvis extending up to the right upper quadrant in the distal small bowel where there is transition point to completely  decompressed loops likely related to adhesions. No evidence of bowel wall thickening or perforation. The patient is not currently in any discomfort.   Patient was unenrolled from hospice and admitted and underwent surgery.  Main goal per family is for patient to be able to eat.  - General surgery following -> s/p exploratory laparotomy with extensive lysis of adhesions  - NG tube placed by EMS, continue to low intermittent suction.  NG tube discontinued on 2/24 as having minimal output over last few days.  - IV Zosyn for now -> WBC down trending.  Completed IV Zosyn course on 2/23/2021.    - TPN, placed PICC 02/19/2020  - Maintenance IVF stopped  - Pain control PRN [IV Dilaudid, IV Ativan available as needed for pain and anxiety].  Resumed as needed SL/p.o. Ativan and SL morphine for symptom management.  -Hb postop stable between 10 and 11.  -Started on clear liquid diet on 2/25 per surgery.  Advance to full liquid diet on 2/26.  Started on low residue diet on 2/27.  TPN will be weaned off on 2/27.  -Should be ready to discharge back to facility with hospice on 03/01     Acute metabolic encephalopathy,?  Hospital delirium  Patient is anxious and confused at times.  Suspect secondary to prolonged hospitalization, surgery, narcotics and anesthesia.  Unclear what her baseline was.  Do see mention of confusion during prior hospitalization in the chart.  -Overall goal appears to be comfort.  Expect confusion/sedation while trying to achieve patient comfort in postop setting.  -Frequent reorientation.  -Delirium precautions.     Goals of Care   Pt with end stage COPD, enrolled in hospice, who was admitted for a SBO, now no longer in hospice.  Being discharged back on  "hospice     End-Stage COPD   Acute copd exacerbation   Acute on chronic hypoxic/hypercapnic respiratory failure   Pt had recent admission for acute on chronic hypoxemic/hypercapnic respiratory failure 2/2 CAP and COPD exacerbation requiring intubation. According to recent NH visits she has \"severe dyspnea\" at baseline. \"Spends most of her day in bed.\" She is on 2-3L of supplemental O2 at baseline.  - Goal is to discharge back to [facility].  Clarified goals with patient's granddaughter, Eugenia.  She states that their main goal is for patient to be able to eat.  She will be reenrolled in hospice at discharge.  - Continue PTA Trelegy and Duonebs PRN   - Prednisone 10mg PTA continued  On iv solumedrol 125mcg daily and now weaned to 20 mg daily.  Switch back to 10 mg p.o. prednisone on 2/28.  PRN P.O./SL/IV ativan ordered for anxiety.  -Currently on Oxymizer.  Wean as able.      Chronic Indwelling grant  Pt had pre-existing grant cather (unclear reason). She does have WBCs and few bacteria but no LE. No clinical evidence of infection   - Culture negative       Coronary artery disease, Hx NSTEMI  Hypertension  Heart failure with preserved ejection fraction  -Does not appear to have been on Lasix PTA.  - ASA was discontinued   -Check BNP [464 on 2/24].No recent ECHO on file.     Type 2 diabetes mellitus, last A1c 6.3 on 2/12/2021.  -Currently on HD SSI every 4 hours while on TPN.  -Insulin to be discontinued after TPN is weaned off.  She will not be discharged on insulin.     Hx stabe IIB colon cancer 2016 s/p left hemicolectomy and colostomy  - WOC consulted     Other Noted History per chart review  Anxiety  GERD   Lung nodule  Rt MCA aneurysm s/p coil embolization 2011  PCOM aneurysm rupture s/p coil embolization 2010  Subarachnoid hemorrhage       Consultations This Hospital Stay   SURGERY GENERAL IP CONSULT  CARE MANAGEMENT / SOCIAL WORK IP CONSULT  PALLIATIVE CARE ADULT IP " CONSULT  WOUND OSTOMY CONTINENCE NURSE  IP CONSULT  PHYSICAL THERAPY ADULT IP CONSULT  SOCIAL WORK IP CONSULT  VASCULAR ACCESS ADULT IP CONSULT  PHARMACY/NUTRITION TO START AND MANAGE TPN  PHARMACY IP CONSULT  PHARMACY IP CONSULT    Code Status   No CPR- Do NOT Intubate    Time Spent on this Encounter   I, Eugene William DO, personally saw the patient today and spent greater than 30 minutes discharging this patient.       Eugene William DO  [place] [id number] [facility]  [address] [place] [address]  Phone: [phone]  ______________________________________________________________________    Physical Exam   Vital Signs: Temp: 97.9  F (36.6  C) Temp src: Oral BP: 113/48 Pulse: 85   Resp: 22 SpO2: 90 % O2 Device: Nasal cannula Oxygen Delivery: 3 LPM  Weight: 140 lbs 13.98 oz  General Appearance: Resting comfortably. NAD   Respiratory: No respiratory distress and speaking in complete sentences  Cardiovascular: RRR   GI: Soft.  Non-distended  Skin: No obvious rashes or cyanosis  Other: Alert.  Moving all extremities grossly         Primary Care Physician   Tonja Bocanegra    Discharge Orders      Activity    Your activity upon discharge: activity as tolerated. No heavy lifting > 15 lbs or strenuous exercise x 4 weeks. No driving or alcohol while on pain meds. Wear abdominal binder for another week for comfort.     Follow-up and recommended labs and tests     Follow up with Dr. Fall in 2 weeks if able. Call [phone] to schedule an appointment or if you have any concerns. We are located at [address] [place] [address] [address], [place] [address].     Wound care and dressings    Instructions to care for your wound at home: keep wound(s) clean and dry. You may shower, but do not soak incisions x 2 weeks.  Apply dry dressing as needed.  Please remove staples on 3/10/21.     General info for SNF    Length of Stay Estimate: Long Term Care  Condition at Discharge: Stable  Level of  care:skilled   Admission H&P remains valid and up-to-date: Yes  Recent Chemotherapy: N/A  Use Nursing Home Standing Orders: Yes     Mantoux instructions    Give two-step Mantoux (PPD) Per Facility Policy Yes     Reason for your hospital stay    Bowel obstruction     Follow Up and recommended labs and tests    Follow up with hospice as planned     Diet    Follow this diet upon discharge: low residue diet       Significant Results and Procedures   Most Recent 3 CBC's:  Recent Labs   Lab Test 02/26/21  0620 02/25/21  0600 02/24/21  0518 02/21/21  0515   WBC  --  13.8* 14.5* 11.7*   HGB 10.1* 10.6* 11.3* 10.9*   MCV  --  99 97 98   PLT  --  251 258 220     Most Recent 3 BMP's:  Recent Labs   Lab Test 03/01/21  0600 02/27/21  0520 02/26/21  0620 02/25/21  0600     --  144 143   POTASSIUM 3.9 3.9 3.9 3.9   CHLORIDE 103  --  106 105   CO2 39*  --  40* 39*   BUN 18  --  30 30   CR 0.49*  --  0.40* 0.41*   ANIONGAP <1*  --  <1* <1*   MARILU 8.6  --  8.1* 8.0*   *  --  114* 167*   ,   Results for orders placed or performed during the hospital encounter of 02/12/21   XR Chest Port 1 View    Narrative    XR CHEST PORT 1 VW 2/12/2021 11:29 AM    HISTORY: evaluate pna    COMPARISON: CT chest dated processes 31/20      Impression    IMPRESSION: Feeding tube seen coursing below the diaphragm with  nonvisualization of the distal tip. Some atelectasis at the lung  bases. No pleural effusion. No focal consolidation. No pneumothorax.  The cardiac mediastinal silhouette is within normal limits.    LAURA WONG MD   XR Colon Water Soluble    Narrative    COLON WATER SOLUBLE THERAPEUTIC February 13, 2021 1:06 PM     HISTORY: Small bowel obstruction. Gastrografin enema through the  colostomy.    COMPARISON: None.    FLUOROSCOPY TIME: .4 minutes.   SPOT IMAGES OR CINE RUNS: Four.    FINDINGS: The rectal tube was inserted to the left lower quadrant  ostomy. Gastrografin was instilled, contrast made it to the hepatic  flexure.  Given significant pain, the patient was unable to do any  rotational images. Spot images were obtained which shows stool in the  colon. Contrast did not make it to the small bowel.       Impression    IMPRESSION: Gastrografin enema through the patient's left lower  quadrant ostomy shows filling of the colon to the level of the hepatic  flexure. Contrast did not make it to the small bowel.    MARIA LUZ BORJAS,    XR Abdomen 1 View    Narrative    XR ABDOMEN 1 VW   2/16/2021 10:26 AM     HISTORY: bowel obstruction    COMPARISON: CT abdomen and pelvis 2/11/2021    FINDINGS:  Multiple dilated small bowel loops throughout the abdomen  and pelvis consistent with small bowel obstruction. Degree of small  bowel dilatation is similar to prior CT. Limited evaluation for free  air due to supine technique. Enteric tube is no longer present.     RONNIE ORTEGA MD   XR Chest Port 1 View    Narrative    CHEST ONE VIEW PORTABLE   2/17/2021 6:15 PM     HISTORY: Respiratory distress.    COMPARISON: Chest x-ray 2/12/2021.      Impression    IMPRESSION: Portable chest. Lungs are clear. Heart is normal in size.  No pneumothorax. No definite pleural effusions. Nasogastric tube  extends below the left hemidiaphragm, presumably in the stomach.    OMAIRA CALVIN MD   XR Abdomen Port 1 View    Narrative    ABDOMEN ONE VIEW PORTABLE February 18, 2021 10:20 AM     HISTORY: NG tube verify placement.    COMPARISON: None.       Impression    IMPRESSION: NG tube tip in left upper quadrant in the expected  location of the stomach. Small amount of stool.  Nonobstructed bowel  gas pattern.    DELVIN CAIN MD   XR Abdomen Port 1 View    Narrative    ABDOMEN ONE VIEW PORTABLE February 23, 2021 11:05 AM     HISTORY: Portable to confirm correct NG tube placement.    COMPARISON: March 18, 2020.       Impression    IMPRESSION: Nasogastric tube tip minimally into the left upper  quadrant in the expected location of the stomach, consider  advancement.  Small amount of stool.  Nonobstructed bowel gas pattern.    DELVIN CAIN MD       Discharge Medications   Current Discharge Medication List      START taking these medications    Details   ondansetron (ZOFRAN-ODT) 4 MG ODT tab Take 1 tablet (4 mg) by mouth every 6 hours as needed for nausea or vomiting  Qty:      Associated Diagnoses: Malignant neoplasm of colon, unspecified part of colon (H)      pantoprazole (PROTONIX) 40 MG EC tablet Take 1 tablet (40 mg) by mouth every morning (before breakfast)  Qty:      Associated Diagnoses: Malignant neoplasm of colon, unspecified part of colon (H)         CONTINUE these medications which have CHANGED    Details   LORazepam (LORAZEPAM INTENSOL) 2 MG/ML (HIGH CONC) solution Take 0.5 mLs (1 mg) by mouth every 2 hours as needed for agitation or anxiety  Qty: 30 mL, Refills: 0    Comments: Refills by hospice  Associated Diagnoses: Malignant neoplasm of colon, unspecified part of colon (H)      morphine sulfate HIGH CONCENTRATE (ROXANOL) 20 mg/mL (HIGH CONC) solution Take 0.25 mLs (5 mg) by mouth every hour as needed for shortness of breath / dyspnea or breakthrough pain  Qty: 15 mL, Refills: 0    Comments: Refills by hospice  Associated Diagnoses: Malignant neoplasm of colon, unspecified part of colon (H)         CONTINUE these medications which have NOT CHANGED    Details   acetaminophen (TYLENOL) 325 MG tablet Take 650 mg by mouth every 4 hours as needed for pain       albuterol (PROAIR HFA/PROVENTIL HFA/VENTOLIN HFA) 108 (90 Base) MCG/ACT inhaler Inhale 2 puffs into the lungs every 4 hours as needed for shortness of breath / dyspnea or wheezing    Comments: Pharmacy may dispense brand covered by insurance (Proair, or proventil or ventolin or generic albuterol inhaler)      alum & mag hydroxide-simethicone (MAALOX) 200-200-20 MG/5ML SUSP suspension Take 30 mLs by mouth every 6 hours as needed for indigestion      citalopram (CELEXA) 40 MG tablet Take 40 mg by mouth daily        Fluticasone-Umeclidin-Vilanterol (TRELEGY ELLIPTA) 100-62.5-25 MCG/INH oral inhaler Inhale 1 puff into the lungs daily      guaiFENesin (MUCINEX) 600 MG 12 hr tablet Take 1,200 mg by mouth 2 times daily      !! Menthol, Topical Analgesic, (BIOFREEZE) 4 % GEL Externally apply topically 2 times daily L Shoulder      !! Menthol, Topical Analgesic, (BIOFREEZE) 4 % GEL Externally apply topically 2 times daily as needed      polyethylene glycol (MIRALAX) 17 g packet Take 1 packet by mouth daily      predniSONE (DELTASONE) 10 MG tablet Take 10 mg by mouth daily      senna-docusate (SENOKOT-S/PERICOLACE) 8.6-50 MG tablet Take 2 tablets by mouth 2 times daily    Associated Diagnoses: Constipation, unspecified constipation type      Vitamin D, Cholecalciferol, 25 MCG (1000 UT) CAPS Take 4,000 Int'l Units by mouth daily      ipratropium - albuterol 0.5 mg/2.5 mg/3 mL (DUONEB) 0.5-2.5 (3) MG/3ML neb solution Take 1 vial by nebulization 3 times daily as needed for shortness of breath / dyspnea or wheezing       !! - Potential duplicate medications found. Please discuss with provider.      STOP taking these medications       azithromycin (ZITHROMAX) 250 MG tablet Comments:   Reason for Stopping:         LORazepam (ATIVAN) 0.5 MG tablet Comments:   Reason for Stopping:         magnesium 250 MG tablet Comments:   Reason for Stopping:             Allergies   Allergies   Allergen Reactions     Nitroglycerin Anxiety and Other (See Comments)     Mild headache, severe anxiety  Mild headache, severe anxiety

## 2021-03-01 NOTE — PLAN OF CARE
Care Management Discharge Note    Discharge Date: 03/01/21(LTC)       Discharge Disposition: Long Term Care, Hospice, [facility]    Discharge Services: Other (see comment)(Hospice)    Discharge DME: None    Discharge Transportation: other (see comments)([facility])    Private pay costs discussed: non-covered expenses, daughter making $5,000 deposit [facility] that they will hold until MA application completed    PAS Confirmation Code:  See previous note  Patient/family educated on Medicare website which has current facility and service quality ratings: no    Education Provided on the Discharge Plan:  Yes  Persons Notified of Discharge Plans: Artur Gracia  Patient/Family in Agreement with the Plan: yes    Handoff Referral Completed:     Additional Information: [facility] stretcher arranged due to pt inability to sit for long period without supervision and her anxiety and disorientation         Mago Griffin, LICSW

## 2021-03-01 NOTE — PROGRESS NOTES
KERRI MANNING received phone call from KERRI at [facility].  They have been in contact with the [facility] today and [facility] stated to hospice SW that there is a problem with pt MA application due to delays in receiving paperwork since the pt daughter was hospitalized.  KERRI spoke with Eleuterio Dey and explained that [facility] was unaware that there was a potential financial issue.  Yissel will be contacting [facility] to clarify and will return call to KERRI.    ADDENDUM:   Spoke with pt daughter.  KERRI told pt daughter that KERRI had spoken with [facility] and [facility] will need either MA application to be completed and pending, or to have a $5,000 deposit.  Daughter is upset that pt might not be able to discharge today, and that pt really wants to go to the [facility], as it is familiar to pt and she knows people there.  D[facility] in [facility] but that daughter is not sure that daughter personally has $5,000, as pt has no assets left at this time.  KERRI spoke with yaya Dey for [employer].  She states that if daughter put a $5,000 deposit, it would be refunded when MA approved.  Yissel states that pt daughter filled out MA application but did not provide necessary proofs.  KERRI requested that someone from [facility] business office reach out to pt daughter to explain exactly what is required for proofs.  Yissel states that pt can transfer to [facility] today ONLY IF daughter has $5,000 deposit.   ADDENDUM:  Daughter explains that she worked out arrangements with [facility].  yaya Dey for [facility] confirms this.  KERRI spoke to [facility] and informed them that pt WILL be transferring to [facility] today.

## 2021-03-03 NOTE — PROGRESS NOTES
[place] [facility]  Chief Complaint   Patient presents with     Hospital F/U     [facility] 2/12/2021 - 3/1/2021      [place] Medical Record Number: [id number]  Place of Service where encounter took place: THE [facility] AT [facility] () [[id number]]   PRIMARY CARE PROVIDER AND CLINIC: Tonja Bocanegra, KASHMIR CNP, [address] [place] [address] [address] / [place] [address]; Phone: [phone]; Fax: [phone]    Bella Saucedo is a 75 year old (1945), re-admitted to the above facility from  [facility]. Hospital stay 2/12/21 - 3/1/21.  Admitted to this facility for rehab, medical management and nursing care.    HPI:    HPI information obtained from: facility chart records, facility staff, patient report and [facility] chart review.     Patient recently hospitalized for SBO. She had an exploratory lap with extensive lysis of adhesions. Bowel rest following surgery with TPN advanced to clear liquids and now low residue diet. She had some post of delirium and is now back to baseline. Today she is requesting oatmeal and eggs. Minimal intake since back to LTC. D/W dietician and Hospice, will advance slowly per patient request. Hospice has started Augmentin for presumed right arm cellulitis. No warmth or pain.         CODE STATUS/ADVANCE DIRECTIVES DISCUSSION: No CPR - Do NOT Intubate  Patient's living condition: lives in a skilled nursing facility  ALLERGIES: Nitroglycerin  PAST MEDICAL HISTORY:  has a past medical history of CAD (coronary artery disease), Cerebral aneurysm, nonruptured, Colon cancer (H), COPD (chronic obstructive pulmonary disease) (H), and Subarachnoid hemorrhage (H).  PAST SURGICAL HISTORY:   has a past surgical history that includes hemicolectomy, rt/lt and Laparoscopy diagnostic (general) (N/A, 2/17/2021).  FAMILY HISTORY: family history includes Cancer in her mother and sister; Diabetes in her brother.  SOCIAL HISTORY:   reports that she quit smoking about 4  months ago. Her smoking use included cigarettes. She smoked 1.00 pack per day. She does not have any smokeless tobacco history on file.    Post Discharge Medication Reconciliation Status: discharge medications reconciled, continue medications without change    Current Outpatient Medications   Medication Sig Dispense Refill     acetaminophen (TYLENOL) 325 MG tablet Take 650 mg by mouth every 4 hours as needed for pain        albuterol (PROAIR HFA/PROVENTIL HFA/VENTOLIN HFA) 108 (90 Base) MCG/ACT inhaler Inhale 2 puffs into the lungs every 4 hours as needed for shortness of breath / dyspnea or wheezing       alum & mag hydroxide-simethicone (MAALOX) 200-200-20 MG/5ML SUSP suspension Take 30 mLs by mouth every 6 hours as needed for indigestion       citalopram (CELEXA) 40 MG tablet Take 40 mg by mouth daily        Fluticasone-Umeclidin-Vilanterol (TRELEGY ELLIPTA) 100-62.5-25 MCG/INH oral inhaler Inhale 1 puff into the lungs daily       guaiFENesin (MUCINEX) 600 MG 12 hr tablet Take 1,200 mg by mouth 2 times daily       ipratropium - albuterol 0.5 mg/2.5 mg/3 mL (DUONEB) 0.5-2.5 (3) MG/3ML neb solution Take 1 vial by nebulization 3 times daily as needed for shortness of breath / dyspnea or wheezing       LORazepam (LORAZEPAM INTENSOL) 2 MG/ML (HIGH CONC) solution Take 0.5 mLs (1 mg) by mouth every 2 hours as needed for agitation or anxiety 30 mL 0     Menthol, Topical Analgesic, (BIOFREEZE) 4 % GEL Externally apply topically 2 times daily L Shoulder       Menthol, Topical Analgesic, (BIOFREEZE) 4 % GEL Externally apply topically 2 times daily as needed       morphine sulfate HIGH CONCENTRATE (ROXANOL) 20 mg/mL (HIGH CONC) solution Take 0.25 mLs (5 mg) by mouth every hour as needed for shortness of breath / dyspnea or breakthrough pain 15 mL 0     ondansetron (ZOFRAN-ODT) 4 MG ODT tab Take 1 tablet (4 mg) by mouth every 6 hours as needed for nausea or vomiting       pantoprazole (PROTONIX) 40 MG EC tablet Take 1 tablet  "(40 mg) by mouth every morning (before breakfast)       polyethylene glycol (MIRALAX) 17 g packet Take 1 packet by mouth daily       predniSONE (DELTASONE) 10 MG tablet Take 10 mg by mouth daily       senna-docusate (SENOKOT-S/PERICOLACE) 8.6-50 MG tablet Take 2 tablets by mouth 2 times daily       Vitamin D, Cholecalciferol, 25 MCG (1000 UT) CAPS Take 4,000 Int'l Units by mouth daily           ROS:  4 point ROS including Respiratory, CV, GI and , other than that noted in the HPI,  is negative    Vitals:  /64   Pulse 66   Temp 97.1  F (36.2  C)   Resp 20   Ht 1.626 m (5' 4\")   Wt 59 kg (130 lb)   SpO2 98%   BMI 22.31 kg/m    Exam:  GENERAL APPEARANCE:  Alert, frail and elderly appearing  RESP:  diminished breath sounds t/o, with mild exp wheezing, using accessorary muscles  CV:  regular rate and rhythm, no murmur, rub, or gallop  ABDOMEN:  mild distention, + BS's, mild diffuse tenderness  SKIN:  no rashes or lesions  PSYCH:  oriented X 3, affect and mood normal    Lab/Diagnostic data:    Most Recent 3 CBC's:  Recent Labs   Lab Test 02/26/21  0620 02/25/21  0600 02/24/21  0518 02/21/21  0515   WBC  --  13.8* 14.5* 11.7*   HGB 10.1* 10.6* 11.3* 10.9*   MCV  --  99 97 98   PLT  --  251 258 220     Most Recent 3 BMP's:  Recent Labs   Lab Test 03/01/21  0600 02/27/21  0520 02/26/21  0620 02/25/21  0600     --  144 143   POTASSIUM 3.9 3.9 3.9 3.9   CHLORIDE 103  --  106 105   CO2 39*  --  40* 39*   BUN 18  --  30 30   CR 0.49*  --  0.40* 0.41*   ANIONGAP <1*  --  <1* <1*   MARILU 8.6  --  8.1* 8.0*   *  --  114* 167*     Most Recent 2 LFT's:  Recent Labs   Lab Test 03/01/21  0600 02/25/21  0600   AST 15 16   ALT 71* 79*   ALKPHOS 103 69   BILITOTAL 0.6 0.3     Most Recent TSH and T4:  Recent Labs   Lab Test 12/24/20  1245   TSH 1.24     Most Recent Anemia Panel:  Recent Labs   Lab Test 02/26/21  0620 02/25/21  0600 12/24/20  1245 12/24/20  1245   WBC  --  13.8*   < > 10.9   HGB 10.1* 10.6*   < > " 14.4   HCT  --  34.8*   < > 46.6   MCV  --  99   < > 99   PLT  --  251   < > 267   B12  --   --   --  680   FOLIC  --   --   --  14.5    < > = values in this interval not displayed.       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 Patient wanting oatmeal, she is on hospice and goal is comfort. Ok to advance diet slowly per patient request    Mild protein-calorie malnutrition (H)  - dietician following, will allow patient to progress diet as goal is comfort    Chronic obstructive pulmonary disease with acute exacerbation (H)  Hospice care patient  - end stage lung disease  - continue prednisone, trelegy and prn albuterol  - ok to use prn morphine or ativan for resp distress    Swelling of joint of upper arm, right  - no warmth, mild ecchymosis  - will stop augmentin       Orders written by provider at facility  - discontinue grant  - discontinue augmentin  - discontinue BG checks      Electronically signed by:  KASHMIR Gonzalez CNP

## 2021-03-04 NOTE — TELEPHONE ENCOUNTER
[facility] Nurse Triage Telephone Note    Provider: Tonja MARLOW CNP  Facility: [facility]  Facility Type:  TCU    Caller: Keyla  Call Back Number: [phone]    Allergies:    Allergies   Allergen Reactions     Nitroglycerin Anxiety and Headache        Reason for call: Nurse calling on behalf of a pharmacy concern about duplication of therapy related to her inhalers.  Currently receiving Albuterol inhaler 2 puffs Q 4 hours PRN, Duo Neb Q 7 hours PRN, Trelegy ellipta 1 puff daily, Prednisone 10mg daily.  Of note, prior to last hospitalization, patient was receiving Duo nebs scheduled.  Patient is also on [facility] and was seen by the hospice provider yesterday and a med review was performed with no changes or concerns.      Verbal Order/Direction given by Provider: On-call provider aware of pharmacy concerns.  No new orders at this time.      Provider giving Order:  Heidi MARLOW CNP    Verbal Order given to: Keyla Galvan RN

## 2021-03-10 NOTE — LETTER
"    3/10/2021        RE: Bella Saucedo  C/o Willie Saucedo  [address]  [facility] [address]        duplicate      [place] [facility]  Chief Complaint   Patient presents with     senior living Regulatory     [place] Medical Record Number:  [id number]  Place of Service where encounter took place:  [facility] () [[id number]]    HPI:    Bella Saucedo  is 75 year old (1945), who is being seen today for a federally mandated E/M visit.  HPI information obtained from: facility staff, patient report and [facility] chart review.       Brief Summary of hospital course:  - hospitalzied with [facility] SBO s/p ex lap with extensive lysis of adhesions,  Started on TPI, eventually weaned off.   Hospital stay complicated with :  *complicated with acute post op anemia, delirum,   * goals of cares discussed, enrolled in hospice  Under severe COPD       Today's concerns are:  -Hospice RN reports that the patient has been doing better in term of anxiety, stoma functioning, was enrolled in choice [facility] oaf January\".   - NH nurse has no concern  - Patient denies abdomina pain, nausea or vomiting. Reports surgical stitches removed. Reports appetite and BM are fine.   - Patient  reports breathing is ok, denies cough or wheezing. Reports   --------------------------------------    ALLERGIES:Nitroglycerin  PAST MEDICAL HISTORY:   has a past medical history of CAD (coronary artery disease), Cerebral aneurysm, nonruptured, Colon cancer (H), COPD (chronic obstructive pulmonary disease) (H), and Subarachnoid hemorrhage (H).  PAST SURGICAL HISTORY:   has a past surgical history that includes hemicolectomy, rt/lt and Laparoscopy diagnostic (general) (N/A, 2/17/2021).  FAMILY HISTORY: family history includes Cancer in her mother and sister; Diabetes in her brother.  SOCIAL HISTORY:  reports that she quit smoking about 5 months ago. Her smoking use included cigarettes. She smoked 1.00 pack per " day. She does not have any smokeless tobacco history on file.    MEDICATIONS:  Current Outpatient Medications   Medication Sig Dispense Refill     acetaminophen (TYLENOL) 325 MG tablet Take 650 mg by mouth every 4 hours as needed for pain        albuterol (PROAIR HFA/PROVENTIL HFA/VENTOLIN HFA) 108 (90 Base) MCG/ACT inhaler Inhale 2 puffs into the lungs every 4 hours as needed for shortness of breath / dyspnea or wheezing       alum & mag hydroxide-simethicone (MAALOX) 200-200-20 MG/5ML SUSP suspension Take 30 mLs by mouth every 6 hours as needed for indigestion       citalopram (CELEXA) 40 MG tablet Take 40 mg by mouth daily        Fluticasone-Umeclidin-Vilanterol (TRELEGY ELLIPTA) 100-62.5-25 MCG/INH oral inhaler Inhale 1 puff into the lungs daily       guaiFENesin (MUCINEX) 600 MG 12 hr tablet Take 1,200 mg by mouth 2 times daily       ipratropium - albuterol 0.5 mg/2.5 mg/3 mL (DUONEB) 0.5-2.5 (3) MG/3ML neb solution Take 1 vial by nebulization 3 times daily as needed for shortness of breath / dyspnea or wheezing       LORazepam (LORAZEPAM INTENSOL) 2 MG/ML (HIGH CONC) solution Take 0.5 mLs (1 mg) by mouth every 2 hours as needed for agitation or anxiety 30 mL 0     Menthol, Topical Analgesic, (BIOFREEZE) 4 % GEL Externally apply topically 2 times daily L Shoulder       Menthol, Topical Analgesic, (BIOFREEZE) 4 % GEL Externally apply topically 2 times daily as needed       morphine sulfate HIGH CONCENTRATE (ROXANOL) 20 mg/mL (HIGH CONC) solution Take 0.25 mLs (5 mg) by mouth every hour as needed for shortness of breath / dyspnea or breakthrough pain 15 mL 0     ondansetron (ZOFRAN-ODT) 4 MG ODT tab Take 1 tablet (4 mg) by mouth every 6 hours as needed for nausea or vomiting       pantoprazole (PROTONIX) 40 MG EC tablet Take 1 tablet (40 mg) by mouth every morning (before breakfast)       polyethylene glycol (MIRALAX) 17 g packet Take 1 packet by mouth daily       predniSONE (DELTASONE) 10 MG tablet Take 10 mg by  "mouth daily       senna-docusate (SENOKOT-S/PERICOLACE) 8.6-50 MG tablet Take 2 tablets by mouth 2 times daily       Vitamin D, Cholecalciferol, 25 MCG (1000 UT) CAPS Take 4,000 Int'l Units by mouth daily       Case Management:  I have reviewed the care plan and MDS and do agree with the plan. Patient's desire to return to the community is currently living in a community environment. Information reviewed:  Medications, vital signs, orders, and nursing notes.    ROS: 10 point ROS of systems including Constitutional, Eyes, Respiratory, Cardiovascular, Gastroenterology, Genitourinary, Integumentary, Musculoskeletal, Psychiatric were all negative except for pertinent positives noted in my HPI.    Vitals:  BP (!) 144/53   Pulse 64   Temp 97.6  F (36.4  C)   Resp 20   Ht 1.626 m (5' 4\")   Wt 57.2 kg (126 lb)   SpO2 95%   BMI 21.63 kg/m    Body mass index is 21.63 kg/m .  Exam:  GENERAL APPEARANCE:  in no distress, cooperative  ENT:  Mouth and posterior oropharynx normal, moist mucous membranes, oral mucosa moist, no lesion noted.   EYES:  EOMI, Pupil rounded and equal.  RESP:  lungs clear to auscultation   CV:  S1S2 audible, regular HR, no murmur appreciated.   ABDOMEN:  soft, NT/ND, BS audible. no mass appreciated on palpation.   M/S:   no joint deformity noted on observation.   SKIN: media surgical incision over abdomen, no stitches or erythema noted.   NEURO:   No NFD appreciated on observation. Hand  5/5 b/l  PSYCH:  normal insight, judgement and memory, affect and mood normal      Lab/Diagnostic data: Reviewed in the chart and EHR.          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   - analgesia optimal.       # HFpEF (H)  #CAD, history of NSTEMI  #Essential hypertension  - compensated.     Diet controlled diabetes (H):    Major depressive d/o, recurrent unspecified (H):  " on lexapro 40 mg. Max dose is 20 in patient >60 years. Also on lorazepam prn. Consider dose reduction to 20 mg, may add another agent if needed.     Severe COPD  Chronic respiratory hypoxic and hypercapnic failure (H)  Hospice care patient  - Appreciate collaboration with  hospice team for symptom management in collaboration with cares for maximum comfort at end-of-life.  - management approach is comfort care.     Vitamin D Deficiency: level 13 (Dec 2020). On Vit D3 4000 Units. No survival benefit. Recommend stopping.      Order: See above, otherwise, continue the rest of the current POC.       Electronically signed by:  Uzma Avila MD                Sincerely,        Uzma Avila MD

## 2021-03-23 NOTE — PROGRESS NOTES
"[place] [facility]  Chief Complaint   Patient presents with     long-term Regulatory     [place] Medical Record Number:  [id number]  [facility] () [[id number]]    HPI:    Bella Saucedo  is 75 year old (1945), who is being seen today for a federally mandated E/M visit.  HPI information obtained from: facility staff, patient report and [facility] chart review.       Brief Summary of hospital course:  - hospitalzied with [facility] SBO s/p ex lap with extensive lysis of adhesions,  Started on TPI, eventually weaned off.   Hospital stay complicated with :  *complicated with acute post op anemia, delirum,   * goals of cares discussed, enrolled in hospice  Under severe COPD       Today's concerns are:  -Hospice RN reports that the patient has been doing better in term of anxiety, stoma functioning, was enrolled in choice [facility] oaf January\".   - NH nurse has no concern  - Patient denies abdomina pain, nausea or vomiting. Reports surgical stitches removed. Reports appetite and BM are fine.   - Patient  reports breathing is ok, denies cough or wheezing. Reports   --------------------------------------    ALLERGIES:Nitroglycerin  PAST MEDICAL HISTORY:   has a past medical history of CAD (coronary artery disease), Cerebral aneurysm, nonruptured, Colon cancer (H), COPD (chronic obstructive pulmonary disease) (H), and Subarachnoid hemorrhage (H).  PAST SURGICAL HISTORY:   has a past surgical history that includes hemicolectomy, rt/lt and Laparoscopy diagnostic (general) (N/A, 2/17/2021).  FAMILY HISTORY: family history includes Cancer in her mother and sister; Diabetes in her brother.  SOCIAL HISTORY:  reports that she quit smoking about 5 months ago. Her smoking use included cigarettes. She smoked 1.00 pack per day. She does not have any smokeless tobacco history on file.    MEDICATIONS:  Current Outpatient Medications   Medication Sig " Dispense Refill     acetaminophen (TYLENOL) 325 MG tablet Take 650 mg by mouth every 4 hours as needed for pain        albuterol (PROAIR HFA/PROVENTIL HFA/VENTOLIN HFA) 108 (90 Base) MCG/ACT inhaler Inhale 2 puffs into the lungs every 4 hours as needed for shortness of breath / dyspnea or wheezing       alum & mag hydroxide-simethicone (MAALOX) 200-200-20 MG/5ML SUSP suspension Take 30 mLs by mouth every 6 hours as needed for indigestion       citalopram (CELEXA) 40 MG tablet Take 40 mg by mouth daily        Fluticasone-Umeclidin-Vilanterol (TRELEGY ELLIPTA) 100-62.5-25 MCG/INH oral inhaler Inhale 1 puff into the lungs daily       guaiFENesin (MUCINEX) 600 MG 12 hr tablet Take 1,200 mg by mouth 2 times daily       ipratropium - albuterol 0.5 mg/2.5 mg/3 mL (DUONEB) 0.5-2.5 (3) MG/3ML neb solution Take 1 vial by nebulization 3 times daily as needed for shortness of breath / dyspnea or wheezing       LORazepam (LORAZEPAM INTENSOL) 2 MG/ML (HIGH CONC) solution Take 0.5 mLs (1 mg) by mouth every 2 hours as needed for agitation or anxiety 30 mL 0     Menthol, Topical Analgesic, (BIOFREEZE) 4 % GEL Externally apply topically 2 times daily L Shoulder       Menthol, Topical Analgesic, (BIOFREEZE) 4 % GEL Externally apply topically 2 times daily as needed       morphine sulfate HIGH CONCENTRATE (ROXANOL) 20 mg/mL (HIGH CONC) solution Take 0.25 mLs (5 mg) by mouth every hour as needed for shortness of breath / dyspnea or breakthrough pain 15 mL 0     ondansetron (ZOFRAN-ODT) 4 MG ODT tab Take 1 tablet (4 mg) by mouth every 6 hours as needed for nausea or vomiting       pantoprazole (PROTONIX) 40 MG EC tablet Take 1 tablet (40 mg) by mouth every morning (before breakfast)       polyethylene glycol (MIRALAX) 17 g packet Take 1 packet by mouth daily       predniSONE (DELTASONE) 10 MG tablet Take 10 mg by mouth daily       senna-docusate (SENOKOT-S/PERICOLACE) 8.6-50 MG tablet Take 2 tablets by mouth 2 times daily       Vitamin  "D, Cholecalciferol, 25 MCG (1000 UT) CAPS Take 4,000 Int'l Units by mouth daily       Case Management:  I have reviewed the care plan and MDS and do agree with the plan. Patient's desire to return to the community is currently living in a community environment. Information reviewed:  Medications, vital signs, orders, and nursing notes.    ROS: 10 point ROS of systems including Constitutional, Eyes, Respiratory, Cardiovascular, Gastroenterology, Genitourinary, Integumentary, Musculoskeletal, Psychiatric were all negative except for pertinent positives noted in my HPI.    Vitals:  BP (!) 144/53   Pulse 64   Temp 97.6  F (36.4  C)   Resp 20   Ht 1.626 m (5' 4\")   Wt 57.2 kg (126 lb)   SpO2 95%   BMI 21.63 kg/m    Body mass index is 21.63 kg/m .  Exam:  GENERAL APPEARANCE:  in no distress, cooperative  ENT:  Mouth and posterior oropharynx normal, moist mucous membranes, oral mucosa moist, no lesion noted.   EYES:  EOMI, Pupil rounded and equal.  RESP:  lungs clear to auscultation   CV:  S1S2 audible, regular HR, no murmur appreciated.   ABDOMEN:  soft, NT/ND, BS audible. no mass appreciated on palpation.   M/S:   no joint deformity noted on observation.   SKIN: media surgical incision over abdomen, no stitches or erythema noted.   NEURO:   No NFD appreciated on observation. Hand  5/5 b/l  PSYCH:  normal insight, judgement and memory, affect and mood normal      Lab/Diagnostic data: Reviewed in the chart and EHR.          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   - analgesia optimal.       # HFpEF (H)  #CAD, history of NSTEMI  #Essential hypertension  - compensated.     Diet controlled diabetes (H):    Major depressive d/o, recurrent unspecified (H):  on lexapro 40 mg. Max dose is 20 in patient >60 years. Also on lorazepam prn. Consider dose reduction to 20 mg, may add " another agent if needed.     Severe COPD  Chronic respiratory hypoxic and hypercapnic failure (H)  Hospice care patient  - Appreciate collaboration with  hospice team for symptom management in collaboration with cares for maximum comfort at end-of-life.  - management approach is comfort care.     Vitamin D Deficiency: level 13 (Dec 2020). On Vit D3 4000 Units. No survival benefit. Recommend stopping.      Order: See above, otherwise, continue the rest of the current POC.       Electronically signed by:  Uzma Avila MD

## 2021-04-26 NOTE — PROGRESS NOTES
[facility] GERIATRIC SERVICES  [place] Medical Record Number: [id number]  Place of Service Where Encounter Took Place: THE [facility] AT [facility] () [[id number]]  Chief Complaint   Patient presents with     FVP Care Coordination - Health Plan or Product Change     Annual Comprehensive Nursing Home     HPI:    Bella Saucedo is a 75 year old (1945), who is being seen today for an annual comprehensive visit.     No staff concerns.   Patient doing well. Happy that family is now able to visit.   Snacking often during the day on sweets. Is having loose stools. NO abd pain.   Breathing and cough stable.   No other patient concerns.     ALLERGIES: Nitroglycerin  PAST MEDICAL HISTORY:  has a past medical history of CAD (coronary artery disease), Cerebral aneurysm, nonruptured, Colon cancer (H), COPD (chronic obstructive pulmonary disease) (H), and Subarachnoid hemorrhage (H).  PAST SURGICAL HISTORY:  has a past surgical history that includes hemicolectomy, rt/lt and Laparoscopy diagnostic (general) (N/A, 2/17/2021).    IMMUNIZATIONS:  Immunization History   Administered Date(s) Administered     FLU 6-35 months 10/23/2010, 10/25/2011     FLUAD -HD 65+ QUAD (G CLINIC ONLY) 12/16/2020     Flu 65+ Years 11/08/2017, 10/25/2018     Influenza (High Dose) 3 valent vaccine 09/11/2015     Influenza (IIV3) PF 10/23/2010, 10/25/2011     Mantoux Tuberculin Skin Test 02/06/2017     Pneumo Conj 13-V (2010&after) 11/08/2017     Pneumococcal 23 valent 10/23/2010, 05/13/2019     TDAP Vaccine (Adacel) 04/25/2011     Above immunizations pulled from [facility]. [facility]IC and facility records also reconciled. Outstanding information sent to  to update [facility].  Future immunizations are deferred as: not clinically appropriate given goals of care    Current Outpatient Medications   Medication Sig Dispense Refill     acetaminophen (TYLENOL) 325 MG tablet Take 650 mg by mouth every 4 hours as  needed for pain        albuterol (PROAIR HFA/PROVENTIL HFA/VENTOLIN HFA) 108 (90 Base) MCG/ACT inhaler Inhale 2 puffs into the lungs every 4 hours as needed for shortness of breath / dyspnea or wheezing       alum & mag hydroxide-simethicone (MAALOX) 200-200-20 MG/5ML SUSP suspension Take 30 mLs by mouth every 6 hours as needed for indigestion       citalopram (CELEXA) 40 MG tablet Take 40 mg by mouth daily        Fluticasone-Umeclidin-Vilanterol (TRELEGY ELLIPTA) 100-62.5-25 MCG/INH oral inhaler Inhale 1 puff into the lungs daily       guaiFENesin (MUCINEX) 600 MG 12 hr tablet Take 1,200 mg by mouth 2 times daily       ipratropium - albuterol 0.5 mg/2.5 mg/3 mL (DUONEB) 0.5-2.5 (3) MG/3ML neb solution Take 1 vial by nebulization 3 times daily as needed for shortness of breath / dyspnea or wheezing       LORazepam (LORAZEPAM INTENSOL) 2 MG/ML (HIGH CONC) solution Take 0.5 mLs (1 mg) by mouth every 2 hours as needed for agitation or anxiety 30 mL 0     Menthol, Topical Analgesic, (BIOFREEZE) 4 % GEL Externally apply topically 2 times daily L Shoulder       Menthol, Topical Analgesic, (BIOFREEZE) 4 % GEL Externally apply topically 2 times daily as needed       morphine sulfate HIGH CONCENTRATE (ROXANOL) 20 mg/mL (HIGH CONC) solution Take 0.25 mLs (5 mg) by mouth every hour as needed for shortness of breath / dyspnea or breakthrough pain 15 mL 0     ondansetron (ZOFRAN-ODT) 4 MG ODT tab Take 1 tablet (4 mg) by mouth every 6 hours as needed for nausea or vomiting       pantoprazole (PROTONIX) 40 MG EC tablet Take 1 tablet (40 mg) by mouth every morning (before breakfast)       polyethylene glycol (MIRALAX) 17 g packet Take 1 packet by mouth daily       predniSONE (DELTASONE) 10 MG tablet Take 10 mg by mouth daily       senna-docusate (SENOKOT-S/PERICOLACE) 8.6-50 MG tablet Take 2 tablets by mouth 2 times daily       Vitamin D, Cholecalciferol, 25 MCG (1000 UT) CAPS Take 4,000 Int'l Units by mouth daily       Case  "Management:  I have reviewed the facility/SNF care plan/MDS, including the falls risk, nutrition and pain screening. I also reviewed the current immunizations, and preventive care. .Future cancer screening is not clinically indicated secondary to age/goals of care Patient's desire to return to the community is not present. Current Level of Care is appropriate.    Advance Directive Discussion:    I reviewed the current advanced directives as reflected in EPIC, the POLST and the facility chart, and verified the congruency of orders.    Team Discussion:  I communicated with the appropriate disciplines involved with the Plan of Care: Nursing    Patient's goal is pain control and comfort.  Information reviewed: Medications, vital signs, orders, and nursing notes.    [facility]:  The health plan new enrollment has happened. I have reviewed the MDS, the preventative needs, and facility care plan. The level of care is appropriate. I have reviewed the code status/advanced directives.    ROS:  4 point ROS including Respiratory, CV, GI and , other than that noted in the HPI,  is negative    Vitals:  BP (!) 152/72   Pulse 77   Temp 97.4  F (36.3  C)   Resp 18   Ht 1.626 m (5' 4\")   Wt 60.3 kg (133 lb)   SpO2 91%   BMI 22.83 kg/m   Body mass index is 22.83 kg/m .  Exam:  GENERAL APPEARANCE:  Alert, in no distress  RESP:  respiratory effort and palpation of chest normal, auscultation of lungs diminished , no respiratory distress  CV:  Palpation and auscultation of heart done , rate and rhythm reg, systolic murmur, trace peripheral edema  ABDOMEN:  normal bowel sounds, soft, nontender, no hepatosplenomegaly or other masses  M/S:   Gait and station non ambuylatory, Digits and nails no concerns  SKIN:  Inspection and Palpation of skin and subcutaneous tissue no rashes or lesions to exposed skin  NEURO: 2-12 in normal limits and at patient's baseline  PSYCH:  insight and judgement, memory intact , affect and mood " Pleasant     Lab/Diagnostic data:   no routine labs, pt is on hospice    ASSESSMENT/PLAN     COPD, severe (H)  Depression, major, in remission (H)  Loose stools  Hospice care patient  Stable, on oxygen, has comfort meds available.   Recommend increase fiber to help with loose stools.   Continue to anticipate patient needs and provide cares that promote comfort    Electronically signed by:  KASHMIR Gonzalez CNP

## 2021-04-26 NOTE — LETTER
4/26/2021        RE: Bella Saucedo  C/o Willie Saucedo  [address]  [facility] [address]        [facility] GERIATRIC SERVICES  [place] Medical Record Number: [id number]  [facility] () [[id number]]  Chief Complaint   Patient presents with     FVP Care Coordination - Health Plan or Product Change     Annual Comprehensive Nursing Home     HPI:    Bella Saucedo is a 75 year old (1945), who is being seen today for an annual comprehensive visit.     No staff concerns.   Patient doing well. Happy that family is now able to visit.   Snacking often during the day on sweets. Is having loose stools. NO abd pain.   Breathing and cough stable.   No other patient concerns.     ALLERGIES: Nitroglycerin  PAST MEDICAL HISTORY:  has a past medical history of CAD (coronary artery disease), Cerebral aneurysm, nonruptured, Colon cancer (H), COPD (chronic obstructive pulmonary disease) (H), and Subarachnoid hemorrhage (H).  PAST SURGICAL HISTORY:  has a past surgical history that includes hemicolectomy, rt/lt and Laparoscopy diagnostic (general) (N/A, 2/17/2021).    IMMUNIZATIONS:  Immunization History   Administered Date(s) Administered     FLU 6-35 months 10/23/2010, 10/25/2011     FLUAD -HD 65+ QUAD (G CLINIC ONLY) 12/16/2020     Flu 65+ Years 11/08/2017, 10/25/2018     Influenza (High Dose) 3 valent vaccine 09/11/2015     Influenza (IIV3) PF 10/23/2010, 10/25/2011     Mantoux Tuberculin Skin Test 02/06/2017     Pneumo Conj 13-V (2010&after) 11/08/2017     Pneumococcal 23 valent 10/23/2010, 05/13/2019     TDAP Vaccine (Adacel) 04/25/2011     Above immunizations pulled from [facility]. MIIC and facility records also reconciled. Outstanding information sent to  to update [facility].  Future immunizations are deferred as: not clinically appropriate given goals of care    Current Outpatient Medications    Medication Sig Dispense Refill     acetaminophen (TYLENOL) 325 MG tablet Take 650 mg by mouth every 4 hours as needed for pain        albuterol (PROAIR HFA/PROVENTIL HFA/VENTOLIN HFA) 108 (90 Base) MCG/ACT inhaler Inhale 2 puffs into the lungs every 4 hours as needed for shortness of breath / dyspnea or wheezing       alum & mag hydroxide-simethicone (MAALOX) 200-200-20 MG/5ML SUSP suspension Take 30 mLs by mouth every 6 hours as needed for indigestion       citalopram (CELEXA) 40 MG tablet Take 40 mg by mouth daily        Fluticasone-Umeclidin-Vilanterol (TRELEGY ELLIPTA) 100-62.5-25 MCG/INH oral inhaler Inhale 1 puff into the lungs daily       guaiFENesin (MUCINEX) 600 MG 12 hr tablet Take 1,200 mg by mouth 2 times daily       ipratropium - albuterol 0.5 mg/2.5 mg/3 mL (DUONEB) 0.5-2.5 (3) MG/3ML neb solution Take 1 vial by nebulization 3 times daily as needed for shortness of breath / dyspnea or wheezing       LORazepam (LORAZEPAM INTENSOL) 2 MG/ML (HIGH CONC) solution Take 0.5 mLs (1 mg) by mouth every 2 hours as needed for agitation or anxiety 30 mL 0     Menthol, Topical Analgesic, (BIOFREEZE) 4 % GEL Externally apply topically 2 times daily L Shoulder       Menthol, Topical Analgesic, (BIOFREEZE) 4 % GEL Externally apply topically 2 times daily as needed       morphine sulfate HIGH CONCENTRATE (ROXANOL) 20 mg/mL (HIGH CONC) solution Take 0.25 mLs (5 mg) by mouth every hour as needed for shortness of breath / dyspnea or breakthrough pain 15 mL 0     ondansetron (ZOFRAN-ODT) 4 MG ODT tab Take 1 tablet (4 mg) by mouth every 6 hours as needed for nausea or vomiting       pantoprazole (PROTONIX) 40 MG EC tablet Take 1 tablet (40 mg) by mouth every morning (before breakfast)       polyethylene glycol (MIRALAX) 17 g packet Take 1 packet by mouth daily       predniSONE (DELTASONE) 10 MG tablet Take 10 mg by mouth daily       senna-docusate (SENOKOT-S/PERICOLACE) 8.6-50 MG tablet Take 2 tablets by mouth 2 times  "daily       Vitamin D, Cholecalciferol, 25 MCG (1000 UT) CAPS Take 4,000 Int'l Units by mouth daily       Case Management:  I have reviewed the facility/SNF care plan/MDS, including the falls risk, nutrition and pain screening. I also reviewed the current immunizations, and preventive care. .Future cancer screening is not clinically indicated secondary to age/goals of care Patient's desire to return to the community is not present. Current Level of Care is appropriate.    Advance Directive Discussion:    I reviewed the current advanced directives as reflected in EPIC, the POLST and the facility chart, and verified the congruency of orders.    Team Discussion:  I communicated with the appropriate disciplines involved with the Plan of Care: Nursing    Patient's goal is pain control and comfort.  Information reviewed: Medications, vital signs, orders, and nursing notes.    [facility]:  The health plan new enrollment has happened. I have reviewed the MDS, the preventative needs, and facility care plan. The level of care is appropriate. I have reviewed the code status/advanced directives.    ROS:  4 point ROS including Respiratory, CV, GI and , other than that noted in the HPI,  is negative    Vitals:  BP (!) 152/72   Pulse 77   Temp 97.4  F (36.3  C)   Resp 18   Ht 1.626 m (5' 4\")   Wt 60.3 kg (133 lb)   SpO2 91%   BMI 22.83 kg/m   Body mass index is 22.83 kg/m .  Exam:  GENERAL APPEARANCE:  Alert, in no distress  RESP:  respiratory effort and palpation of chest normal, auscultation of lungs diminished , no respiratory distress  CV:  Palpation and auscultation of heart done , rate and rhythm reg, systolic murmur, trace peripheral edema  ABDOMEN:  normal bowel sounds, soft, nontender, no hepatosplenomegaly or other masses  M/S:   Gait and station non ambuylatory, Digits and nails no concerns  SKIN:  Inspection and Palpation of skin and subcutaneous tissue no rashes or lesions to exposed skin  NEURO: 2-12 " in normal limits and at patient's baseline  PSYCH:  insight and judgement, memory intact , affect and mood Pleasant     Lab/Diagnostic data:   no routine labs, pt is on hospice    ASSESSMENT/PLAN     COPD, severe (H)  Depression, major, in remission (H)  Loose stools  Hospice care patient  Stable, on oxygen, has comfort meds available.   Recommend increase fiber to help with loose stools.   Continue to anticipate patient needs and provide cares that promote comfort    Electronically signed by:  KASHMIR Gonzalez CNP           Sincerely,        KASHMIR Gonzalez CNP

## 2021-05-12 NOTE — PROGRESS NOTES
[facility] GERIATRIC SERVICES  Chief Complaint   Patient presents with     [facility]     [place] Medical Record Number: [id number]  Place of Service Where Encounter Took Place: THE [facility] AT [facility] () [[id number]]    HPI:   Bella Saucedo is 75 year old (1945), who is being seen today for a federally mandated E/M visit.     No RN concerns reported.  Stoma was previously coming off daily, no recent concerns per patient.   Patient denies increased dyspnea or wheezing. Breathing is comfortable today.   Patient reports feeling tired, but took ativan last evening.   Denies pain.   No other patient concerns.     ALLERGIES: Nitroglycerin  PAST MEDICAL HISTORY:   Past Medical History:   Diagnosis Date     CAD (coronary artery disease)      Cerebral aneurysm, nonruptured      Colon cancer (H)     s/p colectomy and colostomy     COPD (chronic obstructive pulmonary disease) (H)      Subarachnoid hemorrhage (H)      PAST SURGICAL HISTORY:  has a past surgical history that includes hemicolectomy, rt/lt and Laparoscopy diagnostic (general) (N/A, 2/17/2021).  FAMILY HISTORY: family history includes Cancer in her mother and sister; Diabetes in her brother.  SOCIAL HISTORY:  reports that she quit smoking about 6 months ago. Her smoking use included cigarettes. She smoked 1.00 pack per day. She does not have any smokeless tobacco history on file.    MEDICATIONS:     Review of your medicines          Accurate as of May 13, 2021 12:02 PM. If you have any questions, ask your nurse or doctor.            CONTINUE these medicines which have NOT CHANGED      Dose / Directions   acetaminophen 325 MG tablet  Commonly known as: TYLENOL      Dose: 650 mg  Take 650 mg by mouth every 4 hours as needed for pain  Refills: 0     albuterol 108 (90 Base) MCG/ACT inhaler  Commonly known as: PROAIR HFA/PROVENTIL HFA/VENTOLIN HFA      Dose: 2 puff  Inhale 2 puffs into the lungs every 4 hours as needed for  shortness of breath / dyspnea or wheezing  Refills: 0     alum & mag hydroxide-simethicone 200-200-20 MG/5ML Susp suspension  Commonly known as: MAALOX      Dose: 30 mL  Take 30 mLs by mouth every 6 hours as needed for indigestion  Refills: 0     * Biofreeze 4 % Gel  Generic drug: Menthol (Topical Analgesic)      Externally apply topically 2 times daily L Shoulder  Refills: 0     * Biofreeze 4 % Gel  Generic drug: Menthol (Topical Analgesic)      Externally apply topically 2 times daily as needed  Refills: 0     celeXA 40 MG tablet  Generic drug: citalopram      Dose: 40 mg  Take 40 mg by mouth daily  Refills: 0     Fluticasone-Umeclidin-Vilanterol 100-62.5-25 MCG/INH oral inhaler  Commonly known as: TRELEGY ELLIPTA      Dose: 1 puff  Inhale 1 puff into the lungs daily  Refills: 0     guaiFENesin 600 MG 12 hr tablet  Commonly known as: MUCINEX      Dose: 1,200 mg  Take 1,200 mg by mouth 2 times daily  Refills: 0     ipratropium - albuterol 0.5 mg/2.5 mg/3 mL 0.5-2.5 (3) MG/3ML neb solution  Commonly known as: DUONEB      Dose: 1 vial  Take 1 vial by nebulization 3 times daily as needed for shortness of breath / dyspnea or wheezing  Refills: 0     LORazepam 2 MG/ML (HIGH CONC) solution  Commonly known as: LORazepam INTENSOL  Used for: Malignant neoplasm of colon, unspecified part of colon (H)      Dose: 1 mg  Take 0.5 mLs (1 mg) by mouth every 2 hours as needed for agitation or anxiety  Quantity: 30 mL  Refills: 0     morphine sulfate HIGH CONCENTRATE 20 mg/mL (HIGH CONC) solution  Commonly known as: ROXANOL  Used for: Malignant neoplasm of colon, unspecified part of colon (H)      Dose: 5 mg  Take 0.25 mLs (5 mg) by mouth every hour as needed for shortness of breath / dyspnea or breakthrough pain  Quantity: 15 mL  Refills: 0     ondansetron 4 MG ODT tab  Commonly known as: ZOFRAN-ODT  Used for: Malignant neoplasm of colon, unspecified part of colon (H)      Dose: 4 mg  Take 1 tablet (4 mg) by mouth every 6 hours as  "needed for nausea or vomiting  Quantity:    Refills: 0     pantoprazole 40 MG EC tablet  Commonly known as: PROTONIX  Used for: Malignant neoplasm of colon, unspecified part of colon (H)      Dose: 40 mg  Take 1 tablet (40 mg) by mouth every morning (before breakfast)  Quantity:    Refills: 0     polyethylene glycol 17 g packet  Commonly known as: MIRALAX      Dose: 1 packet  Take 1 packet by mouth daily  Refills: 0     predniSONE 10 MG tablet  Commonly known as: DELTASONE      Dose: 10 mg  Take 10 mg by mouth daily  Refills: 0     senna-docusate 8.6-50 MG tablet  Commonly known as: SENOKOT-S/PERICOLACE  Used for: Constipation, unspecified constipation type      Dose: 2 tablet  Take 2 tablets by mouth 2 times daily  Refills: 0     Vitamin D (Cholecalciferol) 25 MCG (1000 UT) Caps      Dose: 4,000 Int'l Units  Take 4,000 Int'l Units by mouth daily  Refills: 0         * This list has 2 medication(s) that are the same as other medications prescribed for you. Read the directions carefully, and ask your doctor or other care provider to review them with you.               Case Management: I have reviewed the care plan and MDS and do agree with the plan. Patient's desire to return to the community is not present. Information reviewed: Medications, vital signs, orders, and nursing notes.    ROS:  4 point ROS including Respiratory, CV, GI and , other than that noted in the HPI,  is negative    Vitals:  /63   Pulse 72   Temp 96.3  F (35.7  C)   Resp 18   Ht 1.626 m (5' 4\")   Wt 60.3 kg (133 lb)   SpO2 99%   BMI 22.83 kg/m    Body mass index is 22.83 kg/m .  Exam:  GENERAL APPEARANCE:  Alert, in no distress  RESP:  no respiratory distress, diminished breath sounds t/o, on supplemental oxygen  CV:  regular rate and rhythm, no murmur, rub, or gallop, no edema  ABDOMEN:  normal bowel sounds, soft, nontender, no hepatosplenomegaly or other masses, stoma pink, colostomy bag intatact  SKIN:  Inspection of skin and " subcutaneous tissue baseline  PSYCH:  oriented X 3, affect and mood normal    Lab/Diagnostic data:   not following routine labs, pt is on hospice    ASSESSMENT/PLAN  COPD, severe (H)  Hospice care patient  - end stage lung disease  - continue prednisone, trelegy and prn albuterol  - ok to use prn morphine or ativan for resp distress    Colostomy in place (H)  Status post exploratory laparotomy  - colostomy with brown stool, wound RN to manage   - no abd pain    Oropharyngeal dysphagia  Nausea and vomiting, intractability of vomiting not specified, unspecified vomiting type  - no epigastric pain, continue PPI  - occasional nausea, RN to update if persists, could add zofran    Depression, major, in remission (H)  - mood stable, patient does not want to change medications at this time  - continue celexa        Electronically signed by: KASHMIR Gonzalez CNP

## 2021-05-13 NOTE — LETTER
5/13/2021        RE: Bella Saucedo  C/o Willie Saucedo  [address]  [facility] [address] [facility]  Chief Complaint   Patient presents with     [facility] Medical Record Number: [id number]  Place of Service Where Encounter Took Place: [facility] AT [facility] () [[id number]]    HPI:   Bella Saucedo is 75 year old (1945), who is being seen today for a federally mandated E/M visit.     No RN concerns reported.  Stoma was previously coming off daily, no recent concerns per patient.   Patient denies increased dyspnea or wheezing. Breathing is comfortable today.   Patient reports feeling tired, but took ativan last evening.   Denies pain.   No other patient concerns.     ALLERGIES: Nitroglycerin  PAST MEDICAL HISTORY:   Past Medical History:   Diagnosis Date     CAD (coronary artery disease)      Cerebral aneurysm, nonruptured      Colon cancer (H)     s/p colectomy and colostomy     COPD (chronic obstructive pulmonary disease) (H)      Subarachnoid hemorrhage (H)      PAST SURGICAL HISTORY:  has a past surgical history that includes hemicolectomy, rt/lt and Laparoscopy diagnostic (general) (N/A, 2/17/2021).  FAMILY HISTORY: family history includes Cancer in her mother and sister; Diabetes in her brother.  SOCIAL HISTORY:  reports that she quit smoking about 6 months ago. Her smoking use included cigarettes. She smoked 1.00 pack per day. She does not have any smokeless tobacco history on file.    MEDICATIONS:     Review of your medicines          Accurate as of May 13, 2021 12:02 PM. If you have any questions, ask your nurse or doctor.            CONTINUE these medicines which have NOT CHANGED      Dose / Directions   acetaminophen 325 MG tablet  Commonly known as: TYLENOL      Dose: 650 mg  Take 650 mg by mouth every 4 hours as needed for pain  Refills: 0     albuterol 108 (90 Base) MCG/ACT inhaler  Commonly known as: PROAIR  HFA/PROVENTIL HFA/VENTOLIN HFA      Dose: 2 puff  Inhale 2 puffs into the lungs every 4 hours as needed for shortness of breath / dyspnea or wheezing  Refills: 0     alum & mag hydroxide-simethicone 200-200-20 MG/5ML Susp suspension  Commonly known as: MAALOX      Dose: 30 mL  Take 30 mLs by mouth every 6 hours as needed for indigestion  Refills: 0     * Biofreeze 4 % Gel  Generic drug: Menthol (Topical Analgesic)      Externally apply topically 2 times daily L Shoulder  Refills: 0     * Biofreeze 4 % Gel  Generic drug: Menthol (Topical Analgesic)      Externally apply topically 2 times daily as needed  Refills: 0     celeXA 40 MG tablet  Generic drug: citalopram      Dose: 40 mg  Take 40 mg by mouth daily  Refills: 0     Fluticasone-Umeclidin-Vilanterol 100-62.5-25 MCG/INH oral inhaler  Commonly known as: TRELEGY ELLIPTA      Dose: 1 puff  Inhale 1 puff into the lungs daily  Refills: 0     guaiFENesin 600 MG 12 hr tablet  Commonly known as: MUCINEX      Dose: 1,200 mg  Take 1,200 mg by mouth 2 times daily  Refills: 0     ipratropium - albuterol 0.5 mg/2.5 mg/3 mL 0.5-2.5 (3) MG/3ML neb solution  Commonly known as: DUONEB      Dose: 1 vial  Take 1 vial by nebulization 3 times daily as needed for shortness of breath / dyspnea or wheezing  Refills: 0     LORazepam 2 MG/ML (HIGH CONC) solution  Commonly known as: LORazepam INTENSOL  Used for: Malignant neoplasm of colon, unspecified part of colon (H)      Dose: 1 mg  Take 0.5 mLs (1 mg) by mouth every 2 hours as needed for agitation or anxiety  Quantity: 30 mL  Refills: 0     morphine sulfate HIGH CONCENTRATE 20 mg/mL (HIGH CONC) solution  Commonly known as: ROXANOL  Used for: Malignant neoplasm of colon, unspecified part of colon (H)      Dose: 5 mg  Take 0.25 mLs (5 mg) by mouth every hour as needed for shortness of breath / dyspnea or breakthrough pain  Quantity: 15 mL  Refills: 0     ondansetron 4 MG ODT tab  Commonly known as: ZOFRAN-ODT  Used for: Malignant  "neoplasm of colon, unspecified part of colon (H)      Dose: 4 mg  Take 1 tablet (4 mg) by mouth every 6 hours as needed for nausea or vomiting  Quantity:    Refills: 0     pantoprazole 40 MG EC tablet  Commonly known as: PROTONIX  Used for: Malignant neoplasm of colon, unspecified part of colon (H)      Dose: 40 mg  Take 1 tablet (40 mg) by mouth every morning (before breakfast)  Quantity:    Refills: 0     polyethylene glycol 17 g packet  Commonly known as: MIRALAX      Dose: 1 packet  Take 1 packet by mouth daily  Refills: 0     predniSONE 10 MG tablet  Commonly known as: DELTASONE      Dose: 10 mg  Take 10 mg by mouth daily  Refills: 0     senna-docusate 8.6-50 MG tablet  Commonly known as: SENOKOT-S/PERICOLACE  Used for: Constipation, unspecified constipation type      Dose: 2 tablet  Take 2 tablets by mouth 2 times daily  Refills: 0     Vitamin D (Cholecalciferol) 25 MCG (1000 UT) Caps      Dose: 4,000 Int'l Units  Take 4,000 Int'l Units by mouth daily  Refills: 0         * This list has 2 medication(s) that are the same as other medications prescribed for you. Read the directions carefully, and ask your doctor or other care provider to review them with you.               Case Management: I have reviewed the care plan and MDS and do agree with the plan. Patient's desire to return to the community is not present. Information reviewed: Medications, vital signs, orders, and nursing notes.    ROS:  4 point ROS including Respiratory, CV, GI and , other than that noted in the HPI,  is negative    Vitals:  /63   Pulse 72   Temp 96.3  F (35.7  C)   Resp 18   Ht 1.626 m (5' 4\")   Wt 60.3 kg (133 lb)   SpO2 99%   BMI 22.83 kg/m    Body mass index is 22.83 kg/m .  Exam:  GENERAL APPEARANCE:  Alert, in no distress  RESP:  no respiratory distress, diminished breath sounds t/o, on supplemental oxygen  CV:  regular rate and rhythm, no murmur, rub, or gallop, no edema  ABDOMEN:  normal bowel sounds, soft, " nontender, no hepatosplenomegaly or other masses, stoma pink, colostomy bag intatact  SKIN:  Inspection of skin and subcutaneous tissue baseline  PSYCH:  oriented X 3, affect and mood normal    Lab/Diagnostic data:   not following routine labs, pt is on hospice    ASSESSMENT/PLAN  COPD, severe (H)  Hospice care patient  - end stage lung disease  - continue prednisone, trelegy and prn albuterol  - ok to use prn morphine or ativan for resp distress    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

## 2022-01-28 NOTE — PROGRESS NOTES
"[facility]    Hospitalist Progress Note      Assessment & Plan   Bella Saucedo is a 74 year old female who was directly admitted to the ICU from [facility] on 10/17/2020 with acute exacerbation of COPD and concerns for community-acquired pneumonia requiring intubation. COVID-19 ruled out. Extubated on 10/19/2020, hospitalist service consulted 10/20/2020 to transfer out of ICU and assume care.    Acute hypoxemic and hypercapnic respiratory failure requiring intubation 10/17-10/19  Possible community-acquired pneumonia   Acute exacerbation of severe COPD in the setting of chronic 2 L home O2  Ruled out COVID-19 - Negative 10/17  Presented with hypoxic hypercapnic respiratory failure. ABG on admit c/w severe non-compensated respiratory acidosis with pCO2 >90 and failed attempt at BiPAP therefore she was intubated 10/17-10/19. No infiltrates on CXR. Started as COVID-19 PUI but testing negative and considered low risk therefore not retested. Influenza/RSV Negative. Sputum cultures Neg. Legionella urine Neg. Started on empiric antibiotics for possible CAP. No leukocytosis. Continues to be hypercapnic, likely chronic in setting of COPD. Appears to have a relatively recent smoking hx.  - completed ceftriaxone and azithromycin today (1 week total)  - Scheduled and PRN duo nebs  - it seems that in the past she was on Advair but as per pharm note- she stopped taking it because \"it dries her throat and causes dry cough; she would benefit from a ICS/LABA combination after discharge; started her on Breo Ellipta; also started on Incruse  - Slow improvement, CT chest done on 10/22- no PE; moderate left lower lobe compressive atelectasis and/or infiltrate; small left effusion  - Transition to oral prednisone 40 mg po daily starting 10/24, now on Prednisone 30 mg po daily, plan to taper q4 days  - started Mucinex 600 mg po BID  - she would benefit from using BiPAP when at bedtime  -  Pulmonology consult " appreciated; no other interventions recommended at this time; needs to follow up with Pulmonology after discharge   - Incentive spirometry, mobilize as able  - Still requiring 3-4 L oxygen, and desaturates more with activity; try to wean down O2 needs if able; O2 sat goal should be>90%  - PT rec TCU  - SLP-had video swallow study- DD2 with thin liquids  - SW consult appreciated for baseline     Acute encephalopathy, Delirium of critical illness, improved  Improving, pt AAO x2-3.   - Started on Seroquel 50 mg po at bedtime by ICU  - Delirium precautions  - Reorient frequently   - PT, OT following, recommend TCU- the patient and daughter agree with this plan     Coronary artery disease, Hx NSTEMI  Hypertension  Heart failure with preserved ejection fraction   on admit. No peripheral edema.   TTE 2017: LVEF 65-70% with increased RV wall thickness.   - PTA furosemide/KCl (PTA furosemide 40mg 5x/wk)- held on admission given critical illness, resumed on 10/20  - Telemetry  - resumed PTA ASA 81mg/d, atorvastatin 20mg/d  - 2gm Na+ diet  - Does not appear to be on any antihypertensives at home     Chronic venous stasis  - b/l LE erythematous and lichenification but does not appear acutely cellulitic. No fevers/chills.  - General wound care  - No acute concerns for cellulitis      Insulin-dependent type 2 diabetes mellitus  HbA1c 6%.   - PTA on Lantus 1 units at bedtime?  - --304 yesterday  - steroids likely contributing to elevated BS  - started Lantus 4 units at bedtime, BS in am 94; decreased Lantus to 2 units qhs  - ISS, medium resistance--continue on discharge given steroid taper  - Hypoglycemia protocol, preprandial and HS fingerstick checks     Hx stabe IIB colon cancer 2016 s/p left hemicolectomy and colostomy  -WOCN following for ostomy cares     Non-severe malnutrition  - nutrition consult     Other Noted History per chart review  Anxiety: Continue PTA citalopram 40mg/d, quetiapine 50mg Q HS  GERD:  Continue PPI  Lung nodule  Rt MCA aneurysm s/p coil embolization 2011  PCOM aneurysm rupture s/p coil embolization 2010  Subarachnoid hemorrhage    DVT Prophylaxis: Heparin SQ  Code Status: Full Code  Expected discharge: Tomorrow, recommended to transitional care unit once oxygen status stable and bipap in place at TCU    Gela Burden DO  Text Page (7am - 6pm)    Interval History   Patient seen and examined. No chest pain, nausea, vomiting, fevers, chills. Her breathing feels better today. Did have episode of shortness of breath and anxiety after being laid flat and having brief changed prior to transport. Discharge held for this reason. Also due to the fact that bipap would not be available at TCU upon her arrival 10/29 evening.    -Data reviewed today: I reviewed all new labs and imaging results over the last 24 hours. I personally reviewed no images or EKG's today.    Physical Exam   Temp: 98.6  F (37  C) Temp src: Oral BP: 123/56 Pulse: 79   Resp: 18 SpO2: 95 % O2 Device: Nasal cannula Oxygen Delivery: 2 LPM  Vitals:    10/26/20 0636 10/28/20 0630 10/29/20 0446   Weight: 60.7 kg (133 lb 13.1 oz) 59.8 kg (131 lb 13.4 oz) 58 kg (127 lb 13.9 oz)     Vital Signs with Ranges  Temp:  [97.8  F (36.6  C)-98.6  F (37  C)] 98.6  F (37  C)  Pulse:  [68-94] 79  Resp:  [16-18] 18  BP: (122-127)/(53-60) 123/56  SpO2:  [87 %-95 %] 95 %  I/O last 3 completed shifts:  In: 240 [P.O.:240]  Out: 600 [Urine:600]     Constitutional: Awake, alert, cooperative, no apparent distress  Respiratory: decreased breath sounds bilaterally with few wheezes.  Cardiovascular: Regular rate and rhythm, normal S1 and S2, and no murmur noted  GI: Normal bowel sounds, soft, non-distended, non-tender, colostomy in place  Skin/Integumen: No rashes, no cyanosis, no edema  Other: Chronic stasis changes bilateral lower extremities.     Medications     - MEDICATION INSTRUCTIONS -       - MEDICATION INSTRUCTIONS -         aspirin  81 mg Oral Daily      atorvastatin  20 mg Oral Daily     citalopram  40 mg Oral Daily     fluticasone-vilanterol  1 puff Inhalation Daily     furosemide  40 mg Oral Once per day on Mon Tue Wed Fri Sat     guaiFENesin  600 mg Oral BID     heparin ANTICOAGULANT  5,000 Units Subcutaneous Q12H     insulin aspart  1-7 Units Subcutaneous TID AC     insulin aspart  1-5 Units Subcutaneous At Bedtime     insulin glargine  2 Units Subcutaneous At Bedtime     ipratropium - albuterol 0.5 mg/2.5 mg/3 mL  3 mL Nebulization 4x daily     pantoprazole  40 mg Oral Daily     potassium chloride ER  10 mEq Oral Once per day on Mon Tue Wed Fri Sat     predniSONE  30 mg Oral Daily     QUEtiapine  50 mg Oral At Bedtime     senna-docusate  1-2 tablet Oral BID     umeclidinium  1 puff Inhalation Daily       Data   Recent Labs   Lab 10/29/20  0822 10/26/20  0855 10/24/20  0840 10/23/20  0907   WBC  --   --  9.2 7.1   HGB  --   --  13.2 13.9   MCV  --   --  101* 100    239 208 215   NA  --  142 145* 142   POTASSIUM  --  3.8 3.7 4.0   CHLORIDE  --  102 102 102   CO2  --  39* 41* 39*   BUN  --  18 25 18   CR  --  0.52 0.48* 0.42*   ANIONGAP  --  1* 2* 1*   MARILU  --  8.5 8.4* 8.9   GLC  --  82 112* 135*       No results found for this or any previous visit (from the past 24 hour(s)).   declines

## 2025-05-23 NOTE — LETTER
3/3/2021        RE: Bella Saucedo  C/o Willie Saucedo  [address]  [place] [address]        [place] [facility]  Chief Complaint   Patient presents with     Hospital F/U     [facility] 2/12/2021 - 3/1/2021      [facility] Medical Record Number: [id number]  [facility] () [[id number]]   PRIMARY CARE PROVIDER AND CLINIC: Tonja Bocanegra, KASHMIR CNP, [address] / [place] [address]; Phone: [phone]; Fax: [phone]    Bella Saucedo is a 75 year old (1945), re-admitted to the above facility from  [facility]. Hospital stay 2/12/21 - 3/1/21.  Admitted to this facility for rehab, medical management and nursing care.    HPI:    HPI information obtained from: facility chart records, facility staff, patient report and [facility] chart review.     Patient recently hospitalized for SBO. She had an exploratory lap with extensive lysis of adhesions. Bowel rest following surgery with TPN advanced to clear liquids and now low residue diet. She had some post of delirium and is now back to baseline. Today she is requesting oatmeal and eggs. Minimal intake since back to LTC. D/W dietician and Hospice, will advance slowly per patient request. Hospice has started Augmentin for presumed right arm cellulitis. No warmth or pain.         CODE STATUS/ADVANCE DIRECTIVES DISCUSSION: No CPR - Do NOT Intubate  Patient's living condition: lives in a skilled nursing facility  ALLERGIES: Nitroglycerin  PAST MEDICAL HISTORY:  has a past medical history of CAD (coronary artery disease), Cerebral aneurysm, nonruptured, Colon cancer (H), COPD (chronic obstructive pulmonary disease) (H), and Subarachnoid hemorrhage (H).  PAST SURGICAL HISTORY:   has a past surgical history that includes hemicolectomy, rt/lt and Laparoscopy diagnostic (general) (N/A, 2/17/2021).  FAMILY HISTORY: family history includes Cancer in her  mother and sister; Diabetes in her brother.  SOCIAL HISTORY:   reports that she quit smoking about 4 months ago. Her smoking use included cigarettes. She smoked 1.00 pack per day. She does not have any smokeless tobacco history on file.    Post Discharge Medication Reconciliation Status: discharge medications reconciled, continue medications without change    Current Outpatient Medications   Medication Sig Dispense Refill     acetaminophen (TYLENOL) 325 MG tablet Take 650 mg by mouth every 4 hours as needed for pain        albuterol (PROAIR HFA/PROVENTIL HFA/VENTOLIN HFA) 108 (90 Base) MCG/ACT inhaler Inhale 2 puffs into the lungs every 4 hours as needed for shortness of breath / dyspnea or wheezing       alum & mag hydroxide-simethicone (MAALOX) 200-200-20 MG/5ML SUSP suspension Take 30 mLs by mouth every 6 hours as needed for indigestion       citalopram (CELEXA) 40 MG tablet Take 40 mg by mouth daily        Fluticasone-Umeclidin-Vilanterol (TRELEGY ELLIPTA) 100-62.5-25 MCG/INH oral inhaler Inhale 1 puff into the lungs daily       guaiFENesin (MUCINEX) 600 MG 12 hr tablet Take 1,200 mg by mouth 2 times daily       ipratropium - albuterol 0.5 mg/2.5 mg/3 mL (DUONEB) 0.5-2.5 (3) MG/3ML neb solution Take 1 vial by nebulization 3 times daily as needed for shortness of breath / dyspnea or wheezing       LORazepam (LORAZEPAM INTENSOL) 2 MG/ML (HIGH CONC) solution Take 0.5 mLs (1 mg) by mouth every 2 hours as needed for agitation or anxiety 30 mL 0     Menthol, Topical Analgesic, (BIOFREEZE) 4 % GEL Externally apply topically 2 times daily L Shoulder       Menthol, Topical Analgesic, (BIOFREEZE) 4 % GEL Externally apply topically 2 times daily as needed       morphine sulfate HIGH CONCENTRATE (ROXANOL) 20 mg/mL (HIGH CONC) solution Take 0.25 mLs (5 mg) by mouth every hour as needed for shortness of breath / dyspnea or breakthrough pain 15 mL 0     ondansetron (ZOFRAN-ODT) 4 MG ODT tab Take 1 tablet (4 mg) by mouth every  "6 hours as needed for nausea or vomiting       pantoprazole (PROTONIX) 40 MG EC tablet Take 1 tablet (40 mg) by mouth every morning (before breakfast)       polyethylene glycol (MIRALAX) 17 g packet Take 1 packet by mouth daily       predniSONE (DELTASONE) 10 MG tablet Take 10 mg by mouth daily       senna-docusate (SENOKOT-S/PERICOLACE) 8.6-50 MG tablet Take 2 tablets by mouth 2 times daily       Vitamin D, Cholecalciferol, 25 MCG (1000 UT) CAPS Take 4,000 Int'l Units by mouth daily           ROS:  4 point ROS including Respiratory, CV, GI and , other than that noted in the HPI,  is negative    Vitals:  /64   Pulse 66   Temp 97.1  F (36.2  C)   Resp 20   Ht 1.626 m (5' 4\")   Wt 59 kg (130 lb)   SpO2 98%   BMI 22.31 kg/m    Exam:  GENERAL APPEARANCE:  Alert, frail and elderly appearing  RESP:  diminished breath sounds t/o, with mild exp wheezing, using accessorary muscles  CV:  regular rate and rhythm, no murmur, rub, or gallop  ABDOMEN:  mild distention, + BS's, mild diffuse tenderness  SKIN:  no rashes or lesions  PSYCH:  oriented X 3, affect and mood normal    Lab/Diagnostic data:    Most Recent 3 CBC's:  Recent Labs   Lab Test 02/26/21  0620 02/25/21  0600 02/24/21  0518 02/21/21  0515   WBC  --  13.8* 14.5* 11.7*   HGB 10.1* 10.6* 11.3* 10.9*   MCV  --  99 97 98   PLT  --  251 258 220     Most Recent 3 BMP's:  Recent Labs   Lab Test 03/01/21  0600 02/27/21  0520 02/26/21  0620 02/25/21  0600     --  144 143   POTASSIUM 3.9 3.9 3.9 3.9   CHLORIDE 103  --  106 105   CO2 39*  --  40* 39*   BUN 18  --  30 30   CR 0.49*  --  0.40* 0.41*   ANIONGAP <1*  --  <1* <1*   MARILU 8.6  --  8.1* 8.0*   *  --  114* 167*     Most Recent 2 LFT's:  Recent Labs   Lab Test 03/01/21  0600 02/25/21  0600   AST 15 16   ALT 71* 79*   ALKPHOS 103 69   BILITOTAL 0.6 0.3     Most Recent TSH and T4:  Recent Labs   Lab Test 12/24/20  1245   TSH 1.24     Most Recent Anemia Panel:  Recent Labs   Lab Test " 02/26/21  0620 02/25/21  0600 12/24/20  1245 12/24/20  1245   WBC  --  13.8*   < > 10.9   HGB 10.1* 10.6*   < > 14.4   HCT  --  34.8*   < > 46.6   MCV  --  99   < > 99   PLT  --  251   < > 267   B12  --   --   --  680   FOLIC  --   --   --  14.5    < > = values in this interval not displayed.       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 Patient wanting oatmeal, she is on hospice and goal is comfort. Ok to advance diet slowly per patient request    Mild protein-calorie malnutrition (H)  - dietician following, will allow patient to progress diet as goal is comfort    Chronic obstructive pulmonary disease with acute exacerbation (H)  Hospice care patient  - end stage lung disease  - continue prednisone, trelegy and prn albuterol  - ok to use prn morphine or ativan for resp distress    Swelling of joint of upper arm, right  - no warmth, mild ecchymosis  - will stop augmentin       Orders written by provider at facility  - discontinue grant  - discontinue augmentin  - discontinue BG checks      Electronically signed by:  KASHMIR Gonzalez CNP           Sincerely,        KASHMIR Gonzalez CNP     bed rails

## (undated) DEVICE — ENDO TROCAR FIRST ENTRY KII FIOS Z-THRD 05X100MM CTF03

## (undated) DEVICE — SPONGE LAP 18X18" 23250-400

## (undated) DEVICE — SU VICRYL 0 UR-6 27" J603H

## (undated) DEVICE — PACK LAP CHOLE SLC15LCFSD

## (undated) DEVICE — Device

## (undated) DEVICE — SU VICRYL 0 TIE 12X18" J906G

## (undated) DEVICE — ESU HOLDER LAP INST DISP PURPLE LONG 330MM H-PRO-330

## (undated) DEVICE — SU PDS II 1 CT MONOFIL Z353H

## (undated) DEVICE — ENDO SCOPE WARMER LF TM500

## (undated) DEVICE — SU VICRYL 2-0 TIE 12X18" J905T

## (undated) DEVICE — SU VICRYL 4-0 PS-2 18" UND J496H

## (undated) DEVICE — BLADE KNIFE SURG 15C 371716

## (undated) DEVICE — OSTOMY BAG CLAMP 8770

## (undated) DEVICE — SUCTION CANISTER MEDIVAC LINER 3000ML W/LID 65651-530

## (undated) DEVICE — ESU ELEC BLADE 6" COATED E1450-6

## (undated) DEVICE — SU VICRYL 3-0 SH 27" J316H

## (undated) DEVICE — OSTOMY BAG COLOSTOMY ACTIVE LIFE 1 PIECE 8651

## (undated) DEVICE — PREP CHLORAPREP 26ML TINTED ORANGE  260815

## (undated) DEVICE — GLOVE GAMMEX DERMAPRENE ULTRA SZ 8.5 LF 8517

## (undated) DEVICE — DRSG GAUZE 4X4" 3033

## (undated) DEVICE — ESU GROUND PAD UNIVERSAL W/O CORD

## (undated) DEVICE — STPL SKIN 35W ROTATING HEAD PRW35

## (undated) DEVICE — SOL WATER IRRIG 1000ML BOTTLE 2F7114

## (undated) DEVICE — ESU CORD MONOPOLAR 10'  E0510

## (undated) DEVICE — SOL NACL 0.9% INJ 1000ML BAG 2B1324X

## (undated) DEVICE — LINEN TOWEL PACK X5 5464

## (undated) DEVICE — SU VICRYL 3-0 SH CR 8X18" J774

## (undated) RX ORDER — LIDOCAINE HYDROCHLORIDE 20 MG/ML
INJECTION, SOLUTION EPIDURAL; INFILTRATION; INTRACAUDAL; PERINEURAL
Status: DISPENSED
Start: 2021-01-01

## (undated) RX ORDER — FENTANYL CITRATE 50 UG/ML
INJECTION, SOLUTION INTRAMUSCULAR; INTRAVENOUS
Status: DISPENSED
Start: 2021-01-01

## (undated) RX ORDER — CEFAZOLIN SODIUM 2 G/100ML
INJECTION, SOLUTION INTRAVENOUS
Status: DISPENSED
Start: 2021-01-01

## (undated) RX ORDER — ONDANSETRON 2 MG/ML
INJECTION INTRAMUSCULAR; INTRAVENOUS
Status: DISPENSED
Start: 2021-01-01

## (undated) RX ORDER — GLYCOPYRROLATE 0.2 MG/ML
INJECTION, SOLUTION INTRAMUSCULAR; INTRAVENOUS
Status: DISPENSED
Start: 2021-01-01

## (undated) RX ORDER — BUPIVACAINE HYDROCHLORIDE 2.5 MG/ML
INJECTION, SOLUTION EPIDURAL; INFILTRATION; INTRACAUDAL
Status: DISPENSED
Start: 2021-01-01

## (undated) RX ORDER — NEOSTIGMINE METHYLSULFATE 1 MG/ML
VIAL (ML) INJECTION
Status: DISPENSED
Start: 2021-01-01

## (undated) RX ORDER — CEFAZOLIN SODIUM 1 G/3ML
INJECTION, POWDER, FOR SOLUTION INTRAMUSCULAR; INTRAVENOUS
Status: DISPENSED
Start: 2021-01-01

## (undated) RX ORDER — ALBUTEROL SULFATE 90 UG/1
AEROSOL, METERED RESPIRATORY (INHALATION)
Status: DISPENSED
Start: 2021-01-01

## (undated) RX ORDER — ALBUMIN, HUMAN INJ 5% 5 %
SOLUTION INTRAVENOUS
Status: DISPENSED
Start: 2021-01-01